# Patient Record
Sex: FEMALE | Race: WHITE | NOT HISPANIC OR LATINO | Employment: OTHER | URBAN - METROPOLITAN AREA
[De-identification: names, ages, dates, MRNs, and addresses within clinical notes are randomized per-mention and may not be internally consistent; named-entity substitution may affect disease eponyms.]

---

## 2017-03-03 DIAGNOSIS — E87.5 HYPERKALEMIA: ICD-10-CM

## 2017-03-03 DIAGNOSIS — Z12.11 ENCOUNTER FOR SCREENING FOR MALIGNANT NEOPLASM OF COLON: ICD-10-CM

## 2017-03-06 ENCOUNTER — GENERIC CONVERSION - ENCOUNTER (OUTPATIENT)
Dept: OTHER | Facility: OTHER | Age: 67
End: 2017-03-06

## 2017-03-06 ENCOUNTER — APPOINTMENT (OUTPATIENT)
Dept: LAB | Facility: CLINIC | Age: 67
End: 2017-03-06
Payer: MEDICARE

## 2017-03-06 ENCOUNTER — TRANSCRIBE ORDERS (OUTPATIENT)
Dept: LAB | Facility: CLINIC | Age: 67
End: 2017-03-06

## 2017-03-06 DIAGNOSIS — E87.5 HYPERKALEMIA: ICD-10-CM

## 2017-03-06 LAB
ANION GAP SERPL CALCULATED.3IONS-SCNC: 3 MMOL/L (ref 4–13)
BUN SERPL-MCNC: 13 MG/DL (ref 5–25)
CALCIUM SERPL-MCNC: 9.6 MG/DL (ref 8.3–10.1)
CHLORIDE SERPL-SCNC: 100 MMOL/L (ref 100–108)
CO2 SERPL-SCNC: 34 MMOL/L (ref 21–32)
CREAT SERPL-MCNC: 0.85 MG/DL (ref 0.6–1.3)
GFR SERPL CREATININE-BSD FRML MDRD: >60 ML/MIN/1.73SQ M
GLUCOSE SERPL-MCNC: 185 MG/DL (ref 65–140)
POTASSIUM SERPL-SCNC: 4.3 MMOL/L (ref 3.5–5.3)
SODIUM SERPL-SCNC: 137 MMOL/L (ref 136–145)

## 2017-03-06 PROCEDURE — 80048 BASIC METABOLIC PNL TOTAL CA: CPT

## 2017-03-06 PROCEDURE — 36415 COLL VENOUS BLD VENIPUNCTURE: CPT

## 2017-03-07 ENCOUNTER — ALLSCRIPTS OFFICE VISIT (OUTPATIENT)
Dept: OTHER | Facility: OTHER | Age: 67
End: 2017-03-07

## 2017-03-07 ENCOUNTER — GENERIC CONVERSION - ENCOUNTER (OUTPATIENT)
Dept: OTHER | Facility: OTHER | Age: 67
End: 2017-03-07

## 2017-03-20 ENCOUNTER — GENERIC CONVERSION - ENCOUNTER (OUTPATIENT)
Dept: OTHER | Facility: OTHER | Age: 67
End: 2017-03-20

## 2017-06-20 ENCOUNTER — ALLSCRIPTS OFFICE VISIT (OUTPATIENT)
Dept: OTHER | Facility: OTHER | Age: 67
End: 2017-06-20

## 2017-06-20 DIAGNOSIS — J45.909 UNCOMPLICATED ASTHMA: ICD-10-CM

## 2017-06-20 DIAGNOSIS — Z12.11 ENCOUNTER FOR SCREENING FOR MALIGNANT NEOPLASM OF COLON: ICD-10-CM

## 2017-06-20 DIAGNOSIS — Z20.5 CONTACT WITH AND (SUSPECTED) EXPOSURE TO VIRAL HEPATITIS: ICD-10-CM

## 2017-06-20 DIAGNOSIS — E11.9 TYPE 2 DIABETES MELLITUS WITHOUT COMPLICATIONS (HCC): ICD-10-CM

## 2017-06-20 LAB — HBA1C MFR BLD HPLC: 6.9 %

## 2017-09-11 ENCOUNTER — GENERIC CONVERSION - ENCOUNTER (OUTPATIENT)
Dept: OTHER | Facility: OTHER | Age: 67
End: 2017-09-11

## 2017-10-06 ENCOUNTER — APPOINTMENT (OUTPATIENT)
Dept: LAB | Facility: CLINIC | Age: 67
End: 2017-10-06
Payer: MEDICARE

## 2017-10-06 ENCOUNTER — TRANSCRIBE ORDERS (OUTPATIENT)
Dept: LAB | Facility: CLINIC | Age: 67
End: 2017-10-06

## 2017-10-06 DIAGNOSIS — E11.9 TYPE 2 DIABETES MELLITUS WITHOUT COMPLICATIONS (HCC): ICD-10-CM

## 2017-10-06 DIAGNOSIS — Z20.5 CONTACT WITH AND (SUSPECTED) EXPOSURE TO VIRAL HEPATITIS: ICD-10-CM

## 2017-10-06 LAB
ANION GAP SERPL CALCULATED.3IONS-SCNC: 6 MMOL/L (ref 4–13)
BUN SERPL-MCNC: 10 MG/DL (ref 5–25)
CALCIUM SERPL-MCNC: 9.2 MG/DL (ref 8.3–10.1)
CHLORIDE SERPL-SCNC: 104 MMOL/L (ref 100–108)
CO2 SERPL-SCNC: 30 MMOL/L (ref 21–32)
CREAT SERPL-MCNC: 0.9 MG/DL (ref 0.6–1.3)
CREAT UR-MCNC: 154 MG/DL
GFR SERPL CREATININE-BSD FRML MDRD: 65 ML/MIN/1.73SQ M
GLUCOSE P FAST SERPL-MCNC: 172 MG/DL (ref 65–99)
HCV AB SER QL: NORMAL
MICROALBUMIN UR-MCNC: 34.6 MG/L (ref 0–20)
MICROALBUMIN/CREAT 24H UR: 22 MG/G CREATININE (ref 0–30)
POTASSIUM SERPL-SCNC: 4.6 MMOL/L (ref 3.5–5.3)
SODIUM SERPL-SCNC: 140 MMOL/L (ref 136–145)

## 2017-10-06 PROCEDURE — 80048 BASIC METABOLIC PNL TOTAL CA: CPT

## 2017-10-06 PROCEDURE — 86803 HEPATITIS C AB TEST: CPT

## 2017-10-06 PROCEDURE — 36415 COLL VENOUS BLD VENIPUNCTURE: CPT

## 2017-10-06 PROCEDURE — 82570 ASSAY OF URINE CREATININE: CPT

## 2017-10-06 PROCEDURE — 82043 UR ALBUMIN QUANTITATIVE: CPT

## 2017-10-10 ENCOUNTER — ALLSCRIPTS OFFICE VISIT (OUTPATIENT)
Dept: OTHER | Facility: OTHER | Age: 67
End: 2017-10-10

## 2017-10-10 DIAGNOSIS — E78.5 HYPERLIPIDEMIA: ICD-10-CM

## 2017-10-10 DIAGNOSIS — R53.1 WEAKNESS: ICD-10-CM

## 2017-10-10 DIAGNOSIS — E11.9 TYPE 2 DIABETES MELLITUS WITHOUT COMPLICATIONS (HCC): ICD-10-CM

## 2017-10-11 NOTE — PROGRESS NOTES
Assessment  1  Allergic rhinitis (477 9) (J30 9)   2  Asthma, mild intermittent (493 90) (J45 20)   3  Dupuytren's contracture (728 6) (M72 0)   4  Controlled type 2 diabetes mellitus (250 00) (E11 9)   5  Benign essential hypertension (401 1) (I10)   6  Hyperlipidemia (272 4) (E78 5)   7  Weakness (780 79) (R53 1)    Plan  Asthma, mild intermittent    · Accu-Chek Compact Plus In Vitro Strip; test twice daily   · Accu-Chek Softclix Lancets Miscellaneous; USE BID  Controlled type 2 diabetes mellitus    · (1) HEMOGLOBIN A1C; Status:Active; Requested for:10Oct2017;   Flu vaccine need    · Fluzone High-Dose 0 5 ML Intramuscular Suspension Prefilled Syringe  Hyperlipidemia    · (1) COMPREHENSIVE METABOLIC PANEL; Status:Active; Requested for:10Oct2017;    · (1) LIPID PANEL, FASTING; Status:Active; Requested for:10Oct2017;   Weakness    · (1) CBC/PLT/DIFF; Status:Active; Requested for:10Oct2017;    · (1) TSH WITH FT4 REFLEX; Status:Active; Requested for:10Oct2017;     Discussion/Summary    Alexus Hodgson is here for follow-up visit  She has been doing well with the diabetes  She has had a little more nasal congestion and coughing in the morning due to allergies  I recommended use of Flonase and saline nasal spray  blood pressure is under excellent control  She remains on simvastatin for hyperlipidemia  We will check a fasting lipid profile prior to next visit  has been complaining of some feelings of muscle weakness recently  I have also given her a lab slip to check thyroid functions and a blood count  shot was given today and she will be due for her pneumococcal vaccine at the next visit in 3 or 4 months  Possible side effects of new medications were reviewed with the patient/guardian today  The treatment plan was reviewed with the patient/guardian  The patient/guardian understands and agrees with the treatment plan      Chief Complaint  dm ck      History of Present Illness  Here for DM follow up  She is feeling well  CP or SOB  No headaches or dizzinesshand with Duputren's contracturec/o back and hip pain after walking  she feels like she is weak opening a door  She feels like she does not have the  strength  in the AM  They recently inherited a cat      Review of Systems    Constitutional: No fever, no chills, feels well, no tiredness, no recent weight gain or weight loss  Cardiovascular: No complaints of slow heart rate, no fast heart rate, no chest pain, no palpitations, no leg claudication, no lower extremity edema  Respiratory: cough, but-no shortness of breath-and-no wheezing  Musculoskeletal: as noted in HPI  Active Problems  1  Allergic rhinitis (477 9) (J30 9)   2  Asthma, mild intermittent (493 90) (J45 20)   3  Benign essential hypertension (401 1) (I10)   4  Bronchitis, asthmatic (493 90) (J45 909)   5  Colon cancer screening (V76 51) (Z12 11)   6  Dupuytren's contracture (728 6) (M72 0)   7  Encounter for FIT (fecal immunochemical test) screening (V76 51) (Z12 11)   8  Encounter for mammogram to establish baseline mammogram (V76 12) (Z12 31)   9  Encounter for preventive health examination (V70 0) (Z00 00)   10  Exercise counseling (V65 41) (Z71 82)   11  Exposure to hepatitis C (V01 79) (Z20 5)   12  Flu vaccine need (V04 81) (Z23)   13  Flu vaccine need (V04 81) (Z23)   14  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 9) (H91 91)   15  High potassium (276 7) (E87 5)   16  Hyperlipidemia (272 4) (E78 5)   17  Hyperpigmentation of skin (709 00) (L81 9)   18  Left cataract (366 9) (H26 9)   19  Medicare annual wellness visit, subsequent (V70 0) (Z00 00)   20  Monilial rash (112 3) (B37 2)   21  Need for pneumococcal vaccine (V03 82) (Z23)   22  Postmenopausal osteoporosis (733 01) (M81 0)   23  Restless legs syndrome (333 94) (G25 81)   24  Screening for osteoporosis (V82 81) (Z13 820)   25  Uncomplicated asthma, unspecified asthma severity   26  Visit for pre-operative examination (V72 84) (Z01 818)   27   Weight gain (783 1) (R63 5)    Past Medical History    The active problems and past medical history were reviewed and updated today  Surgical History  1  History of Cholecystectomy   2  History of Hysterectomy    Family History  Mother    1  Family history of heart disease (V17 49) (Z82 49)  Father    2  Family history of rheumatic fever (V17 49) (Z82 49)  Sibling    3  Family history of diabetes mellitus (V18 0) (Z83 3)  Maternal Great Grandmother    4  Family history of diabetes mellitus (V18 0) (Z83 3)    Social History   · Former smoker (V15 82) (B50 371)   · No alcohol use   · No drug use    Current Meds   1  Accu-Chek Compact Plus Care KIT; TEST ONCE DAILY; Therapy: 34MEA7236 to (Evaluate:17Jun2016)  Requested for: 61BHN4791; Last   Rx:16Jcv2577 Ordered   2  Accu-Chek Compact Plus In Vitro Strip; test twice daily; Therapy: 51WMS0255 to (Evaluate:00Gnh9701)  Requested for: 24DOQ5343; Last   Rx:87Sju7822 Ordered   3  Accu-Chek Softclix Lancets Miscellaneous; USE BID; Therapy: 42MYV3687 to (Evaluate:11Jan2017)  Requested for: 48QGU4030; Last   Rx:51Hwo1505 Ordered   4  Advil 200 MG Oral Capsule; TAKE 2  CAPSULE Oral;   Therapy: 82AHX0859 to Recorded   5  Alendronate Sodium 70 MG Oral Tablet; TAKE 1 TABLET ONCE WEEKLY; Therapy: 39WXX2104 to (Evaluate:01Apr2018)  Requested for: 70MDL3066; Last   Rx:39Asz6448 Ordered   6  Aspirin 81 MG Oral Tablet Chewable; Take 1 tablet daily; Therapy: 77WSM5385 to (Evaluate:03Sep2017); Last Rx:07Mar2017 Ordered   7  Calcium 600+D 600-400 MG-UNIT Oral Tablet; TAKE 1 TABLET TWICE DAILY; Therapy: 67KWR0402 to (Evaluate:03Sep2017); Last Rx:07Mar2017 Ordered   8  CoQ-10 100 MG Oral Capsule Extended Release; take 1 capsule daily; Therapy: 27KXM3267 to (Evaluate:06Apr2017); Last Rx:07Mar2017 Ordered   9  Losartan Potassium 50 MG Oral Tablet; Take 1 tablet by mouth daily; Therapy: 97TMP9338 to (Evaluate:03Sep2017)  Requested for: 88JOJ5466; Last   Rx:07Mar2017 Ordered   10   MetFORMIN HCl - 1000 MG Oral Tablet; Take 1 tablet twice daily; Therapy: 44DIK5231 to ([place] Draft)  Requested for: 77ZOY2251; Last    Rx:58Qbx7388 Ordered   11  Nystatin 407082 UNIT/GM External Cream; APPLY  AND RUB  IN A THIN FILM TO    AFFECTED AREAS TWICE DAILY  (AM AND PM); Therapy: 64QMR0410 to (Evaluate:77Xzp1950)  Requested for: 20Jun2017; Last    Rx:20Jun2017 Ordered   12  Simvastatin 10 MG Oral Tablet; TAKE 1 TABLET DAILY; Therapy: 05LNE4201 to (Evaluate:11Jan2018)  Requested for: 22PLR3156; Last    Rx:16Jan2017 Ordered    The medication list was reviewed and updated today  Allergies  1  Penicillins    Vitals  Vital Signs    Recorded: 27YLX4281 58:34XP   Systolic 058   Diastolic 70   Height 5 ft 0 6 in   Weight 168 lb 2 oz   BMI Calculated 32 19   BSA Calculated 1 75     Physical Exam    Constitutional   General appearance: No acute distress, well appearing and well nourished  Ears, Nose, Mouth, and Throat   External inspection of ears and nose: Normal     Otoscopic examination: Tympanic membranes translucent with normal light reflex  Canals patent without erythema  Oropharynx: Abnormal  -(edentulous) The posterior pharynx was not erythematous-and-did not have an exudate  Pulmonary   Respiratory effort: No increased work of breathing or signs of respiratory distress  Auscultation of lungs: Clear to auscultation  Cardiovascular   Auscultation of heart: Normal rate and rhythm, normal S1 and S2, without murmurs  Examination of extremities for edema and/or varicosities: Normal     Carotid pulses: Normal     Lymphatic   Palpation of lymph nodes in neck: No lymphadenopathy  Health Management  Diabetes mellitus, type 2   *VB - Eye Exam; every 1 year; Last 99HJP5326; Next Due: 06Xua8558;  Active    Future Appointments    Date/Time Provider Specialty Site   01/16/2018 11:00 AM Everett West MD Family Medicine [facility] 19   Electronically signed by : Juan José Lawton MD; Oct 10 2017  1:03PM EST                       (Author)

## 2018-01-10 NOTE — CONSULTS
Chief Complaint  Initial visit- Preop clearance left eye cataract on 2016 by Dr Shameka Fagan      History of Present Illness  Pre-Op Visit (Brief): The patient is being seen for a preoperative visit  Surgical Risk Assessment:   Prior Anesthesia: She had prior anesthesia  Exercise Capacity: unable to walk two flights of stairs without symptoms, but able to walk four blocks without symptoms  Lifestyle Factors: denies alcohol use, denies tobacco use and denies illegal drug use  Symptoms: no easy bleeding, no easy bruising, no frequent nosebleeds, cough, no dyspnea, no edema and wheezing  HPI: Preop for Left cataract  and right side   Hx of asthma diagnosed many years ago   Has not been here for years, has not been sick  Quit smoking 20 yrs ago  C/o cough in the morning productive of clear mucus  Gets SOB with exertion, occasional wheezing  Used to use Flonase but eye doctor does not want her to use due to elevated pressures, glaucoma suspect  Uses occasional Robitussin DM and takes Advil every other night for arthritis in knees  Weight loss after her father   Dentures no longer fit  Review of Systems    Constitutional: no fever and no chills  ENT: nasal discharge  Cardiovascular: no chest pain and no palpitations  Respiratory: as noted in HPI  Surgical History    · History of Cholecystectomy   · History of Hysterectomy    Family History    · Family history of heart disease (V17 49) (Z82 49)    Social History    · Former smoker (V1 82) (Z18 637)   · No alcohol use   · No drug use    Current Meds   1  Advil 200 MG Oral Capsule; TAKE 2  CAPSULE Oral;   Therapy: 2016 to Recorded   2  Robitussin -10 MG/5ML Oral Syrup; Therapy: 77SDB1328 to Recorded    Allergies    1   Penicillins    Vitals  Signs [Data Includes: Current Encounter]    Temperature: 98 F  O2 Saturation: 94   Systolic: 322  Diastolic: 80   Systolic: 408  Diastolic: 78   Heart Rate: 88  Systolic: 182  Diastolic: 80  Height: 5 ft 0 4 in  Weight: 165 lb 8 oz  BMI Calculated: 31 89  BSA Calculated: 1 73    Physical Exam    Constitutional   General appearance: No acute distress, well appearing and well nourished  obese  Head and Face   Head and face: Normal     Palpation of the face and sinuses: No sinus tenderness  Eyes   Conjunctiva and lids: No swelling, erythema or discharge  Pupils and irises: Equal, round, reactive to light  Ears, Nose, Mouth, and Throat   External inspection of ears and nose: Normal     Otoscopic examination: Tympanic membranes translucent with normal light reflex  Canals patent without erythema  Hearing: Normal     Nasal mucosa, septum, and turbinates: Abnormal   There was clear rhinorrhea from both nares  The bilateral nasal mucosa was boggy  Lips, teeth, and gums: Abnormal   (edentulous)   Oropharynx: Normal with no erythema, edema, exudate or lesions  Neck   Neck: Supple, symmetric, trachea midline, no masses  Thyroid: Normal, no thyromegaly  Pulmonary   Respiratory effort: No increased work of breathing or signs of respiratory distress  Auscultation of lungs: Clear to auscultation  Cardiovascular   Auscultation of heart: Normal rate and rhythm, normal S1 and S2, no murmurs  Carotid pulses: 2+ bilaterally  Pedal pulses: 2+ bilaterally  Examination of extremities for edema and/or varicosities: Normal     Abdomen   Abdomen: Non-tender, no masses  (large vertical scar)   Liver and spleen: No hepatomegaly or splenomegaly  Results/Data  MINI NEBULIZER- POC 20Jan2016 10:10AM Preeti Senegal     Test Name Result Flag Reference   Serafin Silver [id number]       EKG/ECG- POC 09QLY1372 10:07AM Preeti Sen[place]     Test Name Result Flag Reference   EKG/ECG 01/20/2016       No acute ischemia  T waves:   there are nonspecific ST-T wave changes  Assessment    1  History of Hysterectomy   2  History of Cholecystectomy   3   Visit for pre-operative examination (V72 84) (Z01 818)   4  Bronchitis, asthmatic (493 90) (J45 909)   5  Allergic rhinitis (477 9) (J30 9)   6  Left cataract (366 9) (H26 9)    Plan  Bronchitis, asthmatic    · Advair Diskus 100-50 MCG/DOSE Inhalation Aerosol Powder Breath Activated;  inhale 1 puff twice daily   · Albuterol Sulfate (2 5 MG/3ML) 0 083% Inhalation Nebulization Solution   · MINI NEBULIZER- POC; Status:Complete - Retrospective By Protocol Authorization;    Done: 25TNM3777 10:10AM  Flu vaccine need    · Stop: Fluzone High-Dose Intramuscular Suspension  Visit for pre-operative examination    · EKG/ECG- POC; Status:Complete - Retrospective By Protocol Authorization;   Done:  44REY7616 10:07AM    Discussion/Summary  Surgical Clearance: She is at a LOW risk from a cardiovascular standpoint at this time without any additional cardiac testing  Reevaluation needed, if she should present with symptoms prior to surgery/procedure  Patient is here for preop clearance for left cataract to be done January 26  She has not been into the office for many years, because she states she rarely gets sick  She does have a history of chronic bronchitis and was diagnosed with asthma and allergies many years ago  She has never used an inhaler on an ongoing basis  She has used over-the-counter rescue inhalers on rare occasion  She states her respiratory status is fairly good for walking on a level surface but she gets winded with exertion such as climbing stairs or walking up an incline  She did a mid treatment here in the office to see if it would improve her pulse ox above 94% but there was no change  I have given her a Advair inhaler and a prescription for the same  We'll recommend she start using it but she may want to wait until after her cataract surgeries  She should have an annual wellness visits as well as a recheck visits and we will plan this in 1-2 months  End of Encounter Meds    1   Advair Diskus 100-50 MCG/DOSE Inhalation Aerosol Powder Breath Activated; inhale 1   puff twice daily; Therapy: 47UGU3618 to (Last Rx:20Jan2016)  Requested for: 20Jan2016 Ordered    2  Robitussin -10 MG/5ML Oral Syrup; Therapy: 86CAA3483 to Recorded    3   Advil 200 MG Oral Capsule; TAKE 2  CAPSULE Oral;   Therapy: 19EOY6674 to Recorded    Signatures   Electronically signed by : Griffin Wood MD; Jan 20 2016  1:43PM EST                       (Author)

## 2018-01-12 ENCOUNTER — APPOINTMENT (OUTPATIENT)
Dept: LAB | Facility: CLINIC | Age: 68
End: 2018-01-12
Payer: MEDICARE

## 2018-01-12 ENCOUNTER — TRANSCRIBE ORDERS (OUTPATIENT)
Dept: LAB | Facility: CLINIC | Age: 68
End: 2018-01-12

## 2018-01-12 VITALS
DIASTOLIC BLOOD PRESSURE: 82 MMHG | HEIGHT: 61 IN | BODY MASS INDEX: 30.99 KG/M2 | WEIGHT: 164.13 LBS | SYSTOLIC BLOOD PRESSURE: 130 MMHG

## 2018-01-12 DIAGNOSIS — E78.5 HYPERLIPIDEMIA: ICD-10-CM

## 2018-01-12 DIAGNOSIS — R53.1 WEAKNESS: ICD-10-CM

## 2018-01-12 DIAGNOSIS — E11.9 TYPE 2 DIABETES MELLITUS WITHOUT COMPLICATIONS (HCC): ICD-10-CM

## 2018-01-12 LAB
ALBUMIN SERPL BCP-MCNC: 3.7 G/DL (ref 3.5–5)
ALP SERPL-CCNC: 68 U/L (ref 46–116)
ALT SERPL W P-5'-P-CCNC: 18 U/L (ref 12–78)
ANION GAP SERPL CALCULATED.3IONS-SCNC: 4 MMOL/L (ref 4–13)
AST SERPL W P-5'-P-CCNC: 16 U/L (ref 5–45)
BASOPHILS # BLD AUTO: 0.05 THOUSANDS/ΜL (ref 0–0.1)
BASOPHILS NFR BLD AUTO: 1 % (ref 0–1)
BILIRUB SERPL-MCNC: 0.46 MG/DL (ref 0.2–1)
BUN SERPL-MCNC: 15 MG/DL (ref 5–25)
CALCIUM SERPL-MCNC: 9.5 MG/DL (ref 8.3–10.1)
CHLORIDE SERPL-SCNC: 101 MMOL/L (ref 100–108)
CHOLEST SERPL-MCNC: 149 MG/DL (ref 50–200)
CO2 SERPL-SCNC: 33 MMOL/L (ref 21–32)
CREAT SERPL-MCNC: 0.92 MG/DL (ref 0.6–1.3)
EOSINOPHIL # BLD AUTO: 0.45 THOUSAND/ΜL (ref 0–0.61)
EOSINOPHIL NFR BLD AUTO: 5 % (ref 0–6)
ERYTHROCYTE [DISTWIDTH] IN BLOOD BY AUTOMATED COUNT: 12.3 % (ref 11.6–15.1)
EST. AVERAGE GLUCOSE BLD GHB EST-MCNC: 143 MG/DL
GFR SERPL CREATININE-BSD FRML MDRD: 64 ML/MIN/1.73SQ M
GLUCOSE P FAST SERPL-MCNC: 117 MG/DL (ref 65–99)
HBA1C MFR BLD: 6.6 % (ref 4.2–6.3)
HCT VFR BLD AUTO: 42.2 % (ref 34.8–46.1)
HDLC SERPL-MCNC: 82 MG/DL (ref 40–60)
HGB BLD-MCNC: 13.9 G/DL (ref 11.5–15.4)
LDLC SERPL CALC-MCNC: 46 MG/DL (ref 0–100)
LYMPHOCYTES # BLD AUTO: 3.18 THOUSANDS/ΜL (ref 0.6–4.47)
LYMPHOCYTES NFR BLD AUTO: 36 % (ref 14–44)
MCH RBC QN AUTO: 29.5 PG (ref 26.8–34.3)
MCHC RBC AUTO-ENTMCNC: 32.9 G/DL (ref 31.4–37.4)
MCV RBC AUTO: 90 FL (ref 82–98)
MONOCYTES # BLD AUTO: 0.67 THOUSAND/ΜL (ref 0.17–1.22)
MONOCYTES NFR BLD AUTO: 8 % (ref 4–12)
NEUTROPHILS # BLD AUTO: 4.56 THOUSANDS/ΜL (ref 1.85–7.62)
NEUTS SEG NFR BLD AUTO: 50 % (ref 43–75)
NRBC BLD AUTO-RTO: 0 /100 WBCS
PLATELET # BLD AUTO: 289 THOUSANDS/UL (ref 149–390)
PMV BLD AUTO: 10.6 FL (ref 8.9–12.7)
POTASSIUM SERPL-SCNC: 4 MMOL/L (ref 3.5–5.3)
PROT SERPL-MCNC: 8.1 G/DL (ref 6.4–8.2)
RBC # BLD AUTO: 4.71 MILLION/UL (ref 3.81–5.12)
SODIUM SERPL-SCNC: 138 MMOL/L (ref 136–145)
TRIGL SERPL-MCNC: 104 MG/DL
TSH SERPL DL<=0.05 MIU/L-ACNC: 3.19 UIU/ML (ref 0.36–3.74)
WBC # BLD AUTO: 8.92 THOUSAND/UL (ref 4.31–10.16)

## 2018-01-12 PROCEDURE — 85025 COMPLETE CBC W/AUTO DIFF WBC: CPT

## 2018-01-12 PROCEDURE — 36415 COLL VENOUS BLD VENIPUNCTURE: CPT

## 2018-01-12 PROCEDURE — 83036 HEMOGLOBIN GLYCOSYLATED A1C: CPT

## 2018-01-12 PROCEDURE — 80053 COMPREHEN METABOLIC PANEL: CPT

## 2018-01-12 PROCEDURE — 80061 LIPID PANEL: CPT

## 2018-01-12 PROCEDURE — 84443 ASSAY THYROID STIM HORMONE: CPT

## 2018-01-12 NOTE — PROGRESS NOTES
Plan    1  DSMT/MNT Time Record; Status:Complete;   Done: 53WRQ5597 06:00PM    Discussion/Summary    PATIENT EDUCATION RECORD   Living Well with Diabetes Class 2 Education Content: Macronutrients, Carbohydrate sources, What one serving of carbohydrate equals, Effects of diet on blood glucose levels, effects of carbohydrates on blood glucose levels, Basics of meal planning: balance, portions, and meal times, Measurements, Reading food labels to determine carbohydrates       Chief Complaint  Fina Garcia attended class this evening  End of Encounter Meds    1  Advair Diskus 100-50 MCG/DOSE Inhalation Aerosol Powder Breath Activated; inhale 1   puff twice daily; Therapy: 63DPM9831 to (Evaluate:84Bpj2852); Last Rx:08Mar2016 Ordered    2  Accu-Chek Compact Plus Care KIT; TEST ONCE DAILY; Therapy: 51OUE8354 to (Evaluate:17Jun2016)  Requested for: 08SYF0607; Last   Rx:87Bug4699 Ordered   3  Accu-Chek Compact Plus In Vitro Strip; test twice daily; Therapy: 33GTW5207 to (Evaluate:15Nov2016)  Requested for: 43IZS0488; Last   Rx:11Wso8011 Ordered   4  Accu-Chek Softclix Lancets Miscellaneous; USE BID; Therapy: 43TMR3552 to (Evaluate:18Jun2016)  Requested for: 39FJU6499; Last   Rx:05Sdr2151 Ordered   5  Lisinopril 5 MG Oral Tablet; TAKE 1 TABLET DAILY; Therapy: 39YYE2830 to (Evaluate:12Nov2016)  Requested for: 97FTY2285; Last   Rx:54Lzb9978 Ordered   6  MetFORMIN HCl - 500 MG Oral Tablet; TAKE 1 TABLET EVERY 12 HOURS WITH FOOD; Therapy: 15WSM1192 to (Evaluate:12Nov2016)  Requested for: 23HGI2183; Last   Rx:87Kxx5608 Ordered    7  Robitussin -10 MG/5ML Oral Syrup; Therapy: 19UUY3641 to Recorded    8  Simvastatin 10 MG Oral Tablet; TAKE 1 TABLET DAILY; Therapy: 22RGF2993 to (Evaluate:12Nov2016)  Requested for: 82UXE6557; Last   Rx:90Rco3077 Ordered    9  Alendronate Sodium 70 MG Oral Tablet (Fosamax); TAKE 1 TABLET ONCE WEEKLY;    Therapy: 07DET4626 to (Evaluate:12Nov2016)  Requested for: 88QXC8751; Last Rx:82Mol8485 Ordered    10   Advil 200 MG Oral Capsule; TAKE 2  CAPSULE Oral;    Therapy: 18DTP1914 to Recorded    Future Appointments    Date/Time Provider Specialty Site   08/18/2016 09:40 AM Drea Campbell MD Family Medicine [address] [place] [address]   Electronically signed by : Gunner Monique RD; Jun 16 2016  8:27AM EST                       (Author)    Electronically signed by : TIFFANIE Killian ; Jun 16 2016  2:25PM EST

## 2018-01-12 NOTE — PROGRESS NOTES
Plan    1  DSMT/MNT Time Record; Status:Complete;   Done: 88FJD0257 10:23AM   2  *1 - [facility]ABETES SELF MANAGEMENT TRAINING OUTPATIENT Physician Referral    Consult  Status: Active  Requested for: 19DVL4588  requires instruction : Yes  Needs requiring Individual DSMT? : No  Self-Management Education/Trainng : Living Well with Diabetes Education      Program  Care Summary provided  : Yes    Discussion/Summary    PATIENT EDUCATION RECORD   Indication for Services: type 2 Diabetes Mellitus and obesity  She is ready to learn  She has no barriers to learning  Diabetes Disease Process:   Discussed diagnosis criteria: Method: Instruction  Response: Verbalizes Understanding   Discussed treatment goals: Method: Instruction  Response: Verbalizes Understanding   Healthy Eating:   Discussed general nutrition topics: Method: Instruction  Response: Verbalizes Understanding and Needs Review   Being Active:   Stated the benefits of exercise: Method: Instruction  Response: Verbalizes Understanding and Needs Review   Her blood glucose targets are: Pre-meal target , Post-meal target <180 and HS target <180  Monitoring:   Discussed option for monitoring SMBG: Method: Instruction  Response: Verbalizes Understanding  Discussed option for monitoring HgbA1c: Method: Instruction  Response: Verbalizes Understanding  Discussed target blood glucose ranges: Method: Instruction and Handout  Response: Verbalizes Understanding  Discussed Finger stick testing: Method: Instruction  Response: Verbalizes Understanding  Discussed proper stick techniques: Method: Instruction  Response: Verbalizes Understanding  Discussed testing times: Method: Instruction  Response: Verbalizes Understanding and Needs Review  She is currently using a Accu-Chek Compact meter  Taking Medication:   Discussed Oral Medication[de-identified] Method: Instruction  Response: [employer]  Stated name,dose, and timing of oral meds  Method: Instruction  Response: [employer]  Stated side effects/precautions with diabetes meds  Method: Instruction  Response: [facility] [employer]  Discussed medication safety  Method: Instruction  Response: [employer]  She is taking  biguanides  She was given Hypoglycemia Tear Sheet   Problem Solving: She is not on medications that cause hypoglycemia   Hypoglycemia: Stated definition and causes: Method: Instruction and Handout  Response: Verbalizes Understanding and Needs Review   Described signs and symptoms: Method: Instruction and Handout  Response: Verbalizes Understanding and Needs Review   Discussed prevention: Method: Instruction and Handout  Response: Verbalizes Instruction and Needs Review   Discussed treatment: Method: Instruction and Handout  Response: Verbalizes Instruction and Needs Review   Hyperglycemia: Stated definition and causes: Method: Instruction and Handout  Response: Verbalizes Understanding and Needs Review   Described signs and symptoms: Method: Instruction and Handout  Response: Verbalizes Instruction and Needs Review   Education Plan/Path:  She needs the Living Well Class  She will attend class starting next week  her  is also signed up for class  If they decide that they only want to have him billed, she will let educator know that she is observing class as a support person only  She was given the following educational materials: Know Your Blood Glucose Numbers, Diabetes Guidelines and Hyperglycemia/Hypoglycemia   Chief Complaint  Linn Sender reports newly diagnosed type 2 diabetes but is aware she may have had it for at least several months  Reports a h/o prediabetes 20 years ago with subsequent weight loss and near-resolution of glucose excursions  She has been taking Metformin for 2 weeks and c/o back pain requiring prescription medication for the past 10 days  Persistently elevated BG with most readings >200 may be related to pain and inflammation   Advised her to call physician next week if BG don't start to improve  History of Present Illness  Jennifer Velasquez was seen for class assessment for LWD classes for new diagnosis of type 2 diabetes  End of Encounter Meds    1  Advair Diskus 100-50 MCG/DOSE Inhalation Aerosol Powder Breath Activated; inhale 1   puff twice daily; Therapy: 96HEL6106 to (Evaluate:88Zcp8525); Last Rx:08Mar2016 Ordered    2  Accu-Chek Compact Plus Care KIT; TEST ONCE DAILY; Therapy: 17HMC0167 to (Evaluate:17Jun2016)  Requested for: 34NGH2552; Last   Rx:18May2016 Ordered   3  Accu-Chek Compact Plus In Vitro Strip; test twice daily; Therapy: 99IAE7479 to (Evaluate:15Nov2016)  Requested for: 06KJZ9530; Last   Rx:19May2016 Ordered   4  Accu-Chek Softclix Lancets Miscellaneous; USE BID; Therapy: 95JWS4400 to (Evaluate:18Jun2016)  Requested for: 55HPZ8470; Last   Rx:19May2016 Ordered   5  Lisinopril 5 MG Oral Tablet; TAKE 1 TABLET DAILY; Therapy: 09AHB7529 to (Evaluate:12Nov2016)  Requested for: 00COZ1899; Last   Rx:16May2016 Ordered   6  MetFORMIN HCl - 500 MG Oral Tablet; TAKE 1 TABLET EVERY 12 HOURS WITH FOOD; Therapy: 09PII7521 to (Evaluate:12Nov2016)  Requested for: 12SIC6081; Last   Rx:64Xvn4025 Ordered    7  Robitussin -10 MG/5ML Oral Syrup; Therapy: 05MGL5184 to Recorded    8  Simvastatin 10 MG Oral Tablet; TAKE 1 TABLET DAILY; Therapy: 27DUB7662 to (Evaluate:12Nov2016)  Requested for: 15APZ5695; Last   Rx:16Qui5361 Ordered    9  Alendronate Sodium 70 MG Oral Tablet (Fosamax); TAKE 1 TABLET ONCE WEEKLY; Therapy: 56VMD9769 to (Evaluate:12Nov2016)  Requested for: 25IIV3608; Last   Rx:61Abt6934 Ordered    10   Advil 200 MG Oral Capsule; TAKE 2  CAPSULE Oral;    Therapy: 27TSD4607 to Recorded    Future Appointments    Date/Time Provider Specialty Site   08/18/2016 09:40 AM Preeti Izquierdo MD Family Medicine [address] [place] [address]   Electronically signed by : Alexa Bay RD; Buddy  3 2016 10:38AM EST (Author)    Electronically signed by : TIFFANIE Fuentes ; Buddy  3 2016 11:47AM EST

## 2018-01-13 NOTE — PROGRESS NOTES
Plan    1  DSMT/MNT Time Record; Status:Complete;   Done: 63JIL0767 06:00PM    Discussion/Summary    PATIENT EDUCATION RECORD   Living Well with Diabetes Class 4 Education Content: Types of cholesterol, Dietary sources of cholesterol, Types of fat, Types of fiber, Reading food labels- in depth, Healthy choices when dining out        Chief Complaint  Patient attended LWD class 4 this evening  End of Encounter Meds    1  Advair Diskus 100-50 MCG/DOSE Inhalation Aerosol Powder Breath Activated; inhale 1   puff twice daily; Therapy: 16GSN8083 to (Evaluate:18Dve8275); Last Rx:08Mar2016 Ordered    2  Accu-Chek Compact Plus Care KIT; TEST ONCE DAILY; Therapy: 62DTP7189 to (Evaluate:17Jun2016)  Requested for: 41MPF9349; Last   Rx:18May2016 Ordered   3  Accu-Chek Compact Plus In Vitro Strip; test twice daily; Therapy: 04NLQ3583 to (Evaluate:15Nov2016)  Requested for: 52FCH7643; Last   Rx:19May2016 Ordered   4  Accu-Chek Softclix Lancets Miscellaneous; USE BID; Therapy: 88OPJ3306 to (Evaluate:18Jun2016)  Requested for: 81WOA2473; Last   Rx:12Wqk9324 Ordered   5  Lisinopril 5 MG Oral Tablet; TAKE 1 TABLET DAILY; Therapy: 26JGJ1601 to (Evaluate:12Nov2016)  Requested for: 54ORI9015; Last   Rx:52Vdd0566 Ordered   6  MetFORMIN HCl - 500 MG Oral Tablet; TAKE 1 TABLET EVERY 12 HOURS WITH FOOD; Therapy: 42JSG2209 to (Evaluate:12Nov2016)  Requested for: 84JZB6951; Last   Rx:94Raa4020 Ordered    7  Robitussin -10 MG/5ML Oral Syrup; Therapy: 89BEM6489 to Recorded    8  Simvastatin 10 MG Oral Tablet; TAKE 1 TABLET DAILY; Therapy: 03IAF3230 to (Evaluate:12Nov2016)  Requested for: 52IVF1412; Last   Rx:35Oho6682 Ordered    9  Alendronate Sodium 70 MG Oral Tablet (Fosamax); TAKE 1 TABLET ONCE WEEKLY; Therapy: 87CHS4772 to (Evaluate:12Nov2016)  Requested for: 75PKF3370; Last   Rx:25Xzo4958 Ordered    10   Advil 200 MG Oral Capsule; TAKE 2  CAPSULE Oral;    Therapy: 20Jan2016 to Recorded    Future Appointments    Date/Time Provider Specialty Site   08/18/2016 09:40 AM Lan Fernandez MD Family Medicine [address] [place] [address]   Electronically signed by : Joan Delgadillo RD; Jun 23 2016  9:40AM EST                       (Author)    Electronically signed by : TIFFANIE Colon ; Jun 23 2016  1:05PM EST

## 2018-01-14 VITALS
BODY MASS INDEX: 31.74 KG/M2 | WEIGHT: 168.13 LBS | SYSTOLIC BLOOD PRESSURE: 136 MMHG | DIASTOLIC BLOOD PRESSURE: 70 MMHG | HEIGHT: 61 IN

## 2018-01-14 VITALS
DIASTOLIC BLOOD PRESSURE: 70 MMHG | WEIGHT: 167.38 LBS | BODY MASS INDEX: 31.6 KG/M2 | SYSTOLIC BLOOD PRESSURE: 136 MMHG | HEIGHT: 61 IN

## 2018-01-14 NOTE — RESULT NOTES
Verified Results  (1) BASIC METABOLIC PROFILE 68IUN2253 10:07AM Papo ALLEN Order Number: [id number]     Test Name Result Flag Reference   GLUCOSE,RANDM 185 mg/dL H    If the patient is fasting, the ADA then defines impaired fasting glucose as > 100 mg/dL and diabetes as > or equal to 123 mg/dL  SODIUM 137 mmol/L  136-145   POTASSIUM 4 3 mmol/L  3 5-5 3   CHLORIDE 100 mmol/L  100-108   CARBON DIOXIDE 34 mmol/L H 21-32   ANION GAP (CALC) 3 mmol/L L 4-13   BLOOD UREA NITROGEN 13 mg/dL  5-25   CREATININE 0 85 mg/dL  0 60-1 30   Standardized to IDMS reference method   CALCIUM 9 6 mg/dL  8 3-10 1   eGFR Non-African American      >60 0 ml/min/1 73sq [facility] Disease Education Program recommendations are as follows:  GFR calculation is accurate only with a steady state creatinine  Chronic Kidney disease less than 60 ml/min/1 73 sq  meters  Kidney failure less than 15 ml/min/1 73 sq  meters

## 2018-01-15 NOTE — PROGRESS NOTES
Assessment    1  History of Hysterectomy   2  History of Cholecystectomy   3  Visit for pre-operative examination (V72 84) (Z01 818)   4  Bronchitis, asthmatic (493 90) (J45 909)   5  Allergic rhinitis (477 9) (J30 9)   6  Left cataract (366 9) (H26 9)    Plan  Bronchitis, asthmatic    · Advair Diskus 100-50 MCG/DOSE Inhalation Aerosol Powder Breath Activated;  inhale 1 puff twice daily   · Albuterol Sulfate (2 5 MG/3ML) 0 083% Inhalation Nebulization Solution   · MINI NEBULIZER- POC; Status:Complete - Retrospective By Protocol Authorization;    Done: 32IQN2406 10:10AM  Flu vaccine need    · Stop: Fluzone High-Dose Intramuscular Suspension  Visit for pre-operative examination    · EKG/ECG- POC; Status:Complete - Retrospective By Protocol Authorization;   Done:  14NTD2185 10:07AM    Discussion/Summary  Surgical Clearance: She is at a LOW risk from a cardiovascular standpoint at this time without any additional cardiac testing  Reevaluation needed, if she should present with symptoms prior to surgery/procedure  Patient is here for preop clearance for left cataract to be done January 26  She has not been into the office for many years, because she states she rarely gets sick  She does have a history of chronic bronchitis and was diagnosed with asthma and allergies many years ago  She has never used an inhaler on an ongoing basis  She has used over-the-counter rescue inhalers on rare occasion  She states her respiratory status is fairly good for walking on a level surface but she gets winded with exertion such as climbing stairs or walking up an incline  She did a mid treatment here in the office to see if it would improve her pulse ox above 94% but there was no change  I have given her a Advair inhaler and a prescription for the same  We'll recommend she start using it but she may want to wait until after her cataract surgeries    She should have an annual wellness visits as well as a recheck visits and we will plan this in 1-2 months  Chief Complaint  Initial visit- Preop clearance left eye cataract on 2016 by Dr Gege Razo      History of Present Illness  Pre-Op Visit (Brief): The patient is being seen for a preoperative visit  Surgical Risk Assessment:   Prior Anesthesia: She had prior anesthesia  Exercise Capacity: unable to walk two flights of stairs without symptoms, but able to walk four blocks without symptoms  Lifestyle Factors: denies alcohol use, denies tobacco use and denies illegal drug use  Symptoms: no easy bleeding, no easy bruising, no frequent nosebleeds, cough, no dyspnea, no edema and wheezing  HPI: Preop for Left cataract  and right side   Hx of asthma diagnosed many years ago   Has not been here for years, has not been sick  Quit smoking 20 yrs ago  C/o cough in the morning productive of clear mucus  Gets SOB with exertion, occasional wheezing  Used to use Flonase but eye doctor does not want her to use due to elevated pressures, glaucoma suspect  Uses occasional Robitussin DM and takes Advil every other night for arthritis in knees  Weight loss after her father   Dentures no longer fit  Review of Systems    Constitutional: no fever and no chills  ENT: nasal discharge  Cardiovascular: no chest pain and no palpitations  Respiratory: as noted in HPI  Surgical History    · History of Cholecystectomy   · History of Hysterectomy    Family History    · Family history of heart disease (V17 49) (Z82 49)    Social History    · Former smoker ( 80) (I98 957)   · No alcohol use   · No drug use    Current Meds   1  Advil 200 MG Oral Capsule; TAKE 2  CAPSULE Oral;   Therapy: 2016 to Recorded   2  Robitussin -10 MG/5ML Oral Syrup; Therapy: 04SWL6557 to Recorded    Allergies    1  Penicillins    Vitals   ** Printed in Appendix #1 below  Physical Exam    Constitutional   General appearance: No acute distress, well appearing and well nourished  obese  Head and Face   Head and face: Normal     Palpation of the face and sinuses: No sinus tenderness  Eyes   Conjunctiva and lids: No swelling, erythema or discharge  Pupils and irises: Equal, round, reactive to light  Ears, Nose, Mouth, and Throat   External inspection of ears and nose: Normal     Otoscopic examination: Tympanic membranes translucent with normal light reflex  Canals patent without erythema  Hearing: Normal     Nasal mucosa, septum, and turbinates: Abnormal   There was clear rhinorrhea from both nares  The bilateral nasal mucosa was boggy  Lips, teeth, and gums: Abnormal   (edentulous)   Oropharynx: Normal with no erythema, edema, exudate or lesions  Neck   Neck: Supple, symmetric, trachea midline, no masses  Thyroid: Normal, no thyromegaly  Pulmonary   Respiratory effort: No increased work of breathing or signs of respiratory distress  Auscultation of lungs: Clear to auscultation  Cardiovascular   Auscultation of heart: Normal rate and rhythm, normal S1 and S2, no murmurs  Carotid pulses: 2+ bilaterally  Pedal pulses: 2+ bilaterally  Examination of extremities for edema and/or varicosities: Normal     Abdomen   Abdomen: Non-tender, no masses  (large vertical scar)   Liver and spleen: No hepatomegaly or splenomegaly  Results/Data  MINI NEBULIZER- POC 20Jan2016 10:10AM Marie Ovalles     Test Name Result Flag Reference   Merry Souza 85BJH7044       EKG/ECG- POC 68DZS9855 10:07AM Marie Ovalles     Test Name Result Flag Reference   EKG/ECG 01/20/2016       No acute ischemia  T waves:   there are nonspecific ST-T wave changes  End of Encounter Meds    1  Advair Diskus 100-50 MCG/DOSE Inhalation Aerosol Powder Breath Activated; inhale 1   puff twice daily; Therapy: 17WEB6921 to (Last Rx:20Jan2016)  Requested for: 20Jan2016 Ordered    2  Robitussin -10 MG/5ML Oral Syrup; Therapy: 26AKS0503 to Recorded    3   Advil 200 MG Oral Capsule; TAKE 2 CAPSULE Oral;   Therapy: 45UVS9311 to Recorded    Signatures   Electronically signed by : Eugene Truong MD; 2016  1:43PM EST                       (Author)    Appendix #1     Patient: James Oden; : 1950; MRN: [id number]      Recorded: 96TMN8941 09:48AM Recorded: 82XCI0368 09:45AM Recorded: 35WZX8937 09:11AM Recorded: 63WIU7471 09:03AM   Temperature 98 F      Heart Rate    88   Systolic  439 165 107   Diastolic  80 78 80   Height    5 ft 0 4 in   Weight    165 lb 8 oz   BMI Calculated    31 89   BSA Calculated    1 73   O2 Saturation 94

## 2018-01-15 NOTE — PROGRESS NOTES
Plan    1  DSMT/MNT Time Record; Status:Complete;   Done: 02ITB1512 12:00AM    Discussion/Summary    PATIENT EDUCATION RECORD   Living Well with Diabetes Class 3 Education Content:Oral/Injectable medication, Prevention, Short-term complications, Long-term complications, Foot care, Sick day management (illness and stress), Travel       History of Present Illness  Patient attended Living Well with Diabetes class 3        Results/Data  Encounter Results   DSMT/MNT Time Record 36JGZ8446 12:00AM Bryson Martell     Test Name Result Flag Reference   Date of Service 6/13/2016     Start - Stop Time 6:00-8:00PM     Total MInutes 120 minutes     Group Or Individual Instruction DSMT-G       Future Appointments    Date/Time Provider Specialty Site   06/15/2016 06:00 PM Sue Gutierres RD Diabetes Educator [facility]   08/18/2016 09:40 AM Yi Taylor MD Family Medicine [address] [place] [address]   Electronically signed by : Margarette Buitrago [facility]; Jun 14 2016 12:10PM EST                       (Author)    Electronically signed by : TIFFANIE Meneses ; Buddy 15 2016  8:07AM EST

## 2018-01-15 NOTE — PROGRESS NOTES
Plan    1  DSMT/MNT Time Record; Status:Complete;   Done: 05YRD5390 12:00AM    Discussion/Summary    PATIENT EDUCATION RECORD   Living Well with Diabetes Class 1 Education Content: What is diabetes, Types of diabetes, How is diabetes diagnosed, Management skills, Role of exercise, Glucose monitoring, Target range goals        History of Present Illness  Patient attended Living Well with Diabetes class 1        Results/Data  Encounter Results   DSMT/MNT Time Record 75YLU9649 12:00AM Tressa Claire     Test Name Result Flag Reference   Date of Service 6/6/2016     Start - Stop Time 6:00-8:00PM     Total MInutes 120 minutes     Group Or Individual Instruction DSMT-G       Future Appointments    Date/Time Provider Specialty Site   06/08/2016 06:00 PM Taye Nguyen RD Diabetes Educator [facility] ENDOCRINOLOGY   06/15/2016 06:00 PM Taye Nguyen RD Diabetes Educator [facility] ENDOCRINOLOGY   06/13/2016 06:00 PM Tressa Claire RD, LDN Diabetes Educator [facility] ENDOCRINOLOGY   08/18/2016 09:40 AM Willow Lyle MD Family Medicine [address] [place] [address]     Signatures   Electronically signed by : Kip Barragan, [facility]; Jun 7 2016  9:04AM EST                       (Author)    Electronically signed by : TIFFANIE Pritchett ; Jun 8 2016  2:19PM EST

## 2018-01-16 ENCOUNTER — ALLSCRIPTS OFFICE VISIT (OUTPATIENT)
Dept: OTHER | Facility: OTHER | Age: 68
End: 2018-01-16

## 2018-01-16 DIAGNOSIS — M81.0 AGE-RELATED OSTEOPOROSIS WITHOUT CURRENT PATHOLOGICAL FRACTURE: ICD-10-CM

## 2018-01-16 DIAGNOSIS — R53.1 WEAKNESS: ICD-10-CM

## 2018-01-16 DIAGNOSIS — M46.1 SACROILIITIS, NOT ELSEWHERE CLASSIFIED (HCC): ICD-10-CM

## 2018-01-16 NOTE — RESULT NOTES
Verified Results  * DXA BONE DENSITY SPINE HIP AND PELVIS 15EPV7918 02:17PM Finn Kate    Order Number: [id number]     Test Name Result Flag Reference   DXA BONE DENSITY SPINE HIP AND PELVIS (Report)     CENTRAL DXA SCAN     CLINICAL HISTORY:  77year old post-menopausal  female risk factors include postmenopausal, estrogen deficiency  TECHNIQUE: Bone densitometry was performed using a Hologic Custer C bone densitometer  Regions of interest appear properly placed  There are no obvious fractures or other confounding variables which could limit the study  None     COMPARISON: Baseline     RESULTS:    LUMBAR SPINE: L1-L4:   BMD 0 822 gm/cm2   T-score below normal, -2 0  However measuring L3 and L4 the actual T score is -2 45    Z-score -0 1     LEFT TOTAL HIP:   BMD 0 716 gm/cm2   T-score below normal, -1 9   Z-score -0 5     LEFT FEMORAL NECK:   BMD 0 536 gm/cm2   T-score below normal, -2 8, osteoporosis  Z-score -1 1     In view of the femoral neck result a 25-hydroxy vitamin D level, an intact PTH, and a comprehensive metabolic panel is suggested for this patient  Treatment with a Bisphosphonate of your choice is also appropriate  ASSESSMENT:   1  Based on the WHO classification, 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 and the patient is considered at increased risk for fracture  2  A daily intake of calcium of at least 1200 mg and vitamin D, 800-1000 IU, as well as weight bearing and muscle strengthening exercise, fall prevention and avoidance of tobacco and excessive alcohol intake as basic preventive measures are recommended  3  Repeat DXA scan in 18-24 months as clinically indicated        WHO CLASSIFICATION:   Normal (a T-score of -1 0 or higher)   Low bone mineral density (a T-score of less than -1 0 but higher than -2 5)   Osteoporosis (a T-score of -2 5 or less)   Severe osteoporosis (a T-score of -2 5 or less with a fragility fracture)      Thank you for allowing us the opportunity to participate in your patient care  The expanded DEXA report will no longer be arriving in your mail   If you desire to view the full report please contact [facility] or access the PACS system       Workstation performed: [id number]

## 2018-01-17 NOTE — PROGRESS NOTES
Assessment   1  Controlled type 2 diabetes mellitus (250 00) (E11 9)   2  Hyperlipidemia (272 4) (E78 5)   3  Benign essential hypertension (401 1) (I10)   4  Chronic low back pain (724 2,338 29) (M54 5,G89 29)   5  Sacroiliitis (720 2) (M46 1)   6  Weakness (780 79) (R53 1)    Plan   Need for pneumococcal vaccine    · Pneumovax 23 25 MCG/0 5ML Injection Injectable  Postmenopausal osteoporosis    · * DXA BONE DENSITY SPINE HIP AND PELVIS; Status:Active - Retrospective By Protocol    Authorization; Requested RUT:[id number]; Sacroiliitis    · (1) SED RATE; Status:Active; Requested QQH:33TVN3932;    · 2 Henrry Moran MD, [facility]  Rheumatology Co-Management  *  Status: Active     Requested for: 68PLH6761  Care Summary provided  : Yes  Sacroiliitis, Weakness    · (1) T4, FREE; Status:Active; Requested IAO:[id number];    · (1) TSH; Status:Active; Requested WYD:17LMN6777; Discussion/Summary      Amari Mike is here for follow-up on diabetes and other problems  Her diabetes is under excellent control and I have congratulated her on this  also was stable from the asthma standpoint and blood pressure and lipids are under good control as well  of her recent lab tests were normal  continues to have some feelings of weakness in her  and proximal muscles  I am going to do a couple additional lab tests and refer her to Rheumatology for the symptoms as well as low back pain and right sacroiliac pain  her DEXA scan to be done in March  She is scheduled for eye doctor exam this spring as well  plan follow-up visit in 3-4 months  Chief Complaint   f/u dm - labs done on friday A1c was 6 6      History of Present Illness   She is feeling well except pain in hips and occasional sciatic pain in buttock  denies pain going all the way down the leg, but it is a sharp pain to bottom of buttock has been stable  She coughs up clear mucus in AM colored mucus  in hips and knees   She stopped osteo biflex due to cost  takes Advil at bedtime continues to complain of weakness in her  strength and sometimes reaching overhead her arms feel weak  Review of Systems        Constitutional: No fever, no chills, feels well, no tiredness, no recent weight gain or weight loss  ENT: no complaints of earache, no loss of hearing, no nose bleeds, no nasal discharge, no sore throat, no hoarseness  Cardiovascular: No complaints of slow heart rate, no fast heart rate, no chest pain, no palpitations, no leg claudication, no lower extremity edema  Respiratory: as noted in HPI  Gastrointestinal: No complaints of abdominal pain, no constipation, no nausea or vomiting, no diarrhea, no bloody stools  Genitourinary: No complaints of dysuria, no incontinence, no pelvic pain, no dysmenorrhea, no vaginal discharge or bleeding  Musculoskeletal: as noted in HPI  Active Problems   1  Allergic rhinitis (477 9) (J30 9)   2  Asthma, mild intermittent (493 90) (J45 20)   3  Benign essential hypertension (401 1) (I10)   4  Bronchitis, asthmatic (493 90) (J45 909)   5  Colon cancer screening (V76 51) (Z12 11)   6  Controlled type 2 diabetes mellitus (250 00) (E11 9)   7  Dupuytren's contracture (728 6) (M72 0)   8  Encounter for FIT (fecal immunochemical test) screening (V76 51) (Z12 11)   9  Encounter for mammogram to establish baseline mammogram (V76 12) (Z12 31)   10  Encounter for preventive health examination (V70 0) (Z00 00)   11  Exercise counseling (V65 41) (Z71 82)   12  Exposure to hepatitis C (V01 79) (Z20 5)   13  Flu vaccine need (V04 81) (Z23)   14  Flu vaccine need (V04 81) (Z23)   15  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 9) (H91 91)   16  High potassium (276 7) (E87 5)   17  Hyperlipidemia (272 4) (E78 5)   18  Hyperpigmentation of skin (709 00) (L81 9)   19  Left cataract (366 9) (H26 9)   20  Medicare annual wellness visit, subsequent (V70 0) (Z00 00)   21  Monilial rash (112 3) (B37 2)   22   Need for pneumococcal vaccine (V03 82) (Z23)   23  Postmenopausal osteoporosis (733 01) (M81 0)   24  Restless legs syndrome (333 94) (G25 81)   25  Screening for osteoporosis (V82 81) (Z13 820)   26  Uncomplicated asthma, unspecified asthma severity   27  Visit for pre-operative examination (V72 84) (Z01 818)   28  Weakness (780 79) (R53 1)   29  Weight gain (783 1) (R63 5)    Past Medical History      The active problems and past medical history were reviewed and updated today  Surgical History   1  History of Cholecystectomy   2  History of Hysterectomy     The surgical history was reviewed and updated today  Family History   Mother    1  Family history of heart disease (V17 49) (Z82 49)  Father    2  Family history of rheumatic fever (V17 49) (Z82 49)  Sibling    3  Family history of diabetes mellitus (V18 0) (Z83 3)  Maternal Great Grandmother    4  Family history of diabetes mellitus (V18 0) (Z83 3)     The family history was reviewed and updated today  Social History    · Former smoker (A01 78) (N48 009)   · No alcohol use   · No drug use  The social history was reviewed and updated today  Current Meds    1  Accu-Chek Compact Plus Care KIT; TEST ONCE DAILY; Therapy: 93SHA4676 to (Evaluate:38Joh9249)  Requested for: 37UXY0249; Last     Rx:95Qyp8653 Ordered   2  Accu-Chek Compact Plus In Vitro Strip; test twice daily; Therapy: 35QNV4417 to (Evaluate:16Apr2018)  Requested for: 87KIB4419; Last     Rx:73Mmf3629 Ordered   3  Accu-Chek Softclix Lancets Miscellaneous; USE BID; Therapy: 36GMC1402 to (Evaluate:08Apr2018)  Requested for: 16EJK0957; Last     Rx:66Zzi5839 Ordered   4  Advil 200 MG Oral Capsule; TAKE 2  CAPSULE Oral;     Therapy: 59JWQ3552 to Recorded   5  Alendronate Sodium 70 MG Oral Tablet; TAKE 1 TABLET ONCE WEEKLY; Therapy: 18ANK1877 to (Evaluate:01Apr2018)  Requested for: 55SDL4871; Last     Rx:72Pkg4682 Ordered   6  Aspirin 81 MG Oral Tablet Chewable; Take 1 tablet daily;      Therapy: 23JHY8185 to (Evaluate:03Sep2017); Last Rx:07Mar2017 Ordered   7  Calcium 600+D 600-400 MG-UNIT Oral Tablet; TAKE 1 TABLET TWICE DAILY; Therapy: 58PWU2764 to (Evaluate:03Sep2017); Last Rx:07Mar2017 Ordered   8  CoQ-10 100 MG Oral Capsule Extended Release; take 1 capsule daily; Therapy: 62EBN7770 to (Evaluate:06Apr2017); Last Rx:07Mar2017 Ordered   9  Losartan Potassium 50 MG Oral Tablet; Take 1 tablet by mouth daily; Therapy: 65LDN5997 to (Evaluate:03Sep2017)  Requested for: 65NDJ9479; Last     Rx:07Mar2017 Ordered   10  MetFORMIN HCl - 1000 MG Oral Tablet; Take 1 tablet twice daily; Therapy: 58IHI8013 to (Evaluate:34Jkv8162)  Requested for: 34LCU5645; Last      Rx:10Dec2017 Ordered   11  Nystatin 591026 UNIT/GM External Cream; APPLY  AND RUB  IN A THIN FILM TO      AFFECTED AREAS TWICE DAILY  (AM AND PM); Therapy: 64NFI3540 to (Evaluate:88Ipk2325)  Requested for: 20Jun2017; Last      Rx:20Jun2017 Ordered   12  Simvastatin 10 MG Oral Tablet; TAKE 1 TABLET DAILY; Therapy: 63SLU2902 to (Evaluate:11Jan2018)  Requested for: 83HAK2751; Last      Rx:16Jan2017 Ordered     The medication list was reviewed and updated today  Allergies   1  Penicillins    Vitals   Vital Signs    Recorded: 89BKV9161 11:37AM Recorded: 55AOI4071 10:57AM   Heart Rate  90   Respiration  18   Systolic 085 563   Diastolic 68 72   Height  5 ft 0 6 in   Weight  172 lb 4 oz   BMI Calculated  32 98   BSA Calculated  1 76     Physical Exam        Constitutional      General appearance: No acute distress, well appearing and well nourished  overweight  Ears, Nose, Mouth, and Throat      Oropharynx: Normal with no erythema, edema, exudate or lesions  Pulmonary      Respiratory effort: No increased work of breathing or signs of respiratory distress  Auscultation of lungs: Clear to auscultation  Cardiovascular      Auscultation of heart: Normal rate and rhythm, normal S1 and S2, without murmurs         Examination of extremities for edema and/or varicosities: Normal        Lymphatic      Palpation of lymph nodes in neck: No lymphadenopathy  Musculoskeletal      Gait and station: Normal        Inspection/palpation of joints, bones, and muscles: Abnormal  -- (tender LS spine and right SI joint)      Neurologic      Cranial nerves: Cranial nerves 2-12 intact  Reflexes: 2+ and symmetric  Deep tendon reflexes: 1+ right patella-- and-- 1+ left patella  Sensation: No sensory loss  Psychiatric      Orientation to person, place, and time: Normal        Mood and affect: Normal           Health Management   Diabetes mellitus, type 2   *VB - Eye Exam; every 1 year; Last 26VUH5828; Next Due: 68Exo3202;  Overdue    Signatures    Electronically signed by : Elva Rinaldi MD; Jan 16 2018  2:08PM EST                       (Author)

## 2018-01-23 VITALS
SYSTOLIC BLOOD PRESSURE: 146 MMHG | DIASTOLIC BLOOD PRESSURE: 68 MMHG | RESPIRATION RATE: 18 BRPM | BODY MASS INDEX: 32.52 KG/M2 | HEART RATE: 90 BPM | WEIGHT: 172.25 LBS | HEIGHT: 61 IN

## 2018-02-09 DIAGNOSIS — E78.00 PURE HYPERCHOLESTEROLEMIA: Primary | ICD-10-CM

## 2018-02-09 RX ORDER — SIMVASTATIN 10 MG
1 TABLET ORAL DAILY
COMMUNITY
Start: 2016-05-16 | End: 2018-02-09 | Stop reason: SDUPTHER

## 2018-02-09 RX ORDER — SIMVASTATIN 10 MG
10 TABLET ORAL DAILY
Qty: 90 TABLET | Refills: 1 | Status: SHIPPED | OUTPATIENT
Start: 2018-02-09 | End: 2018-06-01 | Stop reason: SDUPTHER

## 2018-03-13 ENCOUNTER — HOSPITAL ENCOUNTER (OUTPATIENT)
Dept: BONE DENSITY | Facility: MEDICAL CENTER | Age: 68
Discharge: HOME | End: 2018-03-13
Attending: FAMILY MEDICINE
Payer: MEDICARE

## 2018-03-13 DIAGNOSIS — M81.0 AGE-RELATED OSTEOPOROSIS WITHOUT CURRENT PATHOLOGICAL FRACTURE: ICD-10-CM

## 2018-03-13 PROCEDURE — 77080 DXA BONE DENSITY AXIAL: CPT

## 2018-03-19 DIAGNOSIS — M81.0 POSTMENOPAUSAL OSTEOPOROSIS: Primary | ICD-10-CM

## 2018-03-19 RX ORDER — ALENDRONATE SODIUM 70 MG/1
1 TABLET ORAL WEEKLY
COMMUNITY
Start: 2016-05-16 | End: 2018-03-19 | Stop reason: SDUPTHER

## 2018-03-19 RX ORDER — ALENDRONATE SODIUM 70 MG/1
70 TABLET ORAL WEEKLY
Qty: 30 TABLET | Refills: 5 | Status: SHIPPED | OUTPATIENT
Start: 2018-03-19 | End: 2019-05-14 | Stop reason: SDUPTHER

## 2018-04-05 DIAGNOSIS — E11.8 TYPE 2 DIABETES MELLITUS WITH COMPLICATION, UNSPECIFIED LONG TERM INSULIN USE STATUS: Primary | ICD-10-CM

## 2018-04-05 RX ORDER — LOSARTAN POTASSIUM 50 MG/1
1 TABLET ORAL DAILY
COMMUNITY
Start: 2016-12-01 | End: 2018-04-05 | Stop reason: SDUPTHER

## 2018-04-05 RX ORDER — LOSARTAN POTASSIUM 50 MG/1
50 TABLET ORAL DAILY
Qty: 90 TABLET | Refills: 1 | Status: SHIPPED | OUTPATIENT
Start: 2018-04-05 | End: 2018-08-08 | Stop reason: SDUPTHER

## 2018-04-23 RX ORDER — LANCETS
EACH MISCELLANEOUS 2 TIMES DAILY
COMMUNITY
Start: 2016-05-19 | End: 2019-06-17 | Stop reason: ALTCHOICE

## 2018-04-23 RX ORDER — ASPIRIN 81 MG/1
1 TABLET, CHEWABLE ORAL DAILY
COMMUNITY
Start: 2017-03-07 | End: 2020-01-09

## 2018-04-23 RX ORDER — NYSTATIN 100000 U/G
CREAM TOPICAL 2 TIMES DAILY
COMMUNITY
Start: 2017-03-07 | End: 2019-08-21 | Stop reason: SDUPTHER

## 2018-04-23 RX ORDER — OMEGA-3 FATTY ACIDS/FISH OIL 300-1000MG
2 CAPSULE ORAL
COMMUNITY
Start: 2016-01-20 | End: 2020-01-09

## 2018-04-25 ENCOUNTER — OFFICE VISIT (OUTPATIENT)
Dept: FAMILY MEDICINE CLINIC | Facility: CLINIC | Age: 68
End: 2018-04-25
Payer: MEDICARE

## 2018-04-25 VITALS
BODY MASS INDEX: 32.73 KG/M2 | HEIGHT: 61 IN | SYSTOLIC BLOOD PRESSURE: 138 MMHG | HEART RATE: 80 BPM | DIASTOLIC BLOOD PRESSURE: 76 MMHG | WEIGHT: 173.38 LBS

## 2018-04-25 DIAGNOSIS — H90.71 MIXED CONDUCTIVE AND SENSORINEURAL HEARING LOSS OF RIGHT EAR WITH UNRESTRICTED HEARING OF LEFT EAR: Primary | ICD-10-CM

## 2018-04-25 DIAGNOSIS — J30.9 ALLERGIC RHINITIS, UNSPECIFIED SEASONALITY, UNSPECIFIED TRIGGER: ICD-10-CM

## 2018-04-25 DIAGNOSIS — G47.33 OBSTRUCTIVE SLEEP APNEA SYNDROME: ICD-10-CM

## 2018-04-25 PROBLEM — E11.9 CONTROLLED TYPE 2 DIABETES MELLITUS (HCC): Status: ACTIVE | Noted: 2017-10-10

## 2018-04-25 PROBLEM — M54.50 CHRONIC LOW BACK PAIN: Status: ACTIVE | Noted: 2018-01-16

## 2018-04-25 PROBLEM — M46.1 SACROILIITIS (HCC): Status: ACTIVE | Noted: 2018-01-16

## 2018-04-25 PROBLEM — I10 BENIGN ESSENTIAL HYPERTENSION: Status: ACTIVE | Noted: 2017-03-07

## 2018-04-25 PROBLEM — G89.29 CHRONIC LOW BACK PAIN: Status: ACTIVE | Noted: 2018-01-16

## 2018-04-25 PROBLEM — J45.20 ASTHMA, MILD INTERMITTENT: Status: ACTIVE | Noted: 2017-06-20

## 2018-04-25 PROCEDURE — 99214 OFFICE O/P EST MOD 30 MIN: CPT | Performed by: FAMILY MEDICINE

## 2018-04-25 NOTE — ASSESSMENT & PLAN NOTE
Her  reports episodes where she stops breathing at night, and she notes daytime drowsiness  We discussed having her go for a sleep study, but she would rather try the Flonase and antihistamine 1st to see if she can help clear the nose and sinuses  If her symptoms persist, she will consider this sleep study  We will discuss it again at her next visit

## 2018-04-25 NOTE — PROGRESS NOTES
Assessment/Plan:    Allergic rhinitis  Her eye doctor told her it was safe for her to use Flonase  I urged her to start using it 2 sprays each nostril daily  In addition I recommended starting daily Claritin or Zyrtec  I also recommended increased water intake  Obstructive sleep apnea syndrome  Her  reports episodes where she stops breathing at night, and she notes daytime drowsiness  We discussed having her go for a sleep study, but she would rather try the Flonase and antihistamine 1st to see if she can help clear the nose and sinuses  If her symptoms persist, she will consider this sleep study  We will discuss it again at her next visit  Hearing loss of right ear  I believe that this is related to onset of allergies, and hopefully hearing will improve use of Flonase and antihistamine  Diagnoses and all orders for this visit:    Mixed conductive and sensorineural hearing loss of right ear with unrestricted hearing of left ear    Allergic rhinitis, unspecified seasonality, unspecified trigger    Obstructive sleep apnea syndrome    Other orders  -     Blood Glucose Monitoring Suppl (ACCU-CHEK COMPACT CARE KIT) KIT; by Does not apply route    -     Glucose Blood (ACCU-CHEK COMPACT PLUS VI); by In Vitro route 2 (two) times a day  -     ACCU-CHEK SOFTCLIX LANCETS lancets; by Does not apply route 2 (two) times a day  -     Ibuprofen (ADVIL) 200 MG CAPS; Take by mouth  -     aspirin 81 mg chewable tablet; Chew 1 tablet daily  -     Calcium Carbonate-Vitamin D (CALCIUM 600+D) 600-400 MG-UNIT per tablet; Take 1 tablet by mouth 2 (two) times a day  -     Coenzyme Q10 (COQ-10) 100 MG CAPS; Take 1 capsule by mouth daily  -     metFORMIN (GLUCOPHAGE) 1000 MG tablet; Take 1 tablet by mouth 2 (two) times a day  -     nystatin (MYCOSTATIN) cream; Apply topically Twice daily          Subjective:      Patient ID: Moses Saleh is a 77 y o  female      She complains of a feeling of right ear blockage for the past week  She has also had major increase in nasal congestion and dry coughing  She has occasional SOB and wheezing  She thinks her allergies have kicked in for the spring  Her  states she stops breathing frequently at night  The following portions of the patient's history were reviewed and updated as appropriate: allergies, current medications, past family history, past medical history, past social history, past surgical history and problem list     Review of Systems   Constitutional: Positive for fatigue  Negative for activity change, chills and fever  HENT: Positive for rhinorrhea  Negative for congestion, ear pain, sinus pressure and sore throat  Eyes: Negative for pain and visual disturbance  Respiratory: Negative for cough, chest tightness, shortness of breath and wheezing  Cardiovascular: Negative for chest pain, palpitations and leg swelling  Gastrointestinal: Negative for abdominal pain, blood in stool, constipation, diarrhea, nausea and vomiting  Endocrine: Negative for polydipsia and polyuria  Genitourinary: Negative for difficulty urinating, dysuria, frequency and urgency  Musculoskeletal: Negative for arthralgias, joint swelling and myalgias  Skin: Negative for rash  Neurological: Negative for dizziness, weakness, numbness and headaches  Hematological: Negative for adenopathy  Does not bruise/bleed easily  Psychiatric/Behavioral: Negative for dysphoric mood  The patient is not nervous/anxious  Objective:      /76   Pulse 80   Ht 5' 0 5" (1 537 m)   Wt 78 6 kg (173 lb 6 oz)   BMI 33 30 kg/m²          Physical Exam   Constitutional: She appears well-developed and well-nourished  HENT:   Head: Normocephalic and atraumatic  Mouth/Throat: Oropharynx is clear and moist    Both Tms and canals are clear  Neck: Normal range of motion  No thyromegaly present  Cardiovascular: Normal rate, regular rhythm and normal heart sounds  Pulmonary/Chest: Effort normal and breath sounds normal    Occasional cough   Lymphadenopathy:     She has no cervical adenopathy  Skin: Skin is warm and dry  Nursing note and vitals reviewed

## 2018-04-25 NOTE — ASSESSMENT & PLAN NOTE
Her eye doctor told her it was safe for her to use Flonase  I urged her to start using it 2 sprays each nostril daily  In addition I recommended starting daily Claritin or Zyrtec  I also recommended increased water intake

## 2018-04-25 NOTE — PATIENT INSTRUCTIONS
Allergic rhinitis  Her eye doctor told her it was safe for her to use Flonase  I urged her to start using it 2 sprays each nostril daily  In addition I recommended starting daily Claritin or Zyrtec  I also recommended increased water intake  Obstructive sleep apnea syndrome  Her  reports episodes where she stops breathing at night, and she notes daytime drowsiness  We discussed having her go for a sleep study, but she would rather try the Flonase and antihistamine 1st to see if she can help clear the nose and sinuses  If her symptoms persist, she will consider this sleep study  We will discuss it again at her next visit  Hearing loss of right ear  I believe that this is related to onset of allergies, and hopefully hearing will improve use of Flonase and antihistamine

## 2018-04-25 NOTE — ASSESSMENT & PLAN NOTE
I believe that this is related to onset of allergies, and hopefully hearing will improve use of Flonase and antihistamine

## 2018-05-15 ENCOUNTER — OFFICE VISIT (OUTPATIENT)
Dept: FAMILY MEDICINE CLINIC | Facility: CLINIC | Age: 68
End: 2018-05-15
Payer: MEDICARE

## 2018-05-15 VITALS
BODY MASS INDEX: 32.1 KG/M2 | SYSTOLIC BLOOD PRESSURE: 140 MMHG | WEIGHT: 170 LBS | DIASTOLIC BLOOD PRESSURE: 70 MMHG | HEART RATE: 78 BPM | HEIGHT: 61 IN | OXYGEN SATURATION: 94 %

## 2018-05-15 DIAGNOSIS — I10 BENIGN ESSENTIAL HYPERTENSION: ICD-10-CM

## 2018-05-15 DIAGNOSIS — E11.9 CONTROLLED TYPE 2 DIABETES MELLITUS WITHOUT COMPLICATION, WITHOUT LONG-TERM CURRENT USE OF INSULIN (HCC): Primary | ICD-10-CM

## 2018-05-15 DIAGNOSIS — H90.71 MIXED CONDUCTIVE AND SENSORINEURAL HEARING LOSS OF RIGHT EAR WITH UNRESTRICTED HEARING OF LEFT EAR: ICD-10-CM

## 2018-05-15 DIAGNOSIS — J30.9 ALLERGIC RHINITIS, UNSPECIFIED SEASONALITY, UNSPECIFIED TRIGGER: ICD-10-CM

## 2018-05-15 LAB — SL AMB POCT HEMOGLOBIN AIC: 6.5

## 2018-05-15 PROCEDURE — 83036 HEMOGLOBIN GLYCOSYLATED A1C: CPT | Performed by: FAMILY MEDICINE

## 2018-05-15 PROCEDURE — 99214 OFFICE O/P EST MOD 30 MIN: CPT | Performed by: FAMILY MEDICINE

## 2018-05-15 RX ORDER — LORATADINE 10 MG/1
10 TABLET ORAL DAILY
COMMUNITY
End: 2020-01-09

## 2018-05-15 NOTE — PATIENT INSTRUCTIONS
Controlled type 2 diabetes mellitus ([facility] [address] )    The diabetes has been very stable  Hemoglobin A1c was 6 5 today  We will maintain   Metformin 1000 mg b I d  Hearing loss of right ear    Her symptoms of decreased hearing persist on the right  I am going to refer her to ENT for further evaluation  Benign essential hypertension  BP   Is well controlled on losartan  50 mg daily

## 2018-05-15 NOTE — ASSESSMENT & PLAN NOTE
Her symptoms of decreased hearing persist on the right  I am going to refer her to ENT for further evaluation

## 2018-05-15 NOTE — PROGRESS NOTES
Assessment/Plan:    Controlled type 2 diabetes mellitus ([facility] [address] )    The diabetes has been very stable  Hemoglobin A1c was 6 5 today  We will maintain   Metformin 1000 mg b I d  Hearing loss of right ear    Her symptoms of decreased hearing persist on the right  I am going to refer her to ENT for further evaluation  Benign essential hypertension  BP   Is well controlled on losartan  50 mg daily  Diagnoses and all orders for this visit:    Controlled type 2 diabetes mellitus without complication, without long-term current use of insulin ([facility])  -     POCT hemoglobin A1c    Allergic rhinitis, unspecified seasonality, unspecified trigger    Mixed conductive and sensorineural hearing loss of right ear with unrestricted hearing of left ear  -     Ambulatory Referral to Otolaryngology; Future    Benign essential hypertension    Other orders  -     Eye exam  -     loratadine (CLARITIN) 10 mg tablet; Take 10 mg by mouth daily          Subjective:      Patient ID: Addis Galarza is a 77 y o  female  She is here for follow-up  Her diabetes has been stable  She continues with decreased hearing  In the right ear  It is a little bit better compared to last visit, but still muffled  She is getting a little relief with allergy symptoms from using Flonase each night  She would like to hold off on sleep study at this time  She continues to have occasional PND and coughing  No CP, no dizziness or headaches  The following portions of the patient's history were reviewed and updated as appropriate: allergies, current medications, past family history, past medical history, past social history, past surgical history and problem list     Review of Systems   Constitutional: Negative for activity change, chills, fatigue and fever  HENT: Positive for postnasal drip  Negative for congestion, ear pain, sinus pressure and sore throat  Eyes: Negative for pain and visual disturbance     Respiratory: Positive for cough  Negative for chest tightness, shortness of breath and wheezing  Cardiovascular: Negative for chest pain, palpitations and leg swelling  Gastrointestinal: Negative for abdominal pain, blood in stool, constipation, diarrhea, nausea and vomiting  Endocrine: Negative for polydipsia and polyuria  Genitourinary: Negative for difficulty urinating, dysuria, frequency and urgency  Musculoskeletal: Negative for arthralgias, joint swelling and myalgias  Skin: Negative for rash  Neurological: Negative for dizziness, weakness, numbness and headaches  Hematological: Negative for adenopathy  Does not bruise/bleed easily  Psychiatric/Behavioral: Negative for dysphoric mood  The patient is not nervous/anxious  Objective:      /70 (BP Location: Left arm, Patient Position: Sitting, Cuff Size: Standard)   Pulse 78   Ht 5' 0 5" (1 537 m)   Wt 77 1 kg (170 lb)   SpO2 94%   BMI 32 65 kg/m²          Physical Exam   Constitutional: She appears well-developed and well-nourished  HENT:   Head: Normocephalic and atraumatic  Right Ear: External ear normal    Left Ear: External ear normal    Mouth/Throat: Oropharynx is clear and moist    Neck: Normal range of motion  Neck supple  No thyromegaly present  Cardiovascular: Normal rate and regular rhythm  Pulmonary/Chest: Effort normal and breath sounds normal    Psychiatric: She has a normal mood and affect  Her behavior is normal  Thought content normal    Nursing note and vitals reviewed

## 2018-06-01 DIAGNOSIS — E78.00 PURE HYPERCHOLESTEROLEMIA: ICD-10-CM

## 2018-06-01 RX ORDER — SIMVASTATIN 10 MG
TABLET ORAL
Qty: 90 TABLET | Refills: 3 | Status: SHIPPED | OUTPATIENT
Start: 2018-06-01 | End: 2019-08-07 | Stop reason: SDUPTHER

## 2018-08-08 DIAGNOSIS — E11.8 TYPE 2 DIABETES MELLITUS WITH COMPLICATION (HCC): ICD-10-CM

## 2018-08-08 RX ORDER — LOSARTAN POTASSIUM 50 MG/1
TABLET ORAL
Qty: 90 TABLET | Refills: 1 | Status: SHIPPED | OUTPATIENT
Start: 2018-08-08 | End: 2018-09-24 | Stop reason: SDUPTHER

## 2018-08-21 ENCOUNTER — OFFICE VISIT (OUTPATIENT)
Dept: FAMILY MEDICINE CLINIC | Facility: CLINIC | Age: 68
End: 2018-08-21
Payer: MEDICARE

## 2018-08-21 VITALS
BODY MASS INDEX: 30.55 KG/M2 | OXYGEN SATURATION: 96 % | HEART RATE: 63 BPM | WEIGHT: 161.8 LBS | SYSTOLIC BLOOD PRESSURE: 136 MMHG | DIASTOLIC BLOOD PRESSURE: 70 MMHG | HEIGHT: 61 IN

## 2018-08-21 DIAGNOSIS — G89.29 CHRONIC LEFT-SIDED LOW BACK PAIN, WITH SCIATICA PRESENCE UNSPECIFIED: ICD-10-CM

## 2018-08-21 DIAGNOSIS — E11.9 CONTROLLED TYPE 2 DIABETES MELLITUS WITHOUT COMPLICATION, WITHOUT LONG-TERM CURRENT USE OF INSULIN (HCC): Primary | ICD-10-CM

## 2018-08-21 DIAGNOSIS — E78.01 FAMILIAL HYPERCHOLESTEROLEMIA: ICD-10-CM

## 2018-08-21 DIAGNOSIS — M54.5 CHRONIC LEFT-SIDED LOW BACK PAIN, WITH SCIATICA PRESENCE UNSPECIFIED: ICD-10-CM

## 2018-08-21 PROCEDURE — 99214 OFFICE O/P EST MOD 30 MIN: CPT | Performed by: FAMILY MEDICINE

## 2018-08-21 NOTE — PROGRESS NOTES
Patient's shoes and socks removed  Right Foot/Ankle   Right Foot Inspection  Skin Exam: skin normal and skin intact no dry skin, no warmth, no callus, no erythema, no maceration, no abnormal color, no pre-ulcer, no ulcer and no callus                            Sensory       Monofilament testing: intact  Vascular    The right DP pulse is 2+  Left Foot/Ankle  Left Foot Inspection  Skin Exam: skin normal and skin intactno dry skin, no warmth, no erythema, no maceration, normal color, no pre-ulcer, no ulcer and no callus                                         Sensory       Monofilament: intact  Vascular    The left DP pulse is 2+     Assign Risk Category:  No deformity present; ;

## 2018-08-21 NOTE — PROGRESS NOTES
Assessment/Plan:    Controlled type 2 diabetes mellitus ([facility] [address] )  Lab Results   Component Value Date    HGBA1C 6 5 05/15/2018     Diabetes has been under excellent control  Will recheck Hgb A1c next time    No results for input(s): POCGLU in the last 72 hours  Blood Sugar Average: Last 72 hrs:      Hyperlipidemia  Will cont Zocor recheck Jan 2019    Chronic low back pain  Back feels better since she got new mattress       Diagnoses and all orders for this visit:    Controlled type 2 diabetes mellitus without complication, without long-term current use of insulin ([facility] [address] )    Familial hypercholesterolemia    Chronic left-sided low back pain, with sciatica presence unspecified          Subjective:      Patient ID: Robbie Broussard is a 77 y o  female  She is here for follow-up  She has been feeling well although she had a hectic time this summer because they moved from the [facility] area to [facility] area  She denies any chest pain or breathing problems  She states that her breathing is improved since they moved  She  Attributes this to living in the new location  She thought there might have been mold in the old home and also there is no longer a cat in her living area  She had hearing loss right side which improved    She feels her diabetes has been under good control  She denies episodes of hyperglycemia or hypoglycemia  She checks her sugar periodically at home  She also   Denies headaches or dizziness  Foot exam revealed some decreased sensation left heel  The following portions of the patient's history were reviewed and updated as appropriate: allergies, current medications, past family history, past medical history, past social history, past surgical history and problem list     Review of Systems   Constitutional: Negative for activity change, chills, fatigue and fever  HENT: Negative for congestion, ear pain, sinus pressure and sore throat  Eyes: Negative for pain and visual disturbance  Respiratory: Negative for cough, chest tightness, shortness of breath and wheezing  Cardiovascular: Negative for chest pain, palpitations and leg swelling  Gastrointestinal: Negative for abdominal pain, blood in stool, constipation, diarrhea, nausea and vomiting  Endocrine: Negative for polydipsia and polyuria  Genitourinary: Negative for difficulty urinating, dysuria, frequency and urgency  Stress incontinence with cough or sneeze   Musculoskeletal: Negative for arthralgias, joint swelling and myalgias  Skin: Negative for rash  Neurological: Negative for dizziness, weakness, numbness and headaches  Hematological: Negative for adenopathy  Does not bruise/bleed easily  Psychiatric/Behavioral: Negative for dysphoric mood  The patient is not nervous/anxious  Objective:      /70 (BP Location: Left arm, Patient Position: Sitting, Cuff Size: Standard)   Pulse 63   Ht 5' 0 5" (1 537 m)   Wt 73 4 kg (161 lb 12 8 oz)   SpO2 96%   BMI 31 08 kg/m²          Physical Exam   Constitutional: She is oriented to person, place, and time  She appears well-developed and well-nourished  No distress  HENT:   Head: Normocephalic and atraumatic  Right Ear: External ear normal    Left Ear: External ear normal    Nose: Nose normal    Mouth/Throat: Oropharynx is clear and moist    Eyes: Conjunctivae and EOM are normal  Pupils are equal, round, and reactive to light  No scleral icterus  Neck: Normal range of motion  Neck supple  No thyromegaly present  Carotid pulses 2+ bilaterally without bruit   Cardiovascular: Normal rate, regular rhythm and normal heart sounds  No murmur heard  Pulmonary/Chest: Effort normal and breath sounds normal  She has no wheezes  She has no rales  Musculoskeletal: She exhibits no tenderness or deformity  Lymphadenopathy:     She has no cervical adenopathy  Neurological: She is alert and oriented to person, place, and time  She has normal reflexes   No cranial nerve deficit  Skin: Skin is warm and dry  No rash noted  No erythema  Psychiatric: She has a normal mood and affect  Her behavior is normal    Nursing note and vitals reviewed

## 2018-08-21 NOTE — ASSESSMENT & PLAN NOTE
Lab Results   Component Value Date    HGBA1C 6 5 05/15/2018     Diabetes has been under excellent control  Will recheck Hgb A1c next time    No results for input(s): POCGLU in the last 72 hours      Blood Sugar Average: Last 72 hrs:

## 2018-09-10 LAB
LEFT EYE DIABETIC RETINOPATHY: ABNORMAL
RIGHT EYE DIABETIC RETINOPATHY: ABNORMAL
SEVERITY (EYE EXAM): ABNORMAL

## 2018-09-24 DIAGNOSIS — I10 BENIGN ESSENTIAL HYPERTENSION: Primary | ICD-10-CM

## 2018-09-24 RX ORDER — LOSARTAN POTASSIUM 50 MG/1
50 TABLET ORAL DAILY
Qty: 90 TABLET | Refills: 3 | Status: SHIPPED | OUTPATIENT
Start: 2018-09-24 | End: 2019-06-26

## 2018-11-21 ENCOUNTER — OFFICE VISIT (OUTPATIENT)
Dept: FAMILY MEDICINE CLINIC | Facility: CLINIC | Age: 68
End: 2018-11-21
Payer: MEDICARE

## 2018-11-21 VITALS
WEIGHT: 170 LBS | RESPIRATION RATE: 16 BRPM | BODY MASS INDEX: 33.38 KG/M2 | DIASTOLIC BLOOD PRESSURE: 82 MMHG | SYSTOLIC BLOOD PRESSURE: 138 MMHG | HEART RATE: 75 BPM | HEIGHT: 60 IN | OXYGEN SATURATION: 98 %

## 2018-11-21 DIAGNOSIS — Z00.00 MEDICARE ANNUAL WELLNESS VISIT, SUBSEQUENT: Primary | ICD-10-CM

## 2018-11-21 DIAGNOSIS — I10 BENIGN ESSENTIAL HYPERTENSION: ICD-10-CM

## 2018-11-21 DIAGNOSIS — Z23 NEED FOR INFLUENZA VACCINATION: ICD-10-CM

## 2018-11-21 DIAGNOSIS — E78.01 FAMILIAL HYPERCHOLESTEROLEMIA: ICD-10-CM

## 2018-11-21 DIAGNOSIS — E11.9 CONTROLLED TYPE 2 DIABETES MELLITUS WITHOUT COMPLICATION, WITHOUT LONG-TERM CURRENT USE OF INSULIN (HCC): ICD-10-CM

## 2018-11-21 DIAGNOSIS — Z12.11 SCREENING FOR COLON CANCER: ICD-10-CM

## 2018-11-21 LAB — SL AMB POCT HEMOGLOBIN AIC: 6.4

## 2018-11-21 PROCEDURE — 90662 IIV NO PRSV INCREASED AG IM: CPT | Performed by: FAMILY MEDICINE

## 2018-11-21 PROCEDURE — 99214 OFFICE O/P EST MOD 30 MIN: CPT | Performed by: FAMILY MEDICINE

## 2018-11-21 PROCEDURE — 83036 HEMOGLOBIN GLYCOSYLATED A1C: CPT | Performed by: FAMILY MEDICINE

## 2018-11-21 PROCEDURE — G0008 ADMIN INFLUENZA VIRUS VAC: HCPCS | Performed by: FAMILY MEDICINE

## 2018-11-21 PROCEDURE — G0439 PPPS, SUBSEQ VISIT: HCPCS | Performed by: FAMILY MEDICINE

## 2018-11-21 NOTE — PROGRESS NOTES
Assessment and Plan:    Problem List Items Addressed This Visit     None        Health Maintenance Due   Topic Date Due    Medicare Annual Wellness Visit (AWV)  1950    MAMMOGRAM  1950    CRC Screening: Colonoscopy  1950    DTaP,Tdap,and Td Vaccines (1 - Tdap) 03/18/1969    Diabetic Foot Exam  06/20/2018    INFLUENZA VACCINE  07/01/2018    URINE MICROALBUMIN  10/06/2018    HEMOGLOBIN A1C  11/15/2018         HPI:  Clemente Maharaj is a 77 y o  female here for her Subsequent Wellness Visit  Patient Active Problem List   Diagnosis    Allergic rhinitis    Asthma, mild intermittent    Benign essential hypertension    Chronic low back pain    Controlled type 2 diabetes mellitus ([address] )    Dupuytren's contracture    Hearing loss of right ear    Hyperlipidemia    Postmenopausal osteoporosis    Restless legs syndrome    Sacroiliitis (HCC)    Obstructive sleep apnea syndrome     No past medical history on file    Past Surgical History:   Procedure Laterality Date    CHOLECYSTECTOMY      [id number]  LAST ASSESSED    HYSTERECTOMY      [id number]  LAST ASSESSED     Family History   Problem Relation Age of Onset    Heart disease Mother     Rheumatic fever Father     Diabetes Family         MELLITUS    Diabetes Family         MELLITUS     History   Smoking Status    Former Smoker    Quit date: 1998   Smokeless Tobacco    Never Used     History   Alcohol Use No      History   Drug Use No       Current Outpatient Prescriptions   Medication Sig Dispense Refill    ACCU-CHEK SOFTCLIX LANCETS lancets by Does not apply route 2 (two) times a day      alendronate (FOSAMAX) 70 mg tablet Take 1 tablet (70 mg total) by mouth once a week 30 tablet 5    aspirin 81 mg chewable tablet Chew 1 tablet daily      Blood Glucose Monitoring Suppl (ACCU-CHEK COMPACT CARE KIT) KIT by Does not apply route        Calcium Carbonate-Vitamin D (CALCIUM 600+D) 600-400 MG-UNIT per tablet Take 2 tablets by mouth daily        Coenzyme Q10 (COQ-10) 100 MG CAPS Take 1 capsule by mouth daily      Glucose Blood (ACCU-CHEK COMPACT PLUS VI) by In Vitro route 2 (two) times a day      Ibuprofen (ADVIL) 200 MG CAPS Take 2 capsules by mouth        loratadine (CLARITIN) 10 mg tablet Take 10 mg by mouth daily      losartan (COZAAR) 50 mg tablet Take 1 tablet (50 mg total) by mouth daily 90 tablet 3    metFORMIN (GLUCOPHAGE) 1000 MG tablet Take 1 tablet by mouth 2 (two) times a day      nystatin (MYCOSTATIN) cream Apply topically Twice daily        simvastatin (ZOCOR) 10 mg tablet TAKE 1 TABLET BY MOUTH ONCE DAILY 90 tablet 3     No current facility-administered medications for this visit  Allergies   Allergen Reactions    Penicillins      Immunization History   Administered Date(s) Administered    Influenza Split High Dose Preservative Free IM 12/01/2016, 10/10/2017    Pneumococcal Conjugate 13-Valent 12/01/2016    Pneumococcal Polysaccharide PPV23 01/16/2018       Patient Care Team:  Eduar Thornton MD as PCP - General    Medicare Screening Tests and Risk Assessments:  [facility] is here for her Subsequent Wellness visit  Health Risk Assessment:  Patient rates overall health as good  Patient feels that their physical health rating is Same  Eyesight was rated as Same  Hearing was rated as Same  Patient feels that their emotional and mental health rating is Same  Pain experienced by patient in the last 7 days has been Some  Patient's pain rating has been 2/10  Emotional/Mental Health:  Patient has been feeling nervous/anxious  PHQ-9 Depression Screening:    Frequency of the following problems over the past two weeks:      1  Little interest or pleasure in doing things: 0 - not at all      2  Feeling down, depressed, or hopeless: 0 - not at all  PHQ-2 Score: 0          Broken Bones/Falls:     Fall Risk Assessment:    In the past year, patient has experienced: No history of falling in past year          Bladder/Bowel:  Patient has not leaked urine accidently in the last six months  Patient reports no loss of bowel control  Immunizations:  Patient has had a flu vaccination within the last year  Patient has received a pneumonia shot  Patient has not received a shingles shot  (Additional Comments: Pt doesn't remember when she had last tetanus shot  )    Home Safety:  Patient does not have trouble with stairs inside or outside of their home  Patient currently reports that there are no safety hazards present in home, working smoke alarms, no working carbon monoxide detectors  Preventative Screenings:   No breast cancer screening performed, no colon cancer screen completed, cholesterol screen completed, glaucoma eye exam completed,     Nutrition:  Current diet: Frequent junk food with servings of the following:    Medications:  Patient is currently taking over-the-counter supplements  List of OTC medications includes: check medicayion list  Patient is able to manage medications  Lifestyle Choices:  Patient reports no tobacco use  Patient has smoked or used tobacco in the past   Patient has stopped her tobacco use  Tobacco use quit date: 1996  Patient reports no alcohol use  Patient drives a vehicle  Patient wears seat belt  Current level of exercise of physical activity described by patient as: walking  Activities of Daily Living:  Can get out of bed by his or her self, able to dress self, able to make own meals, able to do own shopping, able to bathe self, can do own laundry/housekeeping, can manage own money, pay bills and track expenses    Previous Hospitalizations:  No hospitalization or ED visit in past 12 months        Advanced Directives:  Patient has decided on a power of   Patient has spoken to designated power of   Patient has not completed advanced directive

## 2018-11-21 NOTE — ASSESSMENT & PLAN NOTE
Lipids are well controlled on simvastatin 10 mg daily  We will recheck fasting lipid profile in the near future

## 2018-11-21 NOTE — PROGRESS NOTES
Assessment/Plan:    Controlled type 2 diabetes mellitus ([facility] [address] )  Lab Results   Component Value Date    HGBA1C 6 5 05/15/2018     Hemoglobin A1c was 6 4 today  She is doing very well and diabetes is well controlled  We will continue current regimen  No results for input(s): POCGLU in the last 72 hours  Blood Sugar Average: Last 72 hrs:      Benign essential hypertension  BP well controlled on current regimen  Cont Losartan 50 mg daily    Hyperlipidemia  Lipids are well controlled on simvastatin 10 mg daily  We will recheck fasting lipid profile in the near future  Diagnoses and all orders for this visit:    Medicare annual wellness visit, subsequent    Controlled type 2 diabetes mellitus without complication, without long-term current use of insulin ([facility] )    Benign essential hypertension    Familial hypercholesterolemia          Subjective:      Patient ID: Leatha Mariee is a 77 y o  female  She is here for annual Wellness visit and follow-up of chronic problems  She has been doing well since last year  She notes  slight improvement in her vision  She tries to follow healthy diet in general, but she does cheat occasionally with baked goods  She can use a little more exercise, but finds this difficult in the winter  She has history of allergies and COPD  She is under good control with Claritin and she uses Flonase periodically  She has occasional cough productive of white phlegm        The following portions of the patient's history were reviewed and updated as appropriate: allergies, current medications, past family history, past medical history, past social history, past surgical history and problem list     Review of Systems   Constitutional: Negative for activity change, chills, fatigue and fever  HENT: Negative for congestion, ear pain, sinus pressure and sore throat  Eyes: Negative for pain and visual disturbance  Respiratory: Positive for cough   Negative for chest tightness, shortness of breath and wheezing  Cardiovascular: Negative for chest pain, palpitations and leg swelling  Gastrointestinal: Negative for abdominal pain, blood in stool, constipation, diarrhea, nausea and vomiting  Endocrine: Negative for polydipsia and polyuria  Genitourinary: Negative for difficulty urinating, dysuria, frequency and urgency  Musculoskeletal: Positive for arthralgias  Negative for joint swelling and myalgias  Skin: Negative for rash  Neurological: Negative for dizziness, weakness, numbness and headaches  Hematological: Negative for adenopathy  Does not bruise/bleed easily  Psychiatric/Behavioral: Negative for dysphoric mood  The patient is not nervous/anxious  Objective:      /82   Pulse 75   Resp 16   Ht 5' 0 05" (1 525 m)   Wt 77 1 kg (170 lb)   SpO2 98%   BMI 33 15 kg/m²          Physical Exam   Constitutional: She is oriented to person, place, and time  She appears well-developed and well-nourished  HENT:   Head: Normocephalic and atraumatic  Right Ear: External ear normal    Left Ear: External ear normal    Neck: Normal range of motion  Neck supple  No thyromegaly present  Carotid pulses 2+ bilaterally without bruit   Cardiovascular: Normal rate, regular rhythm and normal heart sounds  Pulmonary/Chest: Effort normal and breath sounds normal  No respiratory distress  She exhibits no tenderness  Abdominal: Soft  Bowel sounds are normal  She exhibits no distension and no mass  There is no tenderness  Musculoskeletal: Normal range of motion  Lymphadenopathy:     She has no cervical adenopathy  Neurological: She is alert and oriented to person, place, and time  Skin: Skin is warm and dry  Psychiatric: She has a normal mood and affect  Her behavior is normal    Nursing note and vitals reviewed

## 2018-11-21 NOTE — ASSESSMENT & PLAN NOTE
Lab Results   Component Value Date    HGBA1C 6 5 05/15/2018     Hemoglobin A1c was 6 4 today  She is doing very well and diabetes is well controlled  We will continue current regimen  No results for input(s): POCGLU in the last 72 hours      Blood Sugar Average: Last 72 hrs:

## 2018-11-28 DIAGNOSIS — E11.8 CONTROLLED DIABETES MELLITUS TYPE 2 WITH COMPLICATIONS, UNSPECIFIED WHETHER LONG TERM INSULIN USE (HCC): Primary | ICD-10-CM

## 2019-03-11 LAB
LEFT EYE DIABETIC RETINOPATHY: NORMAL
RIGHT EYE DIABETIC RETINOPATHY: NORMAL

## 2019-05-14 DIAGNOSIS — M81.0 POSTMENOPAUSAL OSTEOPOROSIS: ICD-10-CM

## 2019-05-14 RX ORDER — ALENDRONATE SODIUM 70 MG/1
TABLET ORAL
Qty: 12 TABLET | Refills: 14 | Status: SHIPPED | OUTPATIENT
Start: 2019-05-14 | End: 2020-01-09

## 2019-05-17 ENCOUNTER — APPOINTMENT (OUTPATIENT)
Dept: LAB | Facility: CLINIC | Age: 69
End: 2019-05-17
Payer: MEDICARE

## 2019-05-17 DIAGNOSIS — E11.9 CONTROLLED TYPE 2 DIABETES MELLITUS WITHOUT COMPLICATION, WITHOUT LONG-TERM CURRENT USE OF INSULIN (HCC): ICD-10-CM

## 2019-05-17 DIAGNOSIS — E78.01 FAMILIAL HYPERCHOLESTEROLEMIA: ICD-10-CM

## 2019-05-17 DIAGNOSIS — Z12.11 SCREENING FOR COLON CANCER: ICD-10-CM

## 2019-05-17 LAB
ALBUMIN SERPL BCP-MCNC: 3.7 G/DL (ref 3.5–5)
ALP SERPL-CCNC: 62 U/L (ref 46–116)
ALT SERPL W P-5'-P-CCNC: 21 U/L (ref 12–78)
ANION GAP SERPL CALCULATED.3IONS-SCNC: 5 MMOL/L (ref 4–13)
AST SERPL W P-5'-P-CCNC: 17 U/L (ref 5–45)
BILIRUB SERPL-MCNC: 0.5 MG/DL (ref 0.2–1)
BUN SERPL-MCNC: 16 MG/DL (ref 5–25)
CALCIUM SERPL-MCNC: 9 MG/DL (ref 8.3–10.1)
CHLORIDE SERPL-SCNC: 105 MMOL/L (ref 100–108)
CHOLEST SERPL-MCNC: 145 MG/DL (ref 50–200)
CO2 SERPL-SCNC: 31 MMOL/L (ref 21–32)
CREAT SERPL-MCNC: 1.01 MG/DL (ref 0.6–1.3)
CREAT UR-MCNC: 170 MG/DL
GFR SERPL CREATININE-BSD FRML MDRD: 56 ML/MIN/1.73SQ M
GLUCOSE P FAST SERPL-MCNC: 129 MG/DL (ref 65–99)
HDLC SERPL-MCNC: 78 MG/DL (ref 40–60)
HEMOCCULT STL QL IA: NEGATIVE
LDLC SERPL CALC-MCNC: 53 MG/DL (ref 0–100)
MICROALBUMIN UR-MCNC: 261 MG/L (ref 0–20)
MICROALBUMIN/CREAT 24H UR: 154 MG/G CREATININE (ref 0–30)
NONHDLC SERPL-MCNC: 67 MG/DL
POTASSIUM SERPL-SCNC: 4.1 MMOL/L (ref 3.5–5.3)
PROT SERPL-MCNC: 7.6 G/DL (ref 6.4–8.2)
SODIUM SERPL-SCNC: 141 MMOL/L (ref 136–145)
TRIGL SERPL-MCNC: 71 MG/DL

## 2019-05-17 PROCEDURE — 82570 ASSAY OF URINE CREATININE: CPT

## 2019-05-17 PROCEDURE — G0328 FECAL BLOOD SCRN IMMUNOASSAY: HCPCS

## 2019-05-17 PROCEDURE — 82043 UR ALBUMIN QUANTITATIVE: CPT

## 2019-05-17 PROCEDURE — 36415 COLL VENOUS BLD VENIPUNCTURE: CPT

## 2019-05-17 PROCEDURE — 80061 LIPID PANEL: CPT

## 2019-05-17 PROCEDURE — 80053 COMPREHEN METABOLIC PANEL: CPT

## 2019-05-22 ENCOUNTER — OFFICE VISIT (OUTPATIENT)
Dept: FAMILY MEDICINE CLINIC | Facility: CLINIC | Age: 69
End: 2019-05-22
Payer: MEDICARE

## 2019-05-22 VITALS
BODY MASS INDEX: 35.1 KG/M2 | HEIGHT: 60 IN | WEIGHT: 178.8 LBS | OXYGEN SATURATION: 96 % | HEART RATE: 72 BPM | TEMPERATURE: 97.9 F | DIASTOLIC BLOOD PRESSURE: 80 MMHG | SYSTOLIC BLOOD PRESSURE: 150 MMHG

## 2019-05-22 DIAGNOSIS — E11.8 CONTROLLED DIABETES MELLITUS TYPE 2 WITH COMPLICATIONS, UNSPECIFIED WHETHER LONG TERM INSULIN USE (HCC): ICD-10-CM

## 2019-05-22 DIAGNOSIS — M79.602 PAIN OF LEFT UPPER EXTREMITY: ICD-10-CM

## 2019-05-22 DIAGNOSIS — I10 BENIGN ESSENTIAL HYPERTENSION: ICD-10-CM

## 2019-05-22 DIAGNOSIS — Z12.39 BREAST CANCER SCREENING: ICD-10-CM

## 2019-05-22 DIAGNOSIS — M46.1 SACROILIITIS, NOT ELSEWHERE CLASSIFIED (HCC): ICD-10-CM

## 2019-05-22 DIAGNOSIS — J45.20 MILD INTERMITTENT ASTHMA WITHOUT COMPLICATION: ICD-10-CM

## 2019-05-22 DIAGNOSIS — E11.9 CONTROLLED TYPE 2 DIABETES MELLITUS WITHOUT COMPLICATION, WITHOUT LONG-TERM CURRENT USE OF INSULIN (HCC): Primary | ICD-10-CM

## 2019-05-22 DIAGNOSIS — Z12.11 COLON CANCER SCREENING: ICD-10-CM

## 2019-05-22 DIAGNOSIS — G47.33 OBSTRUCTIVE SLEEP APNEA SYNDROME: ICD-10-CM

## 2019-05-22 LAB — SL AMB POCT HEMOGLOBIN AIC: 6.4 (ref ?–6.5)

## 2019-05-22 PROCEDURE — 83036 HEMOGLOBIN GLYCOSYLATED A1C: CPT | Performed by: FAMILY MEDICINE

## 2019-05-22 PROCEDURE — 99214 OFFICE O/P EST MOD 30 MIN: CPT | Performed by: FAMILY MEDICINE

## 2019-05-22 RX ORDER — FLUTICASONE PROPIONATE 50 MCG
1 SPRAY, SUSPENSION (ML) NASAL DAILY PRN
COMMUNITY
End: 2019-12-10

## 2019-05-30 ENCOUNTER — APPOINTMENT (OUTPATIENT)
Dept: RADIOLOGY | Facility: CLINIC | Age: 69
End: 2019-05-30
Payer: MEDICARE

## 2019-05-30 DIAGNOSIS — M79.602 PAIN OF LEFT UPPER EXTREMITY: ICD-10-CM

## 2019-05-30 PROCEDURE — 73060 X-RAY EXAM OF HUMERUS: CPT

## 2019-05-30 PROCEDURE — 73030 X-RAY EXAM OF SHOULDER: CPT

## 2019-06-03 ENCOUNTER — TELEPHONE (OUTPATIENT)
Dept: FAMILY MEDICINE CLINIC | Facility: CLINIC | Age: 69
End: 2019-06-03

## 2019-06-03 DIAGNOSIS — M75.32 CALCIFIC TENDINITIS OF LEFT SHOULDER: Primary | ICD-10-CM

## 2019-06-03 DIAGNOSIS — M75.52 BURSITIS OF LEFT SHOULDER: ICD-10-CM

## 2019-06-17 ENCOUNTER — CONSULT (OUTPATIENT)
Dept: OBGYN CLINIC | Facility: CLINIC | Age: 69
End: 2019-06-17
Payer: MEDICARE

## 2019-06-17 VITALS
SYSTOLIC BLOOD PRESSURE: 163 MMHG | BODY MASS INDEX: 35.3 KG/M2 | HEIGHT: 60 IN | DIASTOLIC BLOOD PRESSURE: 74 MMHG | HEART RATE: 76 BPM | RESPIRATION RATE: 16 BRPM | WEIGHT: 179.8 LBS

## 2019-06-17 DIAGNOSIS — M25.512 ACUTE PAIN OF LEFT SHOULDER: Primary | ICD-10-CM

## 2019-06-17 DIAGNOSIS — M75.32 CALCIFIC TENDINITIS OF LEFT SHOULDER: ICD-10-CM

## 2019-06-17 PROCEDURE — 99203 OFFICE O/P NEW LOW 30 MIN: CPT | Performed by: EMERGENCY MEDICINE

## 2019-06-20 ENCOUNTER — EVALUATION (OUTPATIENT)
Dept: PHYSICAL THERAPY | Facility: CLINIC | Age: 69
End: 2019-06-20
Payer: MEDICARE

## 2019-06-20 VITALS — DIASTOLIC BLOOD PRESSURE: 78 MMHG | SYSTOLIC BLOOD PRESSURE: 162 MMHG

## 2019-06-20 DIAGNOSIS — M25.512 ACUTE PAIN OF LEFT SHOULDER: Primary | ICD-10-CM

## 2019-06-20 DIAGNOSIS — M75.32 CALCIFIC TENDINITIS OF LEFT SHOULDER: ICD-10-CM

## 2019-06-20 DIAGNOSIS — M25.511 ACUTE PAIN OF RIGHT SHOULDER: ICD-10-CM

## 2019-06-20 PROCEDURE — 97162 PT EVAL MOD COMPLEX 30 MIN: CPT | Performed by: PHYSICAL THERAPIST

## 2019-06-20 PROCEDURE — 97535 SELF CARE MNGMENT TRAINING: CPT | Performed by: PHYSICAL THERAPIST

## 2019-06-24 ENCOUNTER — OFFICE VISIT (OUTPATIENT)
Dept: PHYSICAL THERAPY | Facility: CLINIC | Age: 69
End: 2019-06-24
Payer: MEDICARE

## 2019-06-24 DIAGNOSIS — M25.511 ACUTE PAIN OF RIGHT SHOULDER: ICD-10-CM

## 2019-06-24 DIAGNOSIS — M25.512 ACUTE PAIN OF LEFT SHOULDER: Primary | ICD-10-CM

## 2019-06-24 DIAGNOSIS — M75.32 CALCIFIC TENDINITIS OF LEFT SHOULDER: ICD-10-CM

## 2019-06-24 PROCEDURE — 97110 THERAPEUTIC EXERCISES: CPT

## 2019-06-24 PROCEDURE — 97140 MANUAL THERAPY 1/> REGIONS: CPT

## 2019-06-26 ENCOUNTER — TELEPHONE (OUTPATIENT)
Dept: FAMILY MEDICINE CLINIC | Facility: CLINIC | Age: 69
End: 2019-06-26

## 2019-06-26 ENCOUNTER — OFFICE VISIT (OUTPATIENT)
Dept: PHYSICAL THERAPY | Facility: CLINIC | Age: 69
End: 2019-06-26
Payer: MEDICARE

## 2019-06-26 DIAGNOSIS — M25.511 ACUTE PAIN OF RIGHT SHOULDER: ICD-10-CM

## 2019-06-26 DIAGNOSIS — I10 BENIGN ESSENTIAL HYPERTENSION: Primary | ICD-10-CM

## 2019-06-26 DIAGNOSIS — M25.512 ACUTE PAIN OF LEFT SHOULDER: Primary | ICD-10-CM

## 2019-06-26 DIAGNOSIS — M75.32 CALCIFIC TENDINITIS OF LEFT SHOULDER: ICD-10-CM

## 2019-06-26 PROCEDURE — 97140 MANUAL THERAPY 1/> REGIONS: CPT

## 2019-06-26 PROCEDURE — 97110 THERAPEUTIC EXERCISES: CPT

## 2019-06-28 ENCOUNTER — OFFICE VISIT (OUTPATIENT)
Dept: PHYSICAL THERAPY | Facility: CLINIC | Age: 69
End: 2019-06-28
Payer: MEDICARE

## 2019-06-28 DIAGNOSIS — M25.511 ACUTE PAIN OF RIGHT SHOULDER: ICD-10-CM

## 2019-06-28 DIAGNOSIS — M75.32 CALCIFIC TENDINITIS OF LEFT SHOULDER: ICD-10-CM

## 2019-06-28 DIAGNOSIS — M25.512 ACUTE PAIN OF LEFT SHOULDER: Primary | ICD-10-CM

## 2019-06-28 PROCEDURE — 97140 MANUAL THERAPY 1/> REGIONS: CPT

## 2019-06-28 PROCEDURE — 97110 THERAPEUTIC EXERCISES: CPT

## 2019-06-30 RX ORDER — LOSARTAN POTASSIUM 25 MG/1
TABLET ORAL
Qty: 180 TABLET | Refills: 3 | Status: SHIPPED | OUTPATIENT
Start: 2019-06-30 | End: 2019-12-16 | Stop reason: SDUPTHER

## 2019-07-01 ENCOUNTER — OFFICE VISIT (OUTPATIENT)
Dept: PHYSICAL THERAPY | Facility: CLINIC | Age: 69
End: 2019-07-01
Payer: MEDICARE

## 2019-07-01 DIAGNOSIS — M25.511 ACUTE PAIN OF RIGHT SHOULDER: ICD-10-CM

## 2019-07-01 DIAGNOSIS — M75.32 CALCIFIC TENDINITIS OF LEFT SHOULDER: ICD-10-CM

## 2019-07-01 DIAGNOSIS — M25.512 ACUTE PAIN OF LEFT SHOULDER: Primary | ICD-10-CM

## 2019-07-01 PROCEDURE — 97110 THERAPEUTIC EXERCISES: CPT

## 2019-07-01 PROCEDURE — 97140 MANUAL THERAPY 1/> REGIONS: CPT

## 2019-07-01 NOTE — PROGRESS NOTES
Daily Note     Today's date: 2019  Patient name: John Oliver  : 1950  MRN: [id number]  Referring provider: Yesica Gaspar MD  Dx:   Encounter Diagnosis     ICD-10-CM    1  Acute pain of left shoulder M25 512    2  Calcific tendinitis of left shoulder M75 32    3  Acute pain of right shoulder M25 511                   Subjective: Pt reports noticed increased functional motion L shoulder since starting therapy  States " at least I can wash my hair now"  Continues to c/o limited IR, which she states she has had for years  Objective: See treatment diary below      Assessment: Tolerated treatment well  Progressed resistance with TB exercises without c/o or apparent difficulty  Patient would benefit from continued PT to increase L shoulder ROM and strength  Plan: Continue per plan of care          Precautions: osteopenia, DM, COPD  Re-evaluation due 19  Manual     L shoulder PROM to lynda all dir  x x x x                             15 min 15 min 15 min 15 min       Exercise Diary   7   HEP instruction (UT and levator stretch, supine cane AAROM flex) x       UBE  120 RPM 5' fwd/5' retro  120 RPM 5' fwd/5' retro  120 RPM 5' fwd/5' retro 120 rpm 5'fwd/5' retro   Pulleys - flex/scap  10" x10 Flex/Scap 10" x10 Flex/Scap 10" x10 Flex/Scap 10" x10 flex/scap   Sleeper stretch        Pec stretch in doorway  30" x3 30" x3 30" x3 30 " x3   Thoracic extension over towel  5" x20 5" x20 5" x20 5' x20   TB MTP  Red TB 5" x20 Red TB 5" x20 Red TB 5" x20 Green TB 5" x20   TB LTP  Red TB 5" x20 Red TB 5" x20 Red TB 5" x20 Green TB 5" x20   TB IR   Red TB x20 Red TB x20 Green x20   TB ER   Red TB x20 Red TB x20 Green x20   Scap punches  5" x20 5" x20 5" x20 5" x20   Standing AAROM cane scaption        IR with cane behind back    5" x10 5" x10                                                                               Modalities   7/   CP post tx NP

## 2019-07-05 ENCOUNTER — OFFICE VISIT (OUTPATIENT)
Dept: PHYSICAL THERAPY | Facility: CLINIC | Age: 69
End: 2019-07-05
Payer: MEDICARE

## 2019-07-05 DIAGNOSIS — M25.512 ACUTE PAIN OF LEFT SHOULDER: Primary | ICD-10-CM

## 2019-07-05 DIAGNOSIS — M25.511 ACUTE PAIN OF RIGHT SHOULDER: ICD-10-CM

## 2019-07-05 DIAGNOSIS — M75.32 CALCIFIC TENDINITIS OF LEFT SHOULDER: ICD-10-CM

## 2019-07-05 PROCEDURE — 97140 MANUAL THERAPY 1/> REGIONS: CPT

## 2019-07-05 PROCEDURE — 97110 THERAPEUTIC EXERCISES: CPT

## 2019-07-05 NOTE — PROGRESS NOTES
Daily Note     Today's date: 2019  Patient name: Rina Oshea  : 1950  MRN: [id number]  Referring provider: Daxa Hutton MD  Dx:   Encounter Diagnosis     ICD-10-CM    1  Acute pain of left shoulder M25 512    2  Calcific tendinitis of left shoulder M75 32    3  Acute pain of right shoulder M25 511                   Subjective: Pt c/o soreness L UT  today, states she thinks it was the way she was sleeping  Objective: See treatment diary below      Assessment: Tolerated treatment well  Patient exhibited good technique with therapeutic exercises and would benefit from continued PT      Plan: Continue per plan of care             Precautions: osteopenia, DM, COPD  Re-evaluation due 19  Manual     L shoulder PROM to lynda all dir x x x x x                            15 min 15 min 15 min 15 min 15 min       Exercise Diary     HEP instruction (UT and levator stretch, supine cane AAROM flex)         rpm  5'fwd/5'retro 120 RPM 5' fwd/5' retro  120 RPM 5' fwd/5' retro  120 RPM 5' fwd/5' retro 120 rpm 5'fwd/5' retro   Pulleys - flex/scap 10" x10  Flex/scap 10" x10 Flex/Scap 10" x10 Flex/Scap 10" x10 Flex/Scap 10" x10 flex/scap   Sleeper stretch        Pec stretch in doorway 30" x3 30" x3 30" x3 30" x3 30 " x3   Thoracic extension over towel 5" x20 5" x20 5" x20 5" x20 5' x20   TB MTP Green TB Red TB 5" x20 Red TB 5" x20 Red TB 5" x20 Green TB 5" x20   TB LTP Green TB  5"x20 Red TB 5" x20 Red TB 5" x20 Red TB 5" x20 Green TB 5" x20   TB IR Green TB  x20  Red TB x20 Red TB x20 Green x20   TB ER Green TB  x20  Red TB x20 Red TB x20 Green x20   Scap punches 5" x20 5" x20 5" x20 5" x20 5" x20   Standing AAROM cane scaption        IR with cane behind back 5"x10   5" x10 5" x10                                                                               Modalities     CP post tx np NP

## 2019-07-09 ENCOUNTER — OFFICE VISIT (OUTPATIENT)
Dept: PHYSICAL THERAPY | Facility: CLINIC | Age: 69
End: 2019-07-09
Payer: MEDICARE

## 2019-07-09 DIAGNOSIS — M25.511 ACUTE PAIN OF RIGHT SHOULDER: ICD-10-CM

## 2019-07-09 DIAGNOSIS — M25.512 ACUTE PAIN OF LEFT SHOULDER: Primary | ICD-10-CM

## 2019-07-09 DIAGNOSIS — M75.32 CALCIFIC TENDINITIS OF LEFT SHOULDER: ICD-10-CM

## 2019-07-09 PROCEDURE — 97110 THERAPEUTIC EXERCISES: CPT | Performed by: PHYSICAL THERAPIST

## 2019-07-09 PROCEDURE — 97140 MANUAL THERAPY 1/> REGIONS: CPT | Performed by: PHYSICAL THERAPIST

## 2019-07-09 NOTE — PROGRESS NOTES
Daily Note     Today's date: 2019  Patient name: Jorge Rios  : 1950  MRN: [id number]  Referring provider: Tami Barrow MD  Dx:   Encounter Diagnosis     ICD-10-CM    1  Acute pain of left shoulder M25 512    2  Calcific tendinitis of left shoulder M75 32    3  Acute pain of right shoulder M25 511                   Subjective: Patient reports her shoulder is getting a little better  Objective: See treatment diary below      Assessment: Tolerated treatment well  Continues to demonstrate limited shoulder mobility gosia GHJ IR ROM  Added sleeper stretch to address deficits  Patient demonstrated fatigue post treatment, exhibited good technique with therapeutic exercises and would benefit from continued PT      Plan: Continue per plan of care  Progress treatment as tolerated         Precautions: osteopenia, DM, COPD  Re-evaluation due 19  Manual     L shoulder PROM to lynda all dir x x x x x                            15 min 15 min 15 min 15 min 15 min       Exercise Diary     HEP instruction (UT and levator stretch, supine cane AAROM flex)         rpm  5'fwd/5'retro 120 RPM 5' fwd/5' retro  120 RPM 5' fwd/5' retro  80 RPM 5' fwd/5' retro 120 rpm 5'fwd/5' retro   Pulleys - flex/scap 10" x10  Flex/scap 10" x10 Flex/Scap 10" x10 Flex/Scap 10" x10 Flex/Scap 10" x10 flex/scap   Sleeper stretch  10"x10      Pec stretch in doorway 30" x3 30" x3 30" x3 30" x3 30 " x3   Thoracic extension over towel 5" x20 5" x20 5" x20 5" x20 5' x20   TB MTP Green TB Green TB  x20 Red TB 5" x20 Red TB 5" x20 Green TB 5" x20   TB LTP Green TB  5"x20 Green TB  x20 Red TB 5" x20 Red TB 5" x20 Green TB 5" x20   TB IR Green TB  x20 Green TB  x20 Red TB x20 Red TB x20 Green x20   TB ER Green TB  x20 Green TB  x20 Red TB x20 Red TB x20 Green x20   Scap punches 5" x20 5" x20, 2# 5" x20 5" x20 5" x20   Standing AAROM cane scaption  2", 2x10      IR with cane behind back 5"x10 5" x10 5" x10 5" x10                                                                               Modalities  7/5 7/9 6/26 6/28 7/1   CP post tx np NP

## 2019-07-11 ENCOUNTER — OFFICE VISIT (OUTPATIENT)
Dept: PHYSICAL THERAPY | Facility: CLINIC | Age: 69
End: 2019-07-11
Payer: MEDICARE

## 2019-07-11 DIAGNOSIS — M25.511 ACUTE PAIN OF RIGHT SHOULDER: ICD-10-CM

## 2019-07-11 DIAGNOSIS — M25.512 ACUTE PAIN OF LEFT SHOULDER: Primary | ICD-10-CM

## 2019-07-11 DIAGNOSIS — M75.32 CALCIFIC TENDINITIS OF LEFT SHOULDER: ICD-10-CM

## 2019-07-11 PROCEDURE — 97110 THERAPEUTIC EXERCISES: CPT

## 2019-07-11 PROCEDURE — 97140 MANUAL THERAPY 1/> REGIONS: CPT

## 2019-07-11 NOTE — PROGRESS NOTES
Daily Note     Today's date: 2019  Patient name: Caren Thomas  : 1950  MRN: [id number]  Referring provider: Boni Day MD  Dx:   Encounter Diagnosis     ICD-10-CM    1  Acute pain of left shoulder M25 512    2  Calcific tendinitis of left shoulder M75 32    3  Acute pain of right shoulder M25 511                   Subjective: "I feel pretty good today "      Objective: See treatment diary below      Assessment: Tolerated treatment well  Pt continues to slightly progress in IR of the L shoulder  Patient demonstrated fatigue post treatment, exhibited good technique with therapeutic exercises and would benefit from continued PT      Plan: Continue per plan of care        Precautions: osteopenia, DM, COPD  Re-evaluation due 19  Manual     L shoulder PROM to lynda all dir x x x x x                            15 min 15 min 15 min 15 min 15 min       Exercise Diary     HEP instruction (UT and levator stretch, supine cane AAROM flex)         rpm  5'fwd/5'retro 120 RPM 5' fwd/5' retro  100 RPM 5' fwd/5' retro  80 RPM 5' fwd/5' retro 120 rpm 5'fwd/5' retro   Pulleys - flex/scap 10" x10  Flex/scap 10" x10 Flex/Scap 10" x10 Flex/Scap 10" x10 Flex/Scap 10" x10 flex/scap   Sleeper stretch  10"x10      Pec stretch in doorway 30" x3 30" x3 30" x3 30" x3 30 " x3   Thoracic extension over towel 5" x20 5" x20 5" x20 5" x20 5' x20   TB MTP Green TB Green TB  x20 Green TB 5" x20 Red TB 5" x20 Green TB 5" x20   TB LTP Green TB  5"x20 Green TB  x20 Green TB 5" x20 Red TB 5" x20 Green TB 5" x20   TB IR Green TB  x20 Green TB  x20 Green TB x20 Red TB x20 Green x20   TB ER Green TB  x20 Green TB  x20 Green TB x20 Red TB x20 Green x20   Scap punches 5" x20 5" x20, 2# 5" x20 2# 5" x20 5" x20   Standing AAROM cane scaption  2", 2x10 2" 2x10     IR with cane behind back 5"x10 5" x10 Towel behind   10" x10 5" x10 5" x10 Modalities  7/5 7/9 6/26 6/28 7/1   CP post tx np NP

## 2019-07-12 ENCOUNTER — OFFICE VISIT (OUTPATIENT)
Dept: PHYSICAL THERAPY | Facility: CLINIC | Age: 69
End: 2019-07-12
Payer: MEDICARE

## 2019-07-12 DIAGNOSIS — M75.32 CALCIFIC TENDINITIS OF LEFT SHOULDER: ICD-10-CM

## 2019-07-12 DIAGNOSIS — M25.512 ACUTE PAIN OF LEFT SHOULDER: Primary | ICD-10-CM

## 2019-07-12 DIAGNOSIS — M25.511 ACUTE PAIN OF RIGHT SHOULDER: ICD-10-CM

## 2019-07-12 PROCEDURE — 97140 MANUAL THERAPY 1/> REGIONS: CPT

## 2019-07-12 PROCEDURE — 97110 THERAPEUTIC EXERCISES: CPT

## 2019-07-12 NOTE — PROGRESS NOTES
Daily Note     Today's date: 2019  Patient name: Joanie Real  : 1950  MRN: [id number]  Referring provider: Kye Gonzales MD  Dx:   Encounter Diagnosis     ICD-10-CM    1  Acute pain of left shoulder M25 512    2  Calcific tendinitis of left shoulder M75 32    3  Acute pain of right shoulder M25 511                   Subjective: "I feel pretty good  My right arm hurts more than my left today "      Objective: See treatment diary below      Assessment: Tolerated treatment well  Pt continues to slightly lack IR of the L shoulder during PROM  Patient exhibited good technique with therapeutic exercises and would benefit from continued PT      Plan: Continue per plan of care        Precautions: osteopenia, DM, COPD  Re-evaluation due 19  Manual     L shoulder PROM to lynda all dir x x x x x                            15 min 15 min 15 min 15 min 15 min       Exercise Diary     HEP instruction (UT and levator stretch, supine cane AAROM flex)         rpm  5'fwd/5'retro 120 RPM 5' fwd/5' retro  100 RPM 5' fwd/5' retro  100 RPM 5' fwd/5' retro 120 rpm 5'fwd/5' retro   Pulleys - flex/scap 10" x10  Flex/scap 10" x10 Flex/Scap 10" x10 Flex/Scap 10" x10 Flex/Scap 10" x10 flex/scap   Sleeper stretch  10"x10      Pec stretch in doorway 30" x3 30" x3 30" x3 30" x3 30 " x3   Thoracic extension over towel 5" x20 5" x20 5" x20 5" x20 5' x20   TB MTP Green TB Green TB  x20 Green TB 5" x20 GreenTB 5" x20 Green TB 5" x20   TB LTP Green TB  5"x20 Green TB  x20 Green TB 5" x20 Green TB 5" x20 Green TB 5" x20   TB IR Green TB  x20 Green TB  x20 Green TB x20 Green TB x20 Green x20   TB ER Green TB  x20 Green TB  x20 Green TB x20 Green TB x20 Green x20   Scap punches 5" x20 5" x20, 2# 5" x20 2# 5" x20 2# 5" x20   Standing AAROM cane scaption  2", 2x10 2" 2x10 2" 2x10    IR with cane behind back 5"x10 5" x10 Towel behind   10" x10 Towel behind   10" x10 5" x10 Modalities  7/5 7/9 6/26 7/12 7/1   CP post tx np NP

## 2019-07-15 LAB
LEFT EYE DIABETIC RETINOPATHY: NORMAL
RIGHT EYE DIABETIC RETINOPATHY: NORMAL

## 2019-07-16 ENCOUNTER — OFFICE VISIT (OUTPATIENT)
Dept: PHYSICAL THERAPY | Facility: CLINIC | Age: 69
End: 2019-07-16
Payer: MEDICARE

## 2019-07-16 DIAGNOSIS — M75.32 CALCIFIC TENDINITIS OF LEFT SHOULDER: ICD-10-CM

## 2019-07-16 DIAGNOSIS — M25.511 ACUTE PAIN OF RIGHT SHOULDER: ICD-10-CM

## 2019-07-16 DIAGNOSIS — M25.512 ACUTE PAIN OF LEFT SHOULDER: Primary | ICD-10-CM

## 2019-07-16 PROCEDURE — 97150 GROUP THERAPEUTIC PROCEDURES: CPT | Performed by: PHYSICAL THERAPIST

## 2019-07-16 PROCEDURE — 97140 MANUAL THERAPY 1/> REGIONS: CPT | Performed by: PHYSICAL THERAPIST

## 2019-07-16 NOTE — PROGRESS NOTES
Daily Note     Today's date: 2019  Patient name: Lv Reynoso  : 1950  MRN: [id number]  Referring provider: Caio Zelaya MD  Dx:   Encounter Diagnosis     ICD-10-CM    1  Acute pain of left shoulder M25 512    2  Calcific tendinitis of left shoulder M75 32    3  Acute pain of right shoulder M25 511                   Subjective: "It's feeling good  It's about 80% better  It still twinges some times "      Objective: See treatment diary below      Assessment: Tolerated treatment well  Several progressions this date  Patient is making steady progress toward goals  Patient demonstrated fatigue post treatment, exhibited good technique with therapeutic exercises and would benefit from continued PT      Plan: Continue per plan of care  Progress treatment as tolerated         Precautions: osteopenia, DM, COPD  Re-evaluation due 19  Manual     L shoulder PROM to lynda all dir x x x x x                            15 min 15 min 15 min 15 min 15 min       Exercise Diary     HEP instruction (UT and levator stretch, supine cane AAROM flex)         rpm  5'fwd/5'retro 120 RPM 5' fwd/5' retro  100 RPM 5' fwd/5' retro  100 RPM 5' fwd/5' retro 90 rpm 5'fwd/5' retro   Pulleys - flex/scap 10" x10  Flex/scap 10" x10 Flex/Scap 10" x10 Flex/Scap 10" x10 Flex/Scap 10" x10 flex/scap   Sleeper stretch  10"x10   10"x10   Pec stretch in doorway 30" x3 30" x3 30" x3 30" x3 30 " x3   Thoracic extension over towel 5" x20 5" x20 5" x20 5" x20 5' x20   TB MTP Green TB  5"x20 Green TB  x20 Green TB 5" x20 GreenTB 5" x20 Green TB 5" x30   TB LTP Green TB  5"x20 Green TB  x20 Green TB 5" x20 Green TB 5" x20 Green TB 5" x30   TB IR Green TB  x20 Green TB  x20 Green TB x20 Green TB x20 Green x25   TB ER Green TB  x20 Green TB  x20 Green TB x20 Green TB x20 Green x25   Scap punches 5" x20 5" x20, 2# 5" x20 2# 5" x20 2# 5" x20, 3#   Standing AAROM cane scaption  2", 2x10 2" 2x10 2" 2x10 AROM 2x10   IR with cane behind back 5"x10 5" x10 Towel behind   10" x10 Towel behind   10" x10 Towel  10"x10

## 2019-07-18 ENCOUNTER — OFFICE VISIT (OUTPATIENT)
Dept: PHYSICAL THERAPY | Facility: CLINIC | Age: 69
End: 2019-07-18
Payer: MEDICARE

## 2019-07-18 DIAGNOSIS — M75.32 CALCIFIC TENDINITIS OF LEFT SHOULDER: ICD-10-CM

## 2019-07-18 DIAGNOSIS — M25.512 ACUTE PAIN OF LEFT SHOULDER: Primary | ICD-10-CM

## 2019-07-18 DIAGNOSIS — M25.511 ACUTE PAIN OF RIGHT SHOULDER: ICD-10-CM

## 2019-07-18 PROCEDURE — 97150 GROUP THERAPEUTIC PROCEDURES: CPT | Performed by: PHYSICAL THERAPIST

## 2019-07-18 PROCEDURE — 97140 MANUAL THERAPY 1/> REGIONS: CPT | Performed by: PHYSICAL THERAPIST

## 2019-07-18 NOTE — PROGRESS NOTES
Daily Note     Today's date: 2019  Patient name: Charly Byrd  : 1950  MRN: [id number]  Referring provider: Tonny Wallace MD  Dx:   Encounter Diagnosis     ICD-10-CM    1  Acute pain of left shoulder M25 512    2  Calcific tendinitis of left shoulder M75 32    3  Acute pain of right shoulder M25 511                   Subjective: Patient reports her shoulder is feeling "really good"  Objective: See treatment diary below      Assessment: Tolerated treatment well  Patient demonstrated fatigue post treatment, exhibited good technique with therapeutic exercises and would benefit from continued PT      Plan: Continue per plan of care  Progress treatment as tolerated  Re-evaluation scheduled for next week  Plan to DC by end of next week        Precautions: osteopenia, DM, COPD  Re-evaluation due 19  Manual     L shoulder PROM to lynda all dir x x x x x                            15 min 15 min 15 min 15 min 15 min       Exercise Diary     HEP instruction (UT and levator stretch, supine cane AAROM flex)        UBE 90 rpm  5'fwd/5'retro 120 RPM 5' fwd/5' retro  100 RPM 5' fwd/5' retro  100 RPM 5' fwd/5' retro 90 rpm 5'fwd/5' retro   Pulleys - flex/scap 10" x10  Flex/scap 10" x10 Flex/Scap 10" x10 Flex/Scap 10" x10 Flex/Scap 10" x10 flex/scap   Sleeper stretch  10"x10   10"x10   Pec stretch in doorway 30" x3 30" x3 30" x3 30" x3 30 " x3   Thoracic extension over towel DC 5" x20 5" x20 5" x20 5' x20   TB MTP Green TB  5"x30 Green TB  x20 Green TB 5" x20 GreenTB 5" x20 Green TB 5" x30   TB LTP Green TB  5"x30 Green TB  x20 Green TB 5" x20 Green TB 5" x20 Green TB 5" x30   TB IR Green TB  x25 Green TB  x20 Green TB x20 Green TB x20 Green x25   TB ER Green TB  x25 Green TB  x20 Green TB x20 Green TB x20 Green x25   Scap punches 5" x20, 3# 5" x20, 2# 5" x20 2# 5" x20 2# 5" x20, 3#   Standing AAROM cane scaption AROM 2x10 ea no cane 2", 2x10 2" 2x10 2" 2x10 AROM 2x10 no cane   IR with cane behind back DC 5" x10 Towel behind   10" x10 Towel behind   10" x10 Towel  10"x10

## 2019-07-19 ENCOUNTER — OFFICE VISIT (OUTPATIENT)
Dept: PHYSICAL THERAPY | Facility: CLINIC | Age: 69
End: 2019-07-19
Payer: MEDICARE

## 2019-07-19 DIAGNOSIS — M25.512 ACUTE PAIN OF LEFT SHOULDER: Primary | ICD-10-CM

## 2019-07-19 DIAGNOSIS — M75.32 CALCIFIC TENDINITIS OF LEFT SHOULDER: ICD-10-CM

## 2019-07-19 PROCEDURE — 97140 MANUAL THERAPY 1/> REGIONS: CPT

## 2019-07-19 PROCEDURE — 97110 THERAPEUTIC EXERCISES: CPT

## 2019-07-19 NOTE — PROGRESS NOTES
Daily Note     Today's date: 2019  Patient name: Nona Alvarez  : 1950  MRN: [id number]  Referring provider: Adela Jiménez MD  Dx:   Encounter Diagnosis     ICD-10-CM    1  Acute pain of left shoulder M25 512    2  Calcific tendinitis of left shoulder M75 32                   Subjective: Pt reports her shoulder is feeling good  Objective: See treatment diary below      Assessment: Tolerated treatment well  Patient exhibited good technique with therapeutic exercises and would benefit from continued PT      Plan: Continue per plan of care        Precautions: osteopenia, DM, COPD  Re-evaluation due 19  Manual     L shoulder PROM to lynda all dir x x x x x                            15 min 15 min 15 min 15 min 15 min       Exercise Diary     HEP instruction (UT and levator stretch, supine cane AAROM flex)        UBE 90 rpm  5'fwd/5'retro 90 rpm   5'fwd/5'retro 100 RPM 5' fwd/5' retro  100 RPM 5' fwd/5' retro 90 rpm 5'fwd/5' retro   Pulleys - flex/scap 10" x10  Flex/scap 10" x10   Flex/scap 10" x10 Flex/Scap 10" x10 Flex/Scap 10" x10 flex/scap   Sleeper stretch     10"x10   Pec stretch in doorway 30" x3 30 x3 30" x3 30" x3 30 " x3   Thoracic extension over towel DC --- 5" x20 5" x20 5' x20   TB MTP Green TB  5"x30 Green TB   5"x30 Green TB 5" x20 GreenTB 5" x20 Green TB 5" x30   TB LTP Green TB  5"x30 Green TB  5" x30 Green TB 5" x20 Green TB 5" x20 Green TB 5" x30   TB IR Green TB  x25 Green TB  x25 Green TB x20 Green TB x20 Green x25   TB ER Green TB  x25 Green TB   x25 Green TB x20 Green TB x20 Green x25   Scap punches 5" x20, 3# 3# 5"  x30 5" x20 2# 5" x20 2# 5" x20, 3#   Standing AAROM cane scaption AROM 2x10 ea no cane AROM  2x10 ea  2" 2x10 2" 2x10 AROM 2x10 no cane   IR with cane behind back DC --- Towel behind   10" x10 Towel behind   10" x10 Towel  10"x10

## 2019-07-22 ENCOUNTER — EVALUATION (OUTPATIENT)
Dept: PHYSICAL THERAPY | Facility: CLINIC | Age: 69
End: 2019-07-22
Payer: MEDICARE

## 2019-07-22 DIAGNOSIS — M25.512 ACUTE PAIN OF LEFT SHOULDER: Primary | ICD-10-CM

## 2019-07-22 DIAGNOSIS — M25.511 ACUTE PAIN OF RIGHT SHOULDER: ICD-10-CM

## 2019-07-22 DIAGNOSIS — M75.32 CALCIFIC TENDINITIS OF LEFT SHOULDER: ICD-10-CM

## 2019-07-22 PROCEDURE — 97164 PT RE-EVAL EST PLAN CARE: CPT | Performed by: PHYSICAL THERAPIST

## 2019-07-22 PROCEDURE — 97150 GROUP THERAPEUTIC PROCEDURES: CPT | Performed by: PHYSICAL THERAPIST

## 2019-07-22 NOTE — PROGRESS NOTES
PT Re-evaluation    Today's date: 2019  Patient name: Александр Duque  : 1950  MRN: [id number]  Referring provider: Kyle Muller MD  Dx:   Encounter Diagnosis     ICD-10-CM    1  Acute pain of left shoulder M25 512    2  Calcific tendinitis of left shoulder M75 32    3  Acute pain of right shoulder M25 511                 Assessment  Александр Duque has been compliant with PT services 3 days/week  She has demonstrated decreased pain, increased strength, increased range of motion, and increased activity tolerance since starting physical therapy services  They report an overall improvement of 95% thus far  At this time, patient has achieved their maximum functional benefit from skilled physical therapy services and will be discharged after one final visit for review of HEP  Patient is in agreement with the plan of care  Thank you for the referral      Goals  1) In 4 weeks, patient will report reduced shoulder pain of 3 points or more on 10 point scale - MET  2) In 4 weeks, patient will demonstrate 10 deg or more increase in L shoulder AROM in all directions   - MET  3) In 4 weeks, patient will demonstrate 10 deg or more increase in R shoulder PROM IR  - MET  4) In 4 weeks, patient will demonstrate improved strength of shoulder with 1/2 grade or more improvement upon MMT  - MET  5) In 4 weeks, patient will demonstrate independence with HEP  - will review at [facility]  6) In 4-8 weeks, patient will demonstrate functional IR behind the back and functional ER behind the head without pain - MET  7) In 4-8 weeks, patient will demonstrate pain free overhead reaching with b/l UEs - MET    Plan  1 more visit to review HEP, then DC from PT           Subjective Evaluation    History of Present Illness  Date of onset: 2019  Mechanism of injury: Patient reports that she began to notice L shoulder pain about 2 months ago, "discovered it after sleeping on my side and waking up with pain " She reports feeling stiff after sleeping, felt some "cracking" but still had pain  Patient reports she has this pain in both shoulders but that her L shoulder pain is worse than her R  Had x-ray end of May, results with "calcific tendonitis and bursitis"  She reports the pain has eased wince having the x-ray taken  Has not had injection and does not take rx pain medication for this condition  She reports taking IBU prn for the pain  Pain is primarily in her L shoulder, can radiate into her L upper arm but lately has noticed an ache into the forearm  Denies parasthesias             Not a recurrent problem   Pain  IE        Current pain ratin   0  At best pain ratin   0  At worst pain rating: 10  1  Location: L shoulder          Patient Goals  Patient goals for therapy: decreased pain and increased motion  Patient goal: "to get my arm back, get rid of the terrible pain" - MET        Objective       Neurological Testing     Sensation     Shoulder   Left Shoulder   Intact: light touch    Right Shoulder   Intact: light touch    Active Range of Motion   IE         Left Shoulder   Flexion: 115 degrees with pain  150 deg pain free  Extension: 35 degrees with pain  40 deg pain free  Abduction: 120 degrees with pain  140 deg pain free  External rotation BTH: C2 w/pain  T3 pain free  Internal rotation BTB: sacrum with pain T10 pain free  Horizontal abduction: 80 degrees with pain 95 pain free  Horizontal adduction: 5 degrees with pain 20 deg mild pain    Right Shoulder   IE      RA   Flexion: 145 degrees     160 deg pain free  Extension: 45 degrees with pain  40 deg pain free  Abduction: 155 degrees    160 deg pain free  External rotation BTH: T4 with pain  T4 pain free  Internal rotation BTB: T12 with pain  T12 pain free  Horizontal abduction: 105 degrees   110 deg pain free  Horizontal adduction: 8 degrees with pain 20 deg pain free    Passive Range of Motion  IE         Left Shoulder   Flexion: 120 degrees with pain  WNL pain free  Abduction: 65 degrees with pain  WNL pain free  External rotation 45°: 65 degrees with pain WNL pain free  Internal rotation 45°: 30 degrees with pain WNL pain free    Right Shoulder   Flexion: 155 degrees     WNL pain free  Abduction: 155 degrees    WNL pain free  External rotation 90°: 85 degrees   WNL pain free  Internal rotation 90°: 45 degrees with pain WNL pain free    Strength/Myotome Testing     Left Shoulder   IE    RA 7/22  Planes of Motion   Flexion: 4-    4+  Abduction: 4-    4+  External rotation BTH: 4  5  Internal rotation at 0°: 4  5    Isolated Muscles   Biceps: 4+   Triceps: 4+     Right Shoulder     Planes of Motion   Flexion: 4+    4+  Abduction: 4-    4+ with mild pain  External rotation BTH: 4  5  Internal rotation at 0°: 4-  5    Isolated Muscles   Biceps: 4+   Triceps: 4+     Tests     Left Shoulder   Positive drop arm, empty can, Hawkin's, passive horizontal adduction and Speed's  Negative belly press, clunk, crank, external rotation lag sign and bicep load   RA 7/22: (-) drop arm, empty can, Hawkin's, passive horizontal adduction and Speed's  Right Shoulder   Positive drop arm and empty can  Negative belly press, external rotation lag sign, Hawkin's, passive horizontal adduction and Speed's  RA 7/22: (-) drop arm and empty can           Precautions: osteopenia, DM, COPD  Manual  7/18 7/19 7/22 7/12 7/16   L shoulder PROM to lynda all dir x x DC, ROM WNL x x                            15 min 15 min 0 mins 15 min 15 min       Exercise Diary  7/18 7/19 7/22 7/12 7/16   HEP instruction (UT and levator stretch, supine cane AAROM flex)        UBE 90 rpm  5'fwd/5'retro 90 rpm   5'fwd/5'retro 90 RPM 5' fwd/5' retro  100 RPM 5' fwd/5' retro 90 rpm 5'fwd/5' retro   Pulleys - flex/scap 10" x10  Flex/scap 10" x10   Flex/scap 10" x10 Flex/Scap 10" x10 Flex/Scap 10" x10 flex/scap   Sleeper stretch     10"x10   Pec stretch in doorway 30" x3 30 x3 30" x3 30" x3 30 " x3   Thoracic extension over towel DC --- -- 5" x20 5' x20   TB MTP Green TB  5"x30 Green TB   5"x30 Green TB 5" x25 GreenTB 5" x20 Green TB 5" x30   TB LTP Green TB  5"x30 Green TB  5" x30 Green TB 5" x25 Green TB 5" x20 Green TB 5" x30   TB IR Green TB  x25 Green TB  x25 Green TB x25 Green TB x20 Green x25   TB ER Green TB  x25 Green TB   x25 Green TB x25 Green TB x20 Green x25   Scap punches 5" x20, 3# 3# 5"  x30 Wall push ups  2x10 5" x20 2# 5" x20, 3#   Standing AAROM cane scaption AROM 2x10 ea no cane AROM  2x10 ea  1# 2x10 flexion and scaption 2" 2x10 AROM 2x10 no cane   IR with cane behind back DC --- -- Towel behind   10" x10 Towel  10"x10                                                                           Review HEP at [facility] and DC from PT

## 2019-07-24 ENCOUNTER — APPOINTMENT (OUTPATIENT)
Dept: PHYSICAL THERAPY | Facility: CLINIC | Age: 69
End: 2019-07-24
Payer: MEDICARE

## 2019-07-25 ENCOUNTER — OFFICE VISIT (OUTPATIENT)
Dept: PHYSICAL THERAPY | Facility: CLINIC | Age: 69
End: 2019-07-25
Payer: MEDICARE

## 2019-07-25 DIAGNOSIS — M25.511 ACUTE PAIN OF RIGHT SHOULDER: ICD-10-CM

## 2019-07-25 DIAGNOSIS — M25.512 ACUTE PAIN OF LEFT SHOULDER: Primary | ICD-10-CM

## 2019-07-25 DIAGNOSIS — M75.32 CALCIFIC TENDINITIS OF LEFT SHOULDER: ICD-10-CM

## 2019-07-25 PROCEDURE — 97535 SELF CARE MNGMENT TRAINING: CPT

## 2019-07-25 PROCEDURE — 97110 THERAPEUTIC EXERCISES: CPT

## 2019-07-25 PROCEDURE — 97140 MANUAL THERAPY 1/> REGIONS: CPT

## 2019-07-25 NOTE — PROGRESS NOTES
Daily Note     Today's date: 2019  Patient name: Rina Oshea  : 1950  MRN: [id number]  Referring provider: Daxa Hutton MD  Dx:   Encounter Diagnosis     ICD-10-CM    1  Acute pain of left shoulder M25 512    2  Calcific tendinitis of left shoulder M75 32    3  Acute pain of right shoulder M25 511                   Subjective: "I feel great "      Objective: See treatment diary below      Assessment: Tolerated treatment well  Updated HEP given to pt  Patient exhibited good technique with therapeutic exercises  Pt PROM WNL  Plan: Pt to be DC from skilled therapy by DPT       Precautions: osteopenia, DM, COPD  Re-evaluation due 19  Manual     L shoulder PROM to lynda all dir x x x x x                            15 min 15 min 15 min 15 min 15 min       Exercise Diary     HEP instruction (UT and levator stretch, supine cane AAROM flex)   Updated HEP (10 min)     UBE 90 rpm  5'fwd/5'retro 90 rpm   5'fwd/5'retro 90 RPM 5' fwd/5' retro  100 RPM 5' fwd/5' retro 90 rpm 5'fwd/5' retro   Pulleys - flex/scap 10" x10  Flex/scap 10" x10   Flex/scap 10" x10 Flex/Scap 10" x10 Flex/Scap 10" x10 flex/scap   Sleeper stretch     10"x10   Pec stretch in doorway 30" x3 30 x3 30" x3 30" x3 30 " x3   Thoracic extension over towel DC ---  5" x20 5' x20   TB MTP Green TB  5"x30 Green TB   5"x30 Green TB 5" x30 GreenTB 5" x20 Green TB 5" x30   TB LTP Green TB  5"x30 Green TB  5" x30 Green TB 5" x30 Green TB 5" x20 Green TB 5" x30   TB IR Green TB  x25 Green TB  x25 Green TB x25 Green TB x20 Green x25   TB ER Green TB  x25 Green TB   x25 Green TB x25 Green TB x20 Green x25   Scap punches 5" x20, 3# 3# 5"  x30 5" x20 3# 5" x20 2# 5" x20, 3#   Standing AAROM cane scaption/Flex AROM 2x10 ea no cane AROM  2x10 ea  AROM  2x10 ea 1# 2" 2x10 AROM 2x10 no cane   IR with cane behind back DC --- -- Towel behind   10" x10 Towel  10"x10

## 2019-07-26 ENCOUNTER — APPOINTMENT (OUTPATIENT)
Dept: PHYSICAL THERAPY | Facility: CLINIC | Age: 69
End: 2019-07-26
Payer: MEDICARE

## 2019-07-29 ENCOUNTER — OFFICE VISIT (OUTPATIENT)
Dept: OBGYN CLINIC | Facility: CLINIC | Age: 69
End: 2019-07-29
Payer: MEDICARE

## 2019-07-29 VITALS
RESPIRATION RATE: 16 BRPM | HEART RATE: 76 BPM | DIASTOLIC BLOOD PRESSURE: 81 MMHG | BODY MASS INDEX: 35.3 KG/M2 | HEIGHT: 60 IN | SYSTOLIC BLOOD PRESSURE: 139 MMHG | WEIGHT: 179.8 LBS

## 2019-07-29 DIAGNOSIS — M25.512 ACUTE PAIN OF LEFT SHOULDER: Primary | ICD-10-CM

## 2019-07-29 DIAGNOSIS — M75.32 CALCIFIC TENDINITIS OF LEFT SHOULDER: ICD-10-CM

## 2019-07-29 PROCEDURE — 99213 OFFICE O/P EST LOW 20 MIN: CPT | Performed by: EMERGENCY MEDICINE

## 2019-07-29 NOTE — PROGRESS NOTES
Assessment/Plan:    Diagnoses and all orders for this visit:    Acute pain of left shoulder    Calcific tendinitis of left shoulder    Patient much improved  Continue HEP  Return if symptoms worsen or fail to improve  Chief Complaint:     Chief Complaint   Patient presents with    Left Shoulder - Follow-up       Subjective:   Patient ID: Rina Oshea is a 68 y o  female  Patient returns having completed 1 month of PT and determined that she achieved their maximum functional benefit from skilled physical therapy services  She is much improved and happy with improvement, no significant night pain, no issues with ADLs  Initial note: NP presents for 2 months of left shoulder pain, no injury, worse with reaching behind and reaching above, as well as sleeping on left side  She has been taking ibuprofen which seems to be helping  Review of Systems    The following portions of the patient's chart were reviewed and updated as appropriate:    Allergy:    Allergies   Allergen Reactions    Penicillins          Past Medical History:   Diagnosis Date    Asthma     [facility]     Diabetes ([facility]     Hyperlipidemia     Osteoarthritis     Osteoporosis     Seasonal allergies        Past Surgical History:   Procedure Laterality Date    CHOLECYSTECTOMY      [id number]  LAST ASSESSED    EYE SURGERY      cataracts removed    HYSTERECTOMY      [id number]  LAST ASSESSED    TUBAL LIGATION         Social History     Socioeconomic History    Marital status: /Civil Union     Spouse name: Not on file    Number of children: Not on file    Years of education: Not on file    Highest education level: Not on file   Occupational History    Not on file   Social Needs    Financial resource strain: Not on file    Food insecurity:     Worry: Not on file     Inability: Not on file    Transportation needs:     Medical: Not on file     Non-medical: Not on file Tobacco Use    Smoking status: Former Smoker     Last attempt to quit:      Years since quittin 5    Smokeless tobacco: Never Used   Substance and Sexual Activity    Alcohol use: No    Drug use: No    Sexual activity: Not on file   Lifestyle    Physical activity:     Days per week: Not on file     Minutes per session: Not on file    Stress: Not on file   Relationships    Social connections:     Talks on phone: Not on file     Gets together: Not on file     Attends Jainism service: Not on file     Active member of club or organization: Not on file     Attends meetings of clubs or organizations: Not on file     Relationship status: Not on file    Intimate partner violence:     Fear of current or ex partner: Not on file     Emotionally abused: Not on file     Physically abused: Not on file     Forced sexual activity: Not on file   Other Topics Concern    Not on file   Social History Narrative    Not on file       Family History   Problem Relation Age of Onset    Heart disease Mother     Rheumatic fever Father     Diabetes Family         MELLITUS    Diabetes Family         MELLITUS       Medications:    Current Outpatient Medications:     alendronate (FOSAMAX) 70 mg tablet, TAKE 1 TABLET BY MOUTH ONCE A WEEK, Disp: 12 tablet, Rfl: 14    aspirin 81 mg chewable tablet, Chew 1 tablet daily, Disp: , Rfl:     Calcium Carbonate-Vitamin D (CALCIUM 600+D) 600-400 MG-UNIT per tablet, Take 2 tablets by mouth daily  , Disp: , Rfl:     Coenzyme Q10 (COQ-10) 100 MG CAPS, Take 1 capsule by mouth daily, Disp: , Rfl:     fluticasone (FLONASE) 50 mcg/act nasal spray, 1 spray into each nostril daily as needed for rhinitis, Disp: , Rfl:     Ibuprofen (ADVIL) 200 MG CAPS, Take 2 capsules by mouth  , Disp: , Rfl:     loratadine (CLARITIN) 10 mg tablet, Take 10 mg by mouth daily, Disp: , Rfl:     losartan (COZAAR) 25 mg tablet, Take 2 tabs once daily, Disp: 180 tablet, Rfl: 3    metFORMIN (GLUCOPHAGE) 1000 MG tablet, Take 1 tablet (1,000 mg total) by mouth 2 (two) times a day, Disp: 180 tablet, Rfl: 2    nystatin (MYCOSTATIN) cream, Apply topically Twice daily  , Disp: , Rfl:     simvastatin (ZOCOR) 10 mg tablet, TAKE 1 TABLET BY MOUTH ONCE DAILY, Disp: 90 tablet, Rfl: 3    Patient Active Problem List   Diagnosis    Allergic rhinitis    Asthma, mild intermittent    Benign essential hypertension    Chronic low back pain    Controlled type 2 diabetes mellitus ([address] )    Dupuytren's contracture    Hearing loss of right ear    Hyperlipidemia    Postmenopausal osteoporosis    Restless legs syndrome    Sacroiliitis (HCC)    Obstructive sleep apnea syndrome    Pain of left upper extremity    Controlled diabetes mellitus type 2 with complications (HCC)       Objective:  Right Shoulder Exam     Range of Motion   The patient has normal right shoulder ROM  Muscle Strength   External rotation: 4/5   Supraspinatus: 4/5       Left Shoulder Exam     Range of Motion   The patient has normal left shoulder ROM  Muscle Strength   External rotation: 4/5   Supraspinatus: 4/5     Tests   Drop arm: negative            Physical Exam      Neurologic Exam    Procedures    I have personally reviewed the written report of the pertinent studies

## 2019-08-01 ENCOUNTER — OFFICE VISIT (OUTPATIENT)
Dept: URGENT CARE | Facility: CLINIC | Age: 69
End: 2019-08-01
Payer: MEDICARE

## 2019-08-01 VITALS
RESPIRATION RATE: 18 BRPM | OXYGEN SATURATION: 95 % | TEMPERATURE: 98.3 F | HEART RATE: 86 BPM | DIASTOLIC BLOOD PRESSURE: 72 MMHG | WEIGHT: 179 LBS | BODY MASS INDEX: 35.14 KG/M2 | HEIGHT: 60 IN | SYSTOLIC BLOOD PRESSURE: 174 MMHG

## 2019-08-01 DIAGNOSIS — M10.9 ACUTE GOUT OF RIGHT FOOT, UNSPECIFIED CAUSE: Primary | ICD-10-CM

## 2019-08-01 PROCEDURE — 99213 OFFICE O/P EST LOW 20 MIN: CPT | Performed by: PHYSICIAN ASSISTANT

## 2019-08-01 PROCEDURE — G0463 HOSPITAL OUTPT CLINIC VISIT: HCPCS | Performed by: PHYSICIAN ASSISTANT

## 2019-08-01 RX ORDER — INDOMETHACIN 50 MG/1
50 CAPSULE ORAL 2 TIMES DAILY WITH MEALS
Qty: 10 CAPSULE | Refills: 0 | Status: SHIPPED | OUTPATIENT
Start: 2019-08-01 | End: 2019-08-21 | Stop reason: ALTCHOICE

## 2019-08-01 NOTE — PROGRESS NOTES
[facility]        NAME: Mami Maki is a 68 y o  female  : 1950    MRN: [id number]  DATE: 2019  TIME: 2:23 PM    Assessment and Plan   Acute gout of right foot, unspecified cause [M10 9]  1  Acute gout of right foot, unspecified cause  indomethacin (INDOCIN) 50 mg capsule         Patient Instructions       Follow up with PCP in 3-5 days  Proceed to  ER if symptoms worsen  Chief Complaint     Chief Complaint   Patient presents with    Foot Pain     C/O pain, redness and swelling in top of right foot at base of 3rd,4th and 5th toes with no known injury x 3 days  History of Present Illness       67 y/o F presents for eval of R dorsal foot pain onset 2 days ago suddenly without known injury  Pt states pain is localized to the base of her pinky toes but radiates to base of 3rd toe  It is sensitive even to the light touch and hurts to even put on a shoe  She denies h/o gout  No numbness or tingling, warmth, fever, N/V  Review of Systems   Review of Systems   All other systems reviewed and are negative          Current Medications       Current Outpatient Medications:     alendronate (FOSAMAX) 70 mg tablet, TAKE 1 TABLET BY MOUTH ONCE A WEEK, Disp: 12 tablet, Rfl: 14    aspirin 81 mg chewable tablet, Chew 1 tablet daily, Disp: , Rfl:     Calcium Carbonate-Vitamin D (CALCIUM 600+D) 600-400 MG-UNIT per tablet, Take 2 tablets by mouth daily  , Disp: , Rfl:     Coenzyme Q10 (COQ-10) 100 MG CAPS, Take 1 capsule by mouth daily, Disp: , Rfl:     fluticasone (FLONASE) 50 mcg/act nasal spray, 1 spray into each nostril daily as needed for rhinitis, Disp: , Rfl:     Ibuprofen (ADVIL) 200 MG CAPS, Take 2 capsules by mouth  , Disp: , Rfl:     indomethacin (INDOCIN) 50 mg capsule, Take 1 capsule (50 mg total) by mouth 2 (two) times a day with meals for 5 days, Disp: 10 capsule, Rfl: 0    loratadine (CLARITIN) 10 mg tablet, Take 10 mg by mouth daily, Disp: , Rfl:     losartan (COZAAR) 25 mg tablet, Take 2 tabs once daily, Disp: 180 tablet, Rfl: 3    metFORMIN (GLUCOPHAGE) 1000 MG tablet, Take 1 tablet (1,000 mg total) by mouth 2 (two) times a day, Disp: 180 tablet, Rfl: 2    nystatin (MYCOSTATIN) cream, Apply topically Twice daily  , Disp: , Rfl:     simvastatin (ZOCOR) 10 mg tablet, TAKE 1 TABLET BY MOUTH ONCE DAILY, Disp: 90 tablet, Rfl: 3    Current Allergies     Allergies as of 08/01/2019 - Reviewed 08/01/2019   Allergen Reaction Noted    Penicillins  01/20/2016            The following portions of the patient's history were reviewed and updated as appropriate: allergies, current medications, past family history, past medical history, past social history, past surgical history and problem list      Past Medical History:   Diagnosis Date    Asthma     [facility]     [facility]     Hyperlipidemia     Osteoarthritis     Osteoporosis     Seasonal allergies        Past Surgical History:   Procedure Laterality Date    CHOLECYSTECTOMY      [id number]  LAST ASSESSED    EYE SURGERY      cataracts removed    HYSTERECTOMY      [id number]  LAST ASSESSED    TUBAL LIGATION         Family History   Problem Relation Age of Onset    Heart disease Mother     Rheumatic fever Father     Diabetes Family         MELLITUS    Diabetes Family         MELLITUS         Medications have been verified  Objective   BP (!) 174/72   Pulse 86   Temp 98 3 °F (36 8 °C) (Tympanic)   Resp 18   Ht 5' (1 524 m)   Wt 81 2 kg (179 lb)   SpO2 95%   BMI 34 96 kg/m²        Physical Exam     Physical Exam   Constitutional: She is oriented to person, place, and time  She appears well-developed and well-nourished  No distress  HENT:   Head: Normocephalic and atraumatic  Cardiovascular: Normal rate, regular rhythm and normal heart sounds  Exam reveals no gallop and no friction rub  No murmur heard    Pulmonary/Chest: Effort normal and breath sounds normal  She has no wheezes  She has no rales  Musculoskeletal:        Feet:    Neurological: She is alert and oriented to person, place, and time  Psychiatric: She has a normal mood and affect  Vitals reviewed

## 2019-08-07 DIAGNOSIS — E78.00 PURE HYPERCHOLESTEROLEMIA: ICD-10-CM

## 2019-08-07 RX ORDER — SIMVASTATIN 10 MG
10 TABLET ORAL DAILY
Qty: 90 TABLET | Refills: 3 | Status: SHIPPED | OUTPATIENT
Start: 2019-08-07 | End: 2020-01-09

## 2019-08-21 ENCOUNTER — APPOINTMENT (OUTPATIENT)
Dept: RADIOLOGY | Facility: CLINIC | Age: 69
End: 2019-08-21
Payer: MEDICARE

## 2019-08-21 ENCOUNTER — OFFICE VISIT (OUTPATIENT)
Dept: FAMILY MEDICINE CLINIC | Facility: CLINIC | Age: 69
End: 2019-08-21
Payer: MEDICARE

## 2019-08-21 VITALS
SYSTOLIC BLOOD PRESSURE: 140 MMHG | BODY MASS INDEX: 34.57 KG/M2 | OXYGEN SATURATION: 96 % | WEIGHT: 177 LBS | TEMPERATURE: 97.9 F | DIASTOLIC BLOOD PRESSURE: 90 MMHG | HEART RATE: 66 BPM

## 2019-08-21 DIAGNOSIS — E11.9 CONTROLLED TYPE 2 DIABETES MELLITUS WITHOUT COMPLICATION, WITHOUT LONG-TERM CURRENT USE OF INSULIN (HCC): Primary | ICD-10-CM

## 2019-08-21 DIAGNOSIS — Z12.11 COLON CANCER SCREENING: ICD-10-CM

## 2019-08-21 DIAGNOSIS — M25.571 PAIN IN RIGHT ANKLE AND JOINTS OF RIGHT FOOT: ICD-10-CM

## 2019-08-21 DIAGNOSIS — B35.9 TINEA: ICD-10-CM

## 2019-08-21 DIAGNOSIS — R10.12 LEFT UPPER QUADRANT PAIN: ICD-10-CM

## 2019-08-21 DIAGNOSIS — I10 BENIGN ESSENTIAL HYPERTENSION: ICD-10-CM

## 2019-08-21 PROCEDURE — 73630 X-RAY EXAM OF FOOT: CPT

## 2019-08-21 PROCEDURE — 99214 OFFICE O/P EST MOD 30 MIN: CPT | Performed by: FAMILY MEDICINE

## 2019-08-21 RX ORDER — NYSTATIN 100000 U/G
CREAM TOPICAL AS NEEDED
Qty: 30 G | Refills: 5 | Status: SHIPPED | OUTPATIENT
Start: 2019-08-21 | End: 2020-01-09

## 2019-08-21 NOTE — PROGRESS NOTES
Assessment/Plan:    Controlled type 2 diabetes mellitus ([facility] )  Lab Results   Component Value Date    HGBA1C 6 4 05/22/2019     Diabetes has been under excellent control  We will check hemoglobin A1c at next visit  No results for input(s): POCGLU in the last 72 hours  Blood Sugar Average: Last 72 hrs:      Benign essential hypertension  Blood pressure is under excellent control on losartan 25 mg daily  Pain in right ankle and joints of right foot  She appears to have possible right foot sprain with fading ecchymosis and tenderness laterally  There is also a reddened area of skin in the anterior portion of the lateral foot which appears to be possible insect bite  Although she had a visit to urgent care and was treated for probable gout, this has not helped  Am sending her for x-ray and giving her Podiatry referral     Left upper quadrant pain  She has noticed left upper quadrant discomfort when she coughs  There is also a protrusion when she coughs  She is nontender on exam but I do see a slight protrusion with cough  Explained this could be a small hernia of the abdominal wall  Would recommend monitoring and recheck if sx increase       Diagnoses and all orders for this visit:    Controlled type 2 diabetes mellitus without complication, without long-term current use of insulin ([facility] )    Colon cancer screening  -     Occult Blood, Fecal Immunochemical; Future    Benign essential hypertension    Pain in right ankle and joints of right foot    Left upper quadrant pain    Other orders  -     nystatin (MYCOSTATIN) cream; Apply topically as needed (as needed)          Subjective:      Patient ID: Emma Crabtree is a 68 y o  female  She is here for regular follow-up, but she is having problems with ongoing right foot pain  She developed pain over the outer portion of the right foot about 3 weeks ago  She went to urgent care and was diagnosed with gout    She denies history of gout in the past   She was given anti-inflammatory for few days  It did not help very much  She continues with foot pain and then she developed a red spot on the outer portion of the foot  She does not recall an insect bite or injury there  She also has developed pain on the left side of the upper abdomen and a feeling of a lump when she coughs  This is a new symptom since she was here last time  She does have good news that the left shoulder is much better following physical therapy  The following portions of the patient's history were reviewed and updated as appropriate: allergies, current medications, past family history, past medical history, past social history, past surgical history and problem list     Review of Systems   Constitutional: Negative for activity change, chills, fatigue and fever  HENT: Negative for congestion, ear pain, sinus pressure and sore throat  Eyes: Negative for pain and visual disturbance  Respiratory: Negative for cough, chest tightness, shortness of breath and wheezing  Cardiovascular: Negative for chest pain, palpitations and leg swelling  Gastrointestinal: Positive for abdominal pain  Negative for blood in stool, constipation, diarrhea, nausea and vomiting  Endocrine: Negative for polydipsia and polyuria  Genitourinary: Negative for difficulty urinating, dysuria, frequency and urgency  Musculoskeletal: Positive for arthralgias and joint swelling  Negative for myalgias  Skin: Positive for rash  Neurological: Negative for dizziness, weakness, numbness and headaches  Hematological: Negative for adenopathy  Does not bruise/bleed easily  Psychiatric/Behavioral: Negative for dysphoric mood  The patient is not nervous/anxious            Objective:      /90 (BP Location: Left arm, Patient Position: Sitting, Cuff Size: Standard)   Pulse 66   Temp 97 9 °F (36 6 °C)   Wt 80 3 kg (177 lb)   SpO2 96%   BMI 34 57 kg/m²          Physical Exam   Constitutional: She is oriented to person, place, and time  She appears well-developed and well-nourished  obese   HENT:   Head: Normocephalic and atraumatic  Right Ear: External ear normal    Left Ear: External ear normal    Eyes: Pupils are equal, round, and reactive to light  EOM are normal    Neck: Normal range of motion  Neck supple  No thyromegaly present  Cardiovascular: Normal rate, regular rhythm and normal heart sounds  Pulmonary/Chest: Effort normal and breath sounds normal    Abdominal: Soft  Vertical scar  Slight distension left side with cough   Lymphadenopathy:     She has no cervical adenopathy  Neurological: She is alert and oriented to person, place, and time  Skin: Skin is warm and dry  Right foot with appearance of bruising of the outer ankle  The there is moderate tenderness of the lateral foot and just below the lateral malleolus  There is erythematous patch about 2 cm in diameter front portion of the lateral foot  Nursing note and vitals reviewed

## 2019-08-21 NOTE — ASSESSMENT & PLAN NOTE
She appears to have possible right foot sprain with fading ecchymosis and tenderness laterally  There is also a reddened area of skin in the anterior portion of the lateral foot which appears to be possible insect bite  Although she had a visit to urgent care and was treated for probable gout, this has not helped    Am sending her for x-ray and giving her Podiatry referral

## 2019-08-21 NOTE — ASSESSMENT & PLAN NOTE
Lab Results   Component Value Date    HGBA1C 6 4 05/22/2019     Diabetes has been under excellent control  We will check hemoglobin A1c at next visit  No results for input(s): POCGLU in the last 72 hours      Blood Sugar Average: Last 72 hrs:

## 2019-08-21 NOTE — ASSESSMENT & PLAN NOTE
She has noticed left upper quadrant discomfort when she coughs  There is also a protrusion when she coughs  She is nontender on exam but I do see a slight protrusion with cough  Explained this could be a small hernia of the abdominal wall    Would recommend monitoring and recheck if sx increase

## 2019-08-27 ENCOUNTER — TELEPHONE (OUTPATIENT)
Dept: FAMILY MEDICINE CLINIC | Facility: CLINIC | Age: 69
End: 2019-08-27

## 2019-08-27 NOTE — TELEPHONE ENCOUNTER
Maria Elena Ansari with [facility] called to let you know there were significant findings on pt's xray

## 2019-08-28 ENCOUNTER — TELEPHONE (OUTPATIENT)
Dept: FAMILY MEDICINE CLINIC | Facility: CLINIC | Age: 69
End: 2019-08-28

## 2019-08-28 DIAGNOSIS — E11.8 CONTROLLED DIABETES MELLITUS TYPE 2 WITH COMPLICATIONS, UNSPECIFIED WHETHER LONG TERM INSULIN USE (HCC): ICD-10-CM

## 2019-08-28 DIAGNOSIS — M25.871 MASS OF JOINT OF RIGHT FOOT: Primary | ICD-10-CM

## 2019-08-28 NOTE — TELEPHONE ENCOUNTER
Result Notes for XR foot 3+ vw right     Notes recorded by Natlaia Hassan on 8/28/2019 at 10:31 AM EDT  Amy Aiden called to let us know that she made an appointment to see podiatry, Dr Donald [facility] first available and her appointment is scheduled for Friday, Sept 6th   ------    Notes recorded by Vaishnavi Stoner RN on 8/27/2019 at 7:10 PM EDT  Pt notified of results   Per Dr Darling Spencer, if pt is able to see Podiatry this week we can hold off on the MRI for now, but if it is a week or so out, we should schedule MRI   Pt was waiting on results to schedule appt   She will call tomorrow to see how soon they can get her in and then will call us to let us know   ------    Notes recorded by Raciel Siddiqui MD on 8/27/2019 at 6:53 PM EDT  Please tell her x-ray did not show any broken bones, but she has a periosteal reaction at the 5th bone of the foot which could indicate an early infection   Please find out if she was able to make a Podiatry appointment  [facility] schedule her for MRI of the foot to evaluate

## 2019-08-29 ENCOUNTER — HOSPITAL ENCOUNTER (OUTPATIENT)
Dept: MRI IMAGING | Facility: HOSPITAL | Age: 69
Discharge: HOME | End: 2019-08-29
Attending: FAMILY MEDICINE
Payer: MEDICARE

## 2019-08-29 DIAGNOSIS — M25.871 MASS OF JOINT OF RIGHT FOOT: ICD-10-CM

## 2019-08-29 PROCEDURE — 73718 MRI LOWER EXTREMITY W/O DYE: CPT

## 2019-09-30 ENCOUNTER — OFFICE VISIT (OUTPATIENT)
Dept: OBGYN CLINIC | Facility: CLINIC | Age: 69
End: 2019-09-30
Payer: MEDICARE

## 2019-09-30 VITALS
HEART RATE: 74 BPM | HEIGHT: 60 IN | WEIGHT: 179.2 LBS | BODY MASS INDEX: 35.18 KG/M2 | DIASTOLIC BLOOD PRESSURE: 82 MMHG | SYSTOLIC BLOOD PRESSURE: 152 MMHG | RESPIRATION RATE: 16 BRPM

## 2019-09-30 DIAGNOSIS — M75.32 CALCIFIC TENDINITIS OF LEFT SHOULDER: ICD-10-CM

## 2019-09-30 DIAGNOSIS — M25.512 ACUTE PAIN OF LEFT SHOULDER: Primary | ICD-10-CM

## 2019-09-30 PROCEDURE — 99213 OFFICE O/P EST LOW 20 MIN: CPT | Performed by: EMERGENCY MEDICINE

## 2019-09-30 NOTE — PROGRESS NOTES
Assessment/Plan:    Diagnoses and all orders for this visit:    Acute pain of left shoulder    Calcific tendinitis of left shoulder     Reaggravation of calcific tendinitis of the left shoulder  Patient wishes to continue home exercises a declines formal physical therapy, as she has perform this in the past with benefit  We have discussed steroid injection which she would like to hold off at this time given the fact that she has diabetes however her hemoglobin knee see is 6 4 and this is something that we can consider in the future  Would hold off on MRI at this time    Return if symptoms worsen or fail to improve  Chief Complaint:     Chief Complaint   Patient presents with    Left Shoulder - Pain, Follow-up       Subjective:   Patient ID: Bryan Roca is a 68 y o  female  Patient returns with left shoulder pain for approximately 1 week denies any specific injury symptoms similar to previous pain  She has been performing home exercises and taking ibuprofen every 6 hours  Symptoms are improving  She did have night pain  Symptoms worse with reaching and lifting      Review of Systems    The following portions of the patient's chart were reviewed and updated as appropriate:      Allergie  Allergies   Allergen Reactions    Penicillins    s   Allergen Reactions    Penicillins       Diagnosis Date    Asthma     COPD (chronic obstructive pulmonary disease) (HCC)     Diabetes ([facility] [address] [facility]     Hyperlipidemia     Osteoarthritis     Osteoporosis     Seasonal allergies        Past Surgical History:   Procedure Laterality Date    CHOLECYSTECTOMY      [id number]  LAST ASSESSED    EYE SURGERY      cataracts removed    HYSTERECTOMY      [id number]  LAST ASSESSED    TUBAL LIGATION         Social History     Socioeconomic History    Marital status: /Civil Union     Spouse name: Not on file    Number of children: Not on file    Years of education: Not on file    Highest education level: Not on file Occupational History    Not on file   Social Needs    Financial resource strain: Not on file    Food insecurity:     Worry: Not on file     Inability: Not on file    Transportation needs:     Medical: Not on file     Non-medical: Not on file   Tobacco Use    Smoking status: Former Smoker     Last attempt to quit:      Years since quittin 7    Smokeless tobacco: Never Used   Substance and Sexual Activity    Alcohol use: No    Drug use: No    Sexual activity: Not on file   Lifestyle    Physical activity:     Days per week: Not on file     Minutes per session: Not on file    Stress: Not on file   Relationships    Social connections:     Talks on phone: Not on file     Gets together: Not on file     Attends Zoroastrianism service: Not on file     Active member of club or organization: Not on file     Attends meetings of clubs or organizations: Not on file     Relationship status: Not on file    Intimate partner violence:     Fear of current or ex partner: Not on file     Emotionally abused: Not on file     Physically abused: Not on file     Forced sexual activity: Not on file   Other Topics Concern    Not on file   Social History Narrative    Not on file       Family History   Problem Relation Age of Onset    Heart disease Mother     Rheumatic fever Father     Diabetes Family         MELLITUS    Diabetes Family         MELLITUS       Medications:    Current Outpatient Medications:     alendronate (FOSAMAX) 70 mg tablet, TAKE 1 TABLET BY MOUTH ONCE A WEEK, Disp: 12 tablet, Rfl: 14    aspirin 81 mg chewable tablet, Chew 1 tablet daily, Disp: , Rfl:     Calcium Carbonate-Vitamin D (CALCIUM 600+D) 600-400 MG-UNIT per tablet, Take 2 tablets by mouth daily  , Disp: , Rfl:     Coenzyme Q10 (COQ-10) 100 MG CAPS, Take 1 capsule by mouth daily, Disp: , Rfl:     fluticasone (FLONASE) 50 mcg/act nasal spray, 1 spray into each nostril daily as needed for rhinitis, Disp: , Rfl:     Ibuprofen (ADVIL) 200 MG CAPS, Take 2 capsules by mouth  , Disp: , Rfl:     loratadine (CLARITIN) 10 mg tablet, Take 10 mg by mouth daily, Disp: , Rfl:     losartan (COZAAR) 25 mg tablet, Take 2 tabs once daily, Disp: 180 tablet, Rfl: 3    metFORMIN (GLUCOPHAGE) 1000 MG tablet, Take 1 tablet (1,000 mg total) by mouth 2 (two) times a day, Disp: 180 tablet, Rfl: 3    nystatin (MYCOSTATIN) cream, Apply topically as needed (as needed), Disp: 30 g, Rfl: 5    simvastatin (ZOCOR) 10 mg tablet, Take 1 tablet (10 mg total) by mouth daily, Disp: 90 tablet, Rfl: 3    Patient Active Problem List   Diagnosis    Allergic rhinitis    Asthma, mild intermittent    Benign essential hypertension    Chronic low back pain    Controlled type 2 diabetes mellitus (HCC)    Dupuytren's contracture    Hearing loss of right ear    Hyperlipidemia    Postmenopausal osteoporosis    Restless legs syndrome    Sacroiliitis (HCC)    Obstructive sleep apnea syndrome    Pain of left upper extremity    Controlled diabetes mellitus type 2 with complications (HCC)    Pain in right ankle and joints of right foot    Left upper quadrant pain       Objective:  /82 (BP Location: Right arm, Patient Position: Sitting, Cuff Size: Large)   Pulse 74   Resp 16   Ht 5' (1 524 m)   Wt 81 3 kg (179 lb 3 2 oz)   BMI 35 00 kg/m²      Right Shoulder Exam     Range of Motion   The patient has normal right shoulder ROM  Left Shoulder Exam     Range of Motion   Active abduction: abnormal   External rotation: normal   Internal rotation 0 degrees: abnormal     Muscle Strength   External rotation: 4/5   Supraspinatus: 4/5     Tests   Drop arm: negative    Other   Erythema: absent             Physical Exam      Neurologic Exam    Procedures    I have personally reviewed the written report of the pertinent studies

## 2019-09-30 NOTE — PATIENT INSTRUCTIONS
Calcific Tendinitis   WHAT YOU NEED TO KNOW:   Calcific tendinitis is a condition that occurs when calcium collects in the tendons of the shoulder  Tendons are bands of tissue that connect muscle to bone  The calcium can make the tendons swell and cause severe pain  DISCHARGE INSTRUCTIONS:   Medicines: You may need any of the following:  · NSAIDs  may decrease swelling and pain  This medicine can be bought with or without a doctor's order  This medicine can cause stomach bleeding or kidney problems in certain people  If you take blood thinner medicine, always ask your healthcare provider if NSAIDs are safe for you  Always read the medicine label and follow the directions on it before using this medicine  · Steroids  help decrease inflammation  · Take your medicine as directed  Contact your healthcare provider if you think your medicine is not helping or if you have side effects  Tell him or her if you are allergic to any medicine  Keep a list of the medicines, vitamins, and herbs you take  Include the amounts, and when and why you take them  Bring the list or the pill bottles to follow-up visits  Carry your medicine list with you in case of an emergency  Go to physical therapy:  A physical therapist can teach you exercises to help improve movement and strength, and to decrease pain  Apply ice:  Apply ice on your shoulder for 15 to 20 minutes every hour or as directed  Use an ice pack, or put crushed ice in a plastic bag  Cover it with a towel  Ice helps prevent tissue damage and decreases swelling and pain  Apply heat:  Apply heat on your shoulder for 20 to 30 minutes every 2 hours for as many days as directed  Heat helps decrease pain and muscle spasms  Rest your arm:  Healthcare providers may have you place an item, such as a ball, between your side and elbow while you rest  This may help decrease stiffness and pain    Follow up with your healthcare provider as directed:  Bring a list of any questions you have so you remember to ask them during your visits  Contact your healthcare provider if:   · You have worse pain and stiffness in your shoulder  · You have new or more trouble moving your arm  · You have questions or concerns about your condition or care  Return to the emergency department if:   · You cannot move your arm  · You have severe pain in your arm or shoulder  © 2017 [employer] Information is for End User's use only and may not be sold, redistributed or otherwise used for commercial purposes  All illustrations and images included in CareNotes® are the copyrighted property of [employer]  or [employer]  The above information is an  only  It is not intended as medical advice for individual conditions or treatments  Talk to your doctor, nurse or pharmacist before following any medical regimen to see if it is safe and effective for you

## 2019-10-07 LAB
LEFT EYE DIABETIC RETINOPATHY: NORMAL
RIGHT EYE DIABETIC RETINOPATHY: NORMAL

## 2019-11-19 ENCOUNTER — OFFICE VISIT (OUTPATIENT)
Dept: URGENT CARE | Facility: CLINIC | Age: 69
End: 2019-11-19
Payer: MEDICARE

## 2019-11-19 ENCOUNTER — APPOINTMENT (OUTPATIENT)
Dept: RADIOLOGY | Facility: CLINIC | Age: 69
End: 2019-11-19
Payer: MEDICARE

## 2019-11-19 VITALS
TEMPERATURE: 98.8 F | DIASTOLIC BLOOD PRESSURE: 77 MMHG | BODY MASS INDEX: 35.34 KG/M2 | OXYGEN SATURATION: 95 % | HEIGHT: 60 IN | RESPIRATION RATE: 16 BRPM | WEIGHT: 180 LBS | SYSTOLIC BLOOD PRESSURE: 164 MMHG | HEART RATE: 83 BPM

## 2019-11-19 DIAGNOSIS — M25.571 ACUTE RIGHT ANKLE PAIN: ICD-10-CM

## 2019-11-19 DIAGNOSIS — M25.571 ACUTE RIGHT ANKLE PAIN: Primary | ICD-10-CM

## 2019-11-19 PROCEDURE — 99213 OFFICE O/P EST LOW 20 MIN: CPT | Performed by: PHYSICIAN ASSISTANT

## 2019-11-19 PROCEDURE — 73610 X-RAY EXAM OF ANKLE: CPT

## 2019-11-19 PROCEDURE — G0463 HOSPITAL OUTPT CLINIC VISIT: HCPCS | Performed by: PHYSICIAN ASSISTANT

## 2019-11-19 RX ORDER — SULFAMETHOXAZOLE AND TRIMETHOPRIM 800; 160 MG/1; MG/1
1 TABLET ORAL EVERY 12 HOURS SCHEDULED
Qty: 10 TABLET | Refills: 0 | Status: SHIPPED | OUTPATIENT
Start: 2019-11-19 | End: 2019-11-24

## 2019-11-19 NOTE — PATIENT INSTRUCTIONS
Xray appears negative for any fracture  Will follow up with radiologist report when available  Recommend elevating body part, icing the area every 2 hours for 20-30 minutes, take Ibuprofen every 6-8 hours to reduce inflammation  There is a mild redness over the lateral malleolus with increased warmth  Will start on antibiotic  If not improving, follow up with ortho or podiatry

## 2019-11-19 NOTE — PROGRESS NOTES
[facility]    NAME: Samantha Cannon is a 68 y o  female  : 1950    MRN: [id number]  DATE: 2019  TIME: 4:08 PM    Assessment and Plan   Acute right ankle pain [M25 [id number]]  1  Acute right ankle pain  XR ankle 3+ vw right    sulfamethoxazole-trimethoprim (BACTRIM DS) 800-160 mg per tablet       Patient Instructions   Patient Instructions   Xray appears negative for any fracture  Will follow up with radiologist report when available  Recommend elevating body part, icing the area every 2 hours for 20-30 minutes, take Ibuprofen every 6-8 hours to reduce inflammation  There is a mild redness over the lateral malleolus with increased warmth  Will start on antibiotic  If not improving, follow up with ortho or podiatry  Chief Complaint     Chief Complaint   Patient presents with    Ankle Injury     right x 1 week no injury       History of Present Illness   78-year-old female here with right ankle pain and swelling  States been getting progressively worse over the last week  States that she has had pain in the ankle before  Pain would occur when the weather was cooler  Never had swelling like this  Has some mild redness along the outside of her ankle over the lateral malleolus  Slightly increased warmth over the area  No fever chills  Review of Systems   Review of Systems   Constitutional: Negative for chills and fever  Respiratory: Negative for cough and shortness of breath  Cardiovascular: Negative for chest pain, palpitations and leg swelling  Musculoskeletal: Positive for gait problem and joint swelling (Right ankle swelling and pain)  Skin: Positive for color change (Slight redness over lateral malleolus of right ankle)  Negative for rash and wound         Current Medications     Current Outpatient Medications:     alendronate (FOSAMAX) 70 mg tablet, TAKE 1 TABLET BY MOUTH ONCE A WEEK, Disp: 12 tablet, Rfl: 14    aspirin 81 mg chewable tablet, Chew 1 tablet daily, Disp: , Rfl:     Calcium Carbonate-Vitamin D (CALCIUM 600+D) 600-400 MG-UNIT per tablet, Take 2 tablets by mouth daily  , Disp: , Rfl:     Coenzyme Q10 (COQ-10) 100 MG CAPS, Take 1 capsule by mouth daily, Disp: , Rfl:     Ibuprofen (ADVIL) 200 MG CAPS, Take 2 capsules by mouth  , Disp: , Rfl:     loratadine (CLARITIN) 10 mg tablet, Take 10 mg by mouth daily, Disp: , Rfl:     losartan (COZAAR) 25 mg tablet, Take 2 tabs once daily, Disp: 180 tablet, Rfl: 3    metFORMIN (GLUCOPHAGE) 1000 MG tablet, Take 1 tablet (1,000 mg total) by mouth 2 (two) times a day, Disp: 180 tablet, Rfl: 3    nystatin (MYCOSTATIN) cream, Apply topically as needed (as needed), Disp: 30 g, Rfl: 5    simvastatin (ZOCOR) 10 mg tablet, Take 1 tablet (10 mg total) by mouth daily, Disp: 90 tablet, Rfl: 3    fluticasone (FLONASE) 50 mcg/act nasal spray, 1 spray into each nostril daily as needed for rhinitis, Disp: , Rfl:     sulfamethoxazole-trimethoprim (BACTRIM DS) 800-160 mg per tablet, Take 1 tablet by mouth every 12 (twelve) hours for 5 days, Disp: 10 tablet, Rfl: 0    Current Allergies     Allergies as of 11/19/2019 - Reviewed 11/19/2019   Allergen Reaction Noted    Penicillins  01/20/2016          The following portions of the patient's history were reviewed and updated as appropriate: allergies, current medications, past family history, past medical history, past social history, past surgical history and problem list    Past Medical History:   Diagnosis Date    Asthma     [facility] disease) ([facility] [address] [facility]     Diabetes ([facility] [address] [facility]     Hyperlipidemia     Osteoarthritis     Osteoporosis     Seasonal allergies      Past Surgical History:   Procedure Laterality Date    CHOLECYSTECTOMY      [id number]  LAST ASSESSED    EYE SURGERY      cataracts removed    HYSTERECTOMY      [id number]  LAST ASSESSED    TUBAL LIGATION       Family History   Problem Relation Age of Onset    Heart disease Mother     Rheumatic fever Father     Diabetes Family         MELLITUS    Diabetes Family         MELLITUS     Social History     Socioeconomic History    Marital status: /Civil Union     Spouse name: Not on file    Number of children: Not on file    Years of education: Not on file    Highest education level: Not on file   Occupational History    Not on file   Social Needs    Financial resource strain: Not on file    Food insecurity:     Worry: Not on file     Inability: Not on file    Transportation needs:     Medical: Not on file     Non-medical: Not on file   Tobacco Use    Smoking status: Former Smoker     Last attempt to quit:      Years since quittin 8    Smokeless tobacco: Never Used   Substance and Sexual Activity    Alcohol use: No    Drug use: No    Sexual activity: Not on file   Lifestyle    Physical activity:     Days per week: Not on file     Minutes per session: Not on file    Stress: Not on file   Relationships    Social connections:     Talks on phone: Not on file     Gets together: Not on file     Attends Islam service: Not on file     Active member of club or organization: Not on file     Attends meetings of clubs or organizations: Not on file     Relationship status: Not on file    Intimate partner violence:     Fear of current or ex partner: Not on file     Emotionally abused: Not on file     Physically abused: Not on file     Forced sexual activity: Not on file   Other Topics Concern    Not on file   Social History Narrative    Not on file     Medications have been verified  Objective   /77   Pulse 83   Temp 98 8 °F (37 1 °C)   Resp 16   Ht 5' (1 524 m)   Wt 81 6 kg (180 lb)   SpO2 95%   BMI 35 15 kg/m²      Physical Exam   Physical Exam   Constitutional: She appears well-developed and well-nourished  No distress  Cardiovascular: Normal rate, regular rhythm, normal heart sounds and intact distal pulses  No murmur heard    Pulmonary/Chest: Effort normal and breath sounds normal  No respiratory distress  Musculoskeletal:        Right ankle: She exhibits swelling (Over lateral malleolus)  She exhibits normal range of motion, no ecchymosis, no deformity, no laceration and normal pulse  Tenderness  Lateral malleolus tenderness found  No medial malleolus, no AITFL, no CF ligament, no posterior TFL, no head of 5th metatarsal and no proximal fibula tenderness found  Achilles tendon normal    Nursing note and vitals reviewed

## 2019-11-21 ENCOUNTER — OFFICE VISIT (OUTPATIENT)
Dept: FAMILY MEDICINE CLINIC | Facility: CLINIC | Age: 69
End: 2019-11-21
Payer: MEDICARE

## 2019-11-21 VITALS
HEART RATE: 83 BPM | WEIGHT: 179.8 LBS | TEMPERATURE: 98.8 F | SYSTOLIC BLOOD PRESSURE: 150 MMHG | DIASTOLIC BLOOD PRESSURE: 80 MMHG | OXYGEN SATURATION: 96 % | BODY MASS INDEX: 33.95 KG/M2 | HEIGHT: 61 IN

## 2019-11-21 DIAGNOSIS — E11.8 CONTROLLED DIABETES MELLITUS TYPE 2 WITH COMPLICATIONS, UNSPECIFIED WHETHER LONG TERM INSULIN USE (HCC): Primary | ICD-10-CM

## 2019-11-21 DIAGNOSIS — I10 BENIGN ESSENTIAL HYPERTENSION: ICD-10-CM

## 2019-11-21 DIAGNOSIS — R19.04 LEFT LOWER QUADRANT ABDOMINAL MASS: ICD-10-CM

## 2019-11-21 DIAGNOSIS — E11.9 CONTROLLED TYPE 2 DIABETES MELLITUS WITHOUT COMPLICATION, WITHOUT LONG-TERM CURRENT USE OF INSULIN (HCC): ICD-10-CM

## 2019-11-21 DIAGNOSIS — M25.571 PAIN IN RIGHT ANKLE AND JOINTS OF RIGHT FOOT: ICD-10-CM

## 2019-11-21 DIAGNOSIS — Z23 NEEDS FLU SHOT: ICD-10-CM

## 2019-11-21 DIAGNOSIS — Z00.00 MEDICARE ANNUAL WELLNESS VISIT, SUBSEQUENT: Primary | ICD-10-CM

## 2019-11-21 LAB — SL AMB POCT HEMOGLOBIN AIC: 5.9 (ref ?–6.5)

## 2019-11-21 PROCEDURE — 83036 HEMOGLOBIN GLYCOSYLATED A1C: CPT

## 2019-11-21 PROCEDURE — 99214 OFFICE O/P EST MOD 30 MIN: CPT

## 2019-11-21 PROCEDURE — 90662 IIV NO PRSV INCREASED AG IM: CPT

## 2019-11-21 PROCEDURE — G0008 ADMIN INFLUENZA VIRUS VAC: HCPCS

## 2019-11-21 PROCEDURE — G0439 PPPS, SUBSEQ VISIT: HCPCS

## 2019-11-21 NOTE — PROGRESS NOTES
Assessment and Plan:     Problem List Items Addressed This Visit     None        BMI Counseling: Body mass index is 34 54 kg/m²  The BMI is above normal  Nutrition recommendations include decreasing portion sizes and encouraging healthy choices of fruits and vegetables  Exercise recommendations include exercising 3-5 times per week  No pharmacotherapy was ordered  Patient referred to PCP due to patient being overweight  Preventive health issues were discussed with patient, and age appropriate screening tests were ordered as noted in patient's After Visit Summary  Personalized health advice and appropriate referrals for health education or preventive services given if needed, as noted in patient's After Visit Summary       History of Present Illness:     Patient presents for Medicare Annual Wellness visit    Patient Care Team:  Eleanor Gann MD as PCP - General     Problem List:     Patient Active Problem List   Diagnosis    Allergic rhinitis    Asthma, mild intermittent    Benign essential hypertension    Chronic low back pain    Controlled type 2 diabetes mellitus ([address] )    Dupuytren's contracture    Hearing loss of right ear    Hyperlipidemia    Postmenopausal osteoporosis    Restless legs syndrome    Sacroiliitis (HCC)    Obstructive sleep apnea syndrome    Pain of left upper extremity    Controlled diabetes mellitus type 2 with complications ([address] )    Pain in right ankle and joints of right foot    Left upper quadrant pain      Past Medical and Surgical History:     Past Medical History:   Diagnosis Date    Asthma     COPD (chronic obstructive pulmonary disease) ([address] )     Diabetes ([address] )     Hyperlipidemia     Osteoarthritis     Osteoporosis     Seasonal allergies      Past Surgical History:   Procedure Laterality Date    CHOLECYSTECTOMY      [id number]  LAST ASSESSED    EYE SURGERY      cataracts removed    HYSTERECTOMY      [id number]  LAST ASSESSED    TUBAL LIGATION Family History:     Family History   Problem Relation Age of Onset    Heart disease Mother     Rheumatic fever Father     Diabetes Family         MELLITUS    Diabetes Family         MELLITUS      Social History:     Social History     Socioeconomic History    Marital status: /Civil Union     Spouse name: Not on file    Number of children: Not on file    Years of education: Not on file    Highest education level: Not on file   Occupational History    Not on file   Social Needs    Financial resource strain: Not on file    Food insecurity:     Worry: Not on file     Inability: Not on file    Transportation needs:     Medical: Not on file     Non-medical: Not on file   Tobacco Use    Smoking status: Former Smoker     Last attempt to quit:      Years since quittin 9    Smokeless tobacco: Never Used   Substance and Sexual Activity    Alcohol use: No    Drug use: No    Sexual activity: Not on file   Lifestyle    Physical activity:     Days per week: Not on file     Minutes per session: Not on file    Stress: Not on file   Relationships    Social connections:     Talks on phone: Not on file     Gets together: Not on file     Attends Baptism service: Not on file     Active member of club or organization: Not on file     Attends meetings of clubs or organizations: Not on file     Relationship status: Not on file    Intimate partner violence:     Fear of current or ex partner: Not on file     Emotionally abused: Not on file     Physically abused: Not on file     Forced sexual activity: Not on file   Other Topics Concern    Not on file   Social History Narrative    Not on file       Medications and Allergies:     Current Outpatient Medications   Medication Sig Dispense Refill    alendronate (FOSAMAX) 70 mg tablet TAKE 1 TABLET BY MOUTH ONCE A WEEK 12 tablet 14    aspirin 81 mg chewable tablet Chew 1 tablet daily      Calcium Carbonate-Vitamin D (CALCIUM 600+D) 600-400 MG-UNIT per tablet Take 2 tablets by mouth daily        Coenzyme Q10 (COQ-10) 100 MG CAPS Take 1 capsule by mouth daily      fluticasone (FLONASE) 50 mcg/act nasal spray 1 spray into each nostril daily as needed for rhinitis      Ibuprofen (ADVIL) 200 MG CAPS Take 2 capsules by mouth        loratadine (CLARITIN) 10 mg tablet Take 10 mg by mouth daily      losartan (COZAAR) 25 mg tablet Take 2 tabs once daily 180 tablet 3    metFORMIN (GLUCOPHAGE) 1000 MG tablet Take 1 tablet (1,000 mg total) by mouth 2 (two) times a day 180 tablet 3    nystatin (MYCOSTATIN) cream Apply topically as needed (as needed) 30 g 5    simvastatin (ZOCOR) 10 mg tablet Take 1 tablet (10 mg total) by mouth daily 90 tablet 3    sulfamethoxazole-trimethoprim (BACTRIM DS) 800-160 mg per tablet Take 1 tablet by mouth every 12 (twelve) hours for 5 days 10 tablet 0     No current facility-administered medications for this visit  Allergies   Allergen Reactions    Penicillins       Immunizations:     Immunization History   Administered Date(s) Administered    Influenza Split High Dose Preservative Free IM 12/01/2016, 10/10/2017    Influenza, high dose seasonal 0 5 mL 11/21/2018    Pneumococcal Conjugate 13-Valent 12/01/2016    Pneumococcal Polysaccharide PPV23 01/16/2018      Health Maintenance:         Topic Date Due    CRC Screening: Colonoscopy  05/17/2020 (Originally 1950)    MAMMOGRAM  05/22/2020 (Originally 1950)    DXA SCAN  03/13/2020    CRC Screening: FOBTx3/FIT  05/17/2020    Hepatitis C Screening  Completed         Topic Date Due    DTaP,Tdap,and Td Vaccines (1 - Tdap) 03/18/1959    Hepatitis B Vaccine (1 of 3 - Risk 3-dose series) 03/18/1967    Influenza Vaccine  07/01/2019      Medicare Health Risk Assessment:     There were no vitals taken for this visit  Arabella Samayoa is here for her Subsequent Wellness visit  Health Risk Assessment:   Patient rates overall health as good   Patient feels that their physical health rating is slightly worse  Eyesight was rated as same  Hearing was rated as same  Patient feels that their emotional and mental health rating is same  Pain experienced in the last 7 days has been a lot  Patient's pain rating has been 6/10  Patient states that she has experienced no weight loss or gain in last 6 months  Fall Risk Screening: In the past year, patient has experienced: no history of falling in past year      Urinary Incontinence Screening:   Patient has leaked urine accidently in the last six months  Home Safety:  Patient does not have trouble with stairs inside or outside of their home  Patient has working smoke alarms and has working carbon monoxide detector  Home safety hazards include: none  Nutrition:   Current diet is Regular and Frequent junk food  Medications:   Patient is currently taking over-the-counter supplements  OTC medications include: see medication list  Patient is able to manage medications  Activities of Daily Living (ADLs)/Instrumental Activities of Daily Living (IADLs):   Walk and transfer into and out of bed and chair?: Yes  Dress and groom yourself?: Yes    Bathe or shower yourself?: Yes    Feed yourself?  Yes  Do your laundry/housekeeping?: Yes  Manage your money, pay your bills and track your expenses?: Yes  Make your own meals?: Yes    Do your own shopping?: Yes    Previous Hospitalizations:   Any hospitalizations or ED visits within the last 12 months?: No      Advance Care Planning:   Living will: Yes    Advanced directive: Yes      PREVENTIVE SCREENINGS      Cardiovascular Screening:    General: Screening Not Indicated and History Lipid Disorder      Diabetes Screening:     General: Screening Not Indicated and History Diabetes      Colorectal Cancer Screening:     General: Screening Current      Cervical Cancer Screening:    General: Screening Not Indicated      Osteoporosis Screening:    General: Screening Not Indicated and History Osteoporosis      Hepatitis C Screening:    General: Screening Shari Kincaid MD

## 2019-11-21 NOTE — PATIENT INSTRUCTIONS
Medicare Preventive Visit Patient Instructions  Thank you for completing your Welcome to Medicare Visit or Medicare Annual Wellness Visit today  Your next wellness visit will be due in one year (11/21/2020)  The screening/preventive services that you may require over the next 5-10 years are detailed below  Some tests may not apply to you based off risk factors and/or age  Screening tests ordered at today's visit but not completed yet may show as past due  Also, please note that scanned in results may not display below  Preventive Screenings:  Service Recommendations Previous Testing/Comments   Colorectal Cancer Screening  * Colonoscopy    * Fecal Occult Blood Test (FOBT)/Fecal Immunochemical Test (FIT)  * Fecal DNA/Cologuard Test  * Flexible Sigmoidoscopy Age: 54-65 years old   Colonoscopy: every 10 years (may be performed more frequently if at higher risk)  OR  FOBT/FIT: every 1 year  OR  Cologuard: every 3 years  OR  Sigmoidoscopy: every 5 years  Screening may be recommended earlier than age 48 if at higher risk for colorectal cancer  Also, an individualized decision between you and your healthcare provider will decide whether screening between the ages of 74-80 would be appropriate  Colonoscopy: Not on file  FOBT/FIT: 05/17/2019  Cologuard: Not on file  Sigmoidoscopy: Not on file    Screening Current     Breast Cancer Screening Age: 36 years old  Frequency: every 1-2 years  Not required if history of left and right mastectomy Mammogram: Not on file       Cervical Cancer Screening Between the ages of 21-29, pap smear recommended once every 3 years  Between the ages of 33-67, can perform pap smear with HPV co-testing every 5 years     Recommendations may differ for women with a history of total hysterectomy, cervical cancer, or abnormal pap smears in past  Pap Smear: Not on file    Screening Not Indicated   Hepatitis C Screening Once for adults born between 1945 and 1965  More frequently in patients at high risk for Hepatitis C Hep C Antibody: 10/06/2017    Screening Current   Diabetes Screening 1-2 times per year if you're at risk for diabetes or have pre-diabetes Fasting glucose: 129 mg/dL   A1C: 6 4    Screening Not Indicated  History Diabetes   Cholesterol Screening Once every 5 years if you don't have a lipid disorder  May order more often based on risk factors  Lipid panel: 05/17/2019    Screening Not Indicated  History Lipid Disorder     Other Preventive Screenings Covered by Medicare:  1  Abdominal Aortic Aneurysm (AAA) Screening: covered once if your at risk  You're considered to be at risk if you have a family history of AAA  2  Lung Cancer Screening: covers low dose CT scan once per year if you meet all of the following conditions: (1) Age 50-69; (2) No signs or symptoms of lung cancer; (3) Current smoker or have quit smoking within the last 15 years; (4) You have a tobacco smoking history of at least 30 pack years (packs per day multiplied by number of years you smoked); (5) You get a written order from a healthcare provider  3  Glaucoma Screening: covered annually if you're considered high risk: (1) You have diabetes OR (2) Family history of glaucoma OR (3)  aged 48 and older OR (3)  American aged 72 and older  3  Osteoporosis Screening: covered every 2 years if you meet one of the following conditions: (1) You're estrogen deficient and at risk for osteoporosis based off medical history and other findings; (2) Have a vertebral abnormality; (3) On glucocorticoid therapy for more than 3 months; (4) Have primary hyperparathyroidism; (5) On osteoporosis medications and need to assess response to drug therapy  · Last bone density test (DXA Scan): 03/13/2018  5  HIV Screening: covered annually if you're between the age of 12-76  Also covered annually if you are younger than 13 and older than 72 with risk factors for HIV infection   For pregnant patients, it is covered up to 3 times per pregnancy  Immunizations:  Immunization Recommendations   Influenza Vaccine Annual influenza vaccination during flu season is recommended for all persons aged >= 6 months who do not have contraindications   Pneumococcal Vaccine (Prevnar and Pneumovax)  * Prevnar = PCV13  * Pneumovax = PPSV23   Adults 25-60 years old: 1-3 doses may be recommended based on certain risk factors  Adults 72 years old: Prevnar (PCV13) vaccine recommended followed by Pneumovax (PPSV23) vaccine  If already received PPSV23 since turning 65, then PCV13 recommended at least one year after PPSV23 dose  Hepatitis B Vaccine 3 dose series if at intermediate or high risk (ex: diabetes, end stage renal disease, liver disease)   Tetanus (Td) Vaccine - COST NOT COVERED BY MEDICARE PART B Following completion of primary series, a booster dose should be given every 10 years to maintain immunity against tetanus  Td may also be given as tetanus wound prophylaxis  Tdap Vaccine - COST NOT COVERED BY MEDICARE PART B Recommended at least once for all adults  For pregnant patients, recommended with each pregnancy  Shingles Vaccine (Shingrix) - COST NOT COVERED BY MEDICARE PART B  2 shot series recommended in those aged 48 and above     Health Maintenance Due:      Topic Date Due    CRC Screening: Colonoscopy  05/17/2020 (Originally 1950)    MAMMOGRAM  05/22/2020 (Originally 1950)    DXA SCAN  03/13/2020    CRC Screening: FOBTx3/FIT  05/17/2020    Hepatitis C Screening  Completed     Immunizations Due:      Topic Date Due    DTaP,Tdap,and Td Vaccines (1 - Tdap) 03/18/1959    Hepatitis B Vaccine (1 of 3 - Risk 3-dose series) 03/18/1967    Influenza Vaccine  07/01/2019     Advance Directives   What are advance directives? Advance directives are legal documents that state your wishes and plans for medical care  These plans are made ahead of time in case you lose your ability to make decisions for yourself   Advance directives can apply to any medical decision, such as the treatments you want, and if you want to donate organs  What are the types of advance directives? There are many types of advance directives, and each state has rules about how to use them  You may choose a combination of any of the following:  · Living will: This is a written record of the treatment you want  You can also choose which treatments you do not want, which to limit, and which to stop at a certain time  This includes surgery, medicine, IV fluid, and tube feedings  · Durable power of  for healthcare [facility]): This is a written record that states who you want to make healthcare choices for you when you are unable to make them for yourself  This person, called a proxy, is usually a family member or a friend  You may choose more than 1 proxy  · Do not resuscitate (DNR) order:  A DNR order is used in case your heart stops beating or you stop breathing  It is a request not to have certain forms of treatment, such as CPR  A DNR order may be included in other types of advance directives  · Medical directive: This covers the care that you want if you are in a coma, near death, or unable to make decisions for yourself  You can list the treatments you want for each condition  Treatment may include pain medicine, surgery, blood transfusions, dialysis, IV or tube feedings, and a ventilator (breathing machine)  · Values history: This document has questions about your views, beliefs, and how you feel and think about life  This information can help others choose the care that you would choose  Why are advance directives important? An advance directive helps you control your care  Although spoken wishes may be used, it is better to have your wishes written down  Spoken wishes can be misunderstood, or not followed  Treatments may be given even if you do not want them   An advance directive may make it easier for your family to make difficult choices about your care    Urinary Incontinence   Urinary incontinence (UI)  is when you lose control of your bladder  UI develops because your bladder cannot store or empty urine properly  The 3 most common types of UI are stress incontinence, urge incontinence, or both  Medicines:   · May be given to help strengthen your bladder control  Report any side effects of medication to your healthcare provider  Do pelvic muscle exercises often:  Your pelvic muscles help you stop urinating  Squeeze these muscles tight for 5 seconds, then relax for 5 seconds  Gradually work up to squeezing for 10 seconds  Do 3 sets of 15 repetitions a day, or as directed  This will help strengthen your pelvic muscles and improve bladder control  Train your bladder:  Go to the bathroom at set times, such as every 2 hours, even if you do not feel the urge to go  You can also try to hold your urine when you feel the urge to go  For example, hold your urine for 5 minutes when you feel the urge to go  As that becomes easier, hold your urine for 10 minutes  Self-care:   · Keep a UI record  Write down how often you leak urine and how much you leak  Make a note of what you were doing when you leaked urine  · Drink liquids as directed  You may need to limit the amount of liquid you drink to help control your urine leakage  Do not drink any liquid right before you go to bed  Limit or do not have drinks that contain caffeine or alcohol  · Prevent constipation  Eat a variety of high-fiber foods  Good examples are high-fiber cereals, beans, vegetables, and whole-grain breads  Walking is the best way to trigger your intestines to have a bowel movement  · Exercise regularly and maintain a healthy weight  Weight loss and exercise will decrease pressure on your bladder and help you control your leakage  · Use a catheter as directed  to help empty your bladder  A catheter is a tiny, plastic tube that is put into your bladder to drain your urine     · Go to behavior therapy as directed  Behavior therapy may be used to help you learn to control your urge to urinate  Weight Management   Why it is important to manage your weight:  Being overweight increases your risk of health conditions such as heart disease, high blood pressure, type 2 diabetes, and certain types of cancer  It can also increase your risk for osteoarthritis, sleep apnea, and other respiratory problems  Aim for a slow, steady weight loss  Even a small amount of weight loss can lower your risk of health problems  How to lose weight safely:  A safe and healthy way to lose weight is to eat fewer calories and get regular exercise  You can lose up about 1 pound a week by decreasing the number of calories you eat by 500 calories each day  Healthy meal plan for weight management:  A healthy meal plan includes a variety of foods, contains fewer calories, and helps you stay healthy  A healthy meal plan includes the following:  · Eat whole-grain foods more often  A healthy meal plan should contain fiber  Fiber is the part of grains, fruits, and vegetables that is not broken down by your body  Whole-grain foods are healthy and provide extra fiber in your diet  Some examples of whole-grain foods are whole-wheat breads and pastas, oatmeal, brown rice, and bulgur  · Eat a variety of vegetables every day  Include dark, leafy greens such as spinach, kale, yessy greens, and mustard greens  Eat yellow and orange vegetables such as carrots, sweet potatoes, and winter squash  · Eat a variety of fruits every day  Choose fresh or canned fruit (canned in its own juice or light syrup) instead of juice  Fruit juice has very little or no fiber  · Eat low-fat dairy foods  Drink fat-free (skim) milk or 1% milk  Eat fat-free yogurt and low-fat cottage cheese  Try low-fat cheeses such as mozzarella and other reduced-fat cheeses  · Choose meat and other protein foods that are low in fat    Choose beans or other legumes such as split peas or lentils  Choose fish, skinless poultry (chicken or turkey), or lean cuts of red meat (beef or pork)  Before you cook meat or poultry, cut off any visible fat  · Use less fat and oil  Try baking foods instead of frying them  Add less fat, such as margarine, sour cream, regular salad dressing and mayonnaise to foods  Eat fewer high-fat foods  Some examples of high-fat foods include french fries, doughnuts, ice cream, and cakes  · Eat fewer sweets  Limit foods and drinks that are high in sugar  This includes candy, cookies, regular soda, and sweetened drinks  Exercise:  Exercise at least 30 minutes per day on most days of the week  Some examples of exercise include walking, biking, dancing, and swimming  You can also fit in more physical activity by taking the stairs instead of the elevator or parking farther away from stores  Ask your healthcare provider about the best exercise plan for you  © Copyright [employer] 2018 Information is for End User's use only and may not be sold, redistributed or otherwise used for commercial purposes   All illustrations and images included in CareNotes® are the copyrighted property of A D [employer] M [employer]  or [address] [place] [address]

## 2019-11-21 NOTE — ASSESSMENT & PLAN NOTE
I recommended continuation of her full antibiotic course and she will evaluate with her podiatrist tomorrow

## 2019-11-21 NOTE — ASSESSMENT & PLAN NOTE
Lab Results   Component Value Date    HGBA1C 6 4 05/22/2019    Current hemoglobin A1c is 5 9  She still tends to cheat and I urged her to try to follow a healthy diet

## 2019-11-21 NOTE — PROGRESS NOTES
Assessment/Plan:    Left lower quadrant abdominal mass  I have ordered her CT scan of abdomen and pelvis for further evaluation  Benign essential hypertension  BP is stable on current regimen    Controlled type 2 diabetes mellitus ([address] )    Lab Results   Component Value Date    HGBA1C 6 4 05/22/2019    Current hemoglobin A1c is 5 9  She still tends to cheat and I urged her to try to follow a healthy diet  Pain in right ankle and joints of right foot  I recommended continuation of her full antibiotic course and she will evaluate with her podiatrist tomorrow  Diagnoses and all orders for this visit:    Medicare annual wellness visit, subsequent    Needs flu shot  -     influenza vaccine, [id number], high-dose, PF 0 5 mL (FLUZONE HIGH-DOSE)    Left lower quadrant abdominal mass    Benign essential hypertension    Controlled type 2 diabetes mellitus without complication, without long-term current use of insulin ([facility])    Pain in right ankle and joints of right foot          Subjective:      Patient ID: Pa Colorado is a 68 y o  female  She is here for annual Wellness visit and follow-up of chronic problems  Unfortunately the right ankle has been giving her troubles lately  I had seen her in the summer when she had right foot pain and possible cellulitis  She ended up having x-ray which showed some signs of possible osteomyelitis so she was sent for MRI which was okay  She saw Podiatry and was treated with a walking boot  Gradually the pain subsided but then about 2 weeks ago she developed pain and swelling higher in her right ankle  There is no injury that she could recall  She was seen at urgent care and started on Bactrim  X-ray was negative  So far she continues with pain and swelling after 2 days of Bactrim  She and her  also note that she sleeps a lot  She will tend to fall asleep while watching TV  She states that she does sleep well through the night    She denies depression symptoms  She also feels her cough has gotten worse and she c/o pain left upper abd  Recently she notes abd distension but denies constipation    She had hysterectomy 30 some years ago  This was done because it was causing bladder prolapse  No oopharectomy      The following portions of the patient's history were reviewed and updated as appropriate: allergies, current medications, past family history, past medical history, past social history, past surgical history and problem list     Review of Systems   Constitutional: Negative for activity change, appetite change, chills, fatigue and fever  HENT: Negative for congestion, ear pain, sinus pressure and sore throat  Eyes: Negative for pain and visual disturbance  Respiratory: Positive for cough  Negative for chest tightness, shortness of breath and wheezing  Cardiovascular: Negative for chest pain, palpitations and leg swelling  Gastrointestinal: Positive for abdominal distention and abdominal pain  Negative for blood in stool, constipation, diarrhea, nausea and vomiting  Endocrine: Negative for polydipsia and polyuria  Genitourinary: Negative for difficulty urinating, dysuria, frequency and urgency  Musculoskeletal: Positive for arthralgias  Negative for joint swelling and myalgias  Skin: Negative for rash  Neurological: Positive for headaches  Negative for dizziness, weakness and numbness  Hematological: Negative for adenopathy  Does not bruise/bleed easily  Psychiatric/Behavioral: Negative for dysphoric mood  The patient is not nervous/anxious  Objective:      /80 (BP Location: Right arm, Patient Position: Sitting, Cuff Size: Standard)   Pulse 83   Temp 98 8 °F (37 1 °C) (Tympanic)   Ht 5' 0 5" (1 537 m)   Wt 81 6 kg (179 lb 12 8 oz)   SpO2 96%   BMI 34 54 kg/m²          Physical Exam   Constitutional: She is oriented to person, place, and time  She appears well-developed and well-nourished  No distress     HENT: Head: Normocephalic and atraumatic  Right Ear: External ear normal    Left Ear: External ear normal    Nose: Nose normal    Mouth/Throat: Oropharynx is clear and moist    Eyes: Pupils are equal, round, and reactive to light  Conjunctivae and EOM are normal  No scleral icterus  Neck: Normal range of motion  Neck supple  No thyromegaly present  Cardiovascular: Normal rate, regular rhythm, normal heart sounds and intact distal pulses  No murmur heard  Pulmonary/Chest: Effort normal and breath sounds normal  She has no wheezes  She has no rales  Abdominal: Bowel sounds are normal  She exhibits no mass  There is no tenderness  Abdomen is firm with possible mass LLQ   Musculoskeletal: She exhibits edema  She exhibits no tenderness or deformity  Right ankle with mild swelling and faint erythema located just proximal to the lateral malleolus  Lymphadenopathy:     She has no cervical adenopathy  Neurological: She is alert and oriented to person, place, and time  She has normal reflexes  No cranial nerve deficit  Skin: Skin is warm and dry  No rash noted  No erythema  Psychiatric: She has a normal mood and affect  Her behavior is normal    Nursing note and vitals reviewed

## 2019-11-22 ENCOUNTER — TRANSCRIBE ORDERS (OUTPATIENT)
Dept: LAB | Facility: CLINIC | Age: 69
End: 2019-11-22

## 2019-11-22 ENCOUNTER — APPOINTMENT (OUTPATIENT)
Dept: LAB | Facility: CLINIC | Age: 69
End: 2019-11-22
Payer: MEDICARE

## 2019-11-22 DIAGNOSIS — I10 BENIGN ESSENTIAL HYPERTENSION: ICD-10-CM

## 2019-11-22 DIAGNOSIS — E11.8 CONTROLLED DIABETES MELLITUS TYPE 2 WITH COMPLICATIONS, UNSPECIFIED WHETHER LONG TERM INSULIN USE (HCC): ICD-10-CM

## 2019-11-22 LAB
ANION GAP SERPL CALCULATED.3IONS-SCNC: 9 MMOL/L (ref 4–13)
BUN SERPL-MCNC: 17 MG/DL (ref 5–25)
CALCIUM SERPL-MCNC: 9.6 MG/DL (ref 8.3–10.1)
CHLORIDE SERPL-SCNC: 104 MMOL/L (ref 100–108)
CO2 SERPL-SCNC: 28 MMOL/L (ref 21–32)
CREAT SERPL-MCNC: 1.46 MG/DL (ref 0.6–1.3)
GFR SERPL CREATININE-BSD FRML MDRD: 36 ML/MIN/1.73SQ M
GLUCOSE P FAST SERPL-MCNC: 100 MG/DL (ref 65–99)
POTASSIUM SERPL-SCNC: 4.4 MMOL/L (ref 3.5–5.3)
SODIUM SERPL-SCNC: 141 MMOL/L (ref 136–145)

## 2019-11-22 PROCEDURE — 36415 COLL VENOUS BLD VENIPUNCTURE: CPT

## 2019-11-22 PROCEDURE — 80048 BASIC METABOLIC PNL TOTAL CA: CPT

## 2019-11-27 ENCOUNTER — HOSPITAL ENCOUNTER (OUTPATIENT)
Dept: CT IMAGING | Facility: HOSPITAL | Age: 69
Discharge: HOME | End: 2019-11-27
Payer: MEDICARE

## 2019-11-27 DIAGNOSIS — R19.04 LEFT LOWER QUADRANT ABDOMINAL MASS: ICD-10-CM

## 2019-11-27 PROCEDURE — 74177 CT ABD & PELVIS W/CONTRAST: CPT

## 2019-11-27 RX ADMIN — IODIXANOL 80 ML: 320 INJECTION, SOLUTION INTRAVASCULAR at 08:35

## 2019-11-29 ENCOUNTER — TRANSCRIBE ORDERS (OUTPATIENT)
Dept: LAB | Facility: CLINIC | Age: 69
End: 2019-11-29

## 2019-11-29 ENCOUNTER — LAB REQUISITION (OUTPATIENT)
Dept: LAB | Facility: HOSPITAL | Age: 69
End: 2019-11-29
Payer: MEDICARE

## 2019-11-29 ENCOUNTER — APPOINTMENT (OUTPATIENT)
Dept: LAB | Facility: CLINIC | Age: 69
End: 2019-11-29
Payer: MEDICARE

## 2019-11-29 DIAGNOSIS — L95.9 VASCULITIS LIMITED TO THE SKIN, UNSPECIFIED: ICD-10-CM

## 2019-11-29 DIAGNOSIS — R60.0 LOCALIZED EDEMA: ICD-10-CM

## 2019-11-29 DIAGNOSIS — I77.6 VASCULITIS (HCC): ICD-10-CM

## 2019-11-29 DIAGNOSIS — R60.0 LOCALIZED EDEMA: Primary | ICD-10-CM

## 2019-11-29 DIAGNOSIS — M79.9 LESION OF SOFT TISSUE OF LOWER LEG AND ANKLE: ICD-10-CM

## 2019-11-29 LAB
CRP SERPL QL: <3 MG/L
ERYTHROCYTE [SEDIMENTATION RATE] IN BLOOD: 38 MM/HOUR (ref 0–20)

## 2019-11-29 PROCEDURE — 36415 COLL VENOUS BLD VENIPUNCTURE: CPT

## 2019-11-29 PROCEDURE — 88305 TISSUE EXAM BY PATHOLOGIST: CPT | Performed by: PATHOLOGY

## 2019-11-29 PROCEDURE — 81374 HLA I TYPING 1 ANTIGEN LR: CPT

## 2019-11-29 PROCEDURE — 86140 C-REACTIVE PROTEIN: CPT

## 2019-11-29 PROCEDURE — 86618 LYME DISEASE ANTIBODY: CPT

## 2019-11-29 PROCEDURE — 86255 FLUORESCENT ANTIBODY SCREEN: CPT

## 2019-11-29 PROCEDURE — 86430 RHEUMATOID FACTOR TEST QUAL: CPT

## 2019-11-29 PROCEDURE — 85652 RBC SED RATE AUTOMATED: CPT

## 2019-11-30 LAB — B BURGDOR IGG+IGM SER-ACNC: <0.91 ISR (ref 0–0.9)

## 2019-12-01 LAB — RHEUMATOID FACT SER QL LA: NEGATIVE

## 2019-12-02 ENCOUNTER — TELEPHONE (OUTPATIENT)
Dept: HEMATOLOGY ONCOLOGY | Facility: CLINIC | Age: 69
End: 2019-12-02

## 2019-12-02 ENCOUNTER — TELEPHONE (OUTPATIENT)
Dept: FAMILY MEDICINE CLINIC | Facility: CLINIC | Age: 69
End: 2019-12-02

## 2019-12-02 DIAGNOSIS — N83.8 OVARIAN MASS, RIGHT: Primary | ICD-10-CM

## 2019-12-02 LAB
C-ANCA TITR SER IF: NORMAL TITER
MYELOPEROXIDASE AB SER IA-ACNC: <9 U/ML (ref 0–9)
P-ANCA ATYPICAL TITR SER IF: NORMAL TITER
P-ANCA TITR SER IF: NORMAL TITER
PROTEINASE3 AB SER IA-ACNC: <3.5 U/ML (ref 0–3.5)

## 2019-12-02 NOTE — TELEPHONE ENCOUNTER
Notes recorded by Blade Anthony MD on 12/2/2019 at 3:35 PM EST  Please tell her that the CT scan revealed a large cystic mass which seems to be arising from the right ovary  Please refer her to gynecologic oncology

## 2019-12-02 NOTE — TELEPHONE ENCOUNTER
appt sched w Dr Mauricio Silva, Dec 3, 1:15 pm arrival at [facility] ,  Pt notified

## 2019-12-02 NOTE — TELEPHONE ENCOUNTER
New Patient Encounter    New Patient Intake Form   Patient Details:  Shankar Erm  1950  [id number]    Background Information:  [id number] starts by opening a telephone encounter and gathering the following information   Who is calling to schedule? If not self, relationship to patient? provider   Referring Provider Hever Baker   What is the diagnosis? Adnexal mass   When was the diagnosis? 12/2019   Is patient aware of diagnosis? Yes   Reason for visit? NP DX   Have you had any testing done? If so: when, where? Yes   Are records in [employer]? yes   Was the patient told to bring a disk? no   Scheduling Information:   Preferred [facility]:  Any     Requesting Specific Provider? no   Are there any dates/time the patient cannot be seen? no      Miscellaneous:    After completing the above information, please route to Financial Counselor and the appropriate Nurse Navigator for review

## 2019-12-03 ENCOUNTER — CONSULT (OUTPATIENT)
Dept: GYNECOLOGIC ONCOLOGY | Facility: CLINIC | Age: 69
End: 2019-12-03
Payer: MEDICARE

## 2019-12-03 ENCOUNTER — TELEPHONE (OUTPATIENT)
Dept: FAMILY MEDICINE CLINIC | Facility: CLINIC | Age: 69
End: 2019-12-03

## 2019-12-03 VITALS
HEART RATE: 72 BPM | SYSTOLIC BLOOD PRESSURE: 150 MMHG | TEMPERATURE: 98.8 F | DIASTOLIC BLOOD PRESSURE: 90 MMHG | RESPIRATION RATE: 18 BRPM | WEIGHT: 176 LBS | BODY MASS INDEX: 33.23 KG/M2 | HEIGHT: 61 IN

## 2019-12-03 DIAGNOSIS — R19.07 GENERALIZED ABDOMINAL OR PELVIC SWELLING OR MASS OR LUMP: ICD-10-CM

## 2019-12-03 DIAGNOSIS — R60.0 LOWER EXTREMITY EDEMA: Primary | ICD-10-CM

## 2019-12-03 DIAGNOSIS — N83.8 OVARIAN MASS, RIGHT: ICD-10-CM

## 2019-12-03 LAB — HLA-B27 QL NAA+PROBE: NEGATIVE

## 2019-12-03 PROCEDURE — 99205 OFFICE O/P NEW HI 60 MIN: CPT | Performed by: OBSTETRICS & GYNECOLOGY

## 2019-12-03 RX ORDER — GABAPENTIN 100 MG/1
100 CAPSULE ORAL ONCE
Status: CANCELLED | OUTPATIENT
Start: 2019-12-03 | End: 2019-12-03

## 2019-12-03 RX ORDER — ACETAMINOPHEN 325 MG/1
975 TABLET ORAL ONCE
Status: CANCELLED | OUTPATIENT
Start: 2019-12-03 | End: 2019-12-03

## 2019-12-03 RX ORDER — ENOXAPARIN SODIUM 300 MG/3ML
40 INJECTION INTRAVENOUS; SUBCUTANEOUS
Status: CANCELLED | OUTPATIENT
Start: 2019-12-04 | End: 2019-12-05

## 2019-12-03 RX ORDER — METHYLPREDNISOLONE 4 MG/1
TABLET ORAL
Refills: 0 | COMMUNITY
Start: 2019-11-29 | End: 2019-12-10

## 2019-12-03 NOTE — PROGRESS NOTES
Assessment/Plan:    Problem List Items Addressed This Visit        Other    Generalized abdominal or pelvic swelling or mass or lump     The patient has been recently diagnosed with a ovarian mass  We discussed with the patient that all new complex ovarian masses in the postmenopausal phase run approximately a 4% risk of malignancy  However as this 1 is significantly bigger than 10 cm her overall risk of malignancy is closer to the 10-20% range     These generally do not resolve  We have discussed treatment options including observation with follow-up in 2 months including possible ultrasound at that time, or immediate surgery  We have recommended the following:    Exploratory laparotomy, total abdominal hysterectomy bilateral salpingo-oophorectomy with possible pelvic and para-aortic lymph node dissection staging biopsies including omentectomy based on findings at frozen section     Have discussed risks and benefits of the procedure including bleeding requiring transfusion infection, infection, damage to local structures including bowel bladder ureter and other local organs  We discussed the risk of deep venous thrombosis  Given the patient's recent leg swelling and no prior ultrasound and Doppler we have recommended to perform this prior to surgery  All of these complications are in the 2-4% range of likelihood  The patient understands the risks and benefits of the procedure and has signed an informed consent  I personally signed the consent form with her  She does understand that further treatment including chemotherapy radiation therapy or hormones may be required based on the final postoperative pathologic diagnosis and staging  Standard preoperative testing including type and screen is CBC CPM P chest x-ray and EKG will be ordered  Any appropriate consultations for preoperative evaluation will also be ordered    Overall consultation took 60 min with greater than 50% in dedicated toward discussion time          Relevant Orders    Case request operating room: LAPAROTOMY EXPLORATORY, bilateral salpingo-oophorectomy possible staging (Completed)    Type and screen    Comprehensive metabolic panel    CBC and differential    HEMOGLOBIN A1C W/ EAG ESTIMATION        EKG 12 lead    XR chest pa & lateral      Other Visit Diagnoses     Lower extremity edema    -  Primary    Relevant Orders    VAS lower limb venous duplex study, complete bilateral    Ovarian mass, right        Relevant Orders    VAS lower limb venous duplex study, complete bilateral    Case request operating room: LAPAROTOMY EXPLORATORY, bilateral salpingo-oophorectomy possible staging (Completed)    Type and screen    Comprehensive metabolic panel    CBC and differential    HEMOGLOBIN A1C W/ EAG ESTIMATION        EKG 12 lead    XR chest pa & lateral              CHIEF COMPLAINT:  Abdominal pelvic mass          Patient ID: Ingrid Taylor is a 68 y o  female  Patient is very pleasant 77-year-old white female seen in consultation from Dr Lazarus Barton regarding evaluation and management of abdominal pelvic mass  The patient was evaluated over the summer for left ankle swelling and possible cellulitis as well as possible osteomyelitis  The patient was placed on antibiotics and an MRI of the leg was performed  No osteomyelitis was noted  The patient improved but not significantly  She has had leg swelling on and off since that time which has been evaluated  The patient recently saw her primary care physician Dr Dia Karimi and was noted to have an abdominal mass with tenderness on exam   A CT scan was ordered  This revealed the following:        ABDOMINOPELVIC CAVITY:  A large predominantly cystic mass containing internal enhancing septations arises from the right adnexa extending above the umbilicus measuring 22 x 16 x 19 cm  Primary ovarian tumor suspected    No ascites, or territorial   implants, pneumoperitoneum, or abdominal pelvic lymphadenopathy      VESSELS:  Unremarkable for patient's age      PELVIS     REPRODUCTIVE ORGANS:  Patient is status post hysterectomy  Again, a large cystic mass arising from the right adnexa is origin likely the remaining right ovary      URINARY BLADDER:  Unremarkable      ABDOMINAL WALL/INGUINAL REGIONS:  Evidence of ventral herniorrhaphy with small fat-containing umbilical hernia noted      OSSEOUS STRUCTURES:  No acute fracture or destructive osseous lesion      IMPRESSION:     1   Multiloculated cystic mass arising from the right adnexa extending above the umbilicus measuring 22 x 16 x 19 cm 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  Gynecologic oncology consultation recommended  2   No ascites or evidence for abdominal pelvic metastatic disease  3   Minimal bilateral hydronephrosis  The patient has noted over the past several months increasing early satiety  She has not finished a meal   She notes worsening urinary incontinence over the past 2 months  She now requires the use of a depends  She has no change in bowel function she has intermittent left lower quadrant pain especially while coughing or sneezing and she has developed this large mass that makes her feel as though she is pregnant  She has no family history of ovary of breast cancer  Patient now presents for evaluation and management of large abdominal pelvic mass          Review of Systems   Constitutional: Positive for appetite change and fever  HENT: Negative  Eyes: Negative  Respiratory: Negative  Cardiovascular: Negative  Gastrointestinal: Negative  Endocrine: Negative  Genitourinary: Negative  Musculoskeletal: Negative  Skin: Negative  Neurological: Negative  Hematological: Negative  Psychiatric/Behavioral: Negative          Current Outpatient Medications   Medication Sig Dispense Refill    alendronate (FOSAMAX) 70 mg tablet TAKE 1 TABLET BY MOUTH ONCE A WEEK 12 tablet 14    aspirin 81 mg chewable tablet Chew 1 tablet daily      Calcium Carbonate-Vitamin D (CALCIUM 600+D) 600-400 MG-UNIT per tablet Take 2 tablets by mouth daily        Coenzyme Q10 (COQ-10) 100 MG CAPS Take 1 capsule by mouth daily      fluticasone (FLONASE) 50 mcg/act nasal spray 1 spray into each nostril daily as needed for rhinitis      Ibuprofen (ADVIL) 200 MG CAPS Take 2 capsules by mouth        loratadine (CLARITIN) 10 mg tablet Take 10 mg by mouth daily      losartan (COZAAR) 25 mg tablet Take 2 tabs once daily 180 tablet 3    metFORMIN (GLUCOPHAGE) 1000 MG tablet Take 1 tablet (1,000 mg total) by mouth 2 (two) times a day 180 tablet 3    methylPREDNISolone 4 MG tablet therapy pack AS DIRECTED ON PACKAGE  0    nystatin (MYCOSTATIN) cream Apply topically as needed (as needed) 30 g 5    simvastatin (ZOCOR) 10 mg tablet Take 1 tablet (10 mg total) by mouth daily 90 tablet 3     No current facility-administered medications for this visit          Allergies   Allergen Reactions    Penicillins        Past Medical History:   Diagnosis Date    Asthma     [facility]     Diabetes [facility] )     Hyperlipidemia     Osteoarthritis     Osteoporosis     Seasonal allergies        Past Surgical History:   Procedure Laterality Date    CHOLECYSTECTOMY      2016  LAST ASSESSED    EYE SURGERY      cataracts removed    HYSTERECTOMY      [id number]  LAST ASSESSED    TUBAL LIGATION         OB History        5    Para   5    Term   4       1    AB   0    Living   4       SAB   0    TAB   0    Ectopic   0    Multiple   0    Live Births   4                 Family History   Problem Relation Age of Onset    Heart disease Mother     Rheumatic fever Father     Diabetes Family         MELLITUS    Diabetes Family         MELLITUS       The following portions of the patient's history were reviewed and updated as appropriate: allergies, current medications, past family history, past social history, past surgical history and problem list       Objective:    Blood pressure 150/90, pulse 72, temperature 98 8 °F (37 1 °C), resp  rate 18, height 5' 0 5" (1 537 m), weight 79 8 kg (176 lb)  Body mass index is 33 81 kg/m²  Physical Exam   Constitutional: She is oriented to person, place, and time  She appears well-developed and well-nourished  HENT:   Head: Normocephalic and atraumatic  Eyes: EOM are normal    Neck: Normal range of motion  Neck supple  No thyromegaly present  Cardiovascular: Normal rate, regular rhythm and normal heart sounds  Pulmonary/Chest: Effort normal and breath sounds normal    Abdominal: Soft  Bowel sounds are normal    Well healed  incisions  Large abdominal mass noted  There is no fluid wave her ascites noted  Genitourinary:   Genitourinary Comments: -Normal external female genitalia, normal Bartholin's and [place]. Bonaventure's glands                  -Normal midline urethral meatus  No lesions notes                  -Bladder without fullness mass or tenderness                  -Vagina without lesion or discharge No significant cystocele or rectocele noted                  -Cervix surgically absent                  -Uterus surgically absent                  -Adnexae not specifically help palpable however a large 22 cm mass in the pelvis extending to the abdomen is noted                   - Anus without fissure of lesion     Musculoskeletal: Normal range of motion  Lymphadenopathy:     She has no cervical adenopathy  Neurological: She is alert and oriented to person, place, and time  Skin: Skin is warm and dry  Psychiatric: She has a normal mood and affect   Her behavior is normal          No results found for:   Lab Results   Component Value Date    WBC 8 92 01/12/2018    HGB 13 9 01/12/2018    HCT 42 2 01/12/2018    MCV 90 01/12/2018     01/12/2018     Lab Results   Component Value Date    K 4 4 11/22/2019     11/22/2019    CO2 28 11/22/2019    BUN 17 11/22/2019    CREATININE 1 46 (H) 11/22/2019    GLUF 100 (H) 11/22/2019    CALCIUM 9 6 11/22/2019    AST 17 05/17/2019    ALT 21 05/17/2019    ALKPHOS 62 05/17/2019    EGFR 36 11/22/2019

## 2019-12-03 NOTE — ASSESSMENT & PLAN NOTE
The patient has been recently diagnosed with a ovarian mass  We discussed with the patient that all new complex ovarian masses in the postmenopausal phase run approximately a 4% risk of malignancy  However as this 1 is significantly bigger than 10 cm her overall risk of malignancy is closer to the 10-20% range     These generally do not resolve  We have discussed treatment options including observation with follow-up in 2 months including possible ultrasound at that time, or immediate surgery  We have recommended the following:    Exploratory laparotomy, total abdominal hysterectomy bilateral salpingo-oophorectomy with possible pelvic and para-aortic lymph node dissection staging biopsies including omentectomy based on findings at frozen section     Have discussed risks and benefits of the procedure including bleeding requiring transfusion infection, infection, damage to local structures including bowel bladder ureter and other local organs  We discussed the risk of deep venous thrombosis  Given the patient's recent leg swelling and no prior ultrasound and Doppler we have recommended to perform this prior to surgery  All of these complications are in the 2-4% range of likelihood  The patient understands the risks and benefits of the procedure and has signed an informed consent  I personally signed the consent form with her  She does understand that further treatment including chemotherapy radiation therapy or hormones may be required based on the final postoperative pathologic diagnosis and staging  Standard preoperative testing including type and screen is CBC CPM P chest x-ray and EKG will be ordered  Any appropriate consultations for preoperative evaluation will also be ordered  Overall consultation took 60 min with greater than 50% in dedicated toward discussion time

## 2019-12-03 NOTE — PATIENT INSTRUCTIONS
You will be contacted by the office for scheduling surgery  Instructions of the special Hibiclens shower and carbohydrate drink will be given at that time  We are recommending that we stop the aspirin 7 days prior to surgery date  Please do not take your metformin the day of surgery

## 2019-12-03 NOTE — LETTER
December 3, 2019     Seth Velazquez MD  [id number]   [address] [facility]    Patient: Cari Bautista   YOB: 1950   Date of Visit: 12/3/2019       Dear Dr Ana Dominguez: Thank you for referring Cari Bautista to me for evaluation  Below are my notes for this consultation  If you have questions, please do not hesitate to call me  I look forward to following your patient along with you  Sincerely,        Glenroy Shipley MD        CC: No Recipients  Glenroy Shipley MD  12/3/2019  1:50 PM  Sign at close encounter  Assessment/Plan:    Problem List Items Addressed This Visit        Other    Generalized abdominal or pelvic swelling or mass or lump     The patient has been recently diagnosed with a ovarian mass  We discussed with the patient that all new complex ovarian masses in the postmenopausal phase run approximately a 4% risk of malignancy  However as this 1 is significantly bigger than 10 cm her overall risk of malignancy is closer to the 10-20% range     These generally do not resolve  We have discussed treatment options including observation with follow-up in 2 months including possible ultrasound at that time, or immediate surgery  We have recommended the following:    Exploratory laparotomy, total abdominal hysterectomy bilateral salpingo-oophorectomy with possible pelvic and para-aortic lymph node dissection staging biopsies including omentectomy based on findings at frozen section     Have discussed risks and benefits of the procedure including bleeding requiring transfusion infection, infection, damage to local structures including bowel bladder ureter and other local organs  We discussed the risk of deep venous thrombosis  Given the patient's recent leg swelling and no prior ultrasound and Doppler we have recommended to perform this prior to surgery  All of these complications are in the 2-4% range of likelihood    The patient understands the risks and benefits of the procedure and has signed an informed consent  I personally signed the consent form with her  She does understand that further treatment including chemotherapy radiation therapy or hormones may be required based on the final postoperative pathologic diagnosis and staging  Standard preoperative testing including type and screen is CBC CPM P chest x-ray and EKG will be ordered  Any appropriate consultations for preoperative evaluation will also be ordered  Overall consultation took 60 min with greater than 50% in dedicated toward discussion time  Relevant Orders    Case request operating room: LAPAROTOMY EXPLORATORY, bilateral salpingo-oophorectomy possible staging (Completed)    Type and screen    Comprehensive metabolic panel    CBC and differential    HEMOGLOBIN A1C W/ EAG ESTIMATION        EKG 12 lead    XR chest pa & lateral      Other Visit Diagnoses     Lower extremity edema    -  Primary    Relevant Orders    VAS lower limb venous duplex study, complete bilateral    Ovarian mass, right        Relevant Orders    VAS lower limb venous duplex study, complete bilateral    Case request operating room: LAPAROTOMY EXPLORATORY, bilateral salpingo-oophorectomy possible staging (Completed)    Type and screen    Comprehensive metabolic panel    CBC and differential    HEMOGLOBIN A1C W/ EAG ESTIMATION        EKG 12 lead    XR chest pa & lateral              CHIEF COMPLAINT:  Abdominal pelvic mass          Patient ID: Sarah Vallecillo is a 68 y o  female  Patient is very pleasant 18-year-old white female seen in consultation from Dr Amena Huang regarding evaluation and management of abdominal pelvic mass  The patient was evaluated over the summer for left ankle swelling and possible cellulitis as well as possible osteomyelitis  The patient was placed on antibiotics and an MRI of the leg was performed  No osteomyelitis was noted  The patient improved but not significantly  She has had leg swelling on and off since that time which has been evaluated  The patient recently saw her primary care physician Dr Angelic Granados and was noted to have an abdominal mass with tenderness on exam   A CT scan was ordered  This revealed the following:        ABDOMINOPELVIC CAVITY:  A large predominantly cystic mass containing internal enhancing septations arises from the right adnexa extending above the umbilicus measuring 22 x 16 x 19 cm  Primary ovarian tumor suspected  No ascites, or territorial   implants, pneumoperitoneum, or abdominal pelvic lymphadenopathy      VESSELS:  Unremarkable for patient's age      PELVIS     REPRODUCTIVE ORGANS:  Patient is status post hysterectomy  Again, a large cystic mass arising from the right adnexa is origin likely the remaining right ovary      URINARY BLADDER:  Unremarkable      ABDOMINAL WALL/INGUINAL REGIONS:  Evidence of ventral herniorrhaphy with small fat-containing umbilical hernia noted      OSSEOUS STRUCTURES:  No acute fracture or destructive osseous lesion      IMPRESSION:     1   Multiloculated cystic mass arising from the right adnexa extending above the umbilicus measuring 22 x 16 x 19 cm 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  Gynecologic oncology consultation recommended  2   No ascites or evidence for abdominal pelvic metastatic disease  3   Minimal bilateral hydronephrosis  The patient has noted over the past several months increasing early satiety  She has not finished a meal   She notes worsening urinary incontinence over the past 2 months  She now requires the use of a depends  She has no change in bowel function she has intermittent left lower quadrant pain especially while coughing or sneezing and she has developed this large mass that makes her feel as though she is pregnant  She has no family history of ovary of breast cancer    Patient now presents for evaluation and management of large abdominal pelvic mass          Review of Systems   Constitutional: Positive for appetite change and fever  HENT: Negative  Eyes: Negative  Respiratory: Negative  Cardiovascular: Negative  Gastrointestinal: Negative  Endocrine: Negative  Genitourinary: Negative  Musculoskeletal: Negative  Skin: Negative  Neurological: Negative  Hematological: Negative  Psychiatric/Behavioral: Negative  Current Outpatient Medications   Medication Sig Dispense Refill    alendronate (FOSAMAX) 70 mg tablet TAKE 1 TABLET BY MOUTH ONCE A WEEK 12 tablet 14    aspirin 81 mg chewable tablet Chew 1 tablet daily      Calcium Carbonate-Vitamin D (CALCIUM 600+D) 600-400 MG-UNIT per tablet Take 2 tablets by mouth daily        Coenzyme Q10 (COQ-10) 100 MG CAPS Take 1 capsule by mouth daily      fluticasone (FLONASE) 50 mcg/act nasal spray 1 spray into each nostril daily as needed for rhinitis      Ibuprofen (ADVIL) 200 MG CAPS Take 2 capsules by mouth        loratadine (CLARITIN) 10 mg tablet Take 10 mg by mouth daily      losartan (COZAAR) 25 mg tablet Take 2 tabs once daily 180 tablet 3    metFORMIN (GLUCOPHAGE) 1000 MG tablet Take 1 tablet (1,000 mg total) by mouth 2 (two) times a day 180 tablet 3    methylPREDNISolone 4 MG tablet therapy pack AS DIRECTED ON PACKAGE  0    nystatin (MYCOSTATIN) cream Apply topically as needed (as needed) 30 g 5    simvastatin (ZOCOR) 10 mg tablet Take 1 tablet (10 mg total) by mouth daily 90 tablet 3     No current facility-administered medications for this visit          Allergies   Allergen Reactions    Penicillins        Past Medical History:   Diagnosis Date    Asthma     COPD [facility]chronic obstructive pulmonary disease) [facility] [address] [facility]     [facility] ([facility] )     Hyperlipidemia     Osteoarthritis     Osteoporosis     Seasonal allergies        Past Surgical History:   Procedure Laterality Date    CHOLECYSTECTOMY      [id number]  LAST ASSESSED    EYE SURGERY      cataracts removed    HYSTERECTOMY [id number]  LAST ASSESSED    TUBAL LIGATION         OB History        5    Para   5    Term   4       1    AB   0    Living   4       SAB   0    TAB   0    Ectopic   0    Multiple   0    Live Births   4                 Family History   Problem Relation Age of Onset    Heart disease Mother     Rheumatic fever Father     Diabetes Family         MELLITUS    Diabetes Family         MELLITUS       The following portions of the patient's history were reviewed and updated as appropriate: allergies, current medications, past family history, past social history, past surgical history and problem list       Objective:    Blood pressure 150/90, pulse 72, temperature 98 8 °F (37 1 °C), resp  rate 18, height 5' 0 5" (1 537 m), weight 79 8 kg (176 lb)  Body mass index is 33 81 kg/m²  Physical Exam   Constitutional: She is oriented to person, place, and time  She appears well-developed and well-nourished  HENT:   Head: Normocephalic and atraumatic  Eyes: EOM are normal    Neck: Normal range of motion  Neck supple  No thyromegaly present  Cardiovascular: Normal rate, regular rhythm and normal heart sounds  Pulmonary/Chest: Effort normal and breath sounds normal    Abdominal: Soft  Bowel sounds are normal    Well healed  incisions  Large abdominal mass noted  There is no fluid wave her ascites noted  Genitourinary:   Genitourinary Comments: -Normal external female genitalia, normal Bartholin's and [place]'s glands                  -Normal midline urethral meatus   No lesions notes                  -Bladder without fullness mass or tenderness                  -Vagina without lesion or discharge No significant cystocele or rectocele noted                  -Cervix surgically absent                  -Uterus surgically absent                  -Adnexae not specifically help palpable however a large 22 cm mass in the pelvis extending to the abdomen is noted                   - Anus without fissure of lesion Musculoskeletal: Normal range of motion  Lymphadenopathy:     She has no cervical adenopathy  Neurological: She is alert and oriented to person, place, and time  Skin: Skin is warm and dry  Psychiatric: She has a normal mood and affect   Her behavior is normal          No results found for:   Lab Results   Component Value Date    WBC 8 92 01/12/2018    HGB 13 9 01/12/2018    HCT 42 2 01/12/2018    MCV 90 01/12/2018     01/12/2018     Lab Results   Component Value Date    K 4 4 11/22/2019     11/22/2019    CO2 28 11/22/2019    BUN 17 11/22/2019    CREATININE 1 46 (H) 11/22/2019    GLUF 100 (H) 11/22/2019    CALCIUM 9 6 11/22/2019    AST 17 05/17/2019    ALT 21 05/17/2019    ALKPHOS 62 05/17/2019    EGFR 36 11/22/2019

## 2019-12-06 ENCOUNTER — ANESTHESIA EVENT (OUTPATIENT)
Dept: PERIOP | Facility: HOSPITAL | Age: 69
DRG: 742 | End: 2019-12-06
Payer: MEDICARE

## 2019-12-10 ENCOUNTER — APPOINTMENT (OUTPATIENT)
Dept: LAB | Facility: HOSPITAL | Age: 69
End: 2019-12-10
Attending: OBSTETRICS & GYNECOLOGY
Payer: MEDICARE

## 2019-12-10 ENCOUNTER — HOSPITAL ENCOUNTER (OUTPATIENT)
Dept: RADIOLOGY | Facility: HOSPITAL | Age: 69
Discharge: HOME | End: 2019-12-10
Payer: MEDICARE

## 2019-12-10 DIAGNOSIS — N83.8 OVARIAN MASS, RIGHT: ICD-10-CM

## 2019-12-10 DIAGNOSIS — R19.07 GENERALIZED ABDOMINAL OR PELVIC SWELLING OR MASS OR LUMP: ICD-10-CM

## 2019-12-10 LAB
ABO GROUP BLD: NORMAL
ALBUMIN SERPL BCP-MCNC: 4.1 G/DL (ref 3.5–5)
ALP SERPL-CCNC: 61 U/L (ref 46–116)
ALT SERPL W P-5'-P-CCNC: 22 U/L (ref 12–78)
ANION GAP SERPL CALCULATED.3IONS-SCNC: 1 MMOL/L (ref 4–13)
AST SERPL W P-5'-P-CCNC: 15 U/L (ref 5–45)
ATRIAL RATE: 66 BPM
BASOPHILS # BLD AUTO: 0.09 THOUSANDS/ΜL (ref 0–0.1)
BASOPHILS NFR BLD AUTO: 1 % (ref 0–1)
BILIRUB SERPL-MCNC: 0.32 MG/DL (ref 0.2–1)
BLD GP AB SCN SERPL QL: NEGATIVE
BUN SERPL-MCNC: 20 MG/DL (ref 5–25)
CALCIUM SERPL-MCNC: 10 MG/DL (ref 8.3–10.1)
CANCER AG125 SERPL-ACNC: 16.1 U/ML (ref 0–30)
CHLORIDE SERPL-SCNC: 106 MMOL/L (ref 100–108)
CO2 SERPL-SCNC: 33 MMOL/L (ref 21–32)
CREAT SERPL-MCNC: 1.22 MG/DL (ref 0.6–1.3)
EOSINOPHIL # BLD AUTO: 0.5 THOUSAND/ΜL (ref 0–0.61)
EOSINOPHIL NFR BLD AUTO: 5 % (ref 0–6)
ERYTHROCYTE [DISTWIDTH] IN BLOOD BY AUTOMATED COUNT: 12.3 % (ref 11.6–15.1)
EST. AVERAGE GLUCOSE BLD GHB EST-MCNC: 140 MG/DL
GFR SERPL CREATININE-BSD FRML MDRD: 45 ML/MIN/1.73SQ M
GLUCOSE P FAST SERPL-MCNC: 111 MG/DL (ref 65–99)
HBA1C MFR BLD: 6.5 % (ref 4.2–6.3)
HCT VFR BLD AUTO: 40.9 % (ref 34.8–46.1)
HGB BLD-MCNC: 13.2 G/DL (ref 11.5–15.4)
IMM GRANULOCYTES # BLD AUTO: 0.05 THOUSAND/UL (ref 0–0.2)
IMM GRANULOCYTES NFR BLD AUTO: 1 % (ref 0–2)
LYMPHOCYTES # BLD AUTO: 2.74 THOUSANDS/ΜL (ref 0.6–4.47)
LYMPHOCYTES NFR BLD AUTO: 28 % (ref 14–44)
MCH RBC QN AUTO: 29.7 PG (ref 26.8–34.3)
MCHC RBC AUTO-ENTMCNC: 32.3 G/DL (ref 31.4–37.4)
MCV RBC AUTO: 92 FL (ref 82–98)
MONOCYTES # BLD AUTO: 0.83 THOUSAND/ΜL (ref 0.17–1.22)
MONOCYTES NFR BLD AUTO: 8 % (ref 4–12)
NEUTROPHILS # BLD AUTO: 5.62 THOUSANDS/ΜL (ref 1.85–7.62)
NEUTS SEG NFR BLD AUTO: 57 % (ref 43–75)
NRBC BLD AUTO-RTO: 0 /100 WBCS
P AXIS: 60 DEGREES
PLATELET # BLD AUTO: 308 THOUSANDS/UL (ref 149–390)
PMV BLD AUTO: 10.1 FL (ref 8.9–12.7)
POTASSIUM SERPL-SCNC: 5.4 MMOL/L (ref 3.5–5.3)
PR INTERVAL: 130 MS
PROT SERPL-MCNC: 7.6 G/DL (ref 6.4–8.2)
QRS AXIS: -29 DEGREES
QRSD INTERVAL: 72 MS
QT INTERVAL: 426 MS
QTC INTERVAL: 446 MS
RBC # BLD AUTO: 4.45 MILLION/UL (ref 3.81–5.12)
RH BLD: POSITIVE
SODIUM SERPL-SCNC: 140 MMOL/L (ref 136–145)
SPECIMEN EXPIRATION DATE: NORMAL
T WAVE AXIS: 54 DEGREES
VENTRICULAR RATE: 66 BPM
WBC # BLD AUTO: 9.83 THOUSAND/UL (ref 4.31–10.16)

## 2019-12-10 PROCEDURE — 85025 COMPLETE CBC W/AUTO DIFF WBC: CPT

## 2019-12-10 PROCEDURE — 36415 COLL VENOUS BLD VENIPUNCTURE: CPT

## 2019-12-10 PROCEDURE — 93010 ELECTROCARDIOGRAM REPORT: CPT | Performed by: INTERNAL MEDICINE

## 2019-12-10 PROCEDURE — 86901 BLOOD TYPING SEROLOGIC RH(D): CPT

## 2019-12-10 PROCEDURE — 83036 HEMOGLOBIN GLYCOSYLATED A1C: CPT

## 2019-12-10 PROCEDURE — 86304 IMMUNOASSAY TUMOR CA 125: CPT

## 2019-12-10 PROCEDURE — 86850 RBC ANTIBODY SCREEN: CPT

## 2019-12-10 PROCEDURE — 71046 X-RAY EXAM CHEST 2 VIEWS: CPT

## 2019-12-10 PROCEDURE — 86900 BLOOD TYPING SEROLOGIC ABO: CPT

## 2019-12-10 PROCEDURE — 80053 COMPREHEN METABOLIC PANEL: CPT

## 2019-12-10 PROCEDURE — 93005 ELECTROCARDIOGRAM TRACING: CPT

## 2019-12-10 NOTE — PRE-PROCEDURE INSTRUCTIONS
Pre-Surgery Instructions:   Medication Instructions    alendronate (FOSAMAX) 70 mg tablet Instructed patient per Anesthesia Guidelines   aspirin 81 mg chewable tablet Instructed patient per Anesthesia Guidelines   Calcium Carbonate-Vitamin D (CALCIUM 600+D) 600-400 MG-UNIT per tablet Instructed patient per Anesthesia Guidelines   Coenzyme Q10 (COQ-10) 100 MG CAPS Instructed patient per Anesthesia Guidelines   Ibuprofen (ADVIL) 200 MG CAPS Instructed patient per Anesthesia Guidelines   loratadine (CLARITIN) 10 mg tablet Instructed patient per Anesthesia Guidelines   losartan (COZAAR) 25 mg tablet Instructed patient per Anesthesia Guidelines  Do not take 1/2, 1/3    metFORMIN (GLUCOPHAGE) 1000 MG tablet Instructed patient per Anesthesia Guidelines  Do not take 1/3    nystatin (MYCOSTATIN) cream Instructed patient per Anesthesia Guidelines   simvastatin (ZOCOR) 10 mg tablet Instructed patient per Anesthesia Guidelines  Education Index     Med Instructions Troubleshoot   ACE/ARB Med Class - FOR DEBRA     Do not take this medication the day before and the morning of the day of surgery/procedure  ASA Med Class: Aspirin     Should be discontinued at least one week prior to planned operation, unless specifically stated otherwise by surgical service  Your Surgeon may have patient stop taking aspirin up to a week before surgery if having intracranial, middle ear, posterior eye, spine surgery or prostate surgery  [Patients taking aspirin for coronary stents should be reviewed by an anesthesiologist in the optimization clinic  Please do not discontinue aspirin in patients with coronary stents unless given specific permission to do so by the cardiologist who prescribed medication ]   If your surgeon approves please continue to take this medication on your normal schedule  You may take this medication on the morning of your surgery with a sip of water      Herbal Med Class     Stop taking this herbal medications at least one week prior to surgery/procedure  Insulin Med Class     Pre-Surgery/Procedure Instructions for Adult Patients who Take Medicine for Diabetes or to Control their Blood Sugar     Day Before Surgery/Procedure  Use the directions based on the type of medicine you take for your diabetes  1  If you are having a procedure that does not require a bowel prep:  ? Pre-Mixed Insulin (Intermediate Acting: Humalog 75/25, Humulin 70/30  Novolog 70/30, Regular Insulin)  § Take ½ your regular dose the evening before your procedure  ? Rapid/Fast Acting Insulin/Long Acting Insulin (Humalog U200, NovoLog, Apidra, Lantus, Levemir, Doris Salm, [place])  § Take your FULL regular dose the day before procedure  ? Oral Diabetic Medicines including Glipizide/Glimepiride/Glucotrol (sulfonylurea)  § Take your regular dose with dinner the evening before your procedure  2  If you are having a procedure (e g  Colonoscopy) that requires a bowel prep and you are allowed to have at least a clear liquid diet:  ? Pre-Mixed Insulin (Intermediate Acting: Humalog 75/25, Humulin 70/30, Novolog 70/30, Regular Insulin)  § Take ½ your regular dose the evening before your procedure  ? Rapid/Fast Acting Insulin (Humalog U200, NovoLog, Apidra, Fiasp)  § Take ½ your regular dose the evening before your procedure  ? Long Acting Insulin (Lantus, Levemir, Doris Salm)  § Take your FULL regular dose the day before procedure  ? Oral Glipizide/Glimepiride/Glucotrol (sulfonylurea)  § Take ½ your regular dose the evening before your procedure  ? Oral Diabetic Medicines that are NOT Glipizide/Glimepiride/Glucotrol  § Take your regular dose with dinner in the evening before your procedure      Day of Surgery/Procedure  · Long Acting Insulin (Lantus, Levemir, Doris Salm)  ? If you usually take your Long-Acting Insulin in the morning, take the full dose as scheduled    · With the exception of the morning Long-Acting Insulin noted above, DO NOT take ANY diabetic medicine on the day of your procedure unless you were instructed by the doctor who manages your diabetic medicines  · Continue to check your blood sugars  · If you have an insulin pump then consult with your Endocrinologist for instructions  · If you cannot see your Endocrinologist, on the day of the procedure set your insulin pump to your basal rate only  Please bring your insulin pump supplies to the hospital      This Educational material has been approved by the Patient Education Advisory Committee  Date prepared: 1/17/2018          Expiration date: 1/17/2019        Approval Number:                     NSAID Med Class     Stop taking this medication at least 3 days prior to surgery/procedure  Statin Med Class     Continue to take this medication on your normal schedule  If this is an oral medication and you take it in the morning, then you may take this medicine with a sip of water  Pre procedure instructions reviewed verbalizes understanding  Surgical soap, incentive spirometer reviewed  All questions answered

## 2019-12-16 ENCOUNTER — OFFICE VISIT (OUTPATIENT)
Dept: CARDIOLOGY CLINIC | Facility: CLINIC | Age: 69
End: 2019-12-16
Payer: MEDICARE

## 2019-12-16 VITALS
BODY MASS INDEX: 33.55 KG/M2 | HEART RATE: 80 BPM | DIASTOLIC BLOOD PRESSURE: 88 MMHG | HEIGHT: 61 IN | WEIGHT: 177.7 LBS | SYSTOLIC BLOOD PRESSURE: 158 MMHG

## 2019-12-16 DIAGNOSIS — Z76.89 ENCOUNTER TO ESTABLISH CARE: Primary | ICD-10-CM

## 2019-12-16 DIAGNOSIS — R94.31 ABNORMAL EKG: ICD-10-CM

## 2019-12-16 DIAGNOSIS — Z01.810 PRE-OPERATIVE CARDIOVASCULAR EXAMINATION: ICD-10-CM

## 2019-12-16 DIAGNOSIS — E78.2 MIXED HYPERLIPIDEMIA: ICD-10-CM

## 2019-12-16 DIAGNOSIS — I10 BENIGN ESSENTIAL HYPERTENSION: ICD-10-CM

## 2019-12-16 PROCEDURE — 93000 ELECTROCARDIOGRAM COMPLETE: CPT | Performed by: INTERNAL MEDICINE

## 2019-12-16 PROCEDURE — 99204 OFFICE O/P NEW MOD 45 MIN: CPT | Performed by: INTERNAL MEDICINE

## 2019-12-16 RX ORDER — LOSARTAN POTASSIUM 50 MG/1
TABLET ORAL
Qty: 90 TABLET | Refills: 3 | Status: SHIPPED | OUTPATIENT
Start: 2019-12-16 | End: 2020-01-09

## 2019-12-16 NOTE — PATIENT INSTRUCTIONS

## 2019-12-16 NOTE — PROGRESS NOTES
Cardiology Consultation     Fabian Blevins  [id number]  1950  HEART & VASCULAR [facility] [address] [address]    1  Encounter to establish care  POCT ECG   2  Pre-operative cardiovascular examination     3  Mixed hyperlipidemia     4  Benign essential hypertension     5  Abnormal EKG       Chief Complaint   Patient presents with    Follow-up     Abnormal EKG and Cardiac Clearance    Shortness of Breath     "Always  I have asthma "     Dizziness     When patient gets up too quickly or with severe SOB  HPI: Patient is here for cardiac evaluation of abnormal EKG and cardiac clearance  She has baseline shortness of breath due to asthma and some occasional dizziness when she gets up too quickly  No reported chest pain, palpitations, syncope, LE edema, orthopnea, PND, or significant weight changes  Patient remains active without any increased fatigue out of the ordinary        Patient Active Problem List   Diagnosis    Allergic rhinitis    Asthma, mild intermittent    Benign essential hypertension    Chronic low back pain    Controlled type 2 diabetes mellitus ([facility] )    Dupuytren's contracture    Hearing loss of right ear    Hyperlipidemia    Postmenopausal osteoporosis    Restless legs syndrome    Sacroiliitis (HCC)    Obstructive sleep apnea syndrome    Pain of left upper extremity    Controlled diabetes mellitus type 2 with complications (HCC)    Pain in right ankle and joints of right foot    Left upper quadrant pain    Left lower quadrant abdominal mass    Generalized abdominal or pelvic swelling or mass or lump    Ovarian mass, right    Pre-operative cardiovascular examination    Abnormal EKG     Past Medical History:   Diagnosis Date    Asthma     COPD (chronic obstructive pulmonary disease) ([address]     Diabetes ([address] )     Hyperlipidemia     Osteoarthritis     Osteoporosis     Seasonal allergies      Social History     Socioeconomic History    Marital status: /Civil Union     Spouse name: Not on file    Number of children: Not on file    Years of education: Not on file    Highest education level: Not on file   Occupational History    Not on file   Social Needs    Financial resource strain: Not on file    Food insecurity:     Worry: Not on file     Inability: Not on file    Transportation needs:     Medical: Not on file     Non-medical: Not on file   Tobacco Use    Smoking status: Former Smoker     Last attempt to quit:      Years since quittin 9    Smokeless tobacco: Never Used   Substance and Sexual Activity    Alcohol use: No    Drug use: No    Sexual activity: Not Currently   Lifestyle    Physical activity:     Days per week: Not on file     Minutes per session: Not on file    Stress: Not on file   Relationships    Social connections:     Talks on phone: Not on file     Gets together: Not on file     Attends Restorationist service: Not on file     Active member of club or organization: Not on file     Attends meetings of clubs or organizations: Not on file     Relationship status: Not on file    Intimate partner violence:     Fear of current or ex partner: Not on file     Emotionally abused: Not on file     Physically abused: Not on file     Forced sexual activity: Not on file   Other Topics Concern    Not on file   Social History Narrative    Not on file      Family History   Problem Relation Age of Onset    Heart disease Mother     Rheumatic fever Father     Diabetes Family         MELLITUS    Diabetes Family         MELLITUS     Past Surgical History:   Procedure Laterality Date    CATARACT EXTRACTION Bilateral     CHOLECYSTECTOMY      [id number]  LAST ASSESSED    EYE SURGERY      cataracts removed    HYSTERECTOMY      [id number]  LAST ASSESSED    TUBAL LIGATION         Current Outpatient Medications:     alendronate (FOSAMAX) 70 mg tablet, TAKE 1 TABLET BY MOUTH ONCE A WEEK, Disp: 12 tablet, Rfl: 14    aspirin 81 mg chewable tablet, Chew 1 tablet daily, Disp: , Rfl:     Calcium Carbonate-Vitamin D (CALCIUM 600+D) 600-400 MG-UNIT per tablet, Take 2 tablets by mouth daily  , Disp: , Rfl:     Coenzyme Q10 (COQ-10) 100 MG CAPS, Take 1 capsule by mouth daily, Disp: , Rfl:     loratadine (CLARITIN) 10 mg tablet, Take 10 mg by mouth daily, Disp: , Rfl:     losartan (COZAAR) 25 mg tablet, Take 2 tabs once daily, Disp: 180 tablet, Rfl: 3    metFORMIN (GLUCOPHAGE) 1000 MG tablet, Take 1 tablet (1,000 mg total) by mouth 2 (two) times a day, Disp: 180 tablet, Rfl: 3    simvastatin (ZOCOR) 10 mg tablet, Take 1 tablet (10 mg total) by mouth daily, Disp: 90 tablet, Rfl: 3    Ibuprofen (ADVIL) 200 MG CAPS, Take 2 capsules by mouth  , Disp: , Rfl:     nystatin (MYCOSTATIN) cream, Apply topically as needed (as needed) (Patient not taking: Reported on 12/16/2019), Disp: 30 g, Rfl: 5  Allergies   Allergen Reactions    Penicillins Hives     Vitals:    12/16/19 1446   BP: 158/88   BP Location: Right arm   Patient Position: Sitting   Cuff Size: Adult   Pulse: 80   Weight: 80 6 kg (177 lb 11 2 oz)   Height: 5' 0 5" (1 537 m)       Labs:  Hospital Outpatient Visit on 12/10/2019   Component Date Value    Ventricular Rate 12/10/2019 66     Atrial Rate 12/10/2019 66     WI Interval 12/10/2019 130     QRSD Interval 12/10/2019 72     QT Interval 12/10/2019 426     QTC Interval 12/10/2019 446     P Axis 12/10/2019 60     QRS Axis 12/10/2019 -29     T Wave [place] 12/10/2019 54    Appointment on 12/10/2019   Component Date Value    ABO Grouping 12/10/2019 O     Rh Factor 12/10/2019 Positive     Antibody Screen 12/10/2019 Negative     Specimen Expiration Date 12/10/2019 [id number]     Sodium 12/10/2019 140     Potassium 12/10/2019 5 4*    Chloride 12/10/2019 106     CO2 12/10/2019 33*    ANION GAP 12/10/2019 1*    BUN 12/10/2019 20     Creatinine 12/10/2019 1 22  Glucose, Fasting 12/10/2019 111*    Calcium 12/10/2019 10 0     AST 12/10/2019 15     ALT 12/10/2019 22     Alkaline Phosphatase 12/10/2019 61     Total Protein 12/10/2019 7 6     Albumin 12/10/2019 4 1     Total Bilirubin 12/10/2019 0 32     eGFR 12/10/2019 45     WBC 12/10/2019 9 83     RBC 12/10/2019 4 45     Hemoglobin 12/10/2019 13 2     Hematocrit 12/10/2019 40 9     MCV 12/10/2019 92     MCH 12/10/2019 29 7     MCHC 12/10/2019 32 3     RDW 12/10/2019 12 3     MPV 12/10/2019 10 1     Platelets 43/88/0840 308     nRBC 12/10/2019 0     Neutrophils Relative 12/10/2019 57     Immat GRANS % 12/10/2019 1     Lymphocytes Relative 12/10/2019 28     Monocytes Relative 12/10/2019 8     Eosinophils Relative 12/10/2019 5     Basophils Relative 12/10/2019 1     Neutrophils Absolute 12/10/2019 5 62     Immature Grans Absolute 12/10/2019 0 05     Lymphocytes Absolute 12/10/2019 2 74     Monocytes Absolute 12/10/2019 0 83     Eosinophils Absolute 12/10/2019 0 50     Basophils Absolute 12/10/2019 0 09     Hemoglobin A1C 12/10/2019 6 5*    EAG 12/10/2019 140      12/10/2019 16 1    Lab Requisition on 11/29/2019   Component Date Value    Case Report 11/29/2019                      Value:Surgical Pathology Report                         Case: [id number]                                   Authorizing Provider:  Fay Lowry DPM        Collected:           11/29/2019 1330              Ordering Location:     [address] [place] [address]      Received:            11/29/2019 [facility]                                     [facility]                                                                                  Laboratory                                                                   Pathologist:           Ayesha Andre MD                                                            Specimen:    Skin, Other, RIGHT LATERAL ANKLE                                                           Final Diagnosis 11/29/2019                      Value: This result contains rich text formatting which cannot be displayed here   Additional Information 11/29/2019                      Value: This result contains rich text formatting which cannot be displayed here  Sherrell Garrido Gross Description 11/29/2019                      Value: This result contains rich text formatting which cannot be displayed here  Appointment on 11/29/2019   Component Date Value    Sed Rate 11/29/2019 38*    CRP 11/29/2019 <3 0     C-ANCA 11/29/2019 <1:20     Atypical pANCA 11/29/2019 <1:20     MPO AB 11/29/2019 <9 0     KY-3 AB 11/29/2019 <3 5     P-ANCA 11/29/2019 <1:20     HLA B27 11/29/2019 Negative     Rheumatoid Factor 11/29/2019 Negative     Lyme IgG/IgM Ab 11/29/2019 <0 91    Appointment on 11/22/2019   Component Date Value    Sodium 11/22/2019 141     Potassium 11/22/2019 4 4     Chloride 11/22/2019 104     CO2 11/22/2019 28     ANION GAP 11/22/2019 9     BUN 11/22/2019 17     Creatinine 11/22/2019 1 46*    Glucose, Fasting 11/22/2019 100*    Calcium 11/22/2019 9 6     eGFR 11/22/2019 36    Office Visit on 11/21/2019   Component Date Value    Hemoglobin A1C 11/21/2019 5 9    Orders Only on 10/07/2019   Component Date Value    Right Eye Diabetic Retin* 10/07/2019 Mild     Left Eye Diabetic Retino* 10/07/2019 Mild    Orders Only on 10/07/2019   Component Date Value    Right Eye Diabetic Retin* 10/07/2019 Mild     Left Eye Diabetic Retino* 10/07/2019 Mild    Orders Only on 10/07/2019   Component Date Value    Right Eye Diabetic Retin* 10/07/2019 Mild     Left Eye Diabetic Retino* 10/07/2019 Mild    Orders Only on 10/07/2019   Component Date Value    Right Eye Diabetic Retin* 10/07/2019 Mild     Left Eye Diabetic Retino* 10/07/2019 Mild    There may be more visits with results that are not included       Lab Results   Component Value Date    TRIG 71 05/17/2019    HDL 78 (H) 05/17/2019     Imaging: Xr Chest Pa & Lateral    Result Date: 12/12/2019  Narrative: CHEST INDICATION:   N83 8: Other noninflammatory disorders of ovary, fallopian tube and broad ligament R19 07: Generalized intra-abdominal and pelvic swelling, mass and lump  COMPARISON:  None EXAM PERFORMED/VIEWS:  XR CHEST PA & LATERAL FINDINGS: Cardiomediastinal silhouette appears unremarkable  The lungs are clear  No pneumothorax or pleural effusion  Osseous structures appear within normal limits for patient age  Impression: No acute cardiopulmonary disease  Workstation performed: [id number]     Xr Ankle 3+ Vw Right    Result Date: 11/19/2019  Narrative: RIGHT ANKLE INDICATION:   Right ankle pain and swelling for 1 5 weeks  COMPARISON:  None VIEWS:  XR ANKLE 3+ VW RIGHT FINDINGS: There is no acute fracture or dislocation  No significant degenerative changes  No lytic or blastic lesions seen  Small plantar spur and Achilles insertional enthesophyte at the posterior calcaneus  There is soft tissue swelling over the lateral malleolus  Impression: Lateral ankle swelling without fracture or malalignment  Workstation performed: [id number]     Ct Abdomen Pelvis W Contrast    Result Date: 12/2/2019  Narrative: CT ABDOMEN AND PELVIS WITH IV CONTRAST INDICATION:   R19 04: Left lower quadrant abdominal swelling, mass and lump  COMPARISON:  None  TECHNIQUE:  CT examination of the abdomen and pelvis was performed  Axial, sagittal, and coronal 2D reformatted images were created from the source data and submitted for interpretation  Radiation dose length product (DLP) for this visit:  712 1 mGy-cm   This examination, like all CT scans performed in the [facility], was performed utilizing techniques to minimize radiation dose exposure, including the use of iterative reconstruction and automated exposure control  IV Contrast:  80 mL of iodixanol (VISIPAQUE) Enteric Contrast:  Enteric contrast was administered   FINDINGS: ABDOMEN LOWER CHEST:  No clinically significant abnormality identified in the visualized lower chest  LIVER/BILIARY TREE:  Unremarkable  GALLBLADDER:  Gallbladder is surgically absent  SPLEEN:  Unremarkable  PANCREAS:  Unremarkable  ADRENAL GLANDS:  Unremarkable  KIDNEYS/URETERS:  Mild bilateral hydronephrosis likely secondary to large pelvic mass with no perinephric collections or solid mass lesions  No ureterectasis  STOMACH AND BOWEL:  Unremarkable  The nondistended bowel loops are deviated superiorly and laterally secondary to a large pelvic mass projecting above the umbilicus  APPENDIX:  A normal appendix was visualized  ABDOMINOPELVIC CAVITY:  A large predominantly cystic mass containing internal enhancing septations arises from the right adnexa extending above the umbilicus measuring 22 x 16 x 19 cm  Primary ovarian tumor suspected  No ascites, or territorial implants, pneumoperitoneum, or abdominal pelvic lymphadenopathy  VESSELS:  Unremarkable for patient's age  PELVIS REPRODUCTIVE ORGANS:  Patient is status post hysterectomy  Again, a large cystic mass arising from the right adnexa is origin likely the remaining right ovary  URINARY BLADDER:  Unremarkable  ABDOMINAL WALL/INGUINAL REGIONS:  Evidence of ventral herniorrhaphy with small fat-containing umbilical hernia noted  OSSEOUS STRUCTURES:  No acute fracture or destructive osseous lesion  Impression: 1  Multiloculated cystic mass arising from the right adnexa extending above the umbilicus measuring 22 x 16 x 19 cm 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  Gynecologic oncology consultation recommended  2   No ascites or evidence for abdominal pelvic metastatic disease  3   Minimal bilateral hydronephrosis    I personally discussed this study with Freddy Park on 12/2/2019 at 2:00 PM  Workstation performed: [id number]       Review of Systems:  Review of Systems   Constitutional: Negative for activity change, appetite change, chills, diaphoresis, fatigue and unexpected weight change  HENT: Negative for hearing loss, nosebleeds and sore throat  Eyes: Negative for photophobia and visual disturbance  Respiratory: Negative for cough, chest tightness, shortness of breath and wheezing  Cardiovascular: Negative for chest pain, palpitations and leg swelling  Gastrointestinal: Negative for abdominal pain, diarrhea, nausea and vomiting  Endocrine: Negative for polyuria  Genitourinary: Negative for dysuria, frequency and hematuria  Musculoskeletal: Negative for arthralgias, back pain, gait problem and neck pain  Skin: Negative for pallor and rash  Neurological: Negative for dizziness, syncope and headaches  Hematological: Does not bruise/bleed easily  Psychiatric/Behavioral: Negative for behavioral problems and confusion  Physical Exam:  Physical Exam   Constitutional: She is oriented to person, place, and time  She appears well-developed and well-nourished  HENT:   Head: Normocephalic and atraumatic  Nose: Nose normal    Eyes: Pupils are equal, round, and reactive to light  EOM are normal  No scleral icterus  Neck: Normal range of motion  Neck supple  No JVD present  Cardiovascular: Normal rate, regular rhythm and normal heart sounds  Exam reveals no gallop and no friction rub  No murmur heard  Pulmonary/Chest: Effort normal and breath sounds normal  No respiratory distress  She has no wheezes  She has no rales  Abdominal: Soft  Bowel sounds are normal  She exhibits no distension  There is no tenderness  Musculoskeletal: Normal range of motion  She exhibits no edema or deformity  Neurological: She is alert and oriented to person, place, and time  No cranial nerve deficit  Skin: Skin is warm and dry  No rash noted  She is not diaphoretic  Psychiatric: She has a normal mood and affect  Her behavior is normal    Vitals reviewed      Blood pressure 158/88, pulse 80, height 5' 0 5" (1 537 m), weight 80 6 kg (177 lb 11 2 oz)     EKG:  Normal sinus rhythm  Left axis deviation  Abnormal ECG    Discussion/Summary:  Abnormal ECG/pre-op cardiac eval: no significant ischemic changes or arrhythmias  Prior EKG revealed prior anterior infarct, but this is likely due to breast tissue  Would not pursue further cardiac testing based on this EKG alone  No active cardiac symptoms to suggest acute ischemia, CHF, or arrhythmia at this time  Proceed with planned intermediate risk gynecological surgery without any further cardiac work-up  HTN: elevated today, and tends to run on the high side  Would increase losartan to 50mg to help with overall control  HLD: continued on statin

## 2019-12-18 ENCOUNTER — HOSPITAL ENCOUNTER (OUTPATIENT)
Dept: NON INVASIVE DIAGNOSTICS | Facility: HOSPITAL | Age: 69
Discharge: HOME | End: 2019-12-18
Payer: MEDICARE

## 2019-12-18 DIAGNOSIS — R60.0 LOWER EXTREMITY EDEMA: ICD-10-CM

## 2019-12-18 DIAGNOSIS — N83.8 OVARIAN MASS, RIGHT: ICD-10-CM

## 2019-12-18 PROCEDURE — 93970 EXTREMITY STUDY: CPT | Performed by: SURGERY

## 2019-12-18 PROCEDURE — 93970 EXTREMITY STUDY: CPT

## 2020-01-02 NOTE — ANESTHESIA PREPROCEDURE EVALUATION
Review of Systems/Medical History  Patient summary reviewed    No history of anesthetic complications     Cardiovascular  Exercise tolerance (METS): >4,  Hyperlipidemia, Hypertension , No angina ,    Pulmonary  COPD mild- PRN medication , Asthma , Sleep apnea ,        GI/Hepatic    No GERD ,        Negative  ROS        Endo/Other  Diabetes type 2 ,      GYN       Hematology   Musculoskeletal    Arthritis     Neurology  Negative neurology ROS      Psychology           Physical Exam    Airway    Mallampati score: II         Dental   lower dentures and upper dentures,     Cardiovascular  Rhythm: regular,     Pulmonary  Breath sounds clear to auscultation,     Other Findings        Anesthesia Plan  ASA Score- 3     Anesthesia Type- general and epidural with ASA Monitors  Additional Monitors: arterial line  Airway Plan: ETT  Plan Factors-    Induction- intravenous  Postoperative Plan-     Informed Consent- Anesthetic plan and risks discussed with patient  I personally reviewed this patient with the CRNA  Discussed and agreed on the Anesthesia Plan with the CRNA  Jefferson Arrington

## 2020-01-03 ENCOUNTER — HOSPITAL ENCOUNTER (INPATIENT)
Facility: HOSPITAL | Age: 70
LOS: 3 days | Discharge: HOME | DRG: 742 | End: 2020-01-06
Attending: OBSTETRICS & GYNECOLOGY | Admitting: OBSTETRICS & GYNECOLOGY
Payer: MEDICARE

## 2020-01-03 ENCOUNTER — ANESTHESIA (OUTPATIENT)
Dept: PERIOP | Facility: HOSPITAL | Age: 70
DRG: 742 | End: 2020-01-03
Payer: MEDICARE

## 2020-01-03 DIAGNOSIS — R19.07 GENERALIZED ABDOMINAL OR PELVIC SWELLING OR MASS OR LUMP: ICD-10-CM

## 2020-01-03 DIAGNOSIS — N83.8 OVARIAN MASS, RIGHT: ICD-10-CM

## 2020-01-03 DIAGNOSIS — N28.9 RENAL INSUFFICIENCY: Primary | ICD-10-CM

## 2020-01-03 DIAGNOSIS — Z90.722 S/P BILATERAL SALPINGO-OOPHORECTOMY: ICD-10-CM

## 2020-01-03 DIAGNOSIS — L03.113 CELLULITIS OF RIGHT UPPER EXTREMITY: ICD-10-CM

## 2020-01-03 PROBLEM — E66.9 OBESITY: Status: ACTIVE | Noted: 2020-01-03

## 2020-01-03 LAB
ABO GROUP BLD: NORMAL
ANION GAP SERPL CALCULATED.3IONS-SCNC: 6 MMOL/L (ref 4–13)
BUN SERPL-MCNC: 13 MG/DL (ref 5–25)
CALCIUM SERPL-MCNC: 8.7 MG/DL (ref 8.3–10.1)
CHLORIDE SERPL-SCNC: 107 MMOL/L (ref 100–108)
CO2 SERPL-SCNC: 26 MMOL/L (ref 21–32)
CREAT SERPL-MCNC: 1.12 MG/DL (ref 0.6–1.3)
GFR SERPL CREATININE-BSD FRML MDRD: 50 ML/MIN/1.73SQ M
GLUCOSE SERPL-MCNC: 114 MG/DL (ref 65–140)
GLUCOSE SERPL-MCNC: 179 MG/DL (ref 65–140)
GLUCOSE SERPL-MCNC: 191 MG/DL (ref 65–140)
GLUCOSE SERPL-MCNC: 200 MG/DL (ref 65–140)
GLUCOSE SERPL-MCNC: 206 MG/DL (ref 65–140)
GLUCOSE SERPL-MCNC: 99 MG/DL (ref 65–140)
POTASSIUM SERPL-SCNC: 4.5 MMOL/L (ref 3.5–5.3)
RH BLD: POSITIVE
SODIUM SERPL-SCNC: 139 MMOL/L (ref 136–145)

## 2020-01-03 PROCEDURE — 86901 BLOOD TYPING SEROLOGIC RH(D): CPT | Performed by: OBSTETRICS & GYNECOLOGY

## 2020-01-03 PROCEDURE — 88112 CYTOPATH CELL ENHANCE TECH: CPT | Performed by: PATHOLOGY

## 2020-01-03 PROCEDURE — 99222 1ST HOSP IP/OBS MODERATE 55: CPT | Performed by: INTERNAL MEDICINE

## 2020-01-03 PROCEDURE — 99024 POSTOP FOLLOW-UP VISIT: CPT | Performed by: OBSTETRICS & GYNECOLOGY

## 2020-01-03 PROCEDURE — 88331 PATH CONSLTJ SURG 1 BLK 1SPC: CPT | Performed by: PATHOLOGY

## 2020-01-03 PROCEDURE — 0DNW0ZZ RELEASE PERITONEUM, OPEN APPROACH: ICD-10-PCS | Performed by: OBSTETRICS & GYNECOLOGY

## 2020-01-03 PROCEDURE — 0UT20ZZ RESECTION OF BILATERAL OVARIES, OPEN APPROACH: ICD-10-PCS | Performed by: OBSTETRICS & GYNECOLOGY

## 2020-01-03 PROCEDURE — 86900 BLOOD TYPING SEROLOGIC ABO: CPT | Performed by: OBSTETRICS & GYNECOLOGY

## 2020-01-03 PROCEDURE — 80048 BASIC METABOLIC PNL TOTAL CA: CPT | Performed by: NURSE ANESTHETIST, CERTIFIED REGISTERED

## 2020-01-03 PROCEDURE — 88307 TISSUE EXAM BY PATHOLOGIST: CPT | Performed by: PATHOLOGY

## 2020-01-03 PROCEDURE — 82948 REAGENT STRIP/BLOOD GLUCOSE: CPT

## 2020-01-03 PROCEDURE — 0UT70ZZ RESECTION OF BILATERAL FALLOPIAN TUBES, OPEN APPROACH: ICD-10-PCS | Performed by: OBSTETRICS & GYNECOLOGY

## 2020-01-03 PROCEDURE — 58720 REMOVAL OF OVARY/TUBE(S): CPT | Performed by: OBSTETRICS & GYNECOLOGY

## 2020-01-03 PROCEDURE — 88331 PATH CONSLTJ SURG 1 BLK 1SPC: CPT | Performed by: OBSTETRICS & GYNECOLOGY

## 2020-01-03 RX ORDER — CEFAZOLIN SODIUM 2 G/50ML
2000 SOLUTION INTRAVENOUS ONCE
Status: COMPLETED | OUTPATIENT
Start: 2020-01-03 | End: 2020-01-03

## 2020-01-03 RX ORDER — PRAVASTATIN SODIUM 20 MG
20 TABLET ORAL
Status: DISCONTINUED | OUTPATIENT
Start: 2020-01-04 | End: 2020-01-06 | Stop reason: HOSPADM

## 2020-01-03 RX ORDER — SODIUM CHLORIDE AND POTASSIUM CHLORIDE .9; .15 G/100ML; G/100ML
125 SOLUTION INTRAVENOUS CONTINUOUS
Status: DISCONTINUED | OUTPATIENT
Start: 2020-01-03 | End: 2020-01-05

## 2020-01-03 RX ORDER — ALBUTEROL SULFATE 90 UG/1
2 AEROSOL, METERED RESPIRATORY (INHALATION) EVERY 4 HOURS PRN
Status: DISCONTINUED | OUTPATIENT
Start: 2020-01-03 | End: 2020-01-06 | Stop reason: HOSPADM

## 2020-01-03 RX ORDER — PROPOFOL 10 MG/ML
INJECTION, EMULSION INTRAVENOUS AS NEEDED
Status: DISCONTINUED | OUTPATIENT
Start: 2020-01-03 | End: 2020-01-03 | Stop reason: SURG

## 2020-01-03 RX ORDER — SODIUM CHLORIDE, SODIUM LACTATE, POTASSIUM CHLORIDE, CALCIUM CHLORIDE 600; 310; 30; 20 MG/100ML; MG/100ML; MG/100ML; MG/100ML
125 INJECTION, SOLUTION INTRAVENOUS CONTINUOUS
Status: DISCONTINUED | OUTPATIENT
Start: 2020-01-03 | End: 2020-01-03

## 2020-01-03 RX ORDER — ALBUMIN, HUMAN INJ 5% 5 %
SOLUTION INTRAVENOUS CONTINUOUS PRN
Status: DISCONTINUED | OUTPATIENT
Start: 2020-01-03 | End: 2020-01-03 | Stop reason: SURG

## 2020-01-03 RX ORDER — BUPIVACAINE HYDROCHLORIDE 2.5 MG/ML
INJECTION, SOLUTION EPIDURAL; INFILTRATION; INTRACAUDAL AS NEEDED
Status: DISCONTINUED | OUTPATIENT
Start: 2020-01-03 | End: 2020-01-03 | Stop reason: SURG

## 2020-01-03 RX ORDER — HYDROMORPHONE HCL/PF 1 MG/ML
0.5 SYRINGE (ML) INJECTION
Status: DISCONTINUED | OUTPATIENT
Start: 2020-01-03 | End: 2020-01-03 | Stop reason: HOSPADM

## 2020-01-03 RX ORDER — ONDANSETRON 2 MG/ML
4 INJECTION INTRAMUSCULAR; INTRAVENOUS EVERY 6 HOURS PRN
Status: DISCONTINUED | OUTPATIENT
Start: 2020-01-03 | End: 2020-01-06 | Stop reason: HOSPADM

## 2020-01-03 RX ORDER — MAGNESIUM HYDROXIDE 1200 MG/15ML
LIQUID ORAL AS NEEDED
Status: DISCONTINUED | OUTPATIENT
Start: 2020-01-03 | End: 2020-01-03 | Stop reason: HOSPADM

## 2020-01-03 RX ORDER — LIDOCAINE HYDROCHLORIDE AND EPINEPHRINE 15; 5 MG/ML; UG/ML
INJECTION, SOLUTION EPIDURAL
Status: COMPLETED | OUTPATIENT
Start: 2020-01-03 | End: 2020-01-03

## 2020-01-03 RX ORDER — HYDRALAZINE HYDROCHLORIDE 20 MG/ML
5 INJECTION INTRAMUSCULAR; INTRAVENOUS EVERY 6 HOURS PRN
Status: DISCONTINUED | OUTPATIENT
Start: 2020-01-03 | End: 2020-01-06 | Stop reason: HOSPADM

## 2020-01-03 RX ORDER — GABAPENTIN 100 MG/1
100 CAPSULE ORAL ONCE
Status: COMPLETED | OUTPATIENT
Start: 2020-01-03 | End: 2020-01-03

## 2020-01-03 RX ORDER — PROMETHAZINE HYDROCHLORIDE 25 MG/ML
12.5 INJECTION, SOLUTION INTRAMUSCULAR; INTRAVENOUS ONCE AS NEEDED
Status: COMPLETED | OUTPATIENT
Start: 2020-01-03 | End: 2020-01-03

## 2020-01-03 RX ORDER — ONDANSETRON 2 MG/ML
4 INJECTION INTRAMUSCULAR; INTRAVENOUS ONCE AS NEEDED
Status: COMPLETED | OUTPATIENT
Start: 2020-01-03 | End: 2020-01-03

## 2020-01-03 RX ORDER — OXYCODONE HYDROCHLORIDE 5 MG/1
TABLET ORAL
Qty: 20 TABLET | Refills: 0 | Status: SHIPPED | OUTPATIENT
Start: 2020-01-03 | End: 2020-01-21 | Stop reason: HOSPADM

## 2020-01-03 RX ORDER — SODIUM CHLORIDE 9 MG/ML
INJECTION, SOLUTION INTRAVENOUS CONTINUOUS PRN
Status: DISCONTINUED | OUTPATIENT
Start: 2020-01-03 | End: 2020-01-03 | Stop reason: SURG

## 2020-01-03 RX ORDER — HEPARIN SODIUM 5000 [USP'U]/ML
5000 INJECTION, SOLUTION INTRAVENOUS; SUBCUTANEOUS EVERY 8 HOURS
Status: DISCONTINUED | OUTPATIENT
Start: 2020-01-03 | End: 2020-01-05

## 2020-01-03 RX ORDER — HYDROMORPHONE HCL/PF 1 MG/ML
0.5 SYRINGE (ML) INJECTION EVERY 2 HOUR PRN
Status: DISCONTINUED | OUTPATIENT
Start: 2020-01-03 | End: 2020-01-06 | Stop reason: HOSPADM

## 2020-01-03 RX ORDER — SODIUM CHLORIDE, SODIUM LACTATE, POTASSIUM CHLORIDE, CALCIUM CHLORIDE 600; 310; 30; 20 MG/100ML; MG/100ML; MG/100ML; MG/100ML
INJECTION, SOLUTION INTRAVENOUS CONTINUOUS PRN
Status: DISCONTINUED | OUTPATIENT
Start: 2020-01-03 | End: 2020-01-03 | Stop reason: SURG

## 2020-01-03 RX ORDER — FENTANYL CITRATE/PF 50 MCG/ML
25 SYRINGE (ML) INJECTION
Status: COMPLETED | OUTPATIENT
Start: 2020-01-03 | End: 2020-01-03

## 2020-01-03 RX ORDER — LIDOCAINE HYDROCHLORIDE 10 MG/ML
0.5 INJECTION, SOLUTION EPIDURAL; INFILTRATION; INTRACAUDAL; PERINEURAL ONCE AS NEEDED
Status: DISCONTINUED | OUTPATIENT
Start: 2020-01-03 | End: 2020-01-03 | Stop reason: HOSPADM

## 2020-01-03 RX ORDER — ONDANSETRON 2 MG/ML
INJECTION INTRAMUSCULAR; INTRAVENOUS AS NEEDED
Status: DISCONTINUED | OUTPATIENT
Start: 2020-01-03 | End: 2020-01-03 | Stop reason: SURG

## 2020-01-03 RX ORDER — LIDOCAINE HYDROCHLORIDE 10 MG/ML
INJECTION, SOLUTION EPIDURAL; INFILTRATION; INTRACAUDAL; PERINEURAL AS NEEDED
Status: DISCONTINUED | OUTPATIENT
Start: 2020-01-03 | End: 2020-01-03 | Stop reason: SURG

## 2020-01-03 RX ORDER — GLYCOPYRROLATE 0.2 MG/ML
INJECTION INTRAMUSCULAR; INTRAVENOUS AS NEEDED
Status: DISCONTINUED | OUTPATIENT
Start: 2020-01-03 | End: 2020-01-03 | Stop reason: SURG

## 2020-01-03 RX ORDER — DEXAMETHASONE SODIUM PHOSPHATE 10 MG/ML
INJECTION, SOLUTION INTRAMUSCULAR; INTRAVENOUS AS NEEDED
Status: DISCONTINUED | OUTPATIENT
Start: 2020-01-03 | End: 2020-01-03 | Stop reason: SURG

## 2020-01-03 RX ORDER — LABETALOL 20 MG/4 ML (5 MG/ML) INTRAVENOUS SYRINGE
5
Status: DISCONTINUED | OUTPATIENT
Start: 2020-01-03 | End: 2020-01-03 | Stop reason: HOSPADM

## 2020-01-03 RX ORDER — CALCIUM CARBONATE 200(500)MG
1000 TABLET,CHEWABLE ORAL DAILY PRN
Status: DISCONTINUED | OUTPATIENT
Start: 2020-01-03 | End: 2020-01-06 | Stop reason: HOSPADM

## 2020-01-03 RX ORDER — PROMETHAZINE HYDROCHLORIDE 25 MG/ML
12.5 INJECTION, SOLUTION INTRAMUSCULAR; INTRAVENOUS ONCE AS NEEDED
Status: DISCONTINUED | OUTPATIENT
Start: 2020-01-03 | End: 2020-01-03

## 2020-01-03 RX ORDER — ALBUTEROL SULFATE 2.5 MG/3ML
2.5 SOLUTION RESPIRATORY (INHALATION) ONCE AS NEEDED
Status: DISCONTINUED | OUTPATIENT
Start: 2020-01-03 | End: 2020-01-03 | Stop reason: HOSPADM

## 2020-01-03 RX ORDER — SIMETHICONE 80 MG
80 TABLET,CHEWABLE ORAL 4 TIMES DAILY PRN
Status: DISCONTINUED | OUTPATIENT
Start: 2020-01-03 | End: 2020-01-06 | Stop reason: HOSPADM

## 2020-01-03 RX ORDER — DOCUSATE SODIUM 100 MG/1
100 CAPSULE, LIQUID FILLED ORAL 2 TIMES DAILY
Status: DISCONTINUED | OUTPATIENT
Start: 2020-01-03 | End: 2020-01-06 | Stop reason: HOSPADM

## 2020-01-03 RX ORDER — FENTANYL CITRATE 50 UG/ML
INJECTION, SOLUTION INTRAMUSCULAR; INTRAVENOUS AS NEEDED
Status: DISCONTINUED | OUTPATIENT
Start: 2020-01-03 | End: 2020-01-03 | Stop reason: SURG

## 2020-01-03 RX ORDER — POLYETHYLENE GLYCOL 3350 17 G/17G
17 POWDER, FOR SOLUTION ORAL DAILY
Status: DISCONTINUED | OUTPATIENT
Start: 2020-01-03 | End: 2020-01-06 | Stop reason: HOSPADM

## 2020-01-03 RX ORDER — ACETAMINOPHEN 325 MG/1
975 TABLET ORAL ONCE
Status: COMPLETED | OUTPATIENT
Start: 2020-01-03 | End: 2020-01-03

## 2020-01-03 RX ORDER — ROCURONIUM BROMIDE 10 MG/ML
INJECTION, SOLUTION INTRAVENOUS AS NEEDED
Status: DISCONTINUED | OUTPATIENT
Start: 2020-01-03 | End: 2020-01-03 | Stop reason: SURG

## 2020-01-03 RX ORDER — MEPERIDINE HYDROCHLORIDE 25 MG/ML
12.5 INJECTION INTRAMUSCULAR; INTRAVENOUS; SUBCUTANEOUS AS NEEDED
Status: DISCONTINUED | OUTPATIENT
Start: 2020-01-03 | End: 2020-01-03 | Stop reason: HOSPADM

## 2020-01-03 RX ORDER — HEPARIN SODIUM 5000 [USP'U]/ML
INJECTION, SOLUTION INTRAVENOUS; SUBCUTANEOUS AS NEEDED
Status: DISCONTINUED | OUTPATIENT
Start: 2020-01-03 | End: 2020-01-03 | Stop reason: SURG

## 2020-01-03 RX ADMIN — POLYETHYLENE GLYCOL 3350 17 G: 17 POWDER, FOR SOLUTION ORAL at 13:46

## 2020-01-03 RX ADMIN — ALBUMIN (HUMAN): 12.5 SOLUTION INTRAVENOUS at 08:15

## 2020-01-03 RX ADMIN — FENTANYL CITRATE 50 MCG: 50 INJECTION, SOLUTION INTRAMUSCULAR; INTRAVENOUS at 08:31

## 2020-01-03 RX ADMIN — PHENYLEPHRINE HYDROCHLORIDE 100 MCG: 10 INJECTION INTRAVENOUS at 08:03

## 2020-01-03 RX ADMIN — ROCURONIUM BROMIDE 10 MG: 50 INJECTION, SOLUTION INTRAVENOUS at 09:39

## 2020-01-03 RX ADMIN — BUPIVACAINE HYDROCHLORIDE 5 ML: 2.5 INJECTION, SOLUTION EPIDURAL; INFILTRATION; INTRACAUDAL at 08:42

## 2020-01-03 RX ADMIN — POTASSIUM CHLORIDE AND SODIUM CHLORIDE 125 ML/HR: 900; 150 INJECTION, SOLUTION INTRAVENOUS at 14:48

## 2020-01-03 RX ADMIN — FENTANYL CITRATE 25 MCG: 50 INJECTION, SOLUTION INTRAMUSCULAR; INTRAVENOUS at 10:55

## 2020-01-03 RX ADMIN — ROPIVACAINE HYDROCHLORIDE: 5 INJECTION, SOLUTION EPIDURAL; INFILTRATION; PERINEURAL at 10:40

## 2020-01-03 RX ADMIN — LIDOCAINE HYDROCHLORIDE AND EPINEPHRINE 3 ML: 15; 5 INJECTION, SOLUTION EPIDURAL at 07:52

## 2020-01-03 RX ADMIN — PROPOFOL 30 MG: 10 INJECTION, EMULSION INTRAVENOUS at 08:04

## 2020-01-03 RX ADMIN — BUPIVACAINE HYDROCHLORIDE 4 ML: 2.5 INJECTION, SOLUTION EPIDURAL; INFILTRATION; INTRACAUDAL at 09:35

## 2020-01-03 RX ADMIN — ONDANSETRON 4 MG: 2 INJECTION INTRAMUSCULAR; INTRAVENOUS at 08:03

## 2020-01-03 RX ADMIN — SODIUM CHLORIDE: 0.9 INJECTION, SOLUTION INTRAVENOUS at 08:13

## 2020-01-03 RX ADMIN — HEPARIN SODIUM 5000 UNITS: 5000 INJECTION INTRAVENOUS; SUBCUTANEOUS at 21:32

## 2020-01-03 RX ADMIN — FENTANYL CITRATE 25 MCG: 50 INJECTION, SOLUTION INTRAMUSCULAR; INTRAVENOUS at 10:49

## 2020-01-03 RX ADMIN — INSULIN LISPRO 1 UNITS: 100 INJECTION, SOLUTION INTRAVENOUS; SUBCUTANEOUS at 17:45

## 2020-01-03 RX ADMIN — SODIUM CHLORIDE, SODIUM LACTATE, POTASSIUM CHLORIDE, AND CALCIUM CHLORIDE: .6; .31; .03; .02 INJECTION, SOLUTION INTRAVENOUS at 07:57

## 2020-01-03 RX ADMIN — LIDOCAINE HYDROCHLORIDE 50 MG: 10 INJECTION, SOLUTION EPIDURAL; INFILTRATION; INTRACAUDAL; PERINEURAL at 08:03

## 2020-01-03 RX ADMIN — GLYCOPYRROLATE 0.2 MG: 0.2 INJECTION, SOLUTION INTRAMUSCULAR; INTRAVENOUS at 08:47

## 2020-01-03 RX ADMIN — ACETAMINOPHEN 975 MG: 325 TABLET ORAL at 06:58

## 2020-01-03 RX ADMIN — CEFAZOLIN SODIUM 2000 MG: 2 SOLUTION INTRAVENOUS at 08:21

## 2020-01-03 RX ADMIN — PHENYLEPHRINE HYDROCHLORIDE 100 MCG: 10 INJECTION INTRAVENOUS at 10:15

## 2020-01-03 RX ADMIN — PROMETHAZINE HYDROCHLORIDE 12.5 MG: 25 INJECTION INTRAMUSCULAR; INTRAVENOUS at 12:51

## 2020-01-03 RX ADMIN — PHENYLEPHRINE HYDROCHLORIDE 30 MCG/MIN: 10 INJECTION INTRAVENOUS at 08:16

## 2020-01-03 RX ADMIN — POTASSIUM CHLORIDE AND SODIUM CHLORIDE 125 ML/HR: 900; 150 INJECTION, SOLUTION INTRAVENOUS at 23:29

## 2020-01-03 RX ADMIN — ONDANSETRON 4 MG: 2 INJECTION INTRAMUSCULAR; INTRAVENOUS at 09:43

## 2020-01-03 RX ADMIN — DEXAMETHASONE SODIUM PHOSPHATE 10 MG: 10 INJECTION, SOLUTION INTRAMUSCULAR; INTRAVENOUS at 08:04

## 2020-01-03 RX ADMIN — HEPARIN SODIUM 5000 UNITS: 5000 INJECTION INTRAVENOUS; SUBCUTANEOUS at 09:06

## 2020-01-03 RX ADMIN — SUGAMMADEX 161 MG: 100 INJECTION, SOLUTION INTRAVENOUS at 10:08

## 2020-01-03 RX ADMIN — GABAPENTIN 100 MG: 100 CAPSULE ORAL at 06:58

## 2020-01-03 RX ADMIN — FENTANYL CITRATE 50 MCG: 50 INJECTION, SOLUTION INTRAMUSCULAR; INTRAVENOUS at 08:03

## 2020-01-03 RX ADMIN — HYDROMORPHONE HYDROCHLORIDE 0.5 MG: 1 INJECTION, SOLUTION INTRAMUSCULAR; INTRAVENOUS; SUBCUTANEOUS at 11:08

## 2020-01-03 RX ADMIN — FENTANYL CITRATE 25 MCG: 50 INJECTION, SOLUTION INTRAMUSCULAR; INTRAVENOUS at 10:59

## 2020-01-03 RX ADMIN — FENTANYL CITRATE 25 MCG: 50 INJECTION, SOLUTION INTRAMUSCULAR; INTRAVENOUS at 10:52

## 2020-01-03 RX ADMIN — SODIUM CHLORIDE, SODIUM LACTATE, POTASSIUM CHLORIDE, AND CALCIUM CHLORIDE: .6; .31; .03; .02 INJECTION, SOLUTION INTRAVENOUS at 09:35

## 2020-01-03 RX ADMIN — BUPIVACAINE HYDROCHLORIDE 4 ML: 2.5 INJECTION, SOLUTION EPIDURAL; INFILTRATION; INTRACAUDAL at 08:35

## 2020-01-03 RX ADMIN — DOCUSATE SODIUM 100 MG: 100 CAPSULE, LIQUID FILLED ORAL at 17:45

## 2020-01-03 RX ADMIN — ROCURONIUM BROMIDE 40 MG: 50 INJECTION, SOLUTION INTRAVENOUS at 08:03

## 2020-01-03 RX ADMIN — ONDANSETRON 4 MG: 2 INJECTION INTRAMUSCULAR; INTRAVENOUS at 12:01

## 2020-01-03 RX ADMIN — HYDROMORPHONE HYDROCHLORIDE 0.5 MG: 1 INJECTION, SOLUTION INTRAMUSCULAR; INTRAVENOUS; SUBCUTANEOUS at 11:30

## 2020-01-03 RX ADMIN — PROPOFOL 120 MG: 10 INJECTION, EMULSION INTRAVENOUS at 08:03

## 2020-01-03 RX ADMIN — DOCUSATE SODIUM 100 MG: 100 CAPSULE, LIQUID FILLED ORAL at 13:47

## 2020-01-03 NOTE — PROGRESS NOTES
[address] [facility] Op note   Domi Olguin 68 y o  female MRN: [id number]  Unit/Bed#: [facility] 919-01 Encounter: [id number]      Subjective  Patient seen at bedside and reports she is doing well  She states her pain is well controlled with epidural  She denies any nausea, vomiting, chest pain, shortness of breath or calf pain  We reviewed operative findings and all questions addressed  /66   Pulse 64   Temp (!) 97 1 °F (36 2 °C)   Resp 20   Ht 5' (1 524 m)   Wt 81 1 kg (178 lb 14 1 oz)   SpO2 97%   BMI 34 94 kg/m²     No intake/output data recorded  I/O this shift:  In: 2910 [P O :360; I V :2300; IV Piggyback:250]  Out: 300 [Urine:200; Blood:100]    Lab Results   Component Value Date    WBC 9 83 12/10/2019    HGB 13 2 12/10/2019    HCT 40 9 12/10/2019    MCV 92 12/10/2019     12/10/2019       Lab Results   Component Value Date    CALCIUM 8 7 01/03/2020    K 4 5 01/03/2020    CO2 26 01/03/2020     01/03/2020    BUN 13 01/03/2020    CREATININE 1 12 01/03/2020       Lab Results   Component Value Date/Time    POCGLU 206 (H) 01/03/2020 01:53 PM    POCGLU 191 (H) 01/03/2020 11:21 AM    POCGLU 99 01/03/2020 06:38 AM       Physical Exam  General: NAD in bed, pleasant demeanor  Cardio: Regular rate and rhythm, S1 S2, no murmur, no gallop  Lungs: No respiratory distress, CTABL, good effort  Abdomen: Soft, obese abdomen, non-tender, non-distended, BS hypoactive  Incision: midline incision Closed with running absorbable sutures with overlying histoacryl, dressing C/D/I  Extremities: Non-tender, no erythema, no palpable cords, negative Homans, SCDs in place    A/P: 68 y o  female s/p exp[loratory laparotomy and bilateral salpingo-oophorectomy, Doing well post operatively       1  Post-operative care:    - Frozen section with foci of borderline mucinous pathology, f/u final pathology   - Pain: Epidural in place, APS managing pain   - Follow up AM labs   - Maintain to while epidural in place   - F/u nephrology consultation as she is at risk for DEBRA post-op   - Encourage incentive spirometry to reduce atelectasis and pneumonia post-operatively   - OOB to chair    2  T2DM   - ISS algorithm #3   - Home Metformin held   - FS fasting and before meals    3  Hypertension   - Home Losartan held    - Hydralazine 5mg IV PRN    4  Asthma/ COPD:   - Not on any meds   - Albuterol PRN ordered    5   HLD   - Parvastatin ordered in lieu of home Simvastatin to start tomorrow      FEN: Diabetic diet, NS w/ 20 meq KCl 125 ml/hr  DVT PPx: SCDs, Heparin 5000 U TID, OOB with assistance      Pily Biswas MD   Gyn PGY-3  1/3/2020 3:53 PM

## 2020-01-03 NOTE — ANESTHESIA POSTPROCEDURE EVALUATION
Post-Op Assessment Note    CV Status:  Stable  Pain Score: 0    Pain management: adequate     Mental Status:  Sleepy   Hydration Status:  Euvolemic   PONV Controlled:  Controlled   Airway Patency:  Patent   Post Op Vitals Reviewed: Yes      Staff: CRNA, Anesthesiologist     Post-op block assessment: secured with tape, site cleaned, no complications and catheter intact        BP  122/49   Temp   98   Pulse  77   Resp   18   SpO2   100%

## 2020-01-03 NOTE — INTERVAL H&P NOTE
H&P reviewed  After examining the patient I find no changes in the patients condition since the H&P had been written  Patient's workup has been unremarkable since her last visit  Her  is in the normal range  Her lower extremity Dopplers are unremarkable  Plan to move ahead with exploratory laparotomy MARY BSO and possible staging if malignancy is encountered    Blood pressures below will be reviewed by anesthesia prior to treatment and surgery    Vitals:    01/03/20 0633   BP: (!) 186/86   Pulse: 86   Resp: 16   Temp: 97 6 °F (36 4 °C)   SpO2: 96%

## 2020-01-03 NOTE — DISCHARGE SUMMARY
Discharge Summary - Gynecologic Oncology  Cathy Baumann 68 y o  female MRN: [id number]  Unit/Bed#: OR POOL Encounter: [id number]    Admission Date: 1/3/2020   Discharge Date: 1/6/2020    Admitting Attending Physician: Dr Maddy Corral Physician(s): Nephrology: Dr Rylan Navas    Admitting Diagnosis:   Generalized abdominal or pelvic swelling or mass or lump [R19 07]  Ovarian mass, right [N83 8]    Discharge Diagnosis: Same as above    Procedures Performed: Exploratory laparotomy, Bilateral salpingo- oophorectomy    Hospital Course: Ms Cathy Baumann is a 69 yo female with 22 cm cystic ovarian mass  presented on day of admission for the above mentioned procedure  She was admitted for pain control and postoperative management  Her procedure was uncomplicated  Frozen section showed possible foci of borderline mucinous pathology  She received an epidural preoperatively for pain management  The patient's hospital course was uncomplicated  On POD 1, the patient was doing well  Her pain was well controlled with the episural   Her Hb was 10 7 preoperatively and 11 0 postoperatively  Nephrology was consulted for chronic kidney disease, patient's creatinine was stable throughout hospital stay  On POD#2 her epidural ws removed and pain was well controlled on PO narcotics  Her Barrett was removed and she was able to void spontaneously  She was tolerating PO and passing flatus  She was ambulating well  The patient was discharged home on POD#3 in stable condition  She was given prescriptions for Roxicodone for pain control    She has a post-operative appointment to follow up in 2 weeks with Dr Jolly Zepeda    Lab Results:   Lab Results   Component Value Date    WBC 9 83 12/10/2019    HGB 13 2 12/10/2019    HCT 40 9 12/10/2019    MCV 92 12/10/2019     12/10/2019     Lab Results   Component Value Date    CALCIUM 8 7 01/03/2020    K 4 5 01/03/2020    CO2 26 01/03/2020     01/03/2020    BUN 13 01/03/2020 CREATININE 1 12 01/03/2020     Lab Results   Component Value Date/Time    POCGLU 99 01/03/2020 06:38 AM     No results found for: PTT  No results found for: INR, PROTIME    Pathology: F/u final pathology outpatient    Imaging: None      Condition at Discharge: good     Discharge Medications: See after visit summary for reconciled discharge medications provided to patient and family  Discharge instructions/Information to patient and family: See after visit summary for information provided to patient and family  Provisions for Follow-Up Care: See after visit summary for information related to follow-up care and any pertinent home health orders  Disposition: Home    Planned Readmission: No    Code Status: Full Code    Discharge Statement   I spent 25 minutes discharging the patient  This time was spent on the day of discharge  I had direct contact with the patient on the day of discharge  Additional documentation is required if more than 30 minutes were spent on discharge

## 2020-01-03 NOTE — H&P
Problem List Items Addressed This Visit                 Other      Generalized abdominal or pelvic swelling or mass or lump        The patient has been recently diagnosed with a ovarian mass  We discussed with the patient that all new complex ovarian masses in the postmenopausal phase run approximately a 4% risk of malignancy  However as this 1 is significantly bigger than 10 cm her overall risk of malignancy is closer to the 10-20% range     These generally do not resolve  We have discussed treatment options including observation with follow-up in 2 months including possible ultrasound at that time, or immediate surgery  We have recommended the following:     Exploratory laparotomy, total abdominal hysterectomy bilateral salpingo-oophorectomy with possible pelvic and para-aortic lymph node dissection staging biopsies including omentectomy based on findings at frozen section     Have discussed risks and benefits of the procedure including bleeding requiring transfusion infection, infection, damage to local structures including bowel bladder ureter and other local organs  We discussed the risk of deep venous thrombosis  Given the patient's recent leg swelling and no prior ultrasound and Doppler we have recommended to perform this prior to surgery  All of these complications are in the 2-4% range of likelihood  The patient understands the risks and benefits of the procedure and has signed an informed consent  I personally signed the consent form with her  She does understand that further treatment including chemotherapy radiation therapy or hormones may be required based on the final postoperative pathologic diagnosis and staging  Standard preoperative testing including type and screen is CBC CPM P chest x-ray and EKG will be ordered  Any appropriate consultations for preoperative evaluation will also be ordered    Overall consultation took 60 min with greater than 50% in dedicated toward discussion time             Relevant Orders      Case request operating room: LAPAROTOMY EXPLORATORY, bilateral salpingo-oophorectomy possible staging (Completed)      Type and screen      Comprehensive metabolic panel      CBC and differential      HEMOGLOBIN A1C W/ EAG ESTIMATION            EKG 12 lead      XR chest pa & lateral                Other Visit Diagnoses      Lower extremity edema    -  Primary     Relevant Orders     VAS lower limb venous duplex study, complete bilateral     Ovarian mass, right         Relevant Orders     VAS lower limb venous duplex study, complete bilateral     Case request operating room: LAPAROTOMY EXPLORATORY, bilateral salpingo-oophorectomy possible staging (Completed)     Type and screen     Comprehensive metabolic panel     CBC and differential     HEMOGLOBIN A1C W/ EAG ESTIMATION          EKG 12 lead     XR chest pa & lateral                   CHIEF COMPLAINT:  Abdominal pelvic mass              Patient ID: Mehdi Rodas is a 68 y o  female  Patient is very pleasant 17-year-old white female seen in consultation from Dr Jeancarlos Fonseca regarding evaluation and management of abdominal pelvic mass      The patient was evaluated over the summer for left ankle swelling and possible cellulitis as well as possible osteomyelitis  The patient was placed on antibiotics and an MRI of the leg was performed  No osteomyelitis was noted  The patient improved but not significantly  She has had leg swelling on and off since that time which has been evaluated      The patient recently saw her primary care physician Dr Lalit Sheth and was noted to have an abdominal mass with tenderness on exam   A CT scan was ordered  This revealed the following:          ABDOMINOPELVIC CAVITY:  A large predominantly cystic mass containing internal enhancing septations arises from the right adnexa extending above the umbilicus measuring 22 x 16 x 19 cm  Primary ovarian tumor suspected    No ascites, or territorial   implants, pneumoperitoneum, or abdominal pelvic lymphadenopathy      VESSELS:  Unremarkable for patient's age      PELVIS     REPRODUCTIVE ORGANS:  Patient is status post hysterectomy  Again, a large cystic mass arising from the right adnexa is origin likely the remaining right ovary      URINARY BLADDER:  Unremarkable      ABDOMINAL WALL/INGUINAL REGIONS:  Evidence of ventral herniorrhaphy with small fat-containing umbilical hernia noted      OSSEOUS STRUCTURES:  No acute fracture or destructive osseous lesion      IMPRESSION:     1   Multiloculated cystic mass arising from the right adnexa extending above the umbilicus measuring 22 x 16 x 19 cm 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  Gynecologic oncology consultation recommended  2   No ascites or evidence for abdominal pelvic metastatic disease  3   Minimal bilateral hydronephrosis      The patient has noted over the past several months increasing early satiety  She has not finished a meal   She notes worsening urinary incontinence over the past 2 months  She now requires the use of a depends  She has no change in bowel function she has intermittent left lower quadrant pain especially while coughing or sneezing and she has developed this large mass that makes her feel as though she is pregnant  She has no family history of ovary of breast cancer  Patient now presents for evaluation and management of large abdominal pelvic mass            Review of Systems   Constitutional: Positive for appetite change and fever  HENT: Negative  Eyes: Negative  Respiratory: Negative  Cardiovascular: Negative  Gastrointestinal: Negative  Endocrine: Negative  Genitourinary: Negative  Musculoskeletal: Negative  Skin: Negative  Neurological: Negative  Hematological: Negative      Psychiatric/Behavioral: Negative           Current Medications   Current Outpatient Medications   Medication Sig Dispense Refill    alendronate (FOSAMAX) 70 mg tablet TAKE 1 TABLET BY MOUTH ONCE A WEEK 12 tablet 14    aspirin 81 mg chewable tablet Chew 1 tablet daily        Calcium Carbonate-Vitamin D (CALCIUM 600+D) 600-400 MG-UNIT per tablet Take 2 tablets by mouth daily          Coenzyme Q10 (COQ-10) 100 MG CAPS Take 1 capsule by mouth daily        fluticasone (FLONASE) 50 mcg/act nasal spray 1 spray into each nostril daily as needed for rhinitis        Ibuprofen (ADVIL) 200 MG CAPS Take 2 capsules by mouth          loratadine (CLARITIN) 10 mg tablet Take 10 mg by mouth daily        losartan (COZAAR) 25 mg tablet Take 2 tabs once daily 180 tablet 3    metFORMIN (GLUCOPHAGE) 1000 MG tablet Take 1 tablet (1,000 mg total) by mouth 2 (two) times a day 180 tablet 3    methylPREDNISolone 4 MG tablet therapy pack AS DIRECTED ON PACKAGE   0    nystatin (MYCOSTATIN) cream Apply topically as needed (as needed) 30 g 5    simvastatin (ZOCOR) 10 mg tablet Take 1 tablet (10 mg total) by mouth daily 90 tablet 3      No current facility-administered medications for this visit                   Allergies   Allergen Reactions    Penicillins           Medical History        Past Medical History:   Diagnosis Date    Asthma      [facility]      Diabetes ([facility])      Hyperlipidemia      Osteoarthritis      Osteoporosis      Seasonal allergies              Surgical History         Past Surgical History:   Procedure Laterality Date    CHOLECYSTECTOMY         [id number]  LAST ASSESSED    EYE SURGERY         cataracts removed    HYSTERECTOMY         [id number]  LAST ASSESSED    TUBAL LIGATION                         OB History         5    Para   5    Term   4       1    AB   0    Living   4        SAB   0    TAB   0    Ectopic   0    Multiple   0    Live Births   4                           Family History   Problem Relation Age of Onset    Heart disease Mother      Rheumatic fever Father      Diabetes Family           MELLITUS    Diabetes Family           MELLITUS         The following portions of the patient's history were reviewed and updated as appropriate: allergies, current medications, past family history, past social history, past surgical history and problem list         Objective:     Blood pressure 150/90, pulse 72, temperature 98 8 °F (37 1 °C), resp  rate 18, height 5' 0 5" (1 537 m), weight 79 8 kg (176 lb)  Body mass index is 33 81 kg/m²      Physical Exam   Constitutional: She is oriented to person, place, and time  She appears well-developed and well-nourished  HENT:   Head: Normocephalic and atraumatic  Eyes: EOM are normal    Neck: Normal range of motion  Neck supple  No thyromegaly present  Cardiovascular: Normal rate, regular rhythm and normal heart sounds  Pulmonary/Chest: Effort normal and breath sounds normal    Abdominal: Soft  Bowel sounds are normal    Well healed  incisions  Large abdominal mass noted  There is no fluid wave her ascites noted  Genitourinary:   Genitourinary Comments: -Normal external female genitalia, normal Bartholin's and Marlton's glands                  -Normal midline urethral meatus  No lesions notes                  -Bladder without fullness mass or tenderness                  -Vagina without lesion or discharge No significant cystocele or rectocele noted                  -Cervix surgically absent                  -Uterus surgically absent                  -Adnexae not specifically help palpable however a large 22 cm mass in the pelvis extending to the abdomen is noted                   - Anus without fissure of lesion     Musculoskeletal: Normal range of motion  Lymphadenopathy:     She has no cervical adenopathy  Neurological: She is alert and oriented to person, place, and time  Skin: Skin is warm and dry  Psychiatric: She has a normal mood and affect   Her behavior is normal             No results found for:         Lab Results Component Value Date     WBC 8 92 01/12/2018     HGB 13 9 01/12/2018     HCT 42 2 01/12/2018     MCV 90 01/12/2018      01/12/2018            Lab Results   Component Value Date     K 4 4 11/22/2019      11/22/2019     CO2 28 11/22/2019     BUN 17 11/22/2019     CREATININE 1 46 (H) 11/22/2019     GLUF 100 (H) 11/22/2019     CALCIUM 9 6 11/22/2019     AST 17 05/17/2019     ALT 21 05/17/2019     ALKPHOS 62 05/17/2019     EGFR 36 11/22/2019

## 2020-01-03 NOTE — OP NOTE
OPERATIVE REPORT  PATIENT NAME: Mil Ugarte    :  1950  MRN: [id number]  Pt Location: [facility] OR ROOM 09    SURGERY DATE: 1/3/2020    Surgeon(s) and Role:     * Lorin Garcia MD - Primary     * Malena Zimmerman MD - Assisting     * Francisco Olivera MD - Victoria Meckel, MD - Assisting    Preop Diagnosis:  Generalized abdominal or pelvic swelling or mass or lump [R19 07]  Ovarian mass, right [N83 8]    Post-Op Diagnosis Codes:     * Generalized abdominal or pelvic swelling or mass or lump [R19 07]     * Ovarian mass, right [N83 8]    Procedure(s) (LRB):  LAPAROTOMY EXPLORATORY (N/A)  BILATERAL SALPINGO-OOPHORECTOMY (Bilateral)    Specimen(s):  ID Type Source Tests Collected by Time Destination   1 : left adnexa Tissue Ovary, Left TISSUE EXAM Lorin Garcia MD 1/3/2020 0900    2 : Washing Pelvic Washing NON-GYNECOLOGIC CYTOLOGY Lorin Garcia MD 1/3/2020 4171    3 : and fallopian tube Tissue Ovary, Right TISSUE EXAM Lorin Garcia MD 1/3/2020 0029        Estimated Blood Loss:   100 mL    Drains:  Urethral Catheter Non-latex 16 Fr  (Active)   Number of days: 0       Anesthesia Type:   General w/ Epidural    Operative Indications:  Generalized abdominal or pelvic swelling or mass or lump [R19 07]  Ovarian mass, right [N83 8]      Operative Findings:  Large adnexal mass from the left measuring approximately 20 x 20 cm densely adherent to the left pelvic sidewall  Normal appearing right tube and ovary  The remainder of the abdomen was unremarkable with the exception of a adhesions from the upper abdomen secondary to the patient's prior surgeries  The large mass was removed intact  Frozen section diagnosis indicated likely benign lesions but could not rule out a borderline tumor  No staging was performed      Complications:   None    Procedure and Technique:    After informed consent was obtained, the patient was taken to the operating room where general endotracheal anesthesia was then administered without incident  A full time out procedure was performed  The patient was prepped and draped in normal sterile fashion in the supine position  The patient was froglegged and a chlorhexidine vaginal prep was performed then a Barrett catheter was inserted  A midline vertical incision was created with a knife and carried through to the fascia with a Bovie electrocautery device  This was taken down to the underlying layer of fascia with cautery  The fascia was opened the midline  The fascial incision extended superiorly and anteriorly  The peritoneum was identified and entered  The peritoneal incision extended superiorly and inferiorly as well  The retro-peritoneal space on the left side was opened using cautery  The round ligament was transected  The ureter was identified coursing normally within the medial leaf of the broad ligament  The infundibulopelvic ligament on the left side was skeletonized, clamped with Stephane clamps transected and secured with a free tie of 0 Vicryl suture followed by a suture ligation  Extensive lysis of adhesions was performed on the left side taking down the remainder of the mass from the left pelvic sidewall  The mass measured approximately 20 x 20 cm  No significant bleeding was noted  The mass was sent out of the abdomen without rupturing  Hemostasis was assured  Washings were taken from the pelvis  The right pelvic sidewall was open  The right IP ligament and ureter were identified and   A hole was placed in an avascular plane between the 2  The IP ligament was doubly cross clamped with MD Stephane clamps back clamped with a Anne-Marie clamp cut with heavy scissors free tied with 0 Vicryl suture in suture ligated with 0 Vicryl suture in a fixation stitch fashion  The remainder of the mass was taken off the  Right pelvic sidewall with the Bovie electrocautery device  No bleeding was noted  The pelvis was irrigated   There is no evidence of bleeding noted  The fascia was closed using #1 looped PDS in a running Smead-Ross fashion  The skin was closed with stratafix and histocryl  A sterile dressing was applied  The patient was then awakened and transferred to the recovery room in stable condition  All instrument and instrument counts were correct X 2 for the procedure  No complications were noted  I was present for the entire procedure     I was present for the entire procedure    Patient Disposition:  PACU     SIGNATURE: Aurelio Arreguin MD  DATE: January 3, 2020  TIME: 10:28 AM

## 2020-01-03 NOTE — H&P
H&P Exam - Gynecology   Sonja Bray 68 y o  female MRN: [id number]  Unit/Bed#:  Encounter: [id number]      History of Present Illness     HPI:  Sonja Bray is a 68 y o  female who presents for surgical management due to findings of right adnexal mass 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  She had a CT scan due to abdominal tenderness on exam and a [id number] predominantly cystic mass continue internal enhancing septations was found arising from the right adnexa  Please see Dr Olman Spence full consult note from 12/3/2019  Review of Systems    Historical Information   Past Medical History:   Diagnosis Date    Asthma     [facility] [address] [facility]     [facility]     Hyperlipidemia     Osteoarthritis     Osteoporosis     Seasonal allergies      Past Surgical History:   Procedure Laterality Date    CATARACT EXTRACTION Bilateral     CHOLECYSTECTOMY      [id number]  LAST ASSESSED    EYE SURGERY      cataracts removed    HYSTERECTOMY        LAST ASSESSED    TUBAL LIGATION       OB History    Para Term  AB Living   5 5 4 1 0 4   SAB TAB Ectopic Multiple Live Births   0 0 0 0 4      # Outcome Date GA Lbr Karthik/2nd Weight Sex Delivery Anes PTL Lv   5             4 Term            3 Term            2 Term            1 Term                Family History   Problem Relation Age of Onset    Heart disease Mother     Rheumatic fever Father     Diabetes Family         MELLITUS    Diabetes Family         MELLITUS     Social History   Social History     Substance and Sexual Activity   Alcohol Use No     Social History     Substance and Sexual Activity   Drug Use No     Social History     Tobacco Use   Smoking Status Former Smoker    Last attempt to quit: Shairf Mattson Years since quittin 0   Smokeless Tobacco Never Used       Meds/Allergies   No medications prior to admission       Allergies   Allergen Reactions    Penicillins Hives       Objective There were no vitals taken for this visit  No intake or output data in the 24 hours ending 01/03/20 0608    Physical Exam    Lab Results:   No visits with results within 1 Day(s) from this visit  Latest known visit with results is:   Lab Requisition on 11/29/2019   Component Date Value    Case Report 11/29/2019                      Value:Surgical Pathology Report                         Case: [id number]                                   Authorizing Provider:  Ivana Moraes DPM        Collected:           11/29/2019 1330              Ordering Location:     [address] [place] [address]      Received:            11/29/2019 [facility]                                     [facility]                                                                                  Laboratory                                                                   Pathologist:           Yissel Nolen MD                                                            Specimen:    Skin, Other, RIGHT LATERAL ANKLE                                                           Final Diagnosis 11/29/2019                      Value: This result contains rich text formatting which cannot be displayed here   Additional Information 11/29/2019                      Value: This result contains rich text formatting which cannot be displayed here  Irene Pham Gross Description 11/29/2019                      Value: This result contains rich text formatting which cannot be displayed here  Imaging: I have personally reviewed pertinent reports  EKG, Pathology, and Other Studies: I have personally reviewed pertinent reports  Assessment/Plan     A/P: 68year old female with right adnexal mass consented for exploratory laparotomy, total abdominal hysterectomy, bilateral salpingoo-opherectomy, with possible pelvice and para-aortic lymph node dissection, staging, and biopsies including omentectomy based on findings at time of surgery on frozen section   Plan to proceed to the OR as scheduled       Code Status: No Order    Lion Anthony MD  1/3/2020  6:08 AM

## 2020-01-03 NOTE — DISCHARGE INSTRUCTIONS
Open Salpingo-oophorectomy   WHAT YOU SHOULD KNOW:   Open salpingo-oophorectomy is surgery to remove one or both fallopian tubes together with the ovaries  AFTER YOU LEAVE:   Medicines:   · NSAIDs  help decrease swelling, pain, and fever  This medicine is available without a doctor's order  NSAIDs can cause stomach bleeding or kidney problems  If you take blood thinner medicine, always ask your primary healthcare provider (PHP) if NSAIDs are safe for you  Always read the medicine label and follow directions  · Acetaminophen  decreases pain and fever  It is available without a doctor's order  Ask how much to take and how often to take it  Follow directions  Acetaminophen can cause liver damage if not taken correctly  · Prescription pain medicine  may be given  Ask your PHP how to take this medicine safely  · Take your medicine as directed  Contact your PHP if you think your medicine is not helping or if you have side effects  Tell him if you are allergic to any medicine  Keep a list of the medicines, vitamins, and herbs you take  Include the amounts, and when and why you take them  Bring the list or the pill bottles to follow-up visits  Carry your medicine list with you in case of an emergency  Follow up with your surgeon or gynecologist as directed:  Write down your questions so you remember to ask them during your visits  Wound care:  Carefully wash the wound with soap and water  Dry the area and put on new, clean bandages as directed  Change your bandages when they get wet or dirty  Counseling: This surgery may be life-changing for you and your family  Sudden changes in the levels of your hormones may occur and cause mood swings and depression  You may feel angry, sad, or frightened, or cry frequently and unexpectedly  These feelings are normal  Talk to your caregivers, family, or friends about your feelings   You may need to attend meetings with a caregiver, family members, or other people who are close to you  These meetings can help everyone better understand your condition, surgery, and care  Activity:  Ask when you can return to your usual activities, such as exercise  It is best to start exercise slowly and do more as you get stronger  Exercise makes your heart stronger, lowers blood pressure, and keeps your bones healthy  Contact your surgeon or gynecologist if:   · You have a fever  · You have chills, a cough, or feel weak and achy  · You have nausea or are vomiting  · Your skin is itchy, swollen, or has a rash  · You have questions or concerns about condition or care  Seek care immediately or call 911 if:   · You feel lightheaded, short of breath, and have chest pain  · You cough up blood  · Your arm or leg feels warm, tender, and painful  It may look swollen and red  · You feel something is bulging into your vagina, or you have vaginal bleeding  · You have lower abdominal or back pain that does not go away even after you take your medicines    · You have pus or a foul-smelling odor coming from your vagina  · You have trouble urinating or having a bowel movement  · You have sudden, severe shoulder pain  · Blood soaks through your bandage  · Your symptoms return  © 2014 [address] is for End User's use only and may not be sold, redistributed or otherwise used for commercial purposes  All illustrations and images included in CareNotes® are the copyrighted property of A D A M , Inc  or Gibran Figueroa  The above information is an  only  It is not intended as medical advice for individual conditions or treatments  Talk to your doctor, nurse or pharmacist before following any medical regimen to see if it is safe and effective for you

## 2020-01-03 NOTE — ANESTHESIA PROCEDURE NOTES
Epidural Block    Patient location during procedure: holding area  Start time: 1/3/2020 7:50 AM  Reason for block: procedure for pain, at surgeon's request and post-op pain management  Staffing  Anesthesiologist: Rupinder Lombardi MD  Performed: anesthesiologist   Preanesthetic Checklist  Completed: patient identified, site marked, surgical consent, pre-op evaluation, timeout performed, IV checked, risks and benefits discussed and monitors and equipment checked  Epidural  Patient position: sitting  Prep: ChloraPrep  Patient monitoring: cardiac monitor and frequent blood pressure checks  Approach: midline  Location: lumbar (1-5)  Injection technique: SINA air  Needle  Needle type: Tuohy   Needle gauge: 18 G  Catheter type: side hole  Catheter size: 20 G  Catheter at skin depth: 12 5 cm  Test dose: negativelidocaine 1 5% with epinephrine 1:200,000 test dose, 3 mLnegative aspiration for CSF, negative aspiration for heme and no paresthesia on injection  patient tolerated the procedure well with no immediate complications

## 2020-01-03 NOTE — CONSULTS
Consultation - Nephrology   Gianni Jean 68 y o  female MRN: [id number]  Unit/Bed#: OR POOL Encounter: [id number]    ASSESSMENT/PLAN:   1  CKD stage 3:  Baseline creatinine around 1-1 4 this year  Creatinine 1 1 today  · Likely related to diabetes, hypertension and prior NSAID use  · Hold losartan postoperative  · Avoid NSAIDs, IV dye or other nephrotoxic  · Avoid relative hypotension  · Continue IV fluids per protocol  · Continue Barrett catheter per protocol   · Check am BMP   2  Ovarian mass:  Status post exploratory laparotomy bilateral salpingo-oophorectomy  3  Hypertension:  Hold losartan for now  4  DM II: did have some microalbuminuria in the past so would restart losartan if creatinine stable in next 24-48 hours     HISTORY OF PRESENT ILLNESS:  Requesting Physician: Estuardo Villar MD  Reason for Consult: post op DEBRA prevention protocol    Gianni Jean is a 68y o  year old female with a history of an ovarian mass who presented today for surgical management and underwent exploratory laparotomy with bilateral salpingo oophorectomy  Postoperatively nephrology was consulted as part of the acute kidney injury prevention protocol  Patient is seen and examined postoperatively  She denies any history of renal dysfunction  She is feeling well currently and denies any chest pain, shortness of breath, nausea, vomiting or recent diarrhea  She does have normal postoperative pain but it is well controlled with medications  She was previously taking NSAIDs at home but recently 1 of her doctors told her to switch to Tylenol so she has been doing that instead        PAST MEDICAL HISTORY:  Past Medical History:   Diagnosis Date    Asthma     [facility] [address] [facility]     [facility]     Hyperlipidemia     Osteoarthritis     Osteoporosis     Seasonal allergies        PAST SURGICAL HISTORY:  Past Surgical History:   Procedure Laterality Date    CATARACT EXTRACTION Bilateral     CHOLECYSTECTOMY      [id number]  LAST ASSESSED    EYE SURGERY      cataracts removed    HYSTERECTOMY      [id number]  LAST ASSESSED    TUBAL LIGATION         ALLERGIES:  Allergies   Allergen Reactions    Penicillins Hives       SOCIAL HISTORY:  Social History     Substance and Sexual Activity   Alcohol Use No     Social History     Substance and Sexual Activity   Drug Use No     Social History     Tobacco Use   Smoking Status Former Smoker    Last attempt to quit: Frankie Mccarthy Years since quittin 0   Smokeless Tobacco Never Used       FAMILY HISTORY:  Family History   Problem Relation Age of Onset    Heart disease Mother     Rheumatic fever Father     Diabetes Family         MELLITUS    Diabetes Family         MELLITUS       MEDICATIONS:  Scheduled Meds:  Current Facility-Administered Medications:  albuterol 2 5 mg Nebulization Once PRN Jitendra Tobias MD   enoxaparin 40 mg Subcutaneous On Call To Magdalene Hardwick MD   heparin 5000 units in sodium chloride 0 9% 500 mL irrigation  Irrigation Once Marla Cornejo MD   hydrALAZINE 5 mg Intravenous Q6H PRN Tom Sanches MD   HYDROmorphone 0 5 mg Intravenous Q5 Min PRN Jitendra Tobias MD   HYDROmorphone 0 5 mg Intravenous Q2H PRN Shyla Britt MD   Labetalol HCl 5 mg Intravenous Q10 Min PRN Jitendra Tobias MD   lactated ringers 125 mL/hr Intravenous Continuous Jitendra Tobias MD   lidocaine (PF) 0 5 mL Infiltration Once PRN Jitendra Tobias MD   meperidine 12 5 mg Intravenous PRN Jitendra Tobias MD   ondansetron 4 mg Intravenous Once PRN Jitendra Tobias MD   promethazine 12 5 mg Intravenous Once PRN Jitendra Tobias MD   ropivacaine 0 1% and fentaNYL 2 mcg/mL  Epidural Continuous Shyla Britt MD   sodium chloride 250 mL Intravenous Once Marla Cornejo MD       PRN Meds:   albuterol    hydrALAZINE    HYDROmorphone    HYDROmorphone    Labetalol HCl    lidocaine (PF)    meperidine    ondansetron    promethazine    Continuous Infusions:  lactated ringers 125 mL/hr   ropivacaine 0 1% and fentaNYL 2 mcg/mL        REVIEW OF SYSTEMS:  A complete review of systems was done  Pertinent positives and negatives noted in the HPI but otherwise the review of systems is negative      PHYSICAL EXAM:  Current Weight: Weight - Scale: 80 3 kg (177 lb)  First Weight: Weight - Scale: 80 3 kg (177 lb)  Vitals:    01/03/20 1115   BP: 133/59   Pulse: 76   Resp: 16   Temp:    SpO2: 99%       Intake/Output Summary (Last 24 hours) at 1/3/2020 1131  Last data filed at 1/3/2020 1019  Gross per 24 hour   Intake 2550 ml   Output 150 ml   Net 2400 ml     General:  No acute distress  Skin:  No rash  Eyes:  Sclerae anicteric  ENT:  Moist mucous membranes  Neck:  Trachea midline with no JVD  Chest:  Clear to auscultation bilaterally  CVS:  Regular rate and rhythm  Abdomen:  Post op tenderness   Extremities:  No edema  Neuro:  Awake and alert  Psych:  Appropriate affect    Lab Results:   Results from last 7 days   Lab Units 01/03/20  0823   SODIUM mmol/L 139   POTASSIUM mmol/L 4 5   CHLORIDE mmol/L 107   CO2 mmol/L 26   BUN mg/dL 13   CREATININE mg/dL 1 12   CALCIUM mg/dL 8 7

## 2020-01-03 NOTE — PLAN OF CARE
Problem: Potential for Falls  Goal: Patient will remain free of falls  Description  INTERVENTIONS:  - Assess patient frequently for physical needs  -  Identify cognitive and physical deficits and behaviors that affect risk of falls    -  [place] fall precautions as indicated by assessment   - Educate patient/family on patient safety including physical limitations  - Instruct patient to call for assistance with activity based on assessment  - Modify environment to reduce risk of injury  - Consider OT/PT consult to assist with strengthening/mobility  Outcome: Progressing

## 2020-01-04 LAB
ANION GAP SERPL CALCULATED.3IONS-SCNC: 3 MMOL/L (ref 4–13)
BUN SERPL-MCNC: 12 MG/DL (ref 5–25)
CALCIUM SERPL-MCNC: 7.5 MG/DL (ref 8.3–10.1)
CHLORIDE SERPL-SCNC: 108 MMOL/L (ref 100–108)
CO2 SERPL-SCNC: 27 MMOL/L (ref 21–32)
CREAT SERPL-MCNC: 1.03 MG/DL (ref 0.6–1.3)
ERYTHROCYTE [DISTWIDTH] IN BLOOD BY AUTOMATED COUNT: 12.6 % (ref 11.6–15.1)
GFR SERPL CREATININE-BSD FRML MDRD: 55 ML/MIN/1.73SQ M
GLUCOSE SERPL-MCNC: 134 MG/DL (ref 65–140)
GLUCOSE SERPL-MCNC: 147 MG/DL (ref 65–140)
GLUCOSE SERPL-MCNC: 151 MG/DL (ref 65–140)
GLUCOSE SERPL-MCNC: 153 MG/DL (ref 65–140)
GLUCOSE SERPL-MCNC: 172 MG/DL (ref 65–140)
HCT VFR BLD AUTO: 34.3 % (ref 34.8–46.1)
HGB BLD-MCNC: 10.7 G/DL (ref 11.5–15.4)
MCH RBC QN AUTO: 29.3 PG (ref 26.8–34.3)
MCHC RBC AUTO-ENTMCNC: 31.2 G/DL (ref 31.4–37.4)
MCV RBC AUTO: 94 FL (ref 82–98)
PLATELET # BLD AUTO: 245 THOUSANDS/UL (ref 149–390)
PMV BLD AUTO: 10.7 FL (ref 8.9–12.7)
POTASSIUM SERPL-SCNC: 4.9 MMOL/L (ref 3.5–5.3)
RBC # BLD AUTO: 3.65 MILLION/UL (ref 3.81–5.12)
SODIUM SERPL-SCNC: 138 MMOL/L (ref 136–145)
WBC # BLD AUTO: 14.34 THOUSAND/UL (ref 4.31–10.16)

## 2020-01-04 PROCEDURE — 80048 BASIC METABOLIC PNL TOTAL CA: CPT | Performed by: STUDENT IN AN ORGANIZED HEALTH CARE EDUCATION/TRAINING PROGRAM

## 2020-01-04 PROCEDURE — NC001 PR NO CHARGE: Performed by: INTERNAL MEDICINE

## 2020-01-04 PROCEDURE — 99232 SBSQ HOSP IP/OBS MODERATE 35: CPT | Performed by: ANESTHESIOLOGY

## 2020-01-04 PROCEDURE — 85027 COMPLETE CBC AUTOMATED: CPT | Performed by: STUDENT IN AN ORGANIZED HEALTH CARE EDUCATION/TRAINING PROGRAM

## 2020-01-04 PROCEDURE — 99024 POSTOP FOLLOW-UP VISIT: CPT | Performed by: OBSTETRICS & GYNECOLOGY

## 2020-01-04 PROCEDURE — 82948 REAGENT STRIP/BLOOD GLUCOSE: CPT

## 2020-01-04 RX ORDER — ALENDRONATE SODIUM 70 MG/1
70 TABLET ORAL
Status: DISCONTINUED | OUTPATIENT
Start: 2020-01-05 | End: 2020-01-05 | Stop reason: RX

## 2020-01-04 RX ADMIN — POTASSIUM CHLORIDE AND SODIUM CHLORIDE 125 ML/HR: 900; 150 INJECTION, SOLUTION INTRAVENOUS at 06:26

## 2020-01-04 RX ADMIN — POTASSIUM CHLORIDE AND SODIUM CHLORIDE 125 ML/HR: 900; 150 INJECTION, SOLUTION INTRAVENOUS at 22:42

## 2020-01-04 RX ADMIN — ROPIVACAINE HYDROCHLORIDE: 5 INJECTION, SOLUTION EPIDURAL; INFILTRATION; PERINEURAL at 12:10

## 2020-01-04 RX ADMIN — POTASSIUM CHLORIDE AND SODIUM CHLORIDE 125 ML/HR: 900; 150 INJECTION, SOLUTION INTRAVENOUS at 15:02

## 2020-01-04 RX ADMIN — POLYETHYLENE GLYCOL 3350 17 G: 17 POWDER, FOR SOLUTION ORAL at 09:20

## 2020-01-04 RX ADMIN — DOCUSATE SODIUM 100 MG: 100 CAPSULE, LIQUID FILLED ORAL at 19:02

## 2020-01-04 RX ADMIN — HEPARIN SODIUM 5000 UNITS: 5000 INJECTION INTRAVENOUS; SUBCUTANEOUS at 13:55

## 2020-01-04 RX ADMIN — HEPARIN SODIUM 5000 UNITS: 5000 INJECTION INTRAVENOUS; SUBCUTANEOUS at 05:24

## 2020-01-04 RX ADMIN — PRAVASTATIN SODIUM 20 MG: 20 TABLET ORAL at 19:02

## 2020-01-04 RX ADMIN — DOCUSATE SODIUM 100 MG: 100 CAPSULE, LIQUID FILLED ORAL at 09:20

## 2020-01-04 RX ADMIN — HEPARIN SODIUM 5000 UNITS: 5000 INJECTION INTRAVENOUS; SUBCUTANEOUS at 22:43

## 2020-01-04 NOTE — PROGRESS NOTES
Gyn Oncology Progress note   Samantha Cannon 68 y o  female MRN: [id number]  Unit/Bed#: [facility] 919-01 Encounter: [id number]      Assessment / Plan:  Samantha Cannon is a  68 y o  POD#1 status post exploratory laparotomy and bilateral salpingo-oophorectomy for large right adnexal complex mass, frozen likely borderline lesion but could not rule out a borderline tumor, doing well postoperatively  1) Right adnexal mass: Status post surgery as mentioned above, frozen pathology indicated likely benign lesion but could not rule out a borderline tumor, follow up final pathology as outpatient    2) Post operative Care:  Hgb 10 7 this morning  - FEN: Diabetic regular diet, IV NS 20KCl @ 125cc/hr  - Pain: Epidural in place, controlling pain well, APS following  - Encouraged incentive spirometry to reduce atelectasis and pneumonia risk  - Encouraged ambulation as tolerated  - To in place draining adequate urine, anticipate removal tomorrow  - VTE ppx: SCDs, Heparin [id number]  Hold Heparin tomorrow morning in anticipation of discontinuing epidural tomorrow  3) Chronic kidney disease  Baseline creatinine 0 9-1 4  Creatinine 1 03 this morning, GFR 55  Continue IV fluids at this time  Consult Nephrology  Avoid nephrotoxic medications  4) Type 2 DM:   Hemoglobin A1C 6 5 (12/10/19)  Accuchecks 130s-206  Home Metformin held  Continue SSI  5) Hypertension:   Blood pressures 100s-110s/50s-60s  Continue to hold home Losartan  Hydralazine PRN  6) COPD/ Asthma:  Currently asymptomatic  Albuterol PRN  7) Hyperlipidemia:   Continue Pravastatin     8) Disposition:  Inpatient      Samantha Cannon is doing well this morning  She reports "feeling great, no pain " Pain is well controlled with epidural   Patient is currently voiding by to catheter, urine output adequate  She is not yet ambulating  Patient is not currently passing flatus and has had no bowel movement  She is tolerating PO, and denies nausea or vomiting  Patient denies fever, chills, chest pain, shortness of breath, or calf tenderness  /58   Pulse 70   Temp 98 2 °F (36 8 °C)   Resp 18   Ht 5' (1 524 m)   Wt 81 1 kg (178 lb 14 1 oz)   SpO2 94%   BMI 34 94 kg/m²     I/O last 3 completed shifts: In: 4734 1 [P O :720; I V :3764 1; IV Piggyback:250]  Out: 0361 [Urine:1715; Blood:100]  No intake/output data recorded  Lab Results   Component Value Date    WBC 14 34 (H) 01/04/2020    HGB 10 7 (L) 01/04/2020    HCT 34 3 (L) 01/04/2020    MCV 94 01/04/2020     01/04/2020       Lab Results   Component Value Date    CALCIUM 7 5 (L) 01/04/2020    K 4 9 01/04/2020    CO2 27 01/04/2020     01/04/2020    BUN 12 01/04/2020    CREATININE 1 03 01/04/2020       Lab Results   Component Value Date/Time    POCGLU 134 01/04/2020 07:44 AM    POCGLU 200 (H) 01/03/2020 08:48 PM    POCGLU 179 (H) 01/03/2020 04:42 PM    POCGLU 206 (H) 01/03/2020 01:53 PM    POCGLU 191 (H) 01/03/2020 11:21 AM       Physical Exam  Physical Exam   Constitutional: She is oriented to person, place, and time  She appears well-developed and well-nourished  Genitourinary:   Genitourinary Comments: Barrett in place draining clear yellow urine   HENT:   Head: Normocephalic and atraumatic  Eyes: Pupils are equal, round, and reactive to light  EOM are normal    Neck: Normal range of motion  Neck supple  Cardiovascular: Normal rate, regular rhythm and normal heart sounds  Pulmonary/Chest: Effort normal and breath sounds normal  No stridor  No respiratory distress  She has no wheezes  She exhibits no tenderness  Abdominal: Soft  She exhibits no distension  There is no rebound and no guarding  Mildly, appropriately tender  Incision dressing clean/dry/intact  Hypoactive bowel sounds   Musculoskeletal: Normal range of motion  She exhibits no edema or tenderness  Neurological: She is alert and oriented to person, place, and time  Skin: Skin is warm and dry     Psychiatric: She has a normal mood and affect   Her behavior is normal      Dianna Cr MD  OBGYN PGY4  1/4/2020  8:51 AM

## 2020-01-04 NOTE — CONSULTS
Progress Note - Acute Pain Service Regional Follow Up  Nick Mena 68 y o  female MRN: [id number]  Unit/Bed#: [facility] 919-01 Encounter: [id number]      SURGERY DATE: 1/3/2020  Post-Op Diagnosis Codes:     * Generalized abdominal or pelvic swelling or mass or lump [R19 07]     * Ovarian mass, right [N83 8]    Assessment:   Patient is a 69 y/o F POD#1 s/p exploratory laparotomy and bilateral salpingo-oophorectomy for large right adnexal mass  She continues to do quite well from a pain perspective with minimal pain at rest and with exertion  Plan:   - Continue epidural with current settings of ropivacaine 0 1% with fentanyl 2 mcg/mL at 8 mL//hr with 4 mL q10min DD max 3 doses/hr  - Continue dilaudid 0 5 mg IV q2 hrs PRN for breakthrough pain  - Antipruritics PRN as indicated  - Agree with bowel regimen    APS will continue to follow  Please call  / [phone] ( between [phone] and on weekends) with questions or concerns      Subjective:  Patient states her pain control continues to be excellent  She denies any discomfort at this time  Experiences some mild discomfort with coughing   Looking forward to to removal  Has experience some mild itching but only localized to right side and she does not wish for medicinal interventions    Meds/Allergies   all current active meds have been reviewed and current meds:   Current Facility-Administered Medications   Medication Dose Route Frequency    albuterol (PROVENTIL HFA,VENTOLIN HFA) inhaler 2 puff  2 puff Inhalation Q4H PRN    calcium carbonate (TUMS) chewable tablet 1,000 mg  1,000 mg Oral Daily PRN    docusate sodium (COLACE) capsule 100 mg  100 mg Oral BID    heparin (porcine) subcutaneous injection 5,000 Units  5,000 Units Subcutaneous Q8H    hydrALAZINE (APRESOLINE) injection 5 mg  5 mg Intravenous Q6H PRN    HYDROmorphone (DILAUDID) injection 0 5 mg  0 5 mg Intravenous Q2H PRN    insulin lispro (HumaLOG) 100 units/mL subcutaneous injection 1-6 Units 1-6 Units Subcutaneous 4x Daily (AC & HS)    ondansetron (ZOFRAN) injection 4 mg  4 mg Intravenous Q6H PRN    polyethylene glycol (MIRALAX) packet 17 g  17 g Oral Daily    pravastatin (PRAVACHOL) tablet 20 mg  20 mg Oral Daily With Dinner    ropivacaine 0 1% and fentaNYL 2 mcg/mL PCEA   Epidural Continuous    simethicone (MYLICON) chewable tablet 80 mg  80 mg Oral 4x Daily PRN    sodium chloride 0 9 % with KCl 20 mEq/L infusion (premix)  125 mL/hr Intravenous Continuous       Allergies   Allergen Reactions    Penicillins Hives       Objective     Temp:  [96 °F (35 6 °C)-98 9 °F (37 2 °C)] 98 2 °F (36 8 °C)  HR:  [64-79] 70  Resp:  [13-34] 18  BP: (106-154)/(49-66) 117/58    Physical Exam  Gen: NAD, Resting comfortably in bed  CV: RRR, WWP  Resp: Normal rate and excursion  Abd: Soft, nontender around incision, incision line c/d/i  Ext: Motor 5/5 in b/l LE  Epidural: site c/d/i, no erythema/edema/induration

## 2020-01-04 NOTE — PROGRESS NOTES
Writer paged GynOnc resident X 2 to advise that patient's blood sugar is 200 and she has no coverage  Patient is not being followed by endocrinology  Will page again

## 2020-01-04 NOTE — CONSULTS
Please see nephrology consult completed yesterday by Dr Bethany Gee at 12:18 p m  Margo Oral Renal function currently better than baseline with serum creatinine 1 03  His BP remains in the low 110s, would continue to hold NSAIDs, losartan and Metformin  May restart metformin upon discharge  Would not restart losartan unless systolic blood pressure persistently above 140  Continue IV fluids per surgical team   Nephrology will sign off  Call/page with questions

## 2020-01-04 NOTE — PROGRESS NOTES
Paged gyne/onc resident with out response  Obtained number from charge nurse and called blood sugar previously charted on  Spoke with Marlin Matter, resident incoming

## 2020-01-05 LAB
ANION GAP SERPL CALCULATED.3IONS-SCNC: 4 MMOL/L (ref 4–13)
BUN SERPL-MCNC: 11 MG/DL (ref 5–25)
CALCIUM SERPL-MCNC: 7.4 MG/DL (ref 8.3–10.1)
CHLORIDE SERPL-SCNC: 114 MMOL/L (ref 100–108)
CO2 SERPL-SCNC: 25 MMOL/L (ref 21–32)
CREAT SERPL-MCNC: 0.98 MG/DL (ref 0.6–1.3)
ERYTHROCYTE [DISTWIDTH] IN BLOOD BY AUTOMATED COUNT: 13 % (ref 11.6–15.1)
GFR SERPL CREATININE-BSD FRML MDRD: 58 ML/MIN/1.73SQ M
GLUCOSE SERPL-MCNC: 111 MG/DL (ref 65–140)
GLUCOSE SERPL-MCNC: 114 MG/DL (ref 65–140)
GLUCOSE SERPL-MCNC: 150 MG/DL (ref 65–140)
GLUCOSE SERPL-MCNC: 156 MG/DL (ref 65–140)
GLUCOSE SERPL-MCNC: 158 MG/DL (ref 65–140)
HCT VFR BLD AUTO: 36.1 % (ref 34.8–46.1)
HGB BLD-MCNC: 11 G/DL (ref 11.5–15.4)
MCH RBC QN AUTO: 29.5 PG (ref 26.8–34.3)
MCHC RBC AUTO-ENTMCNC: 30.5 G/DL (ref 31.4–37.4)
MCV RBC AUTO: 97 FL (ref 82–98)
PLATELET # BLD AUTO: 228 THOUSANDS/UL (ref 149–390)
PMV BLD AUTO: 11.2 FL (ref 8.9–12.7)
POTASSIUM SERPL-SCNC: 4.5 MMOL/L (ref 3.5–5.3)
RBC # BLD AUTO: 3.73 MILLION/UL (ref 3.81–5.12)
SODIUM SERPL-SCNC: 143 MMOL/L (ref 136–145)
WBC # BLD AUTO: 13.07 THOUSAND/UL (ref 4.31–10.16)

## 2020-01-05 PROCEDURE — 80048 BASIC METABOLIC PNL TOTAL CA: CPT | Performed by: STUDENT IN AN ORGANIZED HEALTH CARE EDUCATION/TRAINING PROGRAM

## 2020-01-05 PROCEDURE — 85027 COMPLETE CBC AUTOMATED: CPT | Performed by: STUDENT IN AN ORGANIZED HEALTH CARE EDUCATION/TRAINING PROGRAM

## 2020-01-05 PROCEDURE — 99024 POSTOP FOLLOW-UP VISIT: CPT | Performed by: OBSTETRICS & GYNECOLOGY

## 2020-01-05 PROCEDURE — 82948 REAGENT STRIP/BLOOD GLUCOSE: CPT

## 2020-01-05 RX ORDER — OXYCODONE HYDROCHLORIDE 5 MG/1
5 TABLET ORAL EVERY 4 HOURS PRN
Status: DISCONTINUED | OUTPATIENT
Start: 2020-01-05 | End: 2020-01-06 | Stop reason: HOSPADM

## 2020-01-05 RX ORDER — SODIUM CHLORIDE 9 MG/ML
100 INJECTION, SOLUTION INTRAVENOUS CONTINUOUS
Status: DISCONTINUED | OUTPATIENT
Start: 2020-01-05 | End: 2020-01-05

## 2020-01-05 RX ORDER — OXYCODONE HYDROCHLORIDE 10 MG/1
10 TABLET ORAL EVERY 4 HOURS PRN
Status: DISCONTINUED | OUTPATIENT
Start: 2020-01-05 | End: 2020-01-06 | Stop reason: HOSPADM

## 2020-01-05 RX ORDER — ACETAMINOPHEN 325 MG/1
650 TABLET ORAL EVERY 6 HOURS PRN
Status: DISCONTINUED | OUTPATIENT
Start: 2020-01-05 | End: 2020-01-06 | Stop reason: HOSPADM

## 2020-01-05 RX ADMIN — SIMETHICONE CHEW TAB 80 MG 80 MG: 80 TABLET ORAL at 12:01

## 2020-01-05 RX ADMIN — DOCUSATE SODIUM 100 MG: 100 CAPSULE, LIQUID FILLED ORAL at 08:17

## 2020-01-05 RX ADMIN — SIMETHICONE CHEW TAB 80 MG 80 MG: 80 TABLET ORAL at 07:39

## 2020-01-05 RX ADMIN — ENOXAPARIN SODIUM 40 MG: 40 INJECTION SUBCUTANEOUS at 14:35

## 2020-01-05 RX ADMIN — POTASSIUM CHLORIDE AND SODIUM CHLORIDE 125 ML/HR: 900; 150 INJECTION, SOLUTION INTRAVENOUS at 06:24

## 2020-01-05 RX ADMIN — DOCUSATE SODIUM 100 MG: 100 CAPSULE, LIQUID FILLED ORAL at 17:51

## 2020-01-05 RX ADMIN — OXYCODONE HYDROCHLORIDE 5 MG: 5 TABLET ORAL at 22:37

## 2020-01-05 RX ADMIN — SODIUM CHLORIDE 100 ML/HR: 0.9 INJECTION, SOLUTION INTRAVENOUS at 12:15

## 2020-01-05 RX ADMIN — SIMETHICONE CHEW TAB 80 MG 80 MG: 80 TABLET ORAL at 22:37

## 2020-01-05 RX ADMIN — OXYCODONE HYDROCHLORIDE 5 MG: 5 TABLET ORAL at 17:56

## 2020-01-05 RX ADMIN — POLYETHYLENE GLYCOL 3350 17 G: 17 POWDER, FOR SOLUTION ORAL at 08:17

## 2020-01-05 RX ADMIN — SIMETHICONE CHEW TAB 80 MG 80 MG: 80 TABLET ORAL at 17:51

## 2020-01-05 RX ADMIN — PRAVASTATIN SODIUM 20 MG: 20 TABLET ORAL at 17:50

## 2020-01-05 NOTE — PROGRESS NOTES
Progress Note - Acute Pain Service Regional Follow Up  Belen Stagers 68 y o  female MRN: [id number]  Unit/Bed#: [facility] 919-01 Encounter: [id number]      SURGERY DATE: 1/3/2020  Post-Op Diagnosis Codes:     * Generalized abdominal or pelvic swelling or mass or lump [R19 07]     * Ovarian mass, right [N83 8]    Assessment:   Patient is a 67 y/o F POD#2 s/p exploratory laparotomy and bilateral salpingo-oophorectomy for large right adnexal mass  Pain continues to be well controlled with thoracic epidural    Plan:   - Epidural removed at primary team request   Tip intact  Pain management to resume per primary team    APS will sign off  Subjective:  Patient states her incisional pain continues to be well controlled with epidural   She does have gas pain which is uncomfortable      Meds/Allergies   all current active meds have been reviewed and current meds:   Current Facility-Administered Medications   Medication Dose Route Frequency    acetaminophen (TYLENOL) tablet 650 mg  650 mg Oral Q6H PRN    albuterol (PROVENTIL HFA,VENTOLIN HFA) inhaler 2 puff  2 puff Inhalation Q4H PRN    calcium carbonate (TUMS) chewable tablet 1,000 mg  1,000 mg Oral Daily PRN    docusate sodium (COLACE) capsule 100 mg  100 mg Oral BID    hydrALAZINE (APRESOLINE) injection 5 mg  5 mg Intravenous Q6H PRN    HYDROmorphone (DILAUDID) injection 0 5 mg  0 5 mg Intravenous Q2H PRN    insulin lispro (HumaLOG) 100 units/mL subcutaneous injection 1-6 Units  1-6 Units Subcutaneous 4x Daily (AC & HS)    ondansetron (ZOFRAN) injection 4 mg  4 mg Intravenous Q6H PRN    oxyCODONE (ROXICODONE) immediate release tablet 10 mg  10 mg Oral Q4H PRN    oxyCODONE (ROXICODONE) IR tablet 5 mg  5 mg Oral Q4H PRN    polyethylene glycol (MIRALAX) packet 17 g  17 g Oral Daily    pravastatin (PRAVACHOL) tablet 20 mg  20 mg Oral Daily With Dinner    ropivacaine 0 1% and fentaNYL 2 mcg/mL PCEA   Epidural Continuous    simethicone (MYLICON) chewable tablet 80 mg  80 mg Oral 4x Daily PRN    sodium chloride 0 9 % infusion  100 mL/hr Intravenous Continuous       Allergies   Allergen Reactions    Penicillins Hives       Objective     Temp:  [98 9 °F (37 2 °C)-99 9 °F (37 7 °C)] 99 9 °F (37 7 °C)  HR:  [66-87] 84  Resp:  [18] 18  BP: (116-146)/(57-88) 146/86    Physical Exam  Gen: NAD, Resting comfortably in bed  CV: RRR, WWP  Resp: Normal rate and excursion  Abd: Soft, nontender around incision, incision line c/d/i  Ext: Motor 5/5 in b/l LE  Epidural: site c/d/i, no erythema/edema/induration

## 2020-01-05 NOTE — SOCIAL WORK
LOS 2 days, not a bundle or readmission  CM met with patient and spouse at bedside to explain role and discuss DC planning  Patient lives in a 2 floor home with 3 SULAIMAN but reports she has a 1st floor set up with both a bedroom and full bathroom  Patient lives with her , son, daughter in law, and 3 grandchildren  Patient functioned independent PTA with no use of DME  No Hx HHC or STR  No Hx MH/substance use  Patient reports they have the LW and POA documents in process  Patient PCP is Dr Osito Viera, patient does not have prescription coverage and reports she utilizes [facility] and [facility]'s $4 prescription list as able  Preferred pharmacy is [facility]  Source of income SSI  Patient drives and reports her  will drive at DC  CM will continue to follow for potential DC needs

## 2020-01-05 NOTE — PROGRESS NOTES
Gyn Oncology Progress note   George Hills 68 y o  female MRN: [id number]  Unit/Bed#: [facility] 919-01 Encounter: [id number]      Assessment / Plan:  George Hills is a  68 y o  POD#2 status post exploratory laparotomy and bilateral salpingo-oophorectomy for large right adnexal complex mass, frozen likely borderline cannot rule out  borderline tumor, doing well postoperatively  1) Right adnexal mass: Status post surgery as mentioned above  Frozen pathology indicated likely benign lesion but could not rule out a borderline tumor  Follow up final pathology as outpatient  2) Post operative Care:  Hgb 10 7-->11 0   - FEN: Diabetic regular diet, IV NS @ 100cc/hr  - Pain: Epidural in place, APS following  Plan for removal today then will transition to PO meds  - Encouraged incentive spirometry to reduce atelectasis and pneumonia risk  - Encouraged ambulation as tolerated, encouraged Simethicone  - To in place draining adequate urine, anticipate removal today  - VTE ppx: SCDs, Heparin 5082S0 - currently on hold in anticipation of epidural removal, will resume after removal     3) Chronic kidney disease  Baseline creatinine 1 0-1 4  Creatinine 0 98 this morning  Nephrology following: Hold Losartan, (unless SBP persistently >140), Metformin & NSAIDs  Continue IV fluids at this time  Avoid nephrotoxic medications  4) Type 2 DM:   Hemoglobin A1C 6 5 (12/10/19)  Accuchecks 110s-150s  Home Metformin held  Continue SSI  5) Hypertension:   Blood pressures 110s-140s/80s  Continue to hold home Losartan  Hydralazine PRN  6) COPD/ Asthma:  Currently asymptomatic  Albuterol PRN  7) Hyperlipidemia:   Continue Pravastatin     8) Disposition:  Inpatient      George Hills is overall doing well this morning with the exception of "gas pain " Pain is well controlled with epidural   Patient is currently voiding by to catheter, urine output adequate  She is ambulating    Patient is not currently passing flatus and has had no bowel movement  She is tolerating PO, and denies nausea or vomiting  Patient denies fever, chills, chest pain, shortness of breath, or calf tenderness  /86   Pulse 84   Temp 99 9 °F (37 7 °C)   Resp 18   Ht 5' (1 524 m)   Wt 81 1 kg (178 lb 14 1 oz)   SpO2 93%   BMI 34 94 kg/m²     I/O last 3 completed shifts: In: 5162 9 [P O :600; I V :4562 9]  Out: 4175 [Urine:4175]  I/O this shift:  In: 100 7 [I V :100 7]  Out: -     Lab Results   Component Value Date    WBC 13 07 (H) 01/05/2020    HGB 11 0 (L) 01/05/2020    HCT 36 1 01/05/2020    MCV 97 01/05/2020     01/05/2020       Lab Results   Component Value Date    CALCIUM 7 4 (L) 01/05/2020    K 4 5 01/05/2020    CO2 25 01/05/2020     (H) 01/05/2020    BUN 11 01/05/2020    CREATININE 0 98 01/05/2020       Lab Results   Component Value Date/Time    POCGLU 111 01/05/2020 08:17 AM    POCGLU 153 (H) 01/04/2020 09:37 PM    POCGLU 147 (H) 01/04/2020 04:44 PM    POCGLU 172 (H) 01/04/2020 11:48 AM    POCGLU 134 01/04/2020 07:44 AM       Physical Exam  Physical Exam   Constitutional: She is oriented to person, place, and time  She appears well-developed and well-nourished  Genitourinary:   Genitourinary Comments: Barrett in place draining clear yellow urine   HENT:   Head: Normocephalic and atraumatic  Eyes: Pupils are equal, round, and reactive to light  EOM are normal    Neck: Normal range of motion  Neck supple  Cardiovascular: Normal rate, regular rhythm and normal heart sounds  Pulmonary/Chest: Effort normal and breath sounds normal  No stridor  No respiratory distress  She has no wheezes  She exhibits no tenderness  Abdominal: Soft  There is no rebound and no guarding  Mild incisional tenderness, no rebound/guarding  Abdomen mildly distended, tympanic  Incision clean/dry/intact with subcuticular sutures and Exofin surgical adhesive  Hypoactive bowel sounds   Musculoskeletal: Normal range of motion   She exhibits no edema or tenderness  Neurological: She is alert and oriented to person, place, and time  Skin: Skin is warm and dry  Psychiatric: She has a normal mood and affect   Her behavior is normal      Shannon Ludwig MD  OBGYN PGY4  1/5/2020  8:31 AM

## 2020-01-05 NOTE — PLAN OF CARE
Problem: Potential for Falls  Goal: Patient will remain free of falls  Description  INTERVENTIONS:  - Assess patient frequently for physical needs  -  Identify cognitive and physical deficits and behaviors that affect risk of falls    -  [place] fall precautions as indicated by assessment   - Educate patient/family on patient safety including physical limitations  - Instruct patient to call for assistance with activity based on assessment  - Modify environment to reduce risk of injury  - Consider OT/PT consult to assist with strengthening/mobility  Outcome: Progressing

## 2020-01-06 ENCOUNTER — TRANSITIONAL CARE MANAGEMENT (OUTPATIENT)
Dept: FAMILY MEDICINE CLINIC | Facility: CLINIC | Age: 70
End: 2020-01-06

## 2020-01-06 ENCOUNTER — TELEPHONE (OUTPATIENT)
Dept: GYNECOLOGIC ONCOLOGY | Facility: CLINIC | Age: 70
End: 2020-01-06

## 2020-01-06 VITALS
WEIGHT: 178.88 LBS | BODY MASS INDEX: 35.12 KG/M2 | RESPIRATION RATE: 17 BRPM | SYSTOLIC BLOOD PRESSURE: 161 MMHG | OXYGEN SATURATION: 97 % | HEART RATE: 74 BPM | HEIGHT: 60 IN | DIASTOLIC BLOOD PRESSURE: 87 MMHG | TEMPERATURE: 98.9 F

## 2020-01-06 LAB
ANION GAP SERPL CALCULATED.3IONS-SCNC: 7 MMOL/L (ref 4–13)
BUN SERPL-MCNC: 7 MG/DL (ref 5–25)
CALCIUM SERPL-MCNC: 7.7 MG/DL (ref 8.3–10.1)
CHLORIDE SERPL-SCNC: 109 MMOL/L (ref 100–108)
CO2 SERPL-SCNC: 22 MMOL/L (ref 21–32)
CREAT SERPL-MCNC: 0.8 MG/DL (ref 0.6–1.3)
ERYTHROCYTE [DISTWIDTH] IN BLOOD BY AUTOMATED COUNT: 12.6 % (ref 11.6–15.1)
GFR SERPL CREATININE-BSD FRML MDRD: 74 ML/MIN/1.73SQ M
GLUCOSE SERPL-MCNC: 101 MG/DL (ref 65–140)
GLUCOSE SERPL-MCNC: 109 MG/DL (ref 65–140)
HCT VFR BLD AUTO: 37.8 % (ref 34.8–46.1)
HGB BLD-MCNC: 12 G/DL (ref 11.5–15.4)
MCH RBC QN AUTO: 29.7 PG (ref 26.8–34.3)
MCHC RBC AUTO-ENTMCNC: 31.7 G/DL (ref 31.4–37.4)
MCV RBC AUTO: 94 FL (ref 82–98)
PLATELET # BLD AUTO: 191 THOUSANDS/UL (ref 149–390)
PMV BLD AUTO: 11.4 FL (ref 8.9–12.7)
POTASSIUM SERPL-SCNC: 4.5 MMOL/L (ref 3.5–5.3)
RBC # BLD AUTO: 4.04 MILLION/UL (ref 3.81–5.12)
SODIUM SERPL-SCNC: 138 MMOL/L (ref 136–145)
WBC # BLD AUTO: 12.65 THOUSAND/UL (ref 4.31–10.16)

## 2020-01-06 PROCEDURE — 82948 REAGENT STRIP/BLOOD GLUCOSE: CPT

## 2020-01-06 PROCEDURE — 80048 BASIC METABOLIC PNL TOTAL CA: CPT | Performed by: STUDENT IN AN ORGANIZED HEALTH CARE EDUCATION/TRAINING PROGRAM

## 2020-01-06 PROCEDURE — 85027 COMPLETE CBC AUTOMATED: CPT | Performed by: STUDENT IN AN ORGANIZED HEALTH CARE EDUCATION/TRAINING PROGRAM

## 2020-01-06 PROCEDURE — 99024 POSTOP FOLLOW-UP VISIT: CPT | Performed by: OBSTETRICS & GYNECOLOGY

## 2020-01-06 RX ORDER — DOCUSATE SODIUM 100 MG/1
100 CAPSULE, LIQUID FILLED ORAL 2 TIMES DAILY
Qty: 10 CAPSULE | Refills: 0 | Status: SHIPPED | OUTPATIENT
Start: 2020-01-06 | End: 2020-01-21 | Stop reason: HOSPADM

## 2020-01-06 RX ORDER — SULFAMETHOXAZOLE AND TRIMETHOPRIM 800; 160 MG/1; MG/1
1 TABLET ORAL EVERY 12 HOURS SCHEDULED
Qty: 14 TABLET | Refills: 0 | Status: SHIPPED | OUTPATIENT
Start: 2020-01-06 | End: 2020-01-13

## 2020-01-06 RX ORDER — ACETAMINOPHEN 325 MG/1
650 TABLET ORAL EVERY 6 HOURS PRN
Qty: 30 TABLET | Refills: 0 | Status: SHIPPED | OUTPATIENT
Start: 2020-01-06

## 2020-01-06 NOTE — NURSING NOTE
Reviewed AVS with patient and spouse  Pt understands meds are to be picked up at her pharmacy  Volunteer will take patient down in a WC  Pt's ride in room/

## 2020-01-06 NOTE — PLAN OF CARE
Problem: Potential for Falls  Goal: Patient will remain free of falls  Description  INTERVENTIONS:  - Assess patient frequently for physical needs  -  Identify cognitive and physical deficits and behaviors that affect risk of falls    -  [place] fall precautions as indicated by assessment   - Educate patient/family on patient safety including physical limitations  - Instruct patient to call for assistance with activity based on assessment  - Modify environment to reduce risk of injury  - Consider OT/PT consult to assist with strengthening/mobility  Outcome: Progressing

## 2020-01-06 NOTE — PROGRESS NOTES
Gyn Oncology Progress note   Lucio Little 68 y o  female MRN: [id number]  Unit/Bed#: [facility] 919-01 Encounter: [id number]      A/P: 68 y o  POD# 3 s/p exploratory laparotomy and bilateral salpingo-oophorectomy for large adnexal mass  1) Adnexal mass   -F/u pathology as outpatient; initial frozen indicated benign but possible borderline tumor    2) Post operative Care:             - Hgb 10 7->11/0->12 0                        - FEN: Diabetic diet,                         - Pain: Tylenol, Roxicodone 5/10mg                        - DVT ppx: SCDs, Lovenox 40mg                        - Encouraged incentive spirometry to reduce atelectasis and pneumonia risk                        - Encouraged ambulation as tolerated  3) Cellulitis   - Left arm erythematous rash and swelling   - Augmentin 500mg q12h for 7 days  4) CKD   -Creatine 0 98->0 80   -Nephrology recs: Hold Losartan, NSAIDS, Metformin and avoid nephrotoxic drugs    5) T2DM   -Hemoglobin A1C 6 5 on 12/10   -Accuchecks yesterday 111-158   -SSI    6) HTN   - BP's overnight 155/84-87   -Hydralazine PRN    7) COPD/Asthma   -Albuterol PRN    8) HLD    -Pravastain 20mg     9) Dispo: Anticipate discharge today     Lucio Little reports a erythematous rash on her left arm that appeared yesterday  Pain is well controlled with oral narcotics after her epidural removal   Patient is currently voiding without issue  She is ambulating and sitting up in her chair  Patient is currently passing flatus and has had a small bowel movement this morning  She is tolerating PO, and denies nausea or vomitting  Patient denies fever, chills, chest pain, shortness of breath, or calf tenderness  /87   Pulse 80   Temp 98 7 °F (37 1 °C)   Resp 18   Ht 5' (1 524 m)   Wt 81 1 kg (178 lb 14 1 oz)   SpO2 96%   BMI 34 94 kg/m²     I/O last 3 completed shifts:   In: 3770 7 [P O :480; I V :3290 7]  Out: 3900 [LBRXH:8811]  I/O this shift:  In: 240 [P O :240]  Out: 850 [Urine:850]    Lab Results   Component Value Date    WBC 13 07 (H) 01/05/2020    HGB 11 0 (L) 01/05/2020    HCT 36 1 01/05/2020    MCV 97 01/05/2020     01/05/2020       Lab Results   Component Value Date    CALCIUM 7 4 (L) 01/05/2020    K 4 5 01/05/2020    CO2 25 01/05/2020     (H) 01/05/2020    BUN 11 01/05/2020    CREATININE 0 98 01/05/2020       Lab Results   Component Value Date/Time    POCGLU 156 (H) 01/05/2020 08:41 PM    POCGLU 158 (H) 01/05/2020 04:50 PM    POCGLU 150 (H) 01/05/2020 02:02 PM    POCGLU 111 01/05/2020 08:17 AM    POCGLU 153 (H) 01/04/2020 09:37 PM       Physical Exam  Gen: AAOx3, NAD, comfortable in bed  CVS: S1S2+, RRR, no murmurs  Lungs: CTA b/l normal respiratory effort and rate  Abdomen: soft, non tender, incisions C/D/I  Extremities: SCDs on and on, non tender    Edmundo Red MD  OBGYN PGY-1  1/6/2020  5:59 AM

## 2020-01-09 ENCOUNTER — OFFICE VISIT (OUTPATIENT)
Dept: FAMILY MEDICINE CLINIC | Facility: CLINIC | Age: 70
End: 2020-01-09
Payer: MEDICARE

## 2020-01-09 VITALS
BODY MASS INDEX: 32.74 KG/M2 | SYSTOLIC BLOOD PRESSURE: 180 MMHG | HEART RATE: 79 BPM | HEIGHT: 61 IN | WEIGHT: 173.4 LBS | OXYGEN SATURATION: 95 % | DIASTOLIC BLOOD PRESSURE: 78 MMHG | TEMPERATURE: 99.6 F

## 2020-01-09 DIAGNOSIS — N83.8 OVARIAN MASS, RIGHT: Primary | ICD-10-CM

## 2020-01-09 DIAGNOSIS — I80.9 PHLEBITIS: ICD-10-CM

## 2020-01-09 DIAGNOSIS — I10 BENIGN ESSENTIAL HYPERTENSION: ICD-10-CM

## 2020-01-09 PROCEDURE — 99496 TRANSJ CARE MGMT HIGH F2F 7D: CPT | Performed by: FAMILY MEDICINE

## 2020-01-09 NOTE — ASSESSMENT & PLAN NOTE
Systolic blood pressure is quite elevated today but she has not restarted her medications  I will have her start losartan 50 mg this afternoon and then continue on a daily basis  She will continue monitoring blood pressure at home and let me know if the systolic numbers remain elevated

## 2020-01-09 NOTE — ASSESSMENT & PLAN NOTE
She had exploratory laparotomy with bilateral salpingo-oophorectomy and removal of a large benign right adnexal mass  Incision appears to be healing well without any signs of infection  She is doing well postop day 6 , although having some abdominal pain/likely gas-related    She will follow up with her surgeon on January 21st

## 2020-01-09 NOTE — ASSESSMENT & PLAN NOTE
Superficial phlebitis right forearm appears to be healing well on Bactrim  She will finish her 7 day course

## 2020-01-09 NOTE — PROGRESS NOTES
Assessment/Plan:     Ovarian mass, right  She had exploratory laparotomy with bilateral salpingo-oophorectomy and removal of a large benign right adnexal mass  Incision appears to be healing well without any signs of infection  She is doing well postop day 6 , although having some abdominal pain/likely gas-related  She will follow up with her surgeon on January 21st     Benign essential hypertension  Systolic blood pressure is quite elevated today but she has not restarted her medications  I will have her start losartan 50 mg this afternoon and then continue on a daily basis  She will continue monitoring blood pressure at home and let me know if the systolic numbers remain elevated  Controlled diabetes mellitus type 2 with complications [facility])    Lab Results   Component Value Date    HGBA1C 6 5 (H) 12/10/2019     She is back on her metformin  I have urged her to continue with small frequent meals and we will plan follow-up visit in a month  Phlebitis  Superficial phlebitis right forearm appears to be healing well on Bactrim  She will finish her 7 day course  Diagnoses and all orders for this visit:    Ovarian mass, right    Benign essential hypertension    Phlebitis         Subjective:     Patient ID: Belen Dale is a 68 y o  female  She is here with her  6 postop day status post exploratory laparotomy and she bilateral salpingo-oophorectomy with removal of a large benign adnexal mass on the right side  She was discharged from the hospital on Monday January 6, 3 days ago  She did get a superficial phlebitis of the right arm from the IV  She was treated with a course of Bactrim  There was a decrease in kidney function which recovered  She had Nephrology consultation while in the hospital   Although she is taking her metformin she has not started any of the other medications  She is having back pain which radiates around to the abdomen  This just started today    No fever or chills    She has been doing small walks daily  She has decreased appetite  She has had some nausea but no vomiting  She is having bowel movements which are soft because she has been taking stool softeners  Review of Systems   Constitutional: Negative for activity change, chills, fatigue and fever  HENT: Negative for congestion, ear pain, sinus pressure and sore throat  Eyes: Negative for pain and visual disturbance  Respiratory: Positive for cough  Negative for chest tightness, shortness of breath and wheezing  Cardiovascular: Negative for chest pain, palpitations and leg swelling  Gastrointestinal: Positive for abdominal pain and nausea  Negative for blood in stool, constipation, diarrhea and vomiting  Endocrine: Negative for polydipsia and polyuria  Genitourinary: Negative for difficulty urinating, dysuria, frequency and urgency  Musculoskeletal: Negative for arthralgias, joint swelling and myalgias  Skin: Negative for rash  Neurological: Negative for dizziness, weakness, numbness and headaches  Hematological: Negative for adenopathy  Does not bruise/bleed easily  Psychiatric/Behavioral: Negative for dysphoric mood  The patient is not nervous/anxious  Objective:     Physical Exam   Constitutional: She is oriented to person, place, and time  She appears well-developed and well-nourished  HENT:   Head: Normocephalic and atraumatic  Mouth/Throat: Oropharynx is clear and moist    Neck: Normal range of motion  Neck supple  Cardiovascular: Normal rate, regular rhythm, normal heart sounds and intact distal pulses  Pulmonary/Chest: Effort normal and breath sounds normal  No respiratory distress  Abdominal: Soft  Bowel sounds are normal    Large vertical incision from xiphoid process to pubis  Appears to be healing well without erythema or drainage  Neurological: She is alert and oriented to person, place, and time  Skin: Skin is warm and dry     Ecchymosis of the left hand over the dorsum  Small subcutaneous swelling right forearm which is nontender  Psychiatric: She has a normal mood and affect  Her behavior is normal    Nursing note and vitals reviewed  Vitals:    01/09/20 1401   BP: (!) 180/78   BP Location: Left arm   Patient Position: Sitting   Cuff Size: Standard   Pulse: 79   Temp: 99 6 °F (37 6 °C)   TempSrc: Tympanic   SpO2: 95%   Weight: 78 7 kg (173 lb 6 4 oz)   Height: 5' 0 5" (1 537 m)       Transitional Care Management Review:  Marie Coelho is a 68 y o  female here for TCM follow up  During the TCM phone call patient stated:    TCM Call (since 12/9/2019)     Date and time call was made  1/6/2020  5:01 PM    Hospital care reviewed  Records reviewed    Patient was hospitial[facility] at  [facility]    Date of Admission  01/03/20    Date of discharge  01/06/20    Diagnosis  renal insufficiency    Disposition  Home    Were the patients medications reviewed and updated  No    Current Symptoms  None      TCM Call (since 12/9/2019)     Post hospital issues  None    Should patient be enrolled in anticoag monitoring? No    Scheduled for follow up?   Yes    Did you obtain your prescribed medications  Yes    Do you need help managing your prescriptions or medications  No    Is transportation to your appointment needed  No    I have advised the patient to call PCP with any new or worsening symptoms  lhunter    Living Arrangements  Spouse or Significiant other    Are you recieving any outpatient services  No    Are you recieving home care services  No    Are you using any community resources  No    Current waiver services  No    Have you fallen in the last 12 months  No    Comments  scheduled with dr coronel 1/9          Taylor Perea MD

## 2020-01-09 NOTE — ASSESSMENT & PLAN NOTE
Lab Results   Component Value Date    HGBA1C 6 5 (H) 12/10/2019     She is back on her metformin  I have urged her to continue with small frequent meals and we will plan follow-up visit in a month

## 2020-01-21 ENCOUNTER — OFFICE VISIT (OUTPATIENT)
Dept: GYNECOLOGIC ONCOLOGY | Facility: CLINIC | Age: 70
End: 2020-01-21

## 2020-01-21 VITALS
TEMPERATURE: 98.5 F | HEIGHT: 60 IN | BODY MASS INDEX: 31.8 KG/M2 | WEIGHT: 162 LBS | RESPIRATION RATE: 18 BRPM | HEART RATE: 79 BPM | DIASTOLIC BLOOD PRESSURE: 82 MMHG | SYSTOLIC BLOOD PRESSURE: 150 MMHG

## 2020-01-21 DIAGNOSIS — N83.8 OVARIAN MASS, RIGHT: Primary | ICD-10-CM

## 2020-01-21 PROCEDURE — 99024 POSTOP FOLLOW-UP VISIT: CPT | Performed by: OBSTETRICS & GYNECOLOGY

## 2020-01-21 NOTE — PROGRESS NOTES
Assessment/Plan:    Problem List Items Addressed This Visit        Other    Ovarian mass, right - Primary     Patient is stable postoperatively  She has tolerated surgery well  She has return to a preop level of functioning  Final pathology report is currently pending but somewhere in the neighborhood of benign versus borderline tumor  It is likely that no further treatment is required  The patient will follow up by phone  She will call me in 2 weeks if she has not heard from me  If the it is benign no further treatment is required  If it is borderline will follow her with  send CEAs on an every six-month basis  CHIEF COMPLAINT:  Postoperative evaluation      Patient ID: Yamini Kruse is a 68 y o  female  Patient is very pleasant 69-year-old female who underwent exploratory laparotomy bilateral salpingo oophorectomy for a 20 cm adnexal mass  She did well postoperatively and was discharged home in stable condition  Since that time she continues to do well  Final pathology report is not yet available  A preliminary report is consistent with a frozen section and that indicates 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  Today, the patient is doing well  She denies significant abdominal pain, pelvic pain, nausea, vomiting, constipation, diarrhea, fevers, chills, or vaginal bleeding  Review of Systems   Constitutional: Negative  HENT: Negative  Eyes: Negative  Respiratory: Negative  Cardiovascular: Negative  Gastrointestinal: Negative  Endocrine: Negative  Genitourinary: Negative  Musculoskeletal: Negative  Skin: Negative  Neurological: Negative  Hematological: Negative  Psychiatric/Behavioral: Negative          Current Outpatient Medications   Medication Sig Dispense Refill    acetaminophen (TYLENOL) 325 mg tablet Take 2 tablets (650 mg total) by mouth every 6 (six) hours as needed for mild pain 30 tablet 0    metFORMIN (GLUCOPHAGE) 1000 MG tablet Take 1 tablet (1,000 mg total) by mouth 2 (two) times a day 180 tablet 3     No current facility-administered medications for this visit  Allergies   Allergen Reactions    Penicillins Hives       Past Medical History:   Diagnosis Date    Asthma     [facility]chronic obstructive pulmonary disease) [facility] [address]     [facility]     Hyperlipidemia     Osteoarthritis     Osteoporosis     Seasonal allergies        Past Surgical History:   Procedure Laterality Date    BILATERAL SALPINGOOPHORECTOMY Bilateral 1/3/2020    Procedure: BILATERAL SALPINGO-OOPHORECTOMY;  Surgeon: Noel Gottlieb MD;  [facility]: [facility];  Service: Gynecology Oncology    CATARACT EXTRACTION Bilateral     CHOLECYSTECTOMY      [id number]  LAST ASSESSED    EYE SURGERY      cataracts removed    HYSTERECTOMY      [id number]  LAST ASSESSED    NH EXPLORATORY OF ABDOMEN N/A 1/3/2020    Procedure: LAPAROTOMY EXPLORATORY;  Surgeon: Noel Gottlieb MD;  Location: [facility];  Service: Gynecology Oncology    TUBAL LIGATION         OB History        5    Para   5    Term   4       1    AB   0    Living   4       SAB   0    TAB   0    Ectopic   0    Multiple   0    Live Births   4                 Family History   Problem Relation Age of Onset    Heart disease Mother     Rheumatic fever Father     Diabetes Family         MELLITUS    Diabetes Family         MELLITUS       The following portions of the patient's history were reviewed and updated as appropriate: allergies, current medications, past family history, past social history, past surgical history and problem list       Objective:    Blood pressure 150/82, pulse 79, temperature 98 5 °F (36 9 °C), resp  rate 18, height 5' 0 05" (1 525 m), weight 73 5 kg (162 lb)  Body mass index is 31 59 kg/m²  Physical Exam   Constitutional: She is oriented to person, place, and time  She appears well-developed and well-nourished     HENT:   Head: Normocephalic and atraumatic  Eyes: EOM are normal    Neck: Normal range of motion  Neck supple  No thyromegaly present  Cardiovascular: Normal rate, regular rhythm and normal heart sounds  Pulmonary/Chest: Effort normal and breath sounds normal    Abdominal: Soft  Bowel sounds are normal    Well healed laparoscopic incisions  Genitourinary:   Genitourinary Comments: -Normal external female genitalia, normal Barth[place]'s and [place]'s glands                  -Normal midline urethral meatus  No lesions notes                  -Bladder without fullness mass or tenderness                  -Vagina without lesion or discharge No significant cystocele or rectocele noted                  -Cervix surgically absent                  -Uterus surgically absent                  -Adnexae surgically absent                  - Anus without fissure of lesion     Musculoskeletal: Normal range of motion  Lymphadenopathy:     She has no cervical adenopathy  Neurological: She is alert and oriented to person, place, and time  Skin: Skin is warm and dry  Psychiatric: She has a normal mood and affect   Her behavior is normal          Lab Results   Component Value Date     16 1 12/10/2019     Lab Results   Component Value Date    WBC 12 65 (H) 01/06/2020    HGB 12 0 01/06/2020    HCT 37 8 01/06/2020    MCV 94 01/06/2020     01/06/2020     Lab Results   Component Value Date    K 4 5 01/06/2020     (H) 01/06/2020    CO2 22 01/06/2020    BUN 7 01/06/2020    CREATININE 0 80 01/06/2020    GLUF 111 (H) 12/10/2019    CALCIUM 7 7 (L) 01/06/2020    AST 15 12/10/2019    ALT 22 12/10/2019    ALKPHOS 61 12/10/2019    EGFR 74 01/06/2020

## 2020-01-21 NOTE — ASSESSMENT & PLAN NOTE
Patient is stable postoperatively  She has tolerated surgery well  She has return to a preop level of functioning  Final pathology report is currently pending but somewhere in the neighborhood of benign versus borderline tumor  It is likely that no further treatment is required  The patient will follow up by phone  She will call me in 2 weeks if she has not heard from me  If the it is benign no further treatment is required  If it is borderline will follow her with  send CEAs on an every six-month basis

## 2020-01-21 NOTE — LETTER
January 21, 2020     Inez Lang MD  [address]     Patient: Domi Olguin   YOB: 1950   Date of Visit: 1/21/2020       Dear Dr Toyin Rivers: Thank you for referring Domi Olguin to me for evaluation  Below are my notes for this consultation  If you have questions, please do not hesitate to call me  I look forward to following your patient along with you  Sincerely,        Keyur Bobo MD        CC: MD Keyur Greco MD  1/21/2020 10:37 AM  Incomplete  Assessment/Plan:    Problem List Items Addressed This Visit        Other    Ovarian mass, right - Primary     Patient is stable postoperatively  She has tolerated surgery well  She has return to a preop level of functioning  Final pathology report is currently pending but somewhere in the neighborhood of benign versus borderline tumor  It is likely that no further treatment is required  The patient will follow up by phone  She will call me in 2 weeks if she has not heard from me  If the it is benign no further treatment is required  If it is borderline will follow her with  send CEAs on an every six-month basis  CHIEF COMPLAINT:  Postoperative evaluation      Patient ID: Domi Olguin is a 68 y o  female  Patient is very pleasant 28-year-old female who underwent exploratory laparotomy bilateral salpingo oophorectomy for a 20 cm adnexal mass  She did well postoperatively and was discharged home in stable condition  Since that time she continues to do well  Final pathology report is not yet available  A preliminary report is consistent with a frozen section and that indicates 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  Today, the patient is doing well  She denies significant abdominal pain, pelvic pain, nausea, vomiting, constipation, diarrhea, fevers, chills, or vaginal bleeding        Review of Systems   Constitutional: Negative  HENT: Negative  Eyes: Negative  Respiratory: Negative  Cardiovascular: Negative  Gastrointestinal: Negative  Endocrine: Negative  Genitourinary: Negative  Musculoskeletal: Negative  Skin: Negative  Neurological: Negative  Hematological: Negative  Psychiatric/Behavioral: Negative  Current Outpatient Medications   Medication Sig Dispense Refill    acetaminophen (TYLENOL) 325 mg tablet Take 2 tablets (650 mg total) by mouth every 6 (six) hours as needed for mild pain 30 tablet 0    metFORMIN (GLUCOPHAGE) 1000 MG tablet Take 1 tablet (1,000 mg total) by mouth 2 (two) times a day 180 tablet 3     No current facility-administered medications for this visit          Allergies   Allergen Reactions    Penicillins Hives       Past Medical History:   Diagnosis Date    Asthma     COPD [facility]chronic obstructive pulmonary disease) ([facility]     Diabetes ([facility] )     Hyperlipidemia     Osteoarthritis     Osteoporosis     Seasonal allergies        Past Surgical History:   Procedure Laterality Date    BILATERAL SALPINGOOPHORECTOMY Bilateral 1/3/2020    Procedure: BILATERAL SALPINGO-OOPHORECTOMY;  Surgeon: Shruti Suazo MD;  [facility];  Service: Gynecology Oncology    CATARACT EXTRACTION Bilateral     CHOLECYSTECTOMY      [id number]  LAST ASSESSED    EYE SURGERY      cataracts removed    HYSTERECTOMY      [id number]  LAST ASSESSED    NE EXPLORATORY OF ABDOMEN N/A 1/3/2020    Procedure: LAPAROTOMY EXPLORATORY;  Surgeon: Shruti Suazo MD;  [facility];  Service: Gynecology Oncology    TUBAL LIGATION         OB History        5    Para   5    Term   4       1    AB   0    Living   4       SAB   0    TAB   0    Ectopic   0    Multiple   0    Live Births   4                 Family History   Problem Relation Age of Onset    Heart disease Mother     Rheumatic fever Father     Diabetes Family         MELLITUS    Diabetes Family MELLITUS       The following portions of the patient's history were reviewed and updated as appropriate: allergies, current medications, past family history, past social history, past surgical history and problem list       Objective:    Blood pressure 150/82, pulse 79, temperature 98 5 °F (36 9 °C), resp  rate 18, height 5' 0 05" (1 525 m), weight 73 5 kg (162 lb)  Body mass index is 31 59 kg/m²  Physical Exam   Constitutional: She is oriented to person, place, and time  She appears well-developed and well-nourished  HENT:   Head: Normocephalic and atraumatic  Eyes: EOM are normal    Neck: Normal range of motion  Neck supple  No thyromegaly present  Cardiovascular: Normal rate, regular rhythm and normal heart sounds  Pulmonary/Chest: Effort normal and breath sounds normal    Abdominal: Soft  Bowel sounds are normal    Well healed laparoscopic incisions  Genitourinary:   Genitourinary Comments: -Normal external female genitalia, normal Bartholin's and Satanta's glands                  -Normal midline urethral meatus  No lesions notes                  -Bladder without fullness mass or tenderness                  -Vagina without lesion or discharge No significant cystocele or rectocele noted                  -Cervix surgically absent                  -Uterus surgically absent                  -Adnexae surgically absent                  - Anus without fissure of lesion     Musculoskeletal: Normal range of motion  Lymphadenopathy:     She has no cervical adenopathy  Neurological: She is alert and oriented to person, place, and time  Skin: Skin is warm and dry  Psychiatric: She has a normal mood and affect   Her behavior is normal          Lab Results   Component Value Date     16 1 12/10/2019     Lab Results   Component Value Date    WBC 12 65 (H) 01/06/2020    HGB 12 0 01/06/2020    HCT 37 8 01/06/2020    MCV 94 01/06/2020     01/06/2020     Lab Results   Component Value Date    K 4 5 01/06/2020     (H) 01/06/2020    CO2 22 01/06/2020    BUN 7 01/06/2020    CREATININE 0 80 01/06/2020    GLUF 111 (H) 12/10/2019    CALCIUM 7 7 (L) 01/06/2020    AST 15 12/10/2019    ALT 22 12/10/2019    ALKPHOS 61 12/10/2019    EGFR 74 01/06/2020

## 2020-02-19 ENCOUNTER — OFFICE VISIT (OUTPATIENT)
Dept: FAMILY MEDICINE CLINIC | Facility: CLINIC | Age: 70
End: 2020-02-19
Payer: MEDICARE

## 2020-02-19 VITALS
HEART RATE: 64 BPM | WEIGHT: 165.5 LBS | DIASTOLIC BLOOD PRESSURE: 80 MMHG | HEIGHT: 61 IN | OXYGEN SATURATION: 98 % | SYSTOLIC BLOOD PRESSURE: 142 MMHG | BODY MASS INDEX: 31.25 KG/M2

## 2020-02-19 DIAGNOSIS — I77.6 VASCULITIS (HCC): ICD-10-CM

## 2020-02-19 DIAGNOSIS — J45.20 MILD INTERMITTENT ASTHMA WITHOUT COMPLICATION: ICD-10-CM

## 2020-02-19 DIAGNOSIS — I10 BENIGN ESSENTIAL HYPERTENSION: ICD-10-CM

## 2020-02-19 DIAGNOSIS — M81.0 POSTMENOPAUSAL OSTEOPOROSIS: ICD-10-CM

## 2020-02-19 DIAGNOSIS — M46.1 SACROILIITIS, NOT ELSEWHERE CLASSIFIED (HCC): ICD-10-CM

## 2020-02-19 DIAGNOSIS — E78.2 MIXED HYPERLIPIDEMIA: ICD-10-CM

## 2020-02-19 DIAGNOSIS — Z12.11 SCREENING FOR COLON CANCER: ICD-10-CM

## 2020-02-19 DIAGNOSIS — E11.9 CONTROLLED TYPE 2 DIABETES MELLITUS WITHOUT COMPLICATION, WITHOUT LONG-TERM CURRENT USE OF INSULIN (HCC): Primary | ICD-10-CM

## 2020-02-19 DIAGNOSIS — Z23 NEED FOR TDAP VACCINATION: ICD-10-CM

## 2020-02-19 DIAGNOSIS — N83.8 OVARIAN MASS, RIGHT: ICD-10-CM

## 2020-02-19 PROBLEM — R19.07 GENERALIZED ABDOMINAL OR PELVIC SWELLING OR MASS OR LUMP: Status: RESOLVED | Noted: 2019-12-03 | Resolved: 2020-02-19

## 2020-02-19 PROBLEM — Z01.810 PRE-OPERATIVE CARDIOVASCULAR EXAMINATION: Status: RESOLVED | Noted: 2019-12-16 | Resolved: 2020-02-19

## 2020-02-19 PROBLEM — I80.9 PHLEBITIS: Status: RESOLVED | Noted: 2020-01-09 | Resolved: 2020-02-19

## 2020-02-19 PROCEDURE — 3077F SYST BP >= 140 MM HG: CPT | Performed by: FAMILY MEDICINE

## 2020-02-19 PROCEDURE — 1036F TOBACCO NON-USER: CPT | Performed by: FAMILY MEDICINE

## 2020-02-19 PROCEDURE — 3079F DIAST BP 80-89 MM HG: CPT | Performed by: FAMILY MEDICINE

## 2020-02-19 PROCEDURE — 4040F PNEUMOC VAC/ADMIN/RCVD: CPT | Performed by: FAMILY MEDICINE

## 2020-02-19 PROCEDURE — 99214 OFFICE O/P EST MOD 30 MIN: CPT | Performed by: FAMILY MEDICINE

## 2020-02-19 PROCEDURE — 1160F RVW MEDS BY RX/DR IN RCRD: CPT | Performed by: FAMILY MEDICINE

## 2020-02-19 PROCEDURE — 3008F BODY MASS INDEX DOCD: CPT | Performed by: FAMILY MEDICINE

## 2020-02-19 PROCEDURE — 4010F ACE/ARB THERAPY RXD/TAKEN: CPT | Performed by: FAMILY MEDICINE

## 2020-02-19 PROCEDURE — 1111F DSCHRG MED/CURRENT MED MERGE: CPT | Performed by: FAMILY MEDICINE

## 2020-02-19 RX ORDER — LOSARTAN POTASSIUM 50 MG/1
50 TABLET ORAL DAILY
COMMUNITY
End: 2021-01-08 | Stop reason: SDUPTHER

## 2020-02-19 RX ORDER — ALENDRONATE SODIUM 70 MG/1
70 TABLET ORAL
COMMUNITY
End: 2020-07-16

## 2020-02-19 RX ORDER — SIMVASTATIN 10 MG
10 TABLET ORAL
COMMUNITY
End: 2020-08-05 | Stop reason: SDUPTHER

## 2020-02-19 RX ORDER — ASPIRIN 81 MG/1
81 TABLET ORAL DAILY
COMMUNITY

## 2020-02-19 RX ORDER — LORATADINE 10 MG/1
CAPSULE, LIQUID FILLED ORAL DAILY
COMMUNITY

## 2020-02-19 NOTE — ASSESSMENT & PLAN NOTE
Benign ovarian mass removed early January  All results were benign and she has been discharged from needing to return to the surgeon for follow-up

## 2020-02-19 NOTE — PROGRESS NOTES
Assessment/Plan:    Controlled type 2 diabetes mellitus ([facility] )    Lab Results   Component Value Date    HGBA1C 6 5 (H) 12/10/2019       Diabetes has been under excellent control  She will continue her metformin 1000 mg b i d     Will check hemoglobin A1c along with other labs in May  Benign essential hypertension    Blood pressure stable on losartan 50 mg daily  Ovarian mass, right    Benign ovarian mass removed early January  All results were benign and she has been discharged from [facility] to return to the surgeon for follow-up  Asthma, mild intermittent    She hardly ever needs inhaler now  She states cough has improved dramatically since her surgery  Diagnoses and all orders for this visit:    Controlled type 2 diabetes mellitus without complication, without long-term current use of insulin ([facility])    Benign essential hypertension    Ovarian mass, right    Mild intermittent asthma without complication    Other orders  -     losartan (COZAAR) 50 mg tablet; Take 50 mg by mouth daily  -     simvastatin (ZOCOR) 10 mg tablet; Take 10 mg by mouth daily at bedtime  -     alendronate (FOSAMAX) 70 mg tablet; Take 70 mg by mouth every 7 days  -     Calcium Carbonate-Vit D-Min (CALCIUM 1200 PO); Take by mouth daily  -     VITAMIN D PO; Take 2,000 mg by mouth daily  -     Coenzyme Q10 (COQ-10) 10 MG CAPS; Take by mouth daily  -     aspirin (ECOTRIN LOW STRENGTH) 81 mg EC tablet; Take 81 mg by mouth daily  -     Loratadine 10 MG CAPS; Take by mouth daily          Subjective:      Patient ID: Christina Knight is a 68 y o  female  She is here with her  for routine follow-up  She has been feeling well  She had laparotomty just 6 weeks ago to remove a large ovarian mass which was benign  She denies chest pain, shortness of breath or palpitations  No headaches or dizziness  She feels like she is getting her energy back after surgery        The following portions of the patient's history were reviewed and updated as appropriate: allergies, current medications, past family history, past medical history, past social history, past surgical history and problem list     Review of Systems   Constitutional: Negative for activity change, chills, fatigue and fever  HENT: Negative for congestion, ear pain, sinus pressure and sore throat  Eyes: Negative for pain and visual disturbance  Respiratory: Negative for cough, chest tightness, shortness of breath and wheezing  Cardiovascular: Negative for chest pain, palpitations and leg swelling  Gastrointestinal: Negative for abdominal pain, blood in stool, constipation, diarrhea, nausea and vomiting  Endocrine: Negative for polydipsia and polyuria  Genitourinary: Negative for difficulty urinating, dysuria, frequency and urgency  Musculoskeletal: Negative for arthralgias, joint swelling and myalgias  Skin: Negative for rash  Neurological: Negative for dizziness, weakness, numbness and headaches  Hematological: Negative for adenopathy  Does not bruise/bleed easily  Psychiatric/Behavioral: Negative for dysphoric mood  The patient is not nervous/anxious  Objective:      /80 (BP Location: Left arm, Patient Position: Sitting, Cuff Size: Standard)   Pulse 64   Ht 5' 0 5" (1 537 m)   Wt 75 1 kg (165 lb 8 oz)   SpO2 98%   BMI 31 79 kg/m²          Physical Exam   Constitutional: She is oriented to person, place, and time  She appears well-developed and well-nourished  HENT:   Head: Normocephalic and atraumatic  Mouth/Throat: Oropharynx is clear and moist    Eyes: Pupils are equal, round, and reactive to light  EOM are normal    Neck: Normal range of motion  Neck supple  No thyromegaly present  Cardiovascular: Normal rate, regular rhythm and normal heart sounds  Pulmonary/Chest: Effort normal and breath sounds normal    Abdominal: Soft  Bowel sounds are normal    Large vertical scar well healed   Musculoskeletal: Normal range of motion  Lymphadenopathy:     She has no cervical adenopathy  Neurological: She is alert and oriented to person, place, and time  Skin: Skin is warm and dry  Psychiatric: She has a normal mood and affect  Her behavior is normal    Nursing note and vitals reviewed

## 2020-02-19 NOTE — ASSESSMENT & PLAN NOTE
Lab Results   Component Value Date    HGBA1C 6 5 (H) 12/10/2019       Diabetes has been under excellent control  She will continue her metformin 1000 mg b i d     Will check hemoglobin A1c along with other labs in May

## 2020-03-16 ENCOUNTER — HOSPITAL ENCOUNTER (OUTPATIENT)
Dept: BONE DENSITY | Facility: MEDICAL CENTER | Age: 70
Discharge: HOME | End: 2020-03-16
Attending: FAMILY MEDICINE
Payer: MEDICARE

## 2020-03-16 DIAGNOSIS — M81.0 POSTMENOPAUSAL OSTEOPOROSIS: ICD-10-CM

## 2020-03-16 PROCEDURE — 77080 DXA BONE DENSITY AXIAL: CPT

## 2020-03-19 DIAGNOSIS — E55.9 VITAMIN D DEFICIENCY: Primary | ICD-10-CM

## 2020-03-19 DIAGNOSIS — E83.51 LOW CALCIUM LEVELS: ICD-10-CM

## 2020-05-26 ENCOUNTER — APPOINTMENT (OUTPATIENT)
Dept: LAB | Facility: CLINIC | Age: 70
End: 2020-05-26
Payer: MEDICARE

## 2020-05-26 DIAGNOSIS — E78.2 MIXED HYPERLIPIDEMIA: ICD-10-CM

## 2020-05-26 DIAGNOSIS — E83.51 LOW CALCIUM LEVELS: ICD-10-CM

## 2020-05-26 DIAGNOSIS — E11.9 CONTROLLED TYPE 2 DIABETES MELLITUS WITHOUT COMPLICATION, WITHOUT LONG-TERM CURRENT USE OF INSULIN (HCC): ICD-10-CM

## 2020-05-26 DIAGNOSIS — E55.9 VITAMIN D DEFICIENCY: ICD-10-CM

## 2020-05-26 LAB
25(OH)D3 SERPL-MCNC: 58.2 NG/ML (ref 30–100)
ALBUMIN SERPL BCP-MCNC: 3.7 G/DL (ref 3.5–5)
ALP SERPL-CCNC: 62 U/L (ref 46–116)
ALT SERPL W P-5'-P-CCNC: 25 U/L (ref 12–78)
ANION GAP SERPL CALCULATED.3IONS-SCNC: 6 MMOL/L (ref 4–13)
AST SERPL W P-5'-P-CCNC: 19 U/L (ref 5–45)
BILIRUB SERPL-MCNC: 0.36 MG/DL (ref 0.2–1)
BUN SERPL-MCNC: 16 MG/DL (ref 5–25)
CALCIUM SERPL-MCNC: 9.4 MG/DL (ref 8.3–10.1)
CHLORIDE SERPL-SCNC: 103 MMOL/L (ref 100–108)
CHOLEST SERPL-MCNC: 167 MG/DL (ref 50–200)
CO2 SERPL-SCNC: 30 MMOL/L (ref 21–32)
CREAT SERPL-MCNC: 1.18 MG/DL (ref 0.6–1.3)
CREAT UR-MCNC: 74.6 MG/DL
EST. AVERAGE GLUCOSE BLD GHB EST-MCNC: 143 MG/DL
GFR SERPL CREATININE-BSD FRML MDRD: 46 ML/MIN/1.73SQ M
GLUCOSE P FAST SERPL-MCNC: 127 MG/DL (ref 65–99)
HBA1C MFR BLD: 6.6 %
HDLC SERPL-MCNC: 85 MG/DL
LDLC SERPL CALC-MCNC: 70 MG/DL (ref 0–100)
MICROALBUMIN UR-MCNC: 86.4 MG/L (ref 0–20)
MICROALBUMIN/CREAT 24H UR: 116 MG/G CREATININE (ref 0–30)
NONHDLC SERPL-MCNC: 82 MG/DL
POTASSIUM SERPL-SCNC: 4.1 MMOL/L (ref 3.5–5.3)
PROT SERPL-MCNC: 7.6 G/DL (ref 6.4–8.2)
PTH-INTACT SERPL-MCNC: 30.1 PG/ML (ref 18.4–80.1)
SODIUM SERPL-SCNC: 139 MMOL/L (ref 136–145)
TRIGL SERPL-MCNC: 60 MG/DL

## 2020-05-26 PROCEDURE — 83970 ASSAY OF PARATHORMONE: CPT

## 2020-05-26 PROCEDURE — 82306 VITAMIN D 25 HYDROXY: CPT

## 2020-05-26 PROCEDURE — 80061 LIPID PANEL: CPT

## 2020-05-26 PROCEDURE — 82570 ASSAY OF URINE CREATININE: CPT | Performed by: FAMILY MEDICINE

## 2020-05-26 PROCEDURE — 83036 HEMOGLOBIN GLYCOSYLATED A1C: CPT

## 2020-05-26 PROCEDURE — 80053 COMPREHEN METABOLIC PANEL: CPT

## 2020-05-26 PROCEDURE — 82043 UR ALBUMIN QUANTITATIVE: CPT | Performed by: FAMILY MEDICINE

## 2020-05-26 PROCEDURE — 36415 COLL VENOUS BLD VENIPUNCTURE: CPT

## 2020-06-02 ENCOUNTER — OFFICE VISIT (OUTPATIENT)
Dept: FAMILY MEDICINE CLINIC | Facility: CLINIC | Age: 70
End: 2020-06-02
Payer: MEDICARE

## 2020-06-02 VITALS
RESPIRATION RATE: 18 BRPM | DIASTOLIC BLOOD PRESSURE: 82 MMHG | HEART RATE: 72 BPM | TEMPERATURE: 98.2 F | OXYGEN SATURATION: 97 % | WEIGHT: 174 LBS | SYSTOLIC BLOOD PRESSURE: 138 MMHG | BODY MASS INDEX: 33.42 KG/M2

## 2020-06-02 DIAGNOSIS — E78.2 MIXED HYPERLIPIDEMIA: ICD-10-CM

## 2020-06-02 DIAGNOSIS — E66.09 CLASS 1 OBESITY DUE TO EXCESS CALORIES WITH SERIOUS COMORBIDITY AND BODY MASS INDEX (BMI) OF 33.0 TO 33.9 IN ADULT: ICD-10-CM

## 2020-06-02 DIAGNOSIS — E11.9 CONTROLLED TYPE 2 DIABETES MELLITUS WITHOUT COMPLICATION, WITHOUT LONG-TERM CURRENT USE OF INSULIN (HCC): Primary | ICD-10-CM

## 2020-06-02 DIAGNOSIS — I10 BENIGN ESSENTIAL HYPERTENSION: ICD-10-CM

## 2020-06-02 DIAGNOSIS — N83.8 OVARIAN MASS, RIGHT: ICD-10-CM

## 2020-06-02 DIAGNOSIS — J45.20 MILD INTERMITTENT ASTHMA WITHOUT COMPLICATION: ICD-10-CM

## 2020-06-02 DIAGNOSIS — Z12.39 SCREENING FOR BREAST CANCER: ICD-10-CM

## 2020-06-02 PROBLEM — R10.12 LEFT UPPER QUADRANT PAIN: Status: RESOLVED | Noted: 2019-08-21 | Resolved: 2020-06-02

## 2020-06-02 PROCEDURE — 3044F HG A1C LEVEL LT 7.0%: CPT | Performed by: FAMILY MEDICINE

## 2020-06-02 PROCEDURE — 1036F TOBACCO NON-USER: CPT | Performed by: FAMILY MEDICINE

## 2020-06-02 PROCEDURE — 99214 OFFICE O/P EST MOD 30 MIN: CPT | Performed by: FAMILY MEDICINE

## 2020-06-02 PROCEDURE — 3079F DIAST BP 80-89 MM HG: CPT | Performed by: FAMILY MEDICINE

## 2020-06-02 PROCEDURE — 1160F RVW MEDS BY RX/DR IN RCRD: CPT | Performed by: FAMILY MEDICINE

## 2020-06-02 PROCEDURE — 3075F SYST BP GE 130 - 139MM HG: CPT | Performed by: FAMILY MEDICINE

## 2020-06-02 PROCEDURE — 4040F PNEUMOC VAC/ADMIN/RCVD: CPT | Performed by: FAMILY MEDICINE

## 2020-06-02 RX ORDER — ALBUTEROL SULFATE 90 UG/1
2 AEROSOL, METERED RESPIRATORY (INHALATION) EVERY 6 HOURS PRN
Qty: 1 INHALER | Refills: 0 | Status: SHIPPED | OUTPATIENT
Start: 2020-06-02 | End: 2021-11-23 | Stop reason: SDUPTHER

## 2020-07-02 LAB
LEFT EYE DIABETIC RETINOPATHY: NORMAL
RIGHT EYE DIABETIC RETINOPATHY: NORMAL

## 2020-07-16 DIAGNOSIS — M81.0 POSTMENOPAUSAL OSTEOPOROSIS: Primary | ICD-10-CM

## 2020-07-16 RX ORDER — ALENDRONATE SODIUM 70 MG/1
70 TABLET ORAL
Qty: 12 TABLET | Refills: 0 | Status: SHIPPED | OUTPATIENT
Start: 2020-07-16 | End: 2020-10-08 | Stop reason: SDUPTHER

## 2020-08-05 DIAGNOSIS — E78.2 MIXED HYPERLIPIDEMIA: Primary | ICD-10-CM

## 2020-08-05 RX ORDER — SIMVASTATIN 10 MG
10 TABLET ORAL
Qty: 90 TABLET | Refills: 3 | Status: SHIPPED | OUTPATIENT
Start: 2020-08-05 | End: 2021-07-28 | Stop reason: SDUPTHER

## 2020-10-08 DIAGNOSIS — M81.0 POSTMENOPAUSAL OSTEOPOROSIS: ICD-10-CM

## 2020-10-09 RX ORDER — ALENDRONATE SODIUM 70 MG/1
70 TABLET ORAL
Qty: 12 TABLET | Refills: 0 | Status: SHIPPED | OUTPATIENT
Start: 2020-10-09 | End: 2020-12-30 | Stop reason: SDUPTHER

## 2020-11-17 ENCOUNTER — OFFICE VISIT (OUTPATIENT)
Dept: FAMILY MEDICINE CLINIC | Facility: CLINIC | Age: 70
End: 2020-11-17
Payer: MEDICARE

## 2020-11-17 VITALS
WEIGHT: 175 LBS | BODY MASS INDEX: 33.04 KG/M2 | RESPIRATION RATE: 18 BRPM | HEART RATE: 80 BPM | SYSTOLIC BLOOD PRESSURE: 162 MMHG | DIASTOLIC BLOOD PRESSURE: 70 MMHG | HEIGHT: 61 IN | OXYGEN SATURATION: 98 % | TEMPERATURE: 98 F

## 2020-11-17 DIAGNOSIS — E66.9 OBESITY, CLASS I, BMI 30-34.9: ICD-10-CM

## 2020-11-17 DIAGNOSIS — Z23 FLU VACCINE NEED: ICD-10-CM

## 2020-11-17 DIAGNOSIS — E11.8 CONTROLLED DIABETES MELLITUS TYPE 2 WITH COMPLICATIONS, UNSPECIFIED WHETHER LONG TERM INSULIN USE (HCC): Primary | ICD-10-CM

## 2020-11-17 DIAGNOSIS — J45.20 MILD INTERMITTENT ASTHMA WITHOUT COMPLICATION: ICD-10-CM

## 2020-11-17 PROBLEM — N83.8 OVARIAN MASS, RIGHT: Status: RESOLVED | Noted: 2019-12-03 | Resolved: 2020-11-17

## 2020-11-17 PROBLEM — E66.811 OBESITY, CLASS I, BMI 30-34.9: Status: ACTIVE | Noted: 2020-11-17

## 2020-11-17 LAB — SL AMB POCT HEMOGLOBIN AIC: 6.6 (ref ?–6.5)

## 2020-11-17 PROCEDURE — 83036 HEMOGLOBIN GLYCOSYLATED A1C: CPT | Performed by: FAMILY MEDICINE

## 2020-11-17 PROCEDURE — 99214 OFFICE O/P EST MOD 30 MIN: CPT | Performed by: FAMILY MEDICINE

## 2020-11-17 PROCEDURE — G0008 ADMIN INFLUENZA VIRUS VAC: HCPCS

## 2020-11-17 PROCEDURE — 90662 IIV NO PRSV INCREASED AG IM: CPT

## 2020-12-10 LAB
LEFT EYE DIABETIC RETINOPATHY: NORMAL
RIGHT EYE DIABETIC RETINOPATHY: NORMAL

## 2020-12-30 DIAGNOSIS — M81.0 POSTMENOPAUSAL OSTEOPOROSIS: ICD-10-CM

## 2020-12-30 RX ORDER — ALENDRONATE SODIUM 70 MG/1
70 TABLET ORAL
Qty: 12 TABLET | Refills: 0 | Status: SHIPPED | OUTPATIENT
Start: 2020-12-30 | End: 2021-03-24 | Stop reason: SDUPTHER

## 2021-01-08 DIAGNOSIS — I10 BENIGN ESSENTIAL HYPERTENSION: Primary | ICD-10-CM

## 2021-01-08 RX ORDER — LOSARTAN POTASSIUM 50 MG/1
50 TABLET ORAL DAILY
Qty: 90 TABLET | Refills: 1 | Status: SHIPPED | OUTPATIENT
Start: 2021-01-08 | End: 2021-07-20 | Stop reason: SDUPTHER

## 2021-03-24 DIAGNOSIS — M81.0 POSTMENOPAUSAL OSTEOPOROSIS: ICD-10-CM

## 2021-03-24 RX ORDER — ALENDRONATE SODIUM 70 MG/1
70 TABLET ORAL
Qty: 12 TABLET | Refills: 0 | Status: SHIPPED | OUTPATIENT
Start: 2021-03-24 | End: 2021-06-17 | Stop reason: SDUPTHER

## 2021-04-06 ENCOUNTER — IMMUNIZATIONS (OUTPATIENT)
Dept: FAMILY MEDICINE CLINIC | Facility: HOSPITAL | Age: 71
End: 2021-04-06

## 2021-04-06 DIAGNOSIS — Z23 ENCOUNTER FOR IMMUNIZATION: Primary | ICD-10-CM

## 2021-04-06 PROCEDURE — 91301 SARS-COV-2 / COVID-19 MRNA VACCINE (MODERNA) 100 MCG: CPT

## 2021-04-06 PROCEDURE — 0011A SARS-COV-2 / COVID-19 MRNA VACCINE (MODERNA) 100 MCG: CPT

## 2021-05-04 ENCOUNTER — IMMUNIZATIONS (OUTPATIENT)
Dept: FAMILY MEDICINE CLINIC | Facility: HOSPITAL | Age: 71
End: 2021-05-04

## 2021-05-04 DIAGNOSIS — Z23 ENCOUNTER FOR IMMUNIZATION: Primary | ICD-10-CM

## 2021-05-04 PROCEDURE — 91301 SARS-COV-2 / COVID-19 MRNA VACCINE (MODERNA) 100 MCG: CPT

## 2021-05-04 PROCEDURE — 0012A SARS-COV-2 / COVID-19 MRNA VACCINE (MODERNA) 100 MCG: CPT

## 2021-05-17 PROBLEM — E11.22 TYPE 2 DIABETES MELLITUS WITH STAGE 3 CHRONIC KIDNEY DISEASE (HCC): Status: ACTIVE | Noted: 2019-05-22

## 2021-05-17 PROBLEM — N18.30 CKD (CHRONIC KIDNEY DISEASE), STAGE III (HCC): Status: ACTIVE | Noted: 2021-05-17

## 2021-05-17 PROBLEM — N18.30 TYPE 2 DIABETES MELLITUS WITH STAGE 3 CHRONIC KIDNEY DISEASE (HCC): Status: ACTIVE | Noted: 2019-05-22

## 2021-05-18 ENCOUNTER — OFFICE VISIT (OUTPATIENT)
Dept: FAMILY MEDICINE CLINIC | Facility: CLINIC | Age: 71
End: 2021-05-18
Payer: MEDICARE

## 2021-05-18 VITALS
HEIGHT: 61 IN | BODY MASS INDEX: 32.85 KG/M2 | DIASTOLIC BLOOD PRESSURE: 82 MMHG | SYSTOLIC BLOOD PRESSURE: 158 MMHG | HEART RATE: 84 BPM | RESPIRATION RATE: 18 BRPM | WEIGHT: 174 LBS | OXYGEN SATURATION: 97 %

## 2021-05-18 DIAGNOSIS — I10 BENIGN ESSENTIAL HYPERTENSION: ICD-10-CM

## 2021-05-18 DIAGNOSIS — Z12.11 COLON CANCER SCREENING: ICD-10-CM

## 2021-05-18 DIAGNOSIS — J44.9 CHRONIC OBSTRUCTIVE PULMONARY DISEASE, UNSPECIFIED COPD TYPE (HCC): ICD-10-CM

## 2021-05-18 DIAGNOSIS — E11.9 CONTROLLED TYPE 2 DIABETES MELLITUS WITHOUT COMPLICATION, WITHOUT LONG-TERM CURRENT USE OF INSULIN (HCC): ICD-10-CM

## 2021-05-18 DIAGNOSIS — N18.30 STAGE 3 CHRONIC KIDNEY DISEASE, UNSPECIFIED WHETHER STAGE 3A OR 3B CKD (HCC): ICD-10-CM

## 2021-05-18 DIAGNOSIS — E11.22 TYPE 2 DIABETES MELLITUS WITH STAGE 3 CHRONIC KIDNEY DISEASE, UNSPECIFIED WHETHER LONG TERM INSULIN USE, UNSPECIFIED WHETHER STAGE 3A OR 3B CKD (HCC): ICD-10-CM

## 2021-05-18 DIAGNOSIS — H90.71 MIXED CONDUCTIVE AND SENSORINEURAL HEARING LOSS OF RIGHT EAR WITH UNRESTRICTED HEARING OF LEFT EAR: ICD-10-CM

## 2021-05-18 DIAGNOSIS — M81.0 POSTMENOPAUSAL OSTEOPOROSIS: ICD-10-CM

## 2021-05-18 DIAGNOSIS — E78.2 MIXED HYPERLIPIDEMIA: ICD-10-CM

## 2021-05-18 DIAGNOSIS — N18.30 TYPE 2 DIABETES MELLITUS WITH STAGE 3 CHRONIC KIDNEY DISEASE, UNSPECIFIED WHETHER LONG TERM INSULIN USE, UNSPECIFIED WHETHER STAGE 3A OR 3B CKD (HCC): ICD-10-CM

## 2021-05-18 DIAGNOSIS — R53.83 FATIGUE, UNSPECIFIED TYPE: ICD-10-CM

## 2021-05-18 DIAGNOSIS — Z00.00 MEDICARE ANNUAL WELLNESS VISIT, SUBSEQUENT: Primary | ICD-10-CM

## 2021-05-18 DIAGNOSIS — E66.9 OBESITY, CLASS I, BMI 30-34.9: ICD-10-CM

## 2021-05-18 LAB — SL AMB POCT HEMOGLOBIN AIC: 6.5 (ref ?–6.5)

## 2021-05-18 PROCEDURE — 83036 HEMOGLOBIN GLYCOSYLATED A1C: CPT | Performed by: FAMILY MEDICINE

## 2021-05-18 PROCEDURE — 99214 OFFICE O/P EST MOD 30 MIN: CPT | Performed by: FAMILY MEDICINE

## 2021-05-18 PROCEDURE — G0439 PPPS, SUBSEQ VISIT: HCPCS | Performed by: FAMILY MEDICINE

## 2021-05-18 RX ORDER — AMLODIPINE BESYLATE 5 MG/1
5 TABLET ORAL DAILY
Qty: 30 TABLET | Refills: 5 | Status: SHIPPED | OUTPATIENT
Start: 2021-05-18 | End: 2021-11-10 | Stop reason: SDUPTHER

## 2021-05-18 NOTE — PROGRESS NOTES
Assessment and Plan:     Problem List Items Addressed This Visit        Endocrine    Type 2 diabetes mellitus with stage 3 chronic kidney disease ([address] )    Relevant Orders    POCT hemoglobin A1c       Other    Obesity, Class I, BMI 30-34 9      Other Visit Diagnoses     Medicare annual wellness visit, subsequent    -  Primary           Preventive health issues were discussed with patient, and age appropriate screening tests were ordered as noted in patient's After Visit Summary  Personalized health advice and appropriate referrals for health education or preventive services given if needed, as noted in patient's After Visit Summary       History of Present Illness:     Patient presents for Medicare Annual Wellness visit    Patient Care Team:  Ramakrishna Little MD as PCP - General Jailyn Bowman (Pediatrics)  [facility])     Problem List:     Patient Active Problem List   Diagnosis    Allergic rhinitis    Asthma, mild intermittent    Benign essential hypertension    Chronic low back pain    Controlled type 2 diabetes mellitus ([address] )    Dupuytren's contracture    Hearing loss of right ear    Hyperlipidemia    Postmenopausal osteoporosis    Restless legs syndrome    Sacroiliitis (HCC)    Obstructive sleep apnea syndrome    Pain of left upper extremity    Type 2 diabetes mellitus with stage 3 chronic kidney disease (HCC)    Pain in right ankle and joints of right foot    Left lower quadrant abdominal mass    Abnormal EKG    Obesity    Obesity, Class I, BMI 30-34 9    CKD (chronic kidney disease), stage III (HCC)      Past Medical and Surgical History:     Past Medical History:   Diagnosis Date    Asthma     COPD (chronic obstructive pulmonary disease) ([address] )     Diabetes ([address] )     Hyperlipidemia     Osteoarthritis     Osteoporosis     Seasonal allergies      Past Surgical History:   Procedure Laterality Date    BILATERAL SALPINGOOPHORECTOMY Bilateral 1/3/2020 Procedure: BILATERAL SALPINGO-OOPHORECTOMY;  Surgeon: Justo Esposito MD;  [facility];  Service: Gynecology Oncology    CATARACT EXTRACTION Bilateral     CHOLECYSTECTOMY      [id number]  LAST ASSESSED    EYE SURGERY      cataracts removed    HYSTERECTOMY      [id number]  LAST ASSESSED    IL EXPLORATORY OF ABDOMEN N/A 1/3/2020    Procedure: LAPAROTOMY EXPLORATORY;  Surgeon: Justo Esposito MD;  [facility];  Service: Gynecology Oncology    TUBAL LIGATION        Family History:     Family History   Problem Relation Age of Onset    Heart disease Mother     Rheumatic fever Father     Diabetes Family         MELLITUS    Diabetes Family         MELLITUS      Social History:     E-Cigarette/Vaping    E-Cigarette Use Never User      E-Cigarette/Vaping Substances    Nicotine No     THC No     CBD No     Flavoring No     Other No     Unknown No      Social History     Socioeconomic History    Marital status: /Civil Union     Spouse name: Not on file    Number of children: Not on file    Years of education: Not on file    Highest education level: Not on file   Occupational History    Not on file   Social Needs    Financial resource strain: Not on file    Food insecurity     Worry: Not on file     Inability: Not on file   [employer] needs     Medical: Not on file     Non-medical: Not on file   Tobacco Use    Smoking status: Former Smoker     Quit date:      Years since quittin 3    Smokeless tobacco: Never Used   Substance and Sexual Activity    Alcohol use: Not Currently     Frequency: Never     Binge frequency: Never    Drug use: No    Sexual activity: Not Currently   Lifestyle    Physical activity     Days per week: Not on file     Minutes per session: Not on file    Stress: Not on file   Relationships    Social connections     Talks on phone: Not on file     Gets together: Not on file     Attends Baptism service: Not on file     Active member of club or organization: Not on file     Attends meetings of clubs or organizations: Not on file     Relationship status: Not on file    Intimate partner violence     Fear of current or ex partner: Not on file     Emotionally abused: Not on file     Physically abused: Not on file     Forced sexual activity: Not on file   Other Topics Concern    Not on file   Social History Narrative    Not on file      Medications and Allergies:     Current Outpatient Medications   Medication Sig Dispense Refill    acetaminophen (TYLENOL) 325 mg tablet Take 2 tablets (650 mg total) by mouth every 6 (six) hours as needed for mild pain 30 tablet 0    albuterol (PROVENTIL HFA,VENTOLIN HFA) 90 mcg/act inhaler Inhale 2 puffs every 6 (six) hours as needed for wheezing or shortness of breath 1 Inhaler 0    alendronate (FOSAMAX) 70 mg tablet Take 1 tablet (70 mg total) by mouth every 7 days Give with at least 8 ounces of plain water first thing in the morning  Patient should be instructed to stay upright (not to lie down) for at least 30 minutes and until after first food of the day (to reduce esophageal irritation)  12 tablet 0    aspirin (ECOTRIN LOW STRENGTH) 81 mg EC tablet Take 81 mg by mouth daily      Calcium Carbonate-Vit D-Min (CALCIUM 1200 PO) Take by mouth daily      Coenzyme Q10 (COQ-10) 10 MG CAPS Take by mouth daily      Loratadine 10 MG CAPS Take by mouth daily      losartan (COZAAR) 50 mg tablet Take 1 tablet (50 mg total) by mouth daily 90 tablet 1    metFORMIN (GLUCOPHAGE) 1000 MG tablet Take 1 tablet (1,000 mg total) by mouth 2 (two) times a day 180 tablet 3    Multiple Vitamin (MULTI-VITAMIN DAILY PO) Take by mouth daily      simvastatin (ZOCOR) 10 mg tablet Take 1 tablet (10 mg total) by mouth daily at bedtime 90 tablet 3    VITAMIN D PO Take 2,000 mg by mouth daily       No current facility-administered medications for this visit        Allergies   Allergen Reactions    Penicillins Hives Immunizations:     Immunization History   Administered Date(s) Administered    Influenza Split High Dose Preservative Free IM 12/01/2016, 10/10/2017    Influenza, high dose seasonal 0 7 mL 11/21/2018, 11/21/2019, 11/17/2020    Pneumococcal Conjugate 13-Valent 12/01/2016    Pneumococcal Polysaccharide PPV23 01/16/2018    SARS-CoV-2 / COVID-19 mRNA IM (Tomi Robles) 04/06/2021, 05/04/2021      Health Maintenance:         Topic Date Due    MAMMOGRAM  Never done    Colorectal Cancer Screening  05/17/2020    DXA SCAN  03/16/2022    Hepatitis C Screening  Completed         Topic Date Due    DTaP,Tdap,and Td Vaccines (1 - Tdap) Never done      Medicare Health Risk Assessment:     Resp 18   Ht 5' 0 5" (1 537 m)   Wt 78 9 kg (174 lb)   BMI 33 42 kg/m²      Ray Mancera is here for her Subsequent Wellness visit  Health Risk Assessment:   Patient rates overall health as good  Patient feels that their physical health rating is same  Patient is satisfied with their life  Eyesight was rated as same  Hearing was rated as slightly worse  Patient feels that their emotional and mental health rating is same  Patients states they are never, rarely angry  Patient states they are never, rarely unusually tired/fatigued  Pain experienced in the last 7 days has been none  Patient states that she has experienced no weight loss or gain in last 6 months  Depression Screening:   PHQ-2 Score: 0      Fall Risk Screening: In the past year, patient has experienced: no history of falling in past year      Urinary Incontinence Screening:   Patient has not leaked urine accidently in the last six months  Home Safety:  Patient does not have trouble with stairs inside or outside of their home  Patient has working smoke alarms and has working carbon monoxide detector  Home safety hazards include: none  Nutrition:   Current diet is Regular and Frequent junk food       Medications:   Patient is currently taking over-the-counter supplements  OTC medications include: see medication list  Patient is able to manage medications  Activities of Daily Living (ADLs)/Instrumental Activities of Daily Living (IADLs):   Walk and transfer into and out of bed and chair?: Yes  Dress and groom yourself?: Yes    Bathe or shower yourself?: Yes    Feed yourself? Yes  Do your laundry/housekeeping?: Yes  Manage your money, pay your bills and track your expenses?: Yes  Make your own meals?: Yes    Do your own shopping?: Yes    Previous Hospitalizations:   Any hospitalizations or ED visits within the last 12 months?: No      Advance Care Planning:   Living will: Yes    Durable POA for healthcare: Yes    Advanced directive: Yes      PREVENTIVE SCREENINGS      Cardiovascular Screening:    General: Screening Not Indicated and History Lipid Disorder      Diabetes Screening:     General: Screening Not Indicated and History Diabetes      Cervical Cancer Screening:    General: Screening Not Indicated      Osteoporosis Screening:    General: Screening Not Indicated and History Osteoporosis      Lung Cancer Screening:     General: Screening Not Indicated      Hepatitis C Screening:    General: Screening Current    Screening, Brief Intervention, and Referral to Treatment (SBIRT)    Screening  Typical number of drinks in a day: 0  Typical number of drinks in a week: 0  Interpretation: Low risk drinking behavior        Jolene Tovar MD

## 2021-05-18 NOTE — ASSESSMENT & PLAN NOTE
Lab Results   Component Value Date    HGBA1C 6 5 05/18/2021       She remains stable on metformin 1000 mg b i d

## 2021-05-18 NOTE — PROGRESS NOTES
Assessment/Plan:    Type 2 diabetes mellitus with stage 3 chronic kidney disease (HCC)    Lab Results   Component Value Date    HGBA1C 6 5 05/18/2021       She remains stable on metformin 1000 mg b i d  Benign essential hypertension    Systolic blood pressure is elevated  Will continue losartan 50 mg daily and add amlodipine 5 mg daily  Recheck BP in 3 mos    Hearing loss of right ear  Gave ENT referral    Postmenopausal osteoporosis  Continue fosamax  She will be due for DEXA scan in 2022  CKD (chronic kidney disease), stage III [facility])  Lab Results   Component Value Date    EGFR 46 05/26/2020    EGFR 74 01/06/2020    EGFR 58 01/05/2020    CREATININE 1 18 05/26/2020    CREATININE 0 80 01/06/2020    CREATININE 0 98 01/05/2020      ordered new lab work  Diagnoses and all orders for this visit:    Medicare annual wellness visit, subsequent    Obesity, Class I, BMI 30-34 9    Type 2 diabetes mellitus with stage 3 chronic kidney disease, unspecified whether long term insulin use, unspecified whether stage 3a or 3b CKD (HCC)  -     POCT hemoglobin A1c    Colon cancer screening  -     Cologuard; Future    Controlled type 2 diabetes mellitus without complication, without long-term current use of insulin (HCC)    Benign essential hypertension    Mixed conductive and sensorineural hearing loss of right ear with unrestricted hearing of left ear    Postmenopausal osteoporosis    Stage 3 chronic kidney disease, unspecified whether stage 3a or 3b CKD ([facility] [address] )          Subjective:      Patient ID: Russel Denver is a 66 y o  female  She is here with her  for AWV and f/u  She has been feeling well in general   She notes AM cough and increased allergy sx recently  She uses Claritin and sometimes Flonase  Also uses inhaler prn  She has noticed decreased hearing especially in the left ear    She had her 2 Covid shots without problem      She notes fatigue over the winter which she thinks is partly due to the pandemic along with cold weather and isolation  She feels symptoms are improving a little bit now  The following portions of the patient's history were reviewed and updated as appropriate: allergies, current medications, past family history, past medical history, past social history, past surgical history and problem list     Review of Systems   Constitutional: Negative for activity change, chills, fatigue and fever  HENT: Positive for congestion  Negative for ear pain, sinus pressure and sore throat  Eyes: Negative for pain and visual disturbance  Respiratory: Positive for cough  Negative for chest tightness, shortness of breath and wheezing  Cardiovascular: Negative for chest pain, palpitations and leg swelling  Gastrointestinal: Negative for abdominal pain, blood in stool, constipation, diarrhea, nausea and vomiting  Endocrine: Negative for polydipsia and polyuria  Genitourinary: Negative for difficulty urinating, dysuria, frequency and urgency  Musculoskeletal: Negative for arthralgias, joint swelling and myalgias  Skin: Negative for rash  Allergic/Immunologic: Positive for environmental allergies  Neurological: Negative for dizziness, weakness, numbness and headaches  Hematological: Negative for adenopathy  Does not bruise/bleed easily  Psychiatric/Behavioral: Negative for dysphoric mood  The patient is not nervous/anxious  Objective:      /82   Pulse 84   Resp 18   Ht 5' 0 5" (1 537 m)   Wt 78 9 kg (174 lb)   SpO2 97%   BMI 33 42 kg/m²          Physical Exam  Vitals signs and nursing note reviewed  Constitutional:       Appearance: She is obese  HENT:      Head: Normocephalic and atraumatic        Right Ear: Tympanic membrane, ear canal and external ear normal       Left Ear: Tympanic membrane, ear canal and external ear normal       Mouth/Throat:      Mouth: Mucous membranes are moist    Neck:      Musculoskeletal: Normal range of motion and neck supple  Vascular: No carotid bruit  Cardiovascular:      Rate and Rhythm: Normal rate and regular rhythm  Pulses: Normal pulses  Heart sounds: Normal heart sounds  Pulmonary:      Effort: Pulmonary effort is normal    Abdominal:      Palpations: There is no mass  Tenderness: There is no abdominal tenderness  Musculoskeletal: Normal range of motion  Right lower leg: No edema  Left lower leg: No edema  Lymphadenopathy:      Cervical: No cervical adenopathy  Skin:     General: Skin is warm and dry  Neurological:      General: No focal deficit present  Mental Status: She is alert and oriented to person, place, and time     Psychiatric:         Mood and Affect: Mood normal          Behavior: Behavior normal

## 2021-05-18 NOTE — ASSESSMENT & PLAN NOTE
Lab Results   Component Value Date    EGFR 46 05/26/2020    EGFR 74 01/06/2020    EGFR 58 01/05/2020    CREATININE 1 18 05/26/2020    CREATININE 0 80 01/06/2020    CREATININE 0 98 01/05/2020      ordered new lab work

## 2021-05-18 NOTE — ASSESSMENT & PLAN NOTE
Systolic blood pressure is elevated  Will continue losartan 50 mg daily and add amlodipine 5 mg daily    Recheck BP in 3 mos

## 2021-05-19 ENCOUNTER — APPOINTMENT (OUTPATIENT)
Dept: LAB | Facility: CLINIC | Age: 71
End: 2021-05-19
Payer: MEDICARE

## 2021-05-19 DIAGNOSIS — E78.2 MIXED HYPERLIPIDEMIA: ICD-10-CM

## 2021-05-19 DIAGNOSIS — R53.83 FATIGUE, UNSPECIFIED TYPE: ICD-10-CM

## 2021-05-19 LAB
ALBUMIN SERPL BCP-MCNC: 3.5 G/DL (ref 3.5–5)
ALP SERPL-CCNC: 65 U/L (ref 46–116)
ALT SERPL W P-5'-P-CCNC: 19 U/L (ref 12–78)
ANION GAP SERPL CALCULATED.3IONS-SCNC: 6 MMOL/L (ref 4–13)
AST SERPL W P-5'-P-CCNC: 12 U/L (ref 5–45)
BASOPHILS # BLD AUTO: 0.09 THOUSANDS/ΜL (ref 0–0.1)
BASOPHILS NFR BLD AUTO: 1 % (ref 0–1)
BILIRUB SERPL-MCNC: 0.29 MG/DL (ref 0.2–1)
BUN SERPL-MCNC: 16 MG/DL (ref 5–25)
CALCIUM SERPL-MCNC: 9.4 MG/DL (ref 8.3–10.1)
CHLORIDE SERPL-SCNC: 105 MMOL/L (ref 100–108)
CHOLEST SERPL-MCNC: 139 MG/DL (ref 50–200)
CO2 SERPL-SCNC: 31 MMOL/L (ref 21–32)
CREAT SERPL-MCNC: 1.22 MG/DL (ref 0.6–1.3)
EOSINOPHIL # BLD AUTO: 0.5 THOUSAND/ΜL (ref 0–0.61)
EOSINOPHIL NFR BLD AUTO: 5 % (ref 0–6)
ERYTHROCYTE [DISTWIDTH] IN BLOOD BY AUTOMATED COUNT: 12.1 % (ref 11.6–15.1)
GFR SERPL CREATININE-BSD FRML MDRD: 44 ML/MIN/1.73SQ M
GLUCOSE P FAST SERPL-MCNC: 140 MG/DL (ref 65–99)
HCT VFR BLD AUTO: 40.9 % (ref 34.8–46.1)
HDLC SERPL-MCNC: 73 MG/DL
HGB BLD-MCNC: 12.8 G/DL (ref 11.5–15.4)
IMM GRANULOCYTES # BLD AUTO: 0.05 THOUSAND/UL (ref 0–0.2)
IMM GRANULOCYTES NFR BLD AUTO: 1 % (ref 0–2)
LDLC SERPL CALC-MCNC: 50 MG/DL (ref 0–100)
LYMPHOCYTES # BLD AUTO: 2.97 THOUSANDS/ΜL (ref 0.6–4.47)
LYMPHOCYTES NFR BLD AUTO: 30 % (ref 14–44)
MCH RBC QN AUTO: 29.1 PG (ref 26.8–34.3)
MCHC RBC AUTO-ENTMCNC: 31.3 G/DL (ref 31.4–37.4)
MCV RBC AUTO: 93 FL (ref 82–98)
MONOCYTES # BLD AUTO: 0.78 THOUSAND/ΜL (ref 0.17–1.22)
MONOCYTES NFR BLD AUTO: 8 % (ref 4–12)
NEUTROPHILS # BLD AUTO: 5.52 THOUSANDS/ΜL (ref 1.85–7.62)
NEUTS SEG NFR BLD AUTO: 55 % (ref 43–75)
NONHDLC SERPL-MCNC: 66 MG/DL
NRBC BLD AUTO-RTO: 0 /100 WBCS
PLATELET # BLD AUTO: 312 THOUSANDS/UL (ref 149–390)
PMV BLD AUTO: 10.6 FL (ref 8.9–12.7)
POTASSIUM SERPL-SCNC: 4.5 MMOL/L (ref 3.5–5.3)
PROT SERPL-MCNC: 7.6 G/DL (ref 6.4–8.2)
RBC # BLD AUTO: 4.4 MILLION/UL (ref 3.81–5.12)
SODIUM SERPL-SCNC: 142 MMOL/L (ref 136–145)
TRIGL SERPL-MCNC: 78 MG/DL
TSH SERPL DL<=0.05 MIU/L-ACNC: 2.79 UIU/ML (ref 0.36–3.74)
WBC # BLD AUTO: 9.91 THOUSAND/UL (ref 4.31–10.16)

## 2021-05-19 PROCEDURE — 85025 COMPLETE CBC W/AUTO DIFF WBC: CPT

## 2021-05-19 PROCEDURE — 84443 ASSAY THYROID STIM HORMONE: CPT

## 2021-05-19 PROCEDURE — 36415 COLL VENOUS BLD VENIPUNCTURE: CPT

## 2021-05-19 PROCEDURE — 80061 LIPID PANEL: CPT

## 2021-05-19 PROCEDURE — 80053 COMPREHEN METABOLIC PANEL: CPT

## 2021-06-10 LAB
LEFT EYE DIABETIC RETINOPATHY: NORMAL
RIGHT EYE DIABETIC RETINOPATHY: NORMAL

## 2021-06-17 DIAGNOSIS — M81.0 POSTMENOPAUSAL OSTEOPOROSIS: ICD-10-CM

## 2021-06-17 RX ORDER — ALENDRONATE SODIUM 70 MG/1
70 TABLET ORAL
Qty: 12 TABLET | Refills: 0 | Status: SHIPPED | OUTPATIENT
Start: 2021-06-17 | End: 2021-09-08 | Stop reason: SDUPTHER

## 2021-06-18 ENCOUNTER — OFFICE VISIT (OUTPATIENT)
Dept: URGENT CARE | Facility: CLINIC | Age: 71
End: 2021-06-18
Payer: MEDICARE

## 2021-06-18 ENCOUNTER — APPOINTMENT (OUTPATIENT)
Dept: RADIOLOGY | Facility: CLINIC | Age: 71
End: 2021-06-18
Payer: MEDICARE

## 2021-06-18 VITALS
TEMPERATURE: 97.4 F | DIASTOLIC BLOOD PRESSURE: 82 MMHG | HEART RATE: 81 BPM | RESPIRATION RATE: 16 BRPM | OXYGEN SATURATION: 96 % | SYSTOLIC BLOOD PRESSURE: 166 MMHG

## 2021-06-18 DIAGNOSIS — M79.644 PAIN OF RIGHT MIDDLE FINGER: Primary | ICD-10-CM

## 2021-06-18 DIAGNOSIS — M79.644 PAIN OF RIGHT MIDDLE FINGER: ICD-10-CM

## 2021-06-18 PROCEDURE — 73130 X-RAY EXAM OF HAND: CPT

## 2021-06-18 PROCEDURE — 99213 OFFICE O/P EST LOW 20 MIN: CPT | Performed by: NURSE PRACTITIONER

## 2021-06-18 PROCEDURE — G0463 HOSPITAL OUTPT CLINIC VISIT: HCPCS | Performed by: NURSE PRACTITIONER

## 2021-06-18 NOTE — PATIENT INSTRUCTIONS
Your xray was preliminarily read by your provider  A radiologist will read the xray and you will be notified  Take tylenol or motrin as needed  You are to see your PCP   Go to the ED if symptoms worsen  You are to see Dr Walter Iglesias the hand ortho physician   Call the office for an appointment  Apply heat, apply cream or CBD cream and message the hand    Arthralgia   WHAT YOU NEED TO KNOW:   Arthralgia is pain in one or more joints, with no inflammation  It may be short-term and get better within 6 to 8 weeks  Arthralgia can be an early sign of arthritis  Arthralgia may be caused by a medical condition, such as a hormone disorder or a tumor  It may also be caused by an infection or injury  DISCHARGE INSTRUCTIONS:   Medicines: The following medicines may  be ordered for you:  · Acetaminophen  decreases pain  Ask how much to take and how often to take it  Follow directions  Acetaminophen can cause liver damage if not taken correctly  · NSAIDs  decrease pain and prevent swelling  Ask your healthcare provider which medicine is right for you  Ask how much to take and when to take it  Take as directed  NSAIDs can cause stomach bleeding and kidney problems if not taken correctly  · Pain relief cream  decreases pain  Use this cream as directed  · Take your medicine as directed  Contact your healthcare provider if you think your medicine is not helping or if you have side effects  Tell him of her if you are allergic to any medicine  Keep a list of the medicines, vitamins, and herbs you take  Include the amounts, and when and why you take them  Bring the list or the pill bottles to follow-up visits  Carry your medicine list with you in case of an emergency  Follow up with your healthcare provider or specialist as directed:  Write down your questions so you remember to ask them during your visits  Self-care:   · Apply heat  to help decrease pain  Use a heating pad or heat wrap   Apply heat for 20 to 30 minutes every 2 hours for as many days as directed  · Rest  as much as possible  Avoid activities that cause joint pain  · Apply ice  to help decrease swelling and pain  Ice may also help prevent tissue damage  Use an ice pack, or put crushed ice in a plastic bag  Cover it with a towel and place it on your painful joint for 15 to 20 minutes every hour or as directed  · Support  the joint with a brace or elastic wrap as directed  · Elevate  your joint above the level of your heart as often as you can to help decrease swelling and pain  Prop your painful joint on pillows or blankets to keep it elevated comfortably  · Lose weight  if you are overweight  Extra weight can put pressure on your joints and cause more pain  Ask your healthcare provider how much you should weigh  Ask him to help you create a weight loss plan  · Exercise  regularly to help improve joint movement and to decrease pain  Ask about the best exercise plan for you  Low-impact exercises can help take the pressure off your joints  Examples are walking, swimming, and water aerobics  Physical therapy:  A physical therapist teaches you exercises to help improve movement and strength, and to decrease pain  Ask your healthcare provider if physical therapy is right for you  Contact your healthcare provider or specialist if:   · You have a fever  · You continue to have joint pain that cannot be relieved with heat, ice, or medicine  · You have pain and inflammation around your joint  · You have questions or concerns about your condition or care  Return to the emergency department if:   · You have sudden, severe pain when you move your joint  · You have a fever and shaking chills  · You cannot move your joint  · You lose feeling on the side of your body where you have the painful joint      © Copyright [address] Information is for End User's use only and may not be sold, redistributed or otherwise used for commercial purposes  All illustrations and images included in CareNotes® are the copyrighted property of A D A M [employer]  or Govind Pineda  The above information is an  only  It is not intended as medical advice for individual conditions or treatments  Talk to your doctor, nurse or pharmacist before following any medical regimen to see if it is safe and effective for you

## 2021-06-18 NOTE — PROGRESS NOTES
[address]        NAME: Marija Rangel is a 66 y o  female  : 1950    MRN: [id number]  DATE: 2021  TIME: 2:09 PM    Assessment and Plan   Pain of right middle finger [M79 [id number]]  1  Pain of right middle finger  XR hand 3+ vw right         Patient Instructions       Follow up with PCP in 3-5 days  Proceed to  ER if symptoms worsen  Your xray was preliminarily read by your provider  A radiologist will read the xray and you will be notified  Take tylenol or motrin as needed  You are to see your PCP   Go to the ED if symptoms worsen  You are to see Dr Loly Hernandez the hand ortho physician   Call the office for an appointment  Apply heat, apply cream or CBD cream and message the hand            Chief Complaint     Chief Complaint   Patient presents with    Finger Pain     Pt c/o right middle finger pain no injury since   History of Present Illness       This is a 66year old female who states has chronic right middle finger pain that radiates into the palm of her hand  She states that she at times notices that her middle finger is difficult to flex and she states she generally will heat it up and then get it to bend  She states that she woke up  and noted that the finger was more difficult to bend than normal  She states she also has little nodules on the palm of her hand  She states she was told at one time not to worry about it  She denies any injury  She states at times it feels asleep with numbness and tingling         Review of Systems   Review of Systems   Constitutional: Negative  HENT: Negative  Eyes: Negative  Respiratory: Negative  Cardiovascular: Negative  Gastrointestinal: Negative  Endocrine: Negative  Genitourinary: Negative  Musculoskeletal:        Right middle finger and palm pain    Skin: Negative  Allergic/Immunologic: Negative  Neurological: Negative  Hematological: Negative  Psychiatric/Behavioral: Negative  Current Medications       Current Outpatient Medications:     acetaminophen (TYLENOL) 325 mg tablet, Take 2 tablets (650 mg total) by mouth every 6 (six) hours as needed for mild pain, Disp: 30 tablet, Rfl: 0    albuterol (PROVENTIL HFA,VENTOLIN HFA) 90 mcg/act inhaler, Inhale 2 puffs every 6 (six) hours as needed for wheezing or shortness of breath, Disp: 1 Inhaler, Rfl: 0    alendronate (FOSAMAX) 70 mg tablet, Take 1 tablet (70 mg total) by mouth every 7 days Give with at least 8 ounces of plain water first thing in the morning  Patient should be instructed to stay upright (not to lie down) for at least 30 minutes and until after first food of the day (to reduce esophageal irritation)  , Disp: 12 tablet, Rfl: 0    amLODIPine (NORVASC) 5 mg tablet, Take 1 tablet (5 mg total) by mouth daily, Disp: 30 tablet, Rfl: 5    aspirin (ECOTRIN LOW STRENGTH) 81 mg EC tablet, Take 81 mg by mouth daily, Disp: , Rfl:     Calcium Carbonate-Vit D-Min (CALCIUM 1200 PO), Take by mouth daily, Disp: , Rfl:     Coenzyme Q10 (COQ-10) 10 MG CAPS, Take by mouth daily, Disp: , Rfl:     Loratadine 10 MG CAPS, Take by mouth daily, Disp: , Rfl:     losartan (COZAAR) 50 mg tablet, Take 1 tablet (50 mg total) by mouth daily, Disp: 90 tablet, Rfl: 1    metFORMIN (GLUCOPHAGE) 1000 MG tablet, Take 1 tablet (1,000 mg total) by mouth 2 (two) times a day, Disp: 180 tablet, Rfl: 3    mometasone (ELOCON) 0 1 % cream, Apply topically daily, Disp: 45 g, Rfl: 1    Multiple Vitamin (MULTI-VITAMIN DAILY PO), Take by mouth daily, Disp: , Rfl:     simvastatin (ZOCOR) 10 mg tablet, Take 1 tablet (10 mg total) by mouth daily at bedtime, Disp: 90 tablet, Rfl: 3    VITAMIN D PO, Take 2,000 mg by mouth daily, Disp: , Rfl:     Current Allergies     Allergies as of 06/18/2021 - Reviewed 06/18/2021   Allergen Reaction Noted    Penicillins Hives 01/20/2016            The following portions of the patient's history were reviewed and updated as appropriate: allergies, current medications, past family history, past medical history, past social history, past surgical history and problem list      Past Medical History:   Diagnosis Date    Asthma     COPD [facility]chronic obstructive pulmonary disease) ([facility] [address] )     [facility]     Hyperlipidemia     Osteoarthritis     Osteoporosis     Seasonal allergies        Past Surgical History:   Procedure Laterality Date    BILATERAL SALPINGOOPHORECTOMY Bilateral 1/3/2020    Procedure: BILATERAL SALPINGO-OOPHORECTOMY;  Surgeon: Jennyfer Richardson MD;  [facility];  Service: Gynecology Oncology    CATARACT EXTRACTION Bilateral     CHOLECYSTECTOMY      [id number]  LAST ASSESSED    EYE SURGERY      cataracts removed    HYSTERECTOMY      [id number]  LAST ASSESSED    [place] EXPLORATORY OF ABDOMEN N/A 1/3/2020    Procedure: LAPAROTOMY EXPLORATORY;  Surgeon: Jennyfer Richardson MD;  [facility];  Service: Gynecology Oncology    TUBAL LIGATION         Family History   Problem Relation Age of Onset    Heart disease Mother     Rheumatic fever Father     Diabetes Family         MELLITUS    Diabetes Family         MELLITUS         Medications have been verified  Objective   /82   Pulse 81   Temp (!) 97 4 °F (36 3 °C)   Resp 16   SpO2 96%   No LMP recorded  Patient has had a hysterectomy  Physical Exam     Physical Exam  Vitals and nursing note reviewed  Constitutional:       General: She is not in acute distress  Appearance: Normal appearance  She is obese  She is not ill-appearing, toxic-appearing or diaphoretic  HENT:      Head: Normocephalic and atraumatic  Eyes:      Extraocular Movements: Extraocular movements intact  Cardiovascular:      Rate and Rhythm: Normal rate  Pulmonary:      Effort: Pulmonary effort is normal    Musculoskeletal:         General: Tenderness present  No signs of injury  Cervical back: Normal range of motion        Comments: LROM right middle finger  Unable to fully close finger to palm  Finger is swollen, TTP and ecchymotic    Pt has raised TTP nodules noted at base of middle finger in palm and little finger base in palm  Skin:     General: Skin is warm and dry  Capillary Refill: Capillary refill takes less than 2 seconds  Neurological:      General: No focal deficit present  Mental Status: She is alert and oriented to person, place, and time  Sensory: No sensory deficit  Psychiatric:         Mood and Affect: Mood normal          Behavior: Behavior normal          Thought Content: Thought content normal          Judgment: Judgment normal              Preliminary reading of xray  No acute process seen  Waiting on rad read    DDX: arthritis vs dupytrans nodules  Doubt flexor tenosynovitis as there is no erythema or signs of

## 2021-06-22 ENCOUNTER — OFFICE VISIT (OUTPATIENT)
Dept: OBGYN CLINIC | Facility: CLINIC | Age: 71
End: 2021-06-22
Payer: MEDICARE

## 2021-06-22 VITALS
DIASTOLIC BLOOD PRESSURE: 80 MMHG | BODY MASS INDEX: 32.85 KG/M2 | WEIGHT: 174 LBS | HEART RATE: 76 BPM | HEIGHT: 61 IN | SYSTOLIC BLOOD PRESSURE: 161 MMHG

## 2021-06-22 DIAGNOSIS — M19.041 DEGENERATIVE ARTHRITIS OF MIDDLE FINGER OF RIGHT HAND: Primary | ICD-10-CM

## 2021-06-22 PROCEDURE — 99214 OFFICE O/P EST MOD 30 MIN: CPT | Performed by: PHYSICIAN ASSISTANT

## 2021-06-22 RX ORDER — MELOXICAM 15 MG/1
15 TABLET ORAL DAILY
Qty: 30 TABLET | Refills: 0 | Status: SHIPPED | OUTPATIENT
Start: 2021-06-22 | End: 2022-01-10 | Stop reason: ALTCHOICE

## 2021-06-22 NOTE — PROGRESS NOTES
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  -     Ambulatory referral to Physical Therapy; Future  -     meloxicam (MOBIC) 15 mg tablet; Take 1 tablet (15 mg total) by mouth daily          X-rays of the patient's right hand shows some arthritic changes with no fractures or dislocations  Possible treatment options were discussed with the patient  She was given a prescription for meloxicam and will be referred to physical therapy for strengthening, stretching and range of motion of her finger  She will follow up with our office in 6 weeks  The patient is acceptable to this plan  Return in about 6 weeks (around 8/3/2021)  _____________________________________________________  CHIEF COMPLAINT:  Chief Complaint   Patient presents with    Right Middle Finger - Pain         SUBJECTIVE:  Zen Ceballos is a 66 y o  female who presents   To our office complaining of right middle finger pain  The patient states that her pain has been worsening recently and is starting to affect the range of motion of her finger  She states that warm water improved her range of motion  She denies any falls or trauma  She denies any numbness or tingling  She denies any fever or chills  She denies any triggering or locking       The following portions of the patient's history were reviewed and updated as appropriate: allergies, current medications, past family history, past medical history, past social history, past surgical history and problem list     PAST MEDICAL HISTORY:  Past Medical History:   Diagnosis Date    Asthma     [facility]     Diabetes ([facility]     Hyperlipidemia     Osteoarthritis     Osteoporosis     Seasonal allergies        PAST SURGICAL HISTORY:  Past Surgical History:   Procedure Laterality Date    BILATERAL SALPINGOOPHORECTOMY Bilateral 1/3/2020    Procedure: BILATERAL SALPINGO-OOPHORECTOMY;  Surgeon: Javad Lyman MD;  [facility];  Service: Gynecology Oncology    CATARACT EXTRACTION Bilateral     CHOLECYSTECTOMY      [id number]  LAST ASSESSED    EYE SURGERY      cataracts removed    HYSTERECTOMY      [id number]  LAST ASSESSED    [place] EXPLORATORY OF ABDOMEN N/A 1/3/2020    Procedure: LAPAROTOMY EXPLORATORY;  Surgeon: Javad Lyman MD;  [facility];  Service: Gynecology Oncology    TUBAL LIGATION         FAMILY HISTORY:  Family History   Problem Relation Age of Onset    Heart disease Mother     Rheumatic fever Father     Diabetes Family         MELLITUS    Diabetes Family         MELLITUS       SOCIAL HISTORY:  Social History     Tobacco Use    Smoking status: Former Smoker     Quit date:      Years since quittin 4    Smokeless tobacco: Never Used   Vaping Use    Vaping Use: Never used   Substance Use Topics    Alcohol use: Not Currently    Drug use: No       MEDICATIONS:    Current Outpatient Medications:     acetaminophen (TYLENOL) 325 mg tablet, Take 2 tablets (650 mg total) by mouth every 6 (six) hours as needed for mild pain, Disp: 30 tablet, Rfl: 0    albuterol (PROVENTIL HFA,VENTOLIN HFA) 90 mcg/act inhaler, Inhale 2 puffs every 6 (six) hours as needed for wheezing or shortness of breath, Disp: 1 Inhaler, Rfl: 0    alendronate (FOSAMAX) 70 mg tablet, Take 1 tablet (70 mg total) by mouth every 7 days Give with at least 8 ounces of plain water first thing in the morning  Patient should be instructed to stay upright (not to lie down) for at least 30 minutes and until after first food of the day (to reduce esophageal irritation)  , Disp: 12 tablet, Rfl: 0    amLODIPine (NORVASC) 5 mg tablet, Take 1 tablet (5 mg total) by mouth daily, Disp: 30 tablet, Rfl: 5    aspirin (ECOTRIN LOW STRENGTH) 81 mg EC tablet, Take 81 mg by mouth daily, Disp: , Rfl:     Calcium Carbonate-Vit D-Min (CALCIUM 1200 PO), Take by mouth daily, Disp: , Rfl:     Coenzyme Q10 (COQ-10) 10 MG CAPS, Take by mouth daily, Disp: , Rfl:     Loratadine 10 MG CAPS, Take by mouth daily, Disp: , Rfl:     losartan (COZAAR) 50 mg tablet, Take 1 tablet (50 mg total) by mouth daily, Disp: 90 tablet, Rfl: 1    metFORMIN (GLUCOPHAGE) 1000 MG tablet, Take 1 tablet (1,000 mg total) by mouth 2 (two) times a day, Disp: 180 tablet, Rfl: 3    mometasone (ELOCON) 0 1 % cream, Apply topically daily, Disp: 45 g, Rfl: 1    Multiple Vitamin (MULTI-VITAMIN DAILY PO), Take by mouth daily, Disp: , Rfl:     simvastatin (ZOCOR) 10 mg tablet, Take 1 tablet (10 mg total) by mouth daily at bedtime, Disp: 90 tablet, Rfl: 3    VITAMIN D PO, Take 2,000 mg by mouth daily, Disp: , Rfl:     meloxicam (MOBIC) 15 mg tablet, Take 1 tablet (15 mg total) by mouth daily, Disp: 30 tablet, Rfl: 0    ALLERGIES:  Allergies   Allergen Reactions    Penicillins Hives       ROS:  Review of Systems     Constitutional: Negative for fatigue, fever or loss of appetite  HENT: Negative  Respiratory: Negative for shortness of breath, dyspnea  Cardiovascular: Negative for chest pain/tightness  Gastrointestinal: Negative for abdominal pain, N/V  Endocrine: Negative for cold/heat intolerance, unexplained weight loss/gain  Genitourinary: Negative for flank pain, dysuria, hematuria  Musculoskeletal: Positive for arthralgia   Skin: Negative for rash  Neurological: Negative for numbness or tingling  Psychiatric/Behavioral: Negative for agitation  _____________________________________________________  PHYSICAL EXAMINATION:    Blood pressure 161/80, pulse 76, height 5' 0 5" (1 537 m), weight 78 9 kg (174 lb)  Constitutional: Oriented to person, place, and time  Appears well-developed and well-nourished  No distress  HENT:   Head: Normocephalic  Eyes: Conjunctivae are normal  Right eye exhibits no discharge  Left eye exhibits no discharge  No scleral icterus  Cardiovascular: Normal rate  Pulmonary/Chest: Effort normal    Neurological: Alert and oriented to person, place, and time  Skin: Skin is warm and dry  No rash noted  Not diaphoretic  No erythema  No pallor  Psychiatric: Normal mood and affect  Behavior is normal  Judgment and thought content normal       MUSCULOSKELETAL EXAMINATION:   Physical Exam  Ortho Exam      Right upper extremity is neurovascularly intact  Fingers are pink and mobile  Compartments are soft  There is no ligament dysfunction  Tenderness to palpation of the middle phalanx of the middle finger   No tendon dysfunction  Brisk cap refill   Sensation intact  ROM of the middle finger is decreased secondary to pain  Objective:  BP Readings from Last 1 Encounters:   06/22/21 161/80      Wt Readings from Last 1 Encounters:   06/22/21 78 9 kg (174 lb)        BMI:   Estimated body mass index is 33 42 kg/m² as calculated from the following:    Height as of this encounter: 5' 0 5" (1 537 m)  Weight as of this encounter: 78 9 kg (174 lb)  Radiographs:  _____________________________________________________  STUDIES REVIEWED:  I have personally reviewed pertinent films and reports in PACS  X-rays of the patient's right hand shows some arthritic changes with no fractures or dislocations          Malina Cintron PA-C

## 2021-06-23 NOTE — PROGRESS NOTES
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  M19 041                   Assessment  Assessment details: The patient is a 67 y/o female who presents to PT with degenerative arthritis of middle finger of R hand  She has complaints of intermittent pain with most pain being along her R middle finger between her DIP and PIP joint  She also demonstrates deficits with decreased ROM and strength, decreased flexibility and pain and difficulty with completing her ADLs and tasks at home  She is unable to make a full fist on her R hand  She remains I with all her ADLs though she has pain and difficulty with completing them  She notes that because she is unable to make a full fist she has trouble with holding items and carrying items where she has to bend her finger (such as lifting a gallon of milk or carrying a shopping bag)  At times she will drop items  Secondary to pain and above deficits she is limited with her overall mobility and function  The patient would benefit from continued PT to address deficits and improve function  Tx to include ROM, stretching, strengthening, modalities, HEP, pt education, postural ed, lifting/body mechanics, neuro re-ed, balance/proprioception Te, MT and equipment  Impairments: abnormal or restricted ROM, activity intolerance, impaired physical strength, lacks appropriate home exercise program and pain with function  Other impairment: decreased flexibility  Understanding of Dx/Px/POC: good   Prognosis: good    Goals  STGs:  1  Initiate and complete HEP with verbal cues  2   Decrease R middle finger pain by > 25% in 4 weeks  3   Improve R middle finger ROM by 5-10 degrees in 4 weeks  LTGs:  1  Patient to be I with HEP in 12 weeks    2   Improve R middle finger ROM to [facility] t/o in 12 weeks to improve function  3   Improve R  strength by > 5# in 12 weeks to improve function  4   Decrease R middle finger pain to < or = to 2-3/10 with activity in 12 weeks to improve function  5   Recreational performance in related activities is improved to PLOF in 12 weeks  6   ADL performance is improved to PLOF in 12 weeks  7   Patient will be able to make a full fist R hand in 12 weeks to improve function and hold items with increased ease  Plan  Plan details: Modalities and therapy interventions prn  Patient would benefit from: skilled physical therapy  Planned modality interventions: cryotherapy and thermotherapy: hydrocollator packs  Planned therapy interventions: manual therapy, neuromuscular re-education, patient education, postural training, self care, strengthening, stretching, therapeutic activities, therapeutic exercise, flexibility and home exercise program  Frequency: 2x week  Duration in weeks: 12  Plan of Care beginning date: 6/28/2021  Plan of Care expiration date: 9/28/2021  Treatment plan discussed with: patient        Subjective Evaluation    History of Present Illness  Date of onset: 6/13/2021  Mechanism of injury: The patient states that she developed R middle finger pain and swelling on Sunday 6/13/21  By the end of the week the pain had increased and she had tenderness along her finger and towards the palm of her hand  She had gone to the Urgent Care in [facility] and she had x-rays taken  Per patient, (-) for break  She was referred to the orthopedic doctor  She had seen Dr Adrianna Pascual on 6/22/21  No further imaging was completed today  She was started on an anti-inflammatory and she is taking one a day for 30 days  Since starting to take the medicine, some swelling has gone down along with pain  She is still unable to make a full fist     She will be going back to see the doctor on 8/3/21 for her next appointment      Quality of life: good    Pain  At best pain ratin  At worst pain ratin  Location: R Hand - middle finger PIP and DIP joint  Quality: tight, dull ache and sharp (Stiffness)  Relieving factors: heat and medications    Social Support  Lives with: spouse and adult children    Employment status: not working  Hand dominance: right      Diagnostic Tests  X-ray: normal  Patient Goals  Patient goals for therapy: increased motion, decreased pain and increased strength  Patient goal: "To help my finger bend better, to be able to make a fist "         Objective     Tenderness     Additional Tenderness Details  TTP along PIP joint of R middle finger  Neurological Testing     Sensation     Wrist/Hand   Left   Intact: light touch    Right   Intact: light touch    Active Range of Motion     Left Wrist   Wrist flexion: 65 degrees   Wrist extension: 45 degrees     Right Wrist   Wrist flexion: 65 degrees   Wrist extension: 45 degrees     Left Digits    Flexion   Middle     MCP: 70    PIP: 80    DIP: 75  Extension   Middle     MCP: 0    PIP: 0    DIP: 0    Right Digits   Flexion   Middle     MCP: 55    PIP: 60    DIP: 45  Extension   Middle     MCP: 0    PIP: 0    DIP: 0    Additional Active Range of Motion Details  Patient able to make between 1/2 and 3/4 fist on her R hand, mostly limited at middle finger        Strength/Myotome Testing     Left Wrist/Hand   Wrist extension: WFL  Wrist flexion: WFL     (2nd hand position)     Trial 1: 20    Trial 2: 20    Trial 3: 20    Thumb Strength  Key/Lateral Pinch     Trial 1: 12    Trial 2: 12    Trial 3: 12    Average: 12  Tip/Two-Point Pinch     Trial 1: 9    Trial 2: 8    Trial 3: 9    Average: 8 67  Palmar/Three-Point Pinch     Trial 1: 11    Trial 2: 11    Trial 3: 11    Average: 11     Right Wrist/Hand   Wrist extension: WFL  Wrist flexion: WFL     (2nd hand position)     Trial 1: 25    Trial 2: 25    Trial 3: 25    Thumb Strength   Key/Lateral Pinch     Trial 1: 13    Trial 2: 14    Trial 3: 14 Average: 13 67  Tip/Two-Point Pinch     Trial 1: 9    Trial 2: 8    Trial 3: 10    Average: 9  Palmar/Three-Point Pinch     Trial 1: 8    Trial 2: 7    Trial 3: 9    Average: 8                Precautions: None       Manuals 6/28       R middle finger                                Neuro Re-Ed                                                                 Ther Ex        UBE 10 mins   100 rpm       Digiflex Comp and Ind Yellow 20x each       Web -  and Pro/Sup Yellow 20x each       Finger Ext with Rubber Band        Putty - /Roll/Pinch        Therabar  Red 15x       TBand at wrist 4 ways                Ther Activity        Place chips into slot on lid                Gait Training                        Modalities        HP prn

## 2021-06-28 ENCOUNTER — EVALUATION (OUTPATIENT)
Dept: PHYSICAL THERAPY | Facility: CLINIC | Age: 71
End: 2021-06-28
Payer: MEDICARE

## 2021-06-28 DIAGNOSIS — M19.041 DEGENERATIVE ARTHRITIS OF MIDDLE FINGER OF RIGHT HAND: Primary | ICD-10-CM

## 2021-06-28 PROCEDURE — 97110 THERAPEUTIC EXERCISES: CPT | Performed by: PHYSICAL THERAPIST

## 2021-06-28 PROCEDURE — 97161 PT EVAL LOW COMPLEX 20 MIN: CPT | Performed by: PHYSICAL THERAPIST

## 2021-07-01 ENCOUNTER — OFFICE VISIT (OUTPATIENT)
Dept: PHYSICAL THERAPY | Facility: CLINIC | Age: 71
End: 2021-07-01
Payer: MEDICARE

## 2021-07-01 DIAGNOSIS — M19.041 DEGENERATIVE ARTHRITIS OF MIDDLE FINGER OF RIGHT HAND: Primary | ICD-10-CM

## 2021-07-01 PROCEDURE — 97110 THERAPEUTIC EXERCISES: CPT

## 2021-07-01 NOTE — PROGRESS NOTES
Daily Note     Today's date: 2021  Patient name: John Oliver  : 1950  MRN: [id number]  Referring provider: Yesica Gaspar MD  Dx:   Encounter Diagnosis     ICD-10-CM    1  Degenerative arthritis of middle finger of right hand  M19 041                   Subjective: Pt reports she is now able to make a fist now R hand  Objective: See treatment diary below      Assessment: Tolerated treatment well  Discomfort primarily 3 digit DIP joint with resisted  with Web  Patient exhibited good technique with therapeutic exercises and would benefit from continued PT      Plan: Continue per plan of care           Precautions: None       Manuals       R middle finger                                Neuro Re-Ed                                                                 Ther Ex        UBE 10 mins   100 rpm 100 rpm  5'fwd/5'retro      Digiflex Comp and Ind Yellow 20x each Yellow x20 ea      Web -  and Pro/Sup Yellow 20x each Yellow   x20         Finger Ext with Rubber Band  Web yellow  x20       Putty - /Roll/Pinch  2x10 ea      Therabar  Red 15x Red 2x10      TBand at wrist 4 ways  Red 2x10 ea              Ther Activity        Place chips into slot on lid  x20               Gait Training                        Modalities        HP prn

## 2021-07-07 ENCOUNTER — OFFICE VISIT (OUTPATIENT)
Dept: PHYSICAL THERAPY | Facility: CLINIC | Age: 71
End: 2021-07-07
Payer: MEDICARE

## 2021-07-07 DIAGNOSIS — M19.041 DEGENERATIVE ARTHRITIS OF MIDDLE FINGER OF RIGHT HAND: Primary | ICD-10-CM

## 2021-07-07 PROCEDURE — 97110 THERAPEUTIC EXERCISES: CPT

## 2021-07-07 NOTE — PROGRESS NOTES
Daily Note     Today's date: 2021  Patient name: Jessica Wilcox  : 1950  MRN: [id number]  Referring provider: Ronaldo Salinas MD  Dx:   Encounter Diagnosis     ICD-10-CM    1  Degenerative arthritis of middle finger of right hand  M19 041                   Subjective: Pt reports her finger is sore this morning  States "it wasn't too bad"  after last treatment  Objective: See treatment diary below      Assessment: Tolerated treatment well  Tenderness over DIP joint, 3rd digit R hand  Pain with flexion of DIP  Issued handouts and reviewed with patient for HEP  Instructed patient to stop if symptoms increase or new symptoms arise  Patient reported decreased pain increased motion noted 3rd digit R hPatient exhibited good technique with therapeutic exercises and would benefit from continued PT      Plan: Continue per plan of care  Precautions: None       Manuals      R middle finger                                Neuro Re-Ed                                                                 Ther Ex        UBE 10 mins   100 rpm 100 rpm  5'fwd/5'retro 100 rpm  5'fwd/5'retro     Digiflex Comp and Ind Yellow 20x each Yellow x20 ea Yellow x20 ea     Web -  and Pro/Sup Yellow 20x each Yellow   x20    Yellow   x20     Finger Ext with Rubber Band  Web yellow  x20  Web yellow  x20     Putty - /Roll/Pinch  2x10 ea 2x10 ea     Therabar  Red 15x Red 2x10 Red 2x10     TBand at wrist 4 ways  Red 2x10 ea Red 2x10              Ther Activity        Place chips into slot on lid  x20               Gait Training                        Modalities        HP prn                      Access Code: [email or web address]  URL: [email or web address]  Date: 2021  Prepared by:  Mona Pantoja    Exercises  Wrist Extension with Resistance - 1 x daily - 3 x weekly - 2 sets - 10 reps  Wrist Flexion with Resistance - 1 x daily - 3 x weekly - 2 sets - 10 reps  Seated Wrist Radial Deviation with Anchored Resistance - 1 x daily - 3 x weekly - 2 sets - 10 reps  Wrist Ulnar Deviation with Resistance - 1 x daily - 3 x weekly - 2 sets - 10 reps  Seated Finger Flexion with Resistance - 1 x daily - 3 x weekly - 2 sets - 10 reps  Quick Finger Spreading with Rubber Band - 1 x daily - 3 x weekly - 2 sets - 10 reps

## 2021-07-09 ENCOUNTER — OFFICE VISIT (OUTPATIENT)
Dept: PHYSICAL THERAPY | Facility: CLINIC | Age: 71
End: 2021-07-09
Payer: MEDICARE

## 2021-07-09 DIAGNOSIS — M19.041 DEGENERATIVE ARTHRITIS OF MIDDLE FINGER OF RIGHT HAND: Primary | ICD-10-CM

## 2021-07-09 PROCEDURE — 97110 THERAPEUTIC EXERCISES: CPT

## 2021-07-09 NOTE — PROGRESS NOTES
Daily Note     Today's date: 2021  Patient name: Carlene Oconnell  : 1950  MRN: [id number]  Referring provider: Jaylin Baron MD  Dx:   Encounter Diagnosis     ICD-10-CM    1  Degenerative arthritis of middle finger of right hand  M19 041                   Subjective: Pt reports her middle finger is sore today  Objective: See treatment diary below      Assessment: Tolerated treatment well  Tenderness continues DIP joint 3rd digit  Encouraged patient to continue with HEP as tolerated  Patient demonstrated fatigue post treatment, exhibited good technique with therapeutic exercises and would benefit from continued PT      Plan: Continue per plan of care  Precautions: None       Manuals     R middle finger                                Neuro Re-Ed                                                                 Ther Ex        UBE 10 mins   100 rpm 100 rpm  5'fwd/5'retro 100 rpm  5'fwd/5'retro 100 rpm  5'fwd/5'retro    Digiflex Comp and Ind Yellow 20x each Yellow x20 ea Yellow x20 ea Red x20    Web -  and Pro/Sup Yellow 20x each Yellow   x20    Yellow   x20 Yellow   x20    Finger Ext with Rubber Band  Web yellow  x20  Web yellow  x20 Wed yellow  x20    Putty - /Roll/Pinch  2x10 ea 2x10 ea 2x10 ea    Therabar  Red 15x Red 2x10 Red 2x10 Red 2x10    TBand at wrist 4 ways  Red 2x10 ea Red 2x10  Green  2x10            Ther Activity        Place chips into slot on lid  x20               Gait Training                        Modalities         prn                      Access Code: [email or web address]  URL: [email or web address]  Date: 2021  Prepared by:  Aman Pantoja    Exercises  Wrist Extension with Resistance - 1 x daily - 3 x weekly - 2 sets - 10 reps  Wrist Flexion with Resistance - 1 x daily - 3 x weekly - 2 sets - 10 reps  Seated Wrist Radial Deviation with Anchored Resistance - 1 x daily - 3 x weekly - 2 sets - 10 reps  Wrist Ulnar Deviation with Resistance - 1 x daily - 3 x weekly - 2 sets - 10 reps  Seated Finger Flexion with Resistance - 1 x daily - 3 x weekly - 2 sets - 10 reps  Quick Finger Spreading with Rubber Band - 1 x daily - 3 x weekly - 2 sets - 10 reps

## 2021-07-13 ENCOUNTER — OFFICE VISIT (OUTPATIENT)
Dept: PHYSICAL THERAPY | Facility: CLINIC | Age: 71
End: 2021-07-13
Payer: MEDICARE

## 2021-07-13 DIAGNOSIS — M19.041 DEGENERATIVE ARTHRITIS OF MIDDLE FINGER OF RIGHT HAND: Primary | ICD-10-CM

## 2021-07-13 PROCEDURE — 97110 THERAPEUTIC EXERCISES: CPT

## 2021-07-13 NOTE — PROGRESS NOTES
Daily Note     Today's date: 2021  Patient name: Jenna Gay  : 1950  MRN: [id number]  Referring provider: Logan Marcus MD  Dx:   Encounter Diagnosis     ICD-10-CM    1  Degenerative arthritis of middle finger of right hand  M19 041                   Subjective: Pt reports that her finger is hurting today especially due to the weather today  Objective: See treatment diary below      Assessment: Tolerated treatment well  Patient does well with there ex  Pt is able to make a fist now  Pt has weakness in hand  Pt would benefit from continued therapy for improving functional mobility  Plan: Continue per plan of care  Precautions: None       Manuals    R middle finger                                Neuro Re-Ed                                                                 Ther Ex        UBE 10 mins   100 rpm 100 rpm  5'fwd/5'retro 100 rpm  5'fwd/5'retro 100 rpm  5'fwd/5'retro 100 rpm  5'fwd/5'retro   Digiflex Comp and Ind Yellow 20x each Yellow x20 ea Yellow x20 ea Red x20 Red x 20 ea   Web -  and Pro/Sup Yellow 20x each Yellow   x20    Yellow   x20 Yellow   x20 Yellow  x 20   Finger Ext with Rubber Band  Web yellow  x20  Web yellow  x20 Wed yellow  x20 Web yellow   x 20    Putty - /Roll/Pinch  2x10 ea 2x10 ea 2x10 ea 2 x 10 ea   Therabar  Red 15x Red 2x10 Red 2x10 Red 2x10 Red 2 x 10   TBand at wrist 4 ways  Red 2x10 ea Red 2x10  Green  2x10 Green 2 x 10           Ther Activity        Place chips into slot on lid  x20               Gait Training                        Modalities        HP prn                      Access Code: [email or web address]  URL: [email or web address]  Date: 2021  Prepared by:  Thompson Pantoja    Exercises  Wrist Extension with Resistance - 1 x daily - 3 x weekly - 2 sets - 10 reps  Wrist Flexion with Resistance - 1 x daily - 3 x weekly - 2 sets - 10 reps  Seated Wrist Radial Deviation with Anchored Resistance - 1 x daily - 3 x weekly - 2 sets - 10 reps  Wrist Ulnar Deviation with Resistance - 1 x daily - 3 x weekly - 2 sets - 10 reps  Seated Finger Flexion with Resistance - 1 x daily - 3 x weekly - 2 sets - 10 reps  Quick Finger Spreading with Rubber Band - 1 x daily - 3 x weekly - 2 sets - 10 reps

## 2021-07-15 ENCOUNTER — OFFICE VISIT (OUTPATIENT)
Dept: PHYSICAL THERAPY | Facility: CLINIC | Age: 71
End: 2021-07-15
Payer: MEDICARE

## 2021-07-15 DIAGNOSIS — M19.041 DEGENERATIVE ARTHRITIS OF MIDDLE FINGER OF RIGHT HAND: Primary | ICD-10-CM

## 2021-07-15 PROCEDURE — 97110 THERAPEUTIC EXERCISES: CPT | Performed by: PHYSICAL THERAPIST

## 2021-07-15 NOTE — PROGRESS NOTES
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  M19 041                   Subjective: My finger feels ok       Objective: See treatment diary below      Assessment: Tolerated treatment well  Patient exhibited good technique with therapeutic exercises and would benefit from continued PT      Plan: Continue per plan of care  Precautions: None       Manuals 7/15  7/1 7/7 7/9 7/13   R middle finger 5 min RR PROM to R middle finger                                Neuro Re-Ed                                                                 Ther Ex        UBE 10 mins   120 rpm 100 rpm  5'fwd/5'retro 100 rpm  5'fwd/5'retro 100 rpm  5'fwd/5'retro 100 rpm  5'fwd/5'retro   Digiflex Comp and Ind Red  20x each Yellow x20 ea Yellow x20 ea Red x20 Red x 20 ea   Web -  and Pro/Sup Red  20x each Yellow   x20    Yellow   x20 Yellow   x20 Yellow  x 20   Finger Ext with Rubber Band Web Red x20 Web yellow  x20  Web yellow  x20 Wed yellow  x20 Web yellow   x 20    Putty - /Roll/Pinch 2x10  2x10 ea 2x10 ea 2x10 ea 2 x 10 ea   Therabar  Red 2x10 Red 2x10 Red 2x10 Red 2x10 Red 2 x 10   TBand at wrist 4 ways Green 2x10 Red 2x10 ea Red 2x10  Green  2x10 Green 2 x 10           Ther Activity        Place chips into slot on lid  x20               Gait Training                        Modalities        HP prn                      Access Code: [email or web address]  URL: [email or web address]  Date: 2021  Prepared by:  Abundio Pantoja    Exercises  Wrist Extension with Resistance - 1 x daily - 3 x weekly - 2 sets - 10 reps  Wrist Flexion with Resistance - 1 x daily - 3 x weekly - 2 sets - 10 reps  Seated Wrist Radial Deviation with Anchored Resistance - 1 x daily - 3 x weekly - 2 sets - 10 reps  Wrist Ulnar Deviation with Resistance - 1 x daily - 3 x weekly - 2 sets - 10 reps  Seated Finger Flexion with Resistance - 1 x daily - 3 x weekly - 2 sets - 10 reps  Quick Finger Spreading with Rubber Band - 1 x daily - 3 x weekly - 2 sets - 10 reps

## 2021-07-20 ENCOUNTER — OFFICE VISIT (OUTPATIENT)
Dept: PHYSICAL THERAPY | Facility: CLINIC | Age: 71
End: 2021-07-20
Payer: MEDICARE

## 2021-07-20 DIAGNOSIS — I10 BENIGN ESSENTIAL HYPERTENSION: ICD-10-CM

## 2021-07-20 DIAGNOSIS — M19.041 DEGENERATIVE ARTHRITIS OF MIDDLE FINGER OF RIGHT HAND: Primary | ICD-10-CM

## 2021-07-20 PROCEDURE — 97110 THERAPEUTIC EXERCISES: CPT

## 2021-07-20 RX ORDER — LOSARTAN POTASSIUM 50 MG/1
50 TABLET ORAL DAILY
Qty: 30 TABLET | Refills: 0 | Status: SHIPPED | OUTPATIENT
Start: 2021-07-20 | End: 2021-08-25 | Stop reason: SDUPTHER

## 2021-07-20 NOTE — PROGRESS NOTES
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  M19 041                   Subjective: Pt reports feeling good today and reports no pain  Objective: See treatment diary below      Assessment: Tolerated treatment well  Patient with decreased pain today  Pt is able to increase reps/resistance today with good tolerance  Pt is progressing well  Pt does have weakness in hand  Pt would benefit from continued therapy for strengthening for improving functional mobility  Plan: Continue per plan of care  Precautions: None       Manuals 7/15  7/20 7/7 7/9 7/13   R middle finger 5 min RR PROM to R middle finger                                Neuro Re-Ed                                                                 Ther Ex        UBE 10 mins   120 rpm 100 rpm  5'fwd/5'retro 100 rpm  5'fwd/5'retro 100 rpm  5'fwd/5'retro 100 rpm  5'fwd/5'retro   Digiflex Comp and Ind Red  20x each Red x20 ea Yellow x20 ea Red x20 Red x 20 ea   Web -  and Pro/Sup Red  20x each Red   x20    Yellow   x20 Yellow   x20 Yellow  x 20   Finger Ext with Rubber Band Web [employer] x20 Web Red  x20  Web yellow  x20 Wed yellow  x20 Web yellow   x 20    Putty - /Roll/Pinch 2x10  2x10 ea  Red 2x10 ea 2x10 ea 2 x 10 ea   Therabar  Red 2x10 Green 2x10 Red 2x10 Red 2x10 Red 2 x 10   TBand at wrist 4 ways Green 2x10  Green 2x10 ea Red 2x10  Green  2x10 Green 2 x 10           Ther Activity        Place chips into slot on lid                Gait Training                        Modalities        HP prn                      Access Code: [email or web address]  URL: [email or web address]  Date: 2021  Prepared by:  Rosalia Pantoja    Exercises  Wrist Extension with Resistance - 1 x daily - 3 x weekly - 2 sets - 10 reps  Wrist Flexion with Resistance - 1 x daily - 3 x weekly - 2 sets - 10 reps  Seated Wrist Radial Deviation with Anchored Resistance - 1 x daily - 3 x weekly - 2 sets - 10 reps  Wrist Ulnar Deviation with Resistance - 1 x daily - 3 x weekly - 2 sets - 10 reps  Seated Finger Flexion with Resistance - 1 x daily - 3 x weekly - 2 sets - 10 reps  Quick Finger Spreading with Rubber Band - 1 x daily - 3 x weekly - 2 sets - 10 reps

## 2021-07-22 ENCOUNTER — OFFICE VISIT (OUTPATIENT)
Dept: PHYSICAL THERAPY | Facility: CLINIC | Age: 71
End: 2021-07-22
Payer: MEDICARE

## 2021-07-22 DIAGNOSIS — M19.041 DEGENERATIVE ARTHRITIS OF MIDDLE FINGER OF RIGHT HAND: Primary | ICD-10-CM

## 2021-07-22 PROCEDURE — 97110 THERAPEUTIC EXERCISES: CPT

## 2021-07-22 NOTE — PROGRESS NOTES
Daily Note     Today's date: 2021  Patient name: Bryan Roca  : 1950  MRN: [id number]  Referring provider: Josie Lopez MD  Dx:   Encounter Diagnosis     ICD-10-CM    1  Degenerative arthritis of middle finger of right hand  M19 041        Start Time: 830  Stop Time: 915  Total time in clinic (min): 45 minutes    Subjective: Pt states her finger is doing well  Objective: See treatment diary below      Assessment: Tolerated treatment well  Increased TE repetitions this visit, good challenge for patient  Patient exhibited good technique with therapeutic exercises and would benefit from continued PT      Plan: Continue per plan of care  Precautions: None       Manuals 7/15  7/20 7/22 7/9 7/13   R middle finger 5 min RR PROM to R middle finger                                Neuro Re-Ed                                                                 Ther Ex        UBE 10 mins   120 rpm 100 rpm  5'fwd/5'retro 100 rpm  5'fwd/5'retro 100 rpm  5'fwd/5'retro 100 rpm  5'fwd/5'retro   Digiflex Comp and Ind Red  20x each Red x20 ea Red x25 ea Red x20 Red x 20 ea   Web -  and Pro/Sup Red  20x each Red   x20    Red   x25 Yellow   x20 Yellow  x 20   Finger Ext with Rubber Band Web [employer] x20 Web [employer]  x20  Web Red  x25 Wed yellow  x20 Web yellow   x 20    Putty - /Roll/Pinch 2x10  2x10 ea  Red 2x10 ea  Red 2x10 ea 2 x 10 ea   Therabar  Red 2x10 Green 2x10 Green 25x Red 2x10 Red 2 x 10   TBand at wrist 4 ways Green 2x10  Green 2x10 ea Green 25x ea Green  2x10 Green 2 x 10           Ther Activity        Place chips into slot on lid                Gait Training                        Modalities         prn                      Access Code: [email or web address]  URL: [email or web address]  Date: 2021  Prepared by:  Tara Pantoja    Exercises  Wrist Extension with Resistance - 1 x daily - 3 x weekly - 2 sets - 10 reps  Wrist Flexion with Resistance - 1 x daily - 3 x weekly - 2 sets - 10 reps  Seated Wrist Radial Deviation with Anchored Resistance - 1 x daily - 3 x weekly - 2 sets - 10 reps  Wrist Ulnar Deviation with Resistance - 1 x daily - 3 x weekly - 2 sets - 10 reps  Seated Finger Flexion with Resistance - 1 x daily - 3 x weekly - 2 sets - 10 reps  Quick Finger Spreading with Rubber Band - 1 x daily - 3 x weekly - 2 sets - 10 reps

## 2021-07-26 ENCOUNTER — EVALUATION (OUTPATIENT)
Dept: PHYSICAL THERAPY | Facility: CLINIC | Age: 71
End: 2021-07-26
Payer: MEDICARE

## 2021-07-26 DIAGNOSIS — M19.041 DEGENERATIVE ARTHRITIS OF MIDDLE FINGER OF RIGHT HAND: Primary | ICD-10-CM

## 2021-07-26 PROCEDURE — 97110 THERAPEUTIC EXERCISES: CPT | Performed by: PHYSICAL THERAPIST

## 2021-07-26 NOTE — PROGRESS NOTES
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  M19 041             Assessment  Assessment details: The patient is a 67 y/o female who presents to PT with degenerative arthritis of middle finger of R hand  She has complaints of intermittent pain with most pain being along her R middle finger between her DIP and PIP joint  She also demonstrates deficits with decreased ROM and strength, decreased flexibility and pain and difficulty with completing her ADLs and tasks at home  She is unable to make a full fist on her R hand  She remains I with all her ADLs though she has pain and difficulty with completing them  She notes that because she is unable to make a full fist she has trouble with holding items and carrying items where she has to bend her finger (such as lifting a gallon of milk or carrying a shopping bag)  At times she will drop items  Secondary to pain and above deficits she is limited with her overall mobility and function  The patient would benefit from continued PT to address deficits and improve function  Tx to include ROM, stretching, strengthening, modalities, HEP, pt education, postural ed, lifting/body mechanics, neuro re-ed, balance/proprioception Te, MT and equipment  RA      Jenna Gay has demonstrated decreased pain, increased strength, increased range of motion, and increased activity tolerance since starting physical therapy services  She reports They report an overall improvement of 100% thus far  She continues to present with pain, decreased strength, decreased range of motion, and decreased activity tolerance and would benefit from additional skilled physical therapy interventions to address impairments and maximize function   Anticipate D/C from PT following appointment on 7/29        Impairments: abnormal or restricted ROM, activity intolerance, impaired physical strength, lacks appropriate home exercise program and pain with function  Other impairment: decreased flexibility  Understanding of Dx/Px/POC: good   Prognosis: good    Goals  STGs:  1  Initiate and complete HEP with verbal cues  Met   2  Decrease R middle finger pain by > 25% in 4 weeks  Met   3  Improve R middle finger ROM by 5-10 degrees in 4 weeks  Met   LTGs:  1  Patient to be I with HEP in 12 weeks  Met   2  Improve R middle finger ROM to [facility] t/o in 12 weeks to improve function  Met   3  Improve R  strength by > 5# in 12 weeks to improve function  Met   4  Decrease R middle finger pain to < or = to 2-3/10 with activity in 12 weeks to improve function  Met     5  Recreational performance in related activities is improved to PLOF in 12 weeks  Met   6  ADL performance is improved to PLOF in 12 weeks  Met   7  Patient will be able to make a full fist R hand in 12 weeks to improve function and hold items with increased ease  Met      Plan  Plan details: Modalities and therapy interventions prn  Patient would benefit from: skilled physical therapy  Planned modality interventions: cryotherapy and thermotherapy: hydrocollator packs  Planned therapy interventions: manual therapy, neuromuscular re-education, patient education, postural training, self care, strengthening, stretching, therapeutic activities, therapeutic exercise, flexibility and home exercise program  Frequency: 2x week  Duration in weeks: 12  Plan of Care beginning date: 6/28/2021  Plan of Care expiration date: 9/28/2021  Treatment plan discussed with: patient        Subjective Evaluation    History of Present Illness  Date of onset: 6/13/2021  Mechanism of injury: The patient states that she developed R middle finger pain and swelling on Sunday 6/13/21    By the end of the week the pain had increased and she had tenderness along her finger and towards the palm of her hand  She had gone to the Urgent Care in [facility] and she had x-rays taken  Per patient, (-) for break  She was referred to the orthopedic doctor  She had seen Dr Janak Edmondson on 21  No further imaging was completed today  She was started on an anti-inflammatory and she is taking one a day for 30 days  Since starting to take the medicine, some swelling has gone down along with pain  She is still unable to make a full fist     She will be going back to see the doctor on 8/3/21 for her next appointment  RA      The patient reports 100 percent improvement since beginning skilled PT  She reports she is now able to make a complete fist with her right hand  Denies any limitations with gripping  Lifting objects, ADL's or IADL's  She has a follow up with MD on 8/3  Quality of life: good    Pain       RA  No Pain reported   At best pain ratin  At worst pain ratin  Location: R Hand - middle finger PIP and DIP joint  Quality: tight, dull ache and sharp (Stiffness)  Relieving factors: heat and medications    Social Support  Lives with: spouse and adult children    Employment status: not working  Hand dominance: right      Diagnostic Tests  X-ray: normal  Patient Goals  Patient goals for therapy: increased motion, decreased pain and increased strength  Patient goal: "To help my finger bend better, to be able to make a fist "         Objective     Tenderness     Additional Tenderness Details  TTP along PIP joint of R middle finger        Neurological Testing     Sensation     Wrist/Hand   Left   Intact: light touch    Right   Intact: light touch    Active Range of Motion     Left Wrist   Wrist flexion: 65 degrees   Wrist extension: 45 degrees     Right Wrist     RA  WFL  Wrist flexion: 65 degrees   Wrist extension: 45 degrees     Left Digits    Flexion     Middle     MCP: 70    PIP: 80    DIP: 75  Extension   Middle     MCP: 0    PIP: 0    DIP: 0    Right Digits   RA 7/26 WFL  Flexion   Middle     MCP: 55    PIP: 60    DIP: 45  Extension   Middle     MCP: 0    PIP: 0    DIP: 0    Additional Active Range of Motion Details  Patient able to make between 1/2 and 3/4 fist on her R hand, mostly limited at middle finger      RA 7/26  Patient able to make composite fist         Strength/Myotome Testing     Left Wrist/Hand   [facility] [place]  Wrist flexion: WFL     (2nd hand position)     Trial 1: 20    Trial 2: 20    Trial 3: 20    Thumb Strength  Key/Lateral Pinch     Trial 1: 12    Trial 2: 12    Trial 3: 12    Average: 12  Tip/Two-Point Pinch     Trial 1: 9    Trial 2: 8    Trial 3: 9    Average: 8 67  Palmar/Three-Point Pinch     Trial 1: 11    Trial 2: 11    Trial 3: 11    Average: 11     Right Wrist/Hand       Wrist extension: WFL  Wrist flexion: WFL     (2nd hand position)    7/26     Trial 1: 25    35    Trial 2: 25    35    Trial 3: 25    30    Thumb Strength   Key/Lateral Pinch     Trial 1: 13    14       Trial 2: 14    14    Trial 3: 14    14    Average: 13 67  Tip/Two-Point Pinch     Trial 1: 9    10    Trial 2: 8    10    Trial 3: 10     9    Average: 9  Palmar/Three-Point Pinch     Trial 1: 8    10    Trial 2: 7    10    Trial 3: 9    9    Average: 8                 Precautions: None       Manuals 7/15  7/20 7/22 7/26 7/13   R middle finger 5 min RR PROM to R middle finger                                Neuro Re-Ed                                                                 Ther Ex        UBE 10 mins   120 rpm 100 rpm  5'fwd/5'retro 100 rpm  5'fwd/5'retro 100 rpm  5'fwd/5'retro 100 rpm  5'fwd/5'retro   Digiflex Comp and Ind Red  20x each Red x20 ea Red x25 ea Red x20 Red x 20 ea   Web -  and Pro/Sup Red  20x each Red   x20    Red   x25 Red    x20 Yellow  x 20   Finger Ext with Rubber Band [employer] x20 [employer]  x20  Web [employer]  x25 [employer]   x20 Web yellow   x 20    Putty - /Roll/Pinch 2x10  2x10 ea  Red 2x10 ea  Red 2x10 ea  Red  2 x 10 ea   Therabar  Red 2x10 Green 2x10 Green 25x Green 2x10 Red 2 x 10   TBand at wrist 4 ways Green 2x10  Green 2x10 ea Green 25x ea Green  2x10 Green 2 x 10           Ther Activity        Place chips into slot on lid                Gait Training                        Modalities        HP prn                      Access Code: [email or web address]  URL: [email or web address]  Date: 07/07/2021  Prepared by:  Karlo Pantoja    Exercises  Wrist Extension with Resistance - 1 x daily - 3 x weekly - 2 sets - 10 reps  Wrist Flexion with Resistance - 1 x daily - 3 x weekly - 2 sets - 10 reps  Seated Wrist Radial Deviation with Anchored Resistance - 1 x daily - 3 x weekly - 2 sets - 10 reps  Wrist Ulnar Deviation with Resistance - 1 x daily - 3 x weekly - 2 sets - 10 reps  Seated Finger Flexion with Resistance - 1 x daily - 3 x weekly - 2 sets - 10 reps  Quick Finger Spreading with Rubber Band - 1 x daily - 3 x weekly - 2 sets - 10 reps

## 2021-07-28 DIAGNOSIS — E78.2 MIXED HYPERLIPIDEMIA: ICD-10-CM

## 2021-07-28 RX ORDER — SIMVASTATIN 10 MG
10 TABLET ORAL
Qty: 90 TABLET | Refills: 3 | Status: SHIPPED | OUTPATIENT
Start: 2021-07-28 | End: 2021-11-30 | Stop reason: ALTCHOICE

## 2021-07-29 ENCOUNTER — OFFICE VISIT (OUTPATIENT)
Dept: PHYSICAL THERAPY | Facility: CLINIC | Age: 71
End: 2021-07-29
Payer: MEDICARE

## 2021-07-29 DIAGNOSIS — M19.041 DEGENERATIVE ARTHRITIS OF MIDDLE FINGER OF RIGHT HAND: Primary | ICD-10-CM

## 2021-07-29 PROCEDURE — 97110 THERAPEUTIC EXERCISES: CPT

## 2021-07-29 NOTE — PROGRESS NOTES
Daily Note     Today's date: 2021  Patient name: John Oliver  : 1950  MRN: [id number]  Referring provider: Yesica Gaspar MD  Dx:   Encounter Diagnosis     ICD-10-CM    1  Degenerative arthritis of middle finger of right hand  M19 041                   Subjective: Pt reports her middle finger is swollen today due to rainy weather  Objective: See treatment diary below      Assessment: Tolerated treatment well  No c/o with exercises today  Minimal edema noted DIP 3rd digit R hand  Patient exhibited good technique with therapeutic exercises  To continue with HEP  Plan: Pt to be D/c'd from PT by DPT today as per plan  Precautions: None       Manuals 7/15  7/20 7/22 7/26 7/29   R middle finger 5 min RR PROM to R middle finger                                Neuro Re-Ed                                                                 Ther Ex        UBE 10 mins   120 rpm 100 rpm  5'fwd/5'retro 100 rpm  5'fwd/5'retro 100 rpm  5'fwd/5'retro 100 rpm  5'fwd/5'retro   Digiflex Comp and Ind Red  20x each Red x20 ea Red x25 ea Red x20 Red x 20 ea   Web -  and Pro/Sup Red  20x each Red   x20    Red   x25 Red    x20 Red  x 20   Finger Ext with Rubber Band [employer] x20 [employer]  x20  Web [employer]  x25 [employer]   x20 Web Red  x 20    Putty - /Roll/Pinch 2x10  2x10 ea  Red 2x10 ea  Red 2x10 ea  Red  2 x 10 ea   Therabar  Red 2x10 Green 2x10 Green 25x Green 2x10 Green 2 x 10   TBand at wrist 4 ways Green 2x10  Green 2x10 ea Green 25x ea Green  2x10 Green 2 x 10           Ther Activity        Place chips into slot on lid                Gait Training                        Modalities        HP prn                      Access Code: [email or web address]  URL: [email or web address]  Date: 2021  Prepared by:  Faith Pantoja    Exercises  Wrist Extension with Resistance - 1 x daily - 3 x weekly - 2 sets - 10 reps  Wrist Flexion with Resistance - 1 x daily - 3 x weekly - 2 sets - 10 reps  Seated Wrist Radial Deviation with Anchored Resistance - 1 x daily - 3 x weekly - 2 sets - 10 reps  Wrist Ulnar Deviation with Resistance - 1 x daily - 3 x weekly - 2 sets - 10 reps  Seated Finger Flexion with Resistance - 1 x daily - 3 x weekly - 2 sets - 10 reps  Quick Finger Spreading with Rubber Band - 1 x daily - 3 x weekly - 2 sets - 10 reps

## 2021-08-03 ENCOUNTER — OFFICE VISIT (OUTPATIENT)
Dept: OBGYN CLINIC | Facility: CLINIC | Age: 71
End: 2021-08-03
Payer: MEDICARE

## 2021-08-03 VITALS
WEIGHT: 174 LBS | SYSTOLIC BLOOD PRESSURE: 149 MMHG | BODY MASS INDEX: 32.85 KG/M2 | HEART RATE: 80 BPM | DIASTOLIC BLOOD PRESSURE: 79 MMHG | HEIGHT: 61 IN

## 2021-08-03 DIAGNOSIS — M19.041 DEGENERATIVE ARTHRITIS OF MIDDLE FINGER OF RIGHT HAND: Primary | ICD-10-CM

## 2021-08-03 PROCEDURE — 1124F ACP DISCUSS-NO DSCNMKR DOCD: CPT | Performed by: ORTHOPAEDIC SURGERY

## 2021-08-03 PROCEDURE — 99213 OFFICE O/P EST LOW 20 MIN: CPT | Performed by: ORTHOPAEDIC SURGERY

## 2021-08-03 NOTE — PROGRESS NOTES
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        The patient is doing very well since participating in physical therapy  She will continue a home exercise program for strengthening, stretching and range of motion  She can follow up with our office as needed  The patient is acceptable to this plan  Return if symptoms worsen or fail to improve  the patient is doing better since she participated in physical therapy  Physical examination shows full strength and full motion along her right hand without any evidence of any fibrosis or decreased range of motion  Sensation intact  The importance of a home exercise program was discussed with the patient and her  in great detail  Return back on an as-needed basis  _____________________________________________________  CHIEF COMPLAINT:  Chief Complaint   Patient presents with    Right Hand - Follow-up         SUBJECTIVE:  Radha Meza is a 66 y o  female who presents  To our office for follow-up of her right hand  The patient has history of osteoarthritis of the middle finger of her right hand  Last visit, she was given a referral to physical therapy and also prescription for meloxicam   She states she has completed physical therapy and is performing a home exercise program   Her range of motion has greatly improved  She denies any significant pain  She denies any numbness or tingling  She denies any fever or chills       The following portions of the patient's history were reviewed and updated as appropriate: allergies, current medications, past family history, past medical history, past social history, past surgical history and problem list     PAST MEDICAL HISTORY:  Past Medical History:   Diagnosis Date    Asthma     [facility]     [facility] ([facility]     Hyperlipidemia     Osteoarthritis     Osteoporosis     Seasonal allergies        PAST SURGICAL HISTORY:  Past Surgical History:   Procedure Laterality Date    BILATERAL SALPINGOOPHORECTOMY Bilateral 1/3/2020    Procedure: BILATERAL SALPINGO-OOPHORECTOMY;  Surgeon: Junior Alvarado MD;  [facility]: [facility];  Service: Gynecology Oncology    CATARACT EXTRACTION Bilateral     CHOLECYSTECTOMY      [id number]  LAST ASSESSED    EYE SURGERY      cataracts removed    HYSTERECTOMY      [id number]  LAST ASSESSED    MS EXPLORATORY OF ABDOMEN N/A 1/3/2020    Procedure: LAPAROTOMY EXPLORATORY;  Surgeon: Junior Alvarado MD;  Location: [facility];  Service: Gynecology Oncology    TUBAL LIGATION         FAMILY HISTORY:  Family History   Problem Relation Age of Onset    Heart disease Mother     Rheumatic fever Father     Diabetes Family         MELLITUS    Diabetes Family         MELLITUS       SOCIAL HISTORY:  Social History     Tobacco Use    Smoking status: Former Smoker     Quit date:      Years since quittin 6    Smokeless tobacco: Never Used   Vaping Use    Vaping Use: Never used   Substance Use Topics    Alcohol use: Not Currently    Drug use: No       MEDICATIONS:    Current Outpatient Medications:     acetaminophen (TYLENOL) 325 mg tablet, Take 2 tablets (650 mg total) by mouth every 6 (six) hours as needed for mild pain, Disp: 30 tablet, Rfl: 0    albuterol (PROVENTIL HFA,VENTOLIN HFA) 90 mcg/act inhaler, Inhale 2 puffs every 6 (six) hours as needed for wheezing or shortness of breath, Disp: 1 Inhaler, Rfl: 0    alendronate (FOSAMAX) 70 mg tablet, Take 1 tablet (70 mg total) by mouth every 7 days Give with at least 8 ounces of plain water first thing in the morning  Patient should be instructed to stay upright (not to lie down) for at least 30 minutes and until after first food of the day (to reduce esophageal irritation)  , Disp: 12 tablet, Rfl: 0    amLODIPine (NORVASC) 5 mg tablet, Take 1 tablet (5 mg total) by mouth daily, Disp: 30 tablet, Rfl: 5   aspirin (ECOTRIN LOW STRENGTH) 81 mg EC tablet, Take 81 mg by mouth daily, Disp: , Rfl:     Calcium Carbonate-Vit D-Min (CALCIUM 1200 PO), Take by mouth daily, Disp: , Rfl:     Coenzyme Q10 (COQ-10) 10 MG CAPS, Take by mouth daily, Disp: , Rfl:     Loratadine 10 MG CAPS, Take by mouth daily, Disp: , Rfl:     losartan (COZAAR) 50 mg tablet, Take 1 tablet (50 mg total) by mouth daily, Disp: 30 tablet, Rfl: 0    meloxicam (MOBIC) 15 mg tablet, Take 1 tablet (15 mg total) by mouth daily, Disp: 30 tablet, Rfl: 0    metFORMIN (GLUCOPHAGE) 1000 MG tablet, Take 1 tablet (1,000 mg total) by mouth 2 (two) times a day, Disp: 180 tablet, Rfl: 3    mometasone (ELOCON) 0 1 % cream, Apply topically daily, Disp: 45 g, Rfl: 1    Multiple Vitamin (MULTI-VITAMIN DAILY PO), Take by mouth daily, Disp: , Rfl:     simvastatin (ZOCOR) 10 mg tablet, Take 1 tablet (10 mg total) by mouth daily at bedtime, Disp: 90 tablet, Rfl: 3    VITAMIN D PO, Take 2,000 mg by mouth daily, Disp: , Rfl:     ALLERGIES:  Allergies   Allergen Reactions    Penicillins Hives       ROS:  Review of Systems     Constitutional: Negative for fatigue, fever or loss of appetite  HENT: Negative  Respiratory: Negative for shortness of breath, dyspnea  Cardiovascular: Negative for chest pain/tightness  Gastrointestinal: Negative for abdominal pain, N/V  Endocrine: Negative for cold/heat intolerance, unexplained weight loss/gain  Genitourinary: Negative for flank pain, dysuria, hematuria  Musculoskeletal:   Negative for arthralgia   Skin: Negative for rash  Neurological: Negative for numbness or tingling  Psychiatric/Behavioral: Negative for agitation  _____________________________________________________  PHYSICAL EXAMINATION:    Blood pressure 149/79, pulse 80, height 5' 0 5" (1 537 m), weight 78 9 kg (174 lb)  Constitutional: Oriented to person, place, and time  Appears well-developed and well-nourished  No distress     HENT:   Head: Normocephalic  Eyes: Conjunctivae are normal  Right eye exhibits no discharge  Left eye exhibits no discharge  No scleral icterus  Cardiovascular: Normal rate  Pulmonary/Chest: Effort normal    Neurological: Alert and oriented to person, place, and time  Skin: Skin is warm and dry  No rash noted  Not diaphoretic  No erythema  No pallor  Psychiatric: Normal mood and affect  Behavior is normal  Judgment and thought content normal       MUSCULOSKELETAL EXAMINATION:   Physical Exam  Ortho Exam     right upper extremity is neurovascularly intact  Fingers are pink and mobile   Compartments are soft   No tenderness to palpation  Range of motion of the hand and fingers are improved   No warmth, erythema or ecchymosis   Brisk cap refill   Sensation intact  Objective:  BP Readings from Last 1 Encounters:   08/03/21 149/79      Wt Readings from Last 1 Encounters:   08/03/21 78 9 kg (174 lb)        BMI:   Estimated body mass index is 33 42 kg/m² as calculated from the following:    Height as of this encounter: 5' 0 5" (1 537 m)  Weight as of this encounter: 78 9 kg (174 lb)            Olga Caballero PA-C

## 2021-08-25 DIAGNOSIS — I10 BENIGN ESSENTIAL HYPERTENSION: ICD-10-CM

## 2021-08-25 RX ORDER — LOSARTAN POTASSIUM 50 MG/1
50 TABLET ORAL DAILY
Qty: 30 TABLET | Refills: 0 | Status: SHIPPED | OUTPATIENT
Start: 2021-08-25 | End: 2021-09-28 | Stop reason: SDUPTHER

## 2021-09-08 DIAGNOSIS — M81.0 POSTMENOPAUSAL OSTEOPOROSIS: ICD-10-CM

## 2021-09-08 RX ORDER — ALENDRONATE SODIUM 70 MG/1
70 TABLET ORAL
Qty: 12 TABLET | Refills: 0 | Status: SHIPPED | OUTPATIENT
Start: 2021-09-08 | End: 2021-11-23 | Stop reason: SDUPTHER

## 2021-09-28 DIAGNOSIS — I10 BENIGN ESSENTIAL HYPERTENSION: ICD-10-CM

## 2021-09-28 RX ORDER — LOSARTAN POTASSIUM 50 MG/1
50 TABLET ORAL DAILY
Qty: 30 TABLET | Refills: 0 | Status: SHIPPED | OUTPATIENT
Start: 2021-09-28 | End: 2021-10-27 | Stop reason: SDUPTHER

## 2021-10-27 DIAGNOSIS — I10 BENIGN ESSENTIAL HYPERTENSION: ICD-10-CM

## 2021-10-27 RX ORDER — LOSARTAN POTASSIUM 50 MG/1
50 TABLET ORAL DAILY
Qty: 30 TABLET | Refills: 0 | Status: SHIPPED | OUTPATIENT
Start: 2021-10-27 | End: 2021-11-30 | Stop reason: SDUPTHER

## 2021-11-10 DIAGNOSIS — I10 BENIGN ESSENTIAL HYPERTENSION: ICD-10-CM

## 2021-11-10 RX ORDER — AMLODIPINE BESYLATE 5 MG/1
5 TABLET ORAL DAILY
Qty: 30 TABLET | Refills: 5 | Status: SHIPPED | OUTPATIENT
Start: 2021-11-10 | End: 2022-05-25 | Stop reason: ALTCHOICE

## 2021-11-23 ENCOUNTER — OFFICE VISIT (OUTPATIENT)
Dept: FAMILY MEDICINE CLINIC | Facility: CLINIC | Age: 71
End: 2021-11-23
Payer: MEDICARE

## 2021-11-23 VITALS
SYSTOLIC BLOOD PRESSURE: 122 MMHG | BODY MASS INDEX: 32.97 KG/M2 | HEIGHT: 61 IN | RESPIRATION RATE: 18 BRPM | TEMPERATURE: 99 F | WEIGHT: 174.6 LBS | OXYGEN SATURATION: 97 % | DIASTOLIC BLOOD PRESSURE: 66 MMHG | HEART RATE: 77 BPM

## 2021-11-23 DIAGNOSIS — G47.33 OBSTRUCTIVE SLEEP APNEA SYNDROME: ICD-10-CM

## 2021-11-23 DIAGNOSIS — N18.30 TYPE 2 DIABETES MELLITUS WITH STAGE 3 CHRONIC KIDNEY DISEASE, UNSPECIFIED WHETHER LONG TERM INSULIN USE, UNSPECIFIED WHETHER STAGE 3A OR 3B CKD (HCC): Primary | ICD-10-CM

## 2021-11-23 DIAGNOSIS — I10 BENIGN ESSENTIAL HYPERTENSION: ICD-10-CM

## 2021-11-23 DIAGNOSIS — N18.30 STAGE 3 CHRONIC KIDNEY DISEASE, UNSPECIFIED WHETHER STAGE 3A OR 3B CKD (HCC): ICD-10-CM

## 2021-11-23 DIAGNOSIS — M81.0 POSTMENOPAUSAL OSTEOPOROSIS: ICD-10-CM

## 2021-11-23 DIAGNOSIS — H90.71 MIXED CONDUCTIVE AND SENSORINEURAL HEARING LOSS OF RIGHT EAR WITH UNRESTRICTED HEARING OF LEFT EAR: ICD-10-CM

## 2021-11-23 DIAGNOSIS — Z86.2 HISTORY OF ANEMIA: ICD-10-CM

## 2021-11-23 DIAGNOSIS — M81.0 AGE-RELATED OSTEOPOROSIS WITHOUT CURRENT PATHOLOGICAL FRACTURE: ICD-10-CM

## 2021-11-23 DIAGNOSIS — J45.20 MILD INTERMITTENT ASTHMA WITHOUT COMPLICATION: ICD-10-CM

## 2021-11-23 DIAGNOSIS — E11.22 TYPE 2 DIABETES MELLITUS WITH STAGE 3 CHRONIC KIDNEY DISEASE, UNSPECIFIED WHETHER LONG TERM INSULIN USE, UNSPECIFIED WHETHER STAGE 3A OR 3B CKD (HCC): Primary | ICD-10-CM

## 2021-11-23 DIAGNOSIS — E78.2 MIXED HYPERLIPIDEMIA: ICD-10-CM

## 2021-11-23 DIAGNOSIS — E66.9 OBESITY, CLASS I, BMI 30-34.9: ICD-10-CM

## 2021-11-23 DIAGNOSIS — Z23 NEED FOR TETANUS BOOSTER: ICD-10-CM

## 2021-11-23 DIAGNOSIS — Z23 NEED FOR INFLUENZA VACCINATION: ICD-10-CM

## 2021-11-23 LAB — SL AMB POCT HEMOGLOBIN AIC: 6.7 (ref ?–6.5)

## 2021-11-23 PROCEDURE — 90662 IIV NO PRSV INCREASED AG IM: CPT | Performed by: FAMILY MEDICINE

## 2021-11-23 PROCEDURE — 83036 HEMOGLOBIN GLYCOSYLATED A1C: CPT | Performed by: FAMILY MEDICINE

## 2021-11-23 PROCEDURE — 99214 OFFICE O/P EST MOD 30 MIN: CPT | Performed by: FAMILY MEDICINE

## 2021-11-23 PROCEDURE — G0008 ADMIN INFLUENZA VIRUS VAC: HCPCS | Performed by: FAMILY MEDICINE

## 2021-11-23 RX ORDER — ALBUTEROL SULFATE 90 UG/1
2 AEROSOL, METERED RESPIRATORY (INHALATION) EVERY 6 HOURS PRN
Qty: 6.7 G | Refills: 0 | Status: SHIPPED | OUTPATIENT
Start: 2021-11-23

## 2021-11-23 RX ORDER — ALENDRONATE SODIUM 70 MG/1
70 TABLET ORAL
Qty: 12 TABLET | Refills: 1 | Status: SHIPPED | OUTPATIENT
Start: 2021-11-23 | End: 2022-05-18 | Stop reason: SDUPTHER

## 2021-11-30 ENCOUNTER — CONSULT (OUTPATIENT)
Dept: NEPHROLOGY | Facility: CLINIC | Age: 71
End: 2021-11-30
Payer: MEDICARE

## 2021-11-30 VITALS
OXYGEN SATURATION: 95 % | SYSTOLIC BLOOD PRESSURE: 162 MMHG | HEIGHT: 61 IN | BODY MASS INDEX: 32.66 KG/M2 | DIASTOLIC BLOOD PRESSURE: 84 MMHG | WEIGHT: 173 LBS | HEART RATE: 80 BPM

## 2021-11-30 DIAGNOSIS — N18.30 STAGE 3 CHRONIC KIDNEY DISEASE, UNSPECIFIED WHETHER STAGE 3A OR 3B CKD (HCC): ICD-10-CM

## 2021-11-30 DIAGNOSIS — E66.9 OBESITY, CLASS I, BMI 30-34.9: ICD-10-CM

## 2021-11-30 DIAGNOSIS — I10 BENIGN ESSENTIAL HYPERTENSION: ICD-10-CM

## 2021-11-30 DIAGNOSIS — E11.22 TYPE 2 DIABETES MELLITUS WITH STAGE 3 CHRONIC KIDNEY DISEASE, UNSPECIFIED WHETHER LONG TERM INSULIN USE, UNSPECIFIED WHETHER STAGE 3A OR 3B CKD (HCC): Primary | ICD-10-CM

## 2021-11-30 DIAGNOSIS — E11.9 CONTROLLED TYPE 2 DIABETES MELLITUS WITHOUT COMPLICATION, WITHOUT LONG-TERM CURRENT USE OF INSULIN (HCC): ICD-10-CM

## 2021-11-30 DIAGNOSIS — G47.33 OBSTRUCTIVE SLEEP APNEA SYNDROME: ICD-10-CM

## 2021-11-30 DIAGNOSIS — E78.2 MIXED HYPERLIPIDEMIA: ICD-10-CM

## 2021-11-30 DIAGNOSIS — N18.30 TYPE 2 DIABETES MELLITUS WITH STAGE 3 CHRONIC KIDNEY DISEASE, UNSPECIFIED WHETHER LONG TERM INSULIN USE, UNSPECIFIED WHETHER STAGE 3A OR 3B CKD (HCC): Primary | ICD-10-CM

## 2021-11-30 PROCEDURE — 99214 OFFICE O/P EST MOD 30 MIN: CPT | Performed by: INTERNAL MEDICINE

## 2021-11-30 RX ORDER — ROSUVASTATIN CALCIUM 20 MG/1
20 TABLET, COATED ORAL DAILY
Qty: 90 TABLET | Refills: 3 | Status: SHIPPED | OUTPATIENT
Start: 2021-11-30

## 2021-11-30 RX ORDER — LOSARTAN POTASSIUM 50 MG/1
50 TABLET ORAL DAILY
Qty: 90 TABLET | Refills: 3 | Status: SHIPPED | OUTPATIENT
Start: 2021-11-30 | End: 2021-11-30

## 2021-11-30 RX ORDER — LOSARTAN POTASSIUM 100 MG/1
100 TABLET ORAL DAILY
Qty: 90 TABLET | Refills: 3 | Status: SHIPPED | OUTPATIENT
Start: 2021-11-30

## 2021-12-01 ENCOUNTER — HOSPITAL ENCOUNTER (OUTPATIENT)
Dept: ULTRASOUND IMAGING | Facility: HOSPITAL | Age: 71
Discharge: HOME | End: 2021-12-01
Attending: INTERNAL MEDICINE
Payer: MEDICARE

## 2021-12-01 DIAGNOSIS — N18.30 TYPE 2 DIABETES MELLITUS WITH STAGE 3 CHRONIC KIDNEY DISEASE, UNSPECIFIED WHETHER LONG TERM INSULIN USE, UNSPECIFIED WHETHER STAGE 3A OR 3B CKD (HCC): ICD-10-CM

## 2021-12-01 DIAGNOSIS — N18.30 STAGE 3 CHRONIC KIDNEY DISEASE, UNSPECIFIED WHETHER STAGE 3A OR 3B CKD (HCC): ICD-10-CM

## 2021-12-01 DIAGNOSIS — E11.22 TYPE 2 DIABETES MELLITUS WITH STAGE 3 CHRONIC KIDNEY DISEASE, UNSPECIFIED WHETHER LONG TERM INSULIN USE, UNSPECIFIED WHETHER STAGE 3A OR 3B CKD (HCC): ICD-10-CM

## 2021-12-01 PROCEDURE — 76770 US EXAM ABDO BACK WALL COMP: CPT

## 2021-12-22 DIAGNOSIS — E11.8 CONTROLLED DIABETES MELLITUS TYPE 2 WITH COMPLICATIONS, UNSPECIFIED WHETHER LONG TERM INSULIN USE (HCC): ICD-10-CM

## 2022-01-04 ENCOUNTER — IMMUNIZATIONS (OUTPATIENT)
Dept: FAMILY MEDICINE CLINIC | Facility: HOSPITAL | Age: 72
End: 2022-01-04

## 2022-01-04 DIAGNOSIS — Z23 ENCOUNTER FOR IMMUNIZATION: Primary | ICD-10-CM

## 2022-01-04 PROCEDURE — 0064A COVID-19 MODERNA VACC 0.25 ML BOOSTER: CPT

## 2022-01-04 PROCEDURE — 91306 COVID-19 MODERNA VACC 0.25 ML BOOSTER: CPT

## 2022-01-10 ENCOUNTER — OFFICE VISIT (OUTPATIENT)
Dept: NEPHROLOGY | Facility: CLINIC | Age: 72
End: 2022-01-10
Payer: MEDICARE

## 2022-01-10 ENCOUNTER — APPOINTMENT (OUTPATIENT)
Dept: LAB | Facility: CLINIC | Age: 72
End: 2022-01-10
Payer: MEDICARE

## 2022-01-10 VITALS
HEART RATE: 75 BPM | SYSTOLIC BLOOD PRESSURE: 138 MMHG | OXYGEN SATURATION: 95 % | DIASTOLIC BLOOD PRESSURE: 74 MMHG | HEIGHT: 61 IN | BODY MASS INDEX: 32.85 KG/M2 | WEIGHT: 174 LBS

## 2022-01-10 DIAGNOSIS — N18.30 TYPE 2 DIABETES MELLITUS WITH STAGE 3 CHRONIC KIDNEY DISEASE, UNSPECIFIED WHETHER LONG TERM INSULIN USE, UNSPECIFIED WHETHER STAGE 3A OR 3B CKD (HCC): ICD-10-CM

## 2022-01-10 DIAGNOSIS — E66.9 OBESITY, CLASS I, BMI 30-34.9: ICD-10-CM

## 2022-01-10 DIAGNOSIS — E78.2 MIXED HYPERLIPIDEMIA: ICD-10-CM

## 2022-01-10 DIAGNOSIS — E11.22 TYPE 2 DIABETES MELLITUS WITH STAGE 3 CHRONIC KIDNEY DISEASE, UNSPECIFIED WHETHER LONG TERM INSULIN USE, UNSPECIFIED WHETHER STAGE 3A OR 3B CKD (HCC): ICD-10-CM

## 2022-01-10 DIAGNOSIS — I10 BENIGN ESSENTIAL HYPERTENSION: ICD-10-CM

## 2022-01-10 DIAGNOSIS — N18.30 STAGE 3 CHRONIC KIDNEY DISEASE, UNSPECIFIED WHETHER STAGE 3A OR 3B CKD (HCC): ICD-10-CM

## 2022-01-10 DIAGNOSIS — G47.33 OBSTRUCTIVE SLEEP APNEA SYNDROME: ICD-10-CM

## 2022-01-10 DIAGNOSIS — N18.31 STAGE 3A CHRONIC KIDNEY DISEASE (HCC): Primary | ICD-10-CM

## 2022-01-10 LAB
ALBUMIN SERPL BCP-MCNC: 3.7 G/DL (ref 3.5–5)
ALP SERPL-CCNC: 65 U/L (ref 46–116)
ALT SERPL W P-5'-P-CCNC: 17 U/L (ref 12–78)
ANION GAP SERPL CALCULATED.3IONS-SCNC: 4 MMOL/L (ref 4–13)
AST SERPL W P-5'-P-CCNC: 14 U/L (ref 5–45)
BASOPHILS # BLD AUTO: 0.06 THOUSANDS/ΜL (ref 0–0.1)
BASOPHILS NFR BLD AUTO: 1 % (ref 0–1)
BILIRUB SERPL-MCNC: 0.26 MG/DL (ref 0.2–1)
BUN SERPL-MCNC: 21 MG/DL (ref 5–25)
CALCIUM SERPL-MCNC: 9.6 MG/DL (ref 8.3–10.1)
CHLORIDE SERPL-SCNC: 104 MMOL/L (ref 100–108)
CO2 SERPL-SCNC: 30 MMOL/L (ref 21–32)
CREAT SERPL-MCNC: 1.31 MG/DL (ref 0.6–1.3)
CREAT UR-MCNC: 91.7 MG/DL
CREAT UR-MCNC: 91.7 MG/DL
EOSINOPHIL # BLD AUTO: 0.37 THOUSAND/ΜL (ref 0–0.61)
EOSINOPHIL NFR BLD AUTO: 4 % (ref 0–6)
ERYTHROCYTE [DISTWIDTH] IN BLOOD BY AUTOMATED COUNT: 12.3 % (ref 11.6–15.1)
GFR SERPL CREATININE-BSD FRML MDRD: 40 ML/MIN/1.73SQ M
GLUCOSE P FAST SERPL-MCNC: 130 MG/DL (ref 65–99)
HCT VFR BLD AUTO: 38.6 % (ref 34.8–46.1)
HGB BLD-MCNC: 12.2 G/DL (ref 11.5–15.4)
IMM GRANULOCYTES # BLD AUTO: 0.03 THOUSAND/UL (ref 0–0.2)
IMM GRANULOCYTES NFR BLD AUTO: 0 % (ref 0–2)
LDLC SERPL DIRECT ASSAY-MCNC: 33 MG/DL (ref 0–100)
LYMPHOCYTES # BLD AUTO: 2.81 THOUSANDS/ΜL (ref 0.6–4.47)
LYMPHOCYTES NFR BLD AUTO: 31 % (ref 14–44)
MCH RBC QN AUTO: 29.1 PG (ref 26.8–34.3)
MCHC RBC AUTO-ENTMCNC: 31.6 G/DL (ref 31.4–37.4)
MCV RBC AUTO: 92 FL (ref 82–98)
MICROALBUMIN UR-MCNC: 55.5 MG/L (ref 0–20)
MICROALBUMIN/CREAT 24H UR: 61 MG/G CREATININE (ref 0–30)
MONOCYTES # BLD AUTO: 0.67 THOUSAND/ΜL (ref 0.17–1.22)
MONOCYTES NFR BLD AUTO: 7 % (ref 4–12)
NEUTROPHILS # BLD AUTO: 5.07 THOUSANDS/ΜL (ref 1.85–7.62)
NEUTS SEG NFR BLD AUTO: 57 % (ref 43–75)
NRBC BLD AUTO-RTO: 0 /100 WBCS
PLATELET # BLD AUTO: 251 THOUSANDS/UL (ref 149–390)
PMV BLD AUTO: 10.6 FL (ref 8.9–12.7)
POTASSIUM SERPL-SCNC: 4.3 MMOL/L (ref 3.5–5.3)
PROT SERPL-MCNC: 7.4 G/DL (ref 6.4–8.2)
PROT UR-MCNC: 13 MG/DL
PROT/CREAT UR: 0.14 MG/G{CREAT} (ref 0–0.1)
RBC # BLD AUTO: 4.19 MILLION/UL (ref 3.81–5.12)
SODIUM SERPL-SCNC: 138 MMOL/L (ref 136–145)
URATE SERPL-MCNC: 5.2 MG/DL (ref 2–6.8)
WBC # BLD AUTO: 9.01 THOUSAND/UL (ref 4.31–10.16)

## 2022-01-10 PROCEDURE — 36415 COLL VENOUS BLD VENIPUNCTURE: CPT

## 2022-01-10 PROCEDURE — 85025 COMPLETE CBC W/AUTO DIFF WBC: CPT

## 2022-01-10 PROCEDURE — 84550 ASSAY OF BLOOD/URIC ACID: CPT

## 2022-01-10 PROCEDURE — 99214 OFFICE O/P EST MOD 30 MIN: CPT | Performed by: PHYSICIAN ASSISTANT

## 2022-01-10 PROCEDURE — 82570 ASSAY OF URINE CREATININE: CPT

## 2022-01-10 PROCEDURE — 84165 PROTEIN E-PHORESIS SERUM: CPT

## 2022-01-10 PROCEDURE — 84156 ASSAY OF PROTEIN URINE: CPT

## 2022-01-10 PROCEDURE — 84165 PROTEIN E-PHORESIS SERUM: CPT | Performed by: PATHOLOGY

## 2022-01-10 PROCEDURE — 83721 ASSAY OF BLOOD LIPOPROTEIN: CPT

## 2022-01-10 PROCEDURE — 82043 UR ALBUMIN QUANTITATIVE: CPT

## 2022-01-10 PROCEDURE — 80053 COMPREHEN METABOLIC PANEL: CPT

## 2022-01-10 NOTE — PROGRESS NOTES
Assessment & Plan:    1  Stage 3a chronic kidney disease [facility])  Assessment & Plan:  Lab Results   Component Value Date    EGFR 44 05/19/2021    EGFR 46 05/26/2020    EGFR 74 01/06/2020    CREATININE 1 22 05/19/2021    CREATININE 1 18 05/26/2020    CREATININE 0 80 01/06/2020   Lab results pending, monitor with dose increase of losartan  Orders:  -     CBC and differential; Future; Expected date: 06/03/2022  -     Comprehensive metabolic panel; Future; Expected date: 06/03/2022  -     Magnesium; Future; Expected date: 06/03/2022  -     Microalbumin / creatinine urine ratio; Future; Expected date: 06/03/2022  -     Phosphorus; Future; Expected date: 06/03/2022  -     Protein / creatinine ratio, urine; Future; Expected date: 06/03/2022  -     PTH, intact; Future; Expected date: 06/03/2022  -     Urinalysis with microscopic; Future; Expected date: 06/03/2022  -     Vitamin D 25 hydroxy; Future; Expected date: 06/03/2022    2  Obstructive sleep apnea syndrome  Assessment & Plan:  Sleep study when available       3  Benign essential hypertension  Assessment & Plan:  Blood pressure is significantly improved to 138/74 with a heart rate of 75 from 162/84 at prior visit  She reports adherence with losartan 100 mg daily  She is not certain about the amlodipine 5 mg daily  She does have a monitor, but has not been checking recently  I have asked her to report BP values when we call with labs results  4  Mixed hyperlipidemia  Assessment & Plan:  Continue Crestor  Goal LDL < 70 mg/dL      5  Obesity, Class I, BMI 30-34 9  Assessment & Plan:  Benefit form weight loss through low sodium, whole foods, portion controlled diet  Will call with results of labs when available  Blood pressure is approaching goal   I have asked her to report home blood pressures 1 week call with results of labs that were performed today  Consider addition of SGL T2 inhibitor at that time    Routine follow-up with Dr Scott Christensen in 5 months with labs prior  The benefits, risks and alternatives to the treatment plan were discussed at this visit  Patient was advised of common adverse effects of any medical therapies prescribed  All questions were answered and discussed with the patient and any accompanying family members or caretakers  Subjective:      Patient ID: Dorota Hwang is a 66 y o  female seen in the [facility] office  Patient was seen in consultation by Dr Bernadette Jean-Baptiste on 11/30/2021 for evaluation of elevated creatinine  At that time, simvastatin was discontinued in favor of rosuvastatin 20 mg due to interaction of simvastatin with amlodipine  Goal blood pressure is less than 130/80  Losartan was increased to 100 mg daily  SGLT 2 inhibitor was considered  HPI     Today, patient presents for follow-up of stage IIIA chronic kidney disease  She denies acute complaints  A member of her household (son) got Man recently but she and her  did not  She is boosted  Blood pressure is significantly improved to 138/74 with a heart rate of 75 from 162/84 at prior visit  She reports adherence with losartan 100 mg daily  She is not certain about the amlodipine 5 mg daily  She does have a monitor, but has not been checking recently  Labs were performed this morning, but results are not available at this time  History is obtained from patient  She is accompanied at today's visit by her   The following portions of the patient's history were reviewed and updated as appropriate: allergies, current medications, past family history, past medical history, past social history, past surgical history, and problem list     Review of Systems   Constitutional: Negative for activity change, appetite change, chills, fatigue, fever and unexpected weight change  Respiratory: Negative for apnea, cough and shortness of breath  Cardiovascular: Negative for chest pain, palpitations and leg swelling     Gastrointestinal: Negative for abdominal pain, blood in stool, constipation, diarrhea, nausea and vomiting  Genitourinary: Negative for decreased urine volume, difficulty urinating, dysuria, flank pain, frequency, hematuria and urgency  Musculoskeletal: Positive for back pain  Negative for arthralgias, joint swelling and myalgias  Skin: Negative for color change and rash  Neurological: Negative for dizziness, seizures, syncope, weakness, light-headedness, numbness and headaches  Hematological: Negative for adenopathy  Does not bruise/bleed easily  Psychiatric/Behavioral: Negative for agitation, behavioral problems, confusion, decreased concentration, dysphoric mood and hallucinations  The patient is not nervous/anxious and is not hyperactive  All other systems reviewed and are negative  Objective:      /74   Pulse 75   Ht 5' 0 5" (1 537 m)   Wt 78 9 kg (174 lb)   SpO2 95%   BMI 33 42 kg/m²          Physical Exam  Vitals and nursing note reviewed  Exam conducted with a chaperone present  Constitutional:       General: She is not in acute distress  Appearance: Normal appearance  She is normal weight  She is not ill-appearing or toxic-appearing  HENT:      Head: Normocephalic and atraumatic  Nose: Nose normal  No congestion or rhinorrhea  Mouth/Throat:      Mouth: Mucous membranes are moist       Pharynx: Oropharynx is clear  Eyes:      General: No scleral icterus  Right eye: No discharge  Left eye: No discharge  Extraocular Movements: Extraocular movements intact  Conjunctiva/sclera: Conjunctivae normal       Pupils: Pupils are equal, round, and reactive to light  Cardiovascular:      Rate and Rhythm: Normal rate and regular rhythm  Pulses: Normal pulses  Heart sounds: Normal heart sounds  No murmur heard  Pulmonary:      Effort: Pulmonary effort is normal  No respiratory distress  Breath sounds: Normal breath sounds  No wheezing     Abdominal: General: Abdomen is flat  Bowel sounds are normal  There is no distension  Palpations: Abdomen is soft  There is no mass  Tenderness: There is no abdominal tenderness  Musculoskeletal:         General: No swelling or deformity  Normal range of motion  Cervical back: Normal range of motion and neck supple  No rigidity or tenderness  Skin:     General: Skin is warm and dry  Coloration: Skin is not jaundiced or pale  Findings: No bruising  Neurological:      General: No focal deficit present  Mental Status: She is alert and oriented to person, place, and time  Mental status is at baseline  Psychiatric:         Mood and Affect: Mood normal          Behavior: Behavior normal          Thought Content:  Thought content normal          Judgment: Judgment normal              Lab Results   Component Value Date    SODIUM 142 05/19/2021    K 4 5 05/19/2021     05/19/2021    CO2 31 05/19/2021    AGAP 6 05/19/2021    BUN 16 05/19/2021    CREATININE 1 22 05/19/2021    GLUC 101 01/06/2020    GLUF 140 (H) 05/19/2021    CALCIUM 9 4 05/19/2021    AST 12 05/19/2021    ALT 19 05/19/2021    ALKPHOS 65 05/19/2021    TP 7 6 05/19/2021    TBILI 0 29 05/19/2021    EGFR 44 05/19/2021      Lab Results   Component Value Date    CREATININE 1 22 05/19/2021    CREATININE 1 18 05/26/2020    CREATININE 0 80 01/06/2020    CREATININE 0 98 01/05/2020    CREATININE 1 03 01/04/2020    CREATININE 1 12 01/03/2020    CREATININE 1 22 12/10/2019    CREATININE 1 46 (H) 11/22/2019    CREATININE 1 01 05/17/2019    CREATININE 0 92 01/12/2018    CREATININE 0 90 10/06/2017    CREATININE 0 85 03/06/2017      No results found for: COLORU, CLARITYU, SPECGRAV, PHUR, LEUKOCYTESUR, NITRITE, PROTEIN UA, GLUCOSEU, KETONESU, UROBILINOGEN, BILIRUBINUR, BLOODU, RBCUA, WBCUA, EPIS, BACTERIA, MUCOUSTHREAD   No results found for: LABPROT  No results found for: Xu Chance  Lab Results   Component Value Date    WBC 9 91 05/19/2021    HGB 12 8 05/19/2021    HCT 40 9 05/19/2021    MCV 93 05/19/2021     05/19/2021      Lab Results   Component Value Date    HGB 12 8 05/19/2021    HGB 12 0 01/06/2020    HGB 11 0 (L) 01/05/2020    HGB 10 7 (L) 01/04/2020    HGB 13 2 12/10/2019      No results found for: IRON, TIBC, FERRITIN   No results found for: PTHCALCIUM, [id number], PHOSPHORUS   Lab Results   Component Value Date    CHOLESTEROL 139 05/19/2021    HDL 73 05/19/2021    LDLCALC 50 05/19/2021    TRIG 78 05/19/2021      No results found for: URICACID   Lab Results   Component Value Date    HGBA1C 6 7 (A) 11/23/2021      No results found for: TSHANTIBODY, N2ARSHI, FREET4   No results found for: YULIET, DSDNAAB, RFIGM   No results found for: PROT, UPEP, IMMUNOFIX, KAPPALAMBDA, KAPPALIGHT     Portions of the record may have been created with voice recognition software  Occasional wrong word or "sound a like" substitutions may have occurred due to the inherent limitations of voice recognition software  Read the chart carefully and recognize, using context, where substitutions have occurred  If you have any questions, please contact the dictating provider

## 2022-01-10 NOTE — ASSESSMENT & PLAN NOTE
Lab Results   Component Value Date    EGFR 44 05/19/2021    EGFR 46 05/26/2020    EGFR 74 01/06/2020    CREATININE 1 22 05/19/2021    CREATININE 1 18 05/26/2020    CREATININE 0 80 01/06/2020   Lab results pending, monitor with dose increase of losartan

## 2022-01-10 NOTE — PATIENT INSTRUCTIONS
Call office if you don't hear about labs from the end of the week  For the prevention of kidney injury: If you are sick and not eating well or drinking well OR vomiting or having diarrhea, temporarily hold your ACE-inhibitor / ARB / diuretic (LOSARTAN) while sick and call office for further instructions  Otherwise, please take medications as prescribed  Please avoid NSAIDs (nonsteroidal anti-inflammatory drugs), such as Advil (ibuprofen), Aleve (naproxen), Naprosyn, BCs, Goody's powder  Tylenol (acetaminophen) is a safer option for the kidneys  Do not exceed the maximum dose of acetaminophen  Note that this may be in combination with other drugs NSAID medications  Please avoid herbal supplements, such as turmeric  Please consult your nephrologist before taking any over-the-counter medications  Low sodium diet, do not exceed 2 g (or 2,000 mg) of sodium daily  Avoid processed, canned, frozen foods unless "no salt added " Opt for fresh meats, produce  Utilize compression socks during the day, remove before bed    Watch fluid intake (this includes water, tea, coffee soda, soups etc )

## 2022-01-10 NOTE — ASSESSMENT & PLAN NOTE
Blood pressure is significantly improved to 138/74 with a heart rate of 75 from 162/84 at prior visit  She reports adherence with losartan 100 mg daily  She is not certain about the amlodipine 5 mg daily  She does have a monitor, but has not been checking recently  I have asked her to report BP values when we call with labs results

## 2022-01-11 LAB
ALBUMIN SERPL ELPH-MCNC: 4.55 G/DL (ref 3.5–5)
ALBUMIN SERPL ELPH-MCNC: 59.9 % (ref 52–65)
ALPHA1 GLOB SERPL ELPH-MCNC: 0.29 G/DL (ref 0.1–0.4)
ALPHA1 GLOB SERPL ELPH-MCNC: 3.8 % (ref 2.5–5)
ALPHA2 GLOB SERPL ELPH-MCNC: 0.97 G/DL (ref 0.4–1.2)
ALPHA2 GLOB SERPL ELPH-MCNC: 12.8 % (ref 7–13)
BETA GLOB ABNORMAL SERPL ELPH-MCNC: 0.4 G/DL (ref 0.4–0.8)
BETA1 GLOB SERPL ELPH-MCNC: 5.3 % (ref 5–13)
BETA2 GLOB SERPL ELPH-MCNC: 6.1 % (ref 2–8)
BETA2+GAMMA GLOB SERPL ELPH-MCNC: 0.46 G/DL (ref 0.2–0.5)
GAMMA GLOB ABNORMAL SERPL ELPH-MCNC: 0.92 G/DL (ref 0.5–1.6)
GAMMA GLOB SERPL ELPH-MCNC: 12.1 % (ref 12–22)
IGG/ALB SER: 1.49 {RATIO} (ref 1.1–1.8)
PROT PATTERN SERPL ELPH-IMP: NORMAL
PROT SERPL-MCNC: 7.6 G/DL (ref 6.4–8.2)

## 2022-05-18 DIAGNOSIS — M81.0 POSTMENOPAUSAL OSTEOPOROSIS: ICD-10-CM

## 2022-05-19 RX ORDER — ALENDRONATE SODIUM 70 MG/1
70 TABLET ORAL
Qty: 12 TABLET | Refills: 1 | Status: SHIPPED | OUTPATIENT
Start: 2022-05-19

## 2022-05-20 ENCOUNTER — RA CDI HCC (OUTPATIENT)
Dept: OTHER | Facility: HOSPITAL | Age: 72
End: 2022-05-20

## 2022-05-20 ENCOUNTER — APPOINTMENT (OUTPATIENT)
Dept: LAB | Facility: CLINIC | Age: 72
End: 2022-05-20
Payer: MEDICARE

## 2022-05-20 DIAGNOSIS — Z86.2 HISTORY OF ANEMIA: ICD-10-CM

## 2022-05-20 DIAGNOSIS — E78.2 MIXED HYPERLIPIDEMIA: ICD-10-CM

## 2022-05-20 DIAGNOSIS — N18.30 TYPE 2 DIABETES MELLITUS WITH STAGE 3 CHRONIC KIDNEY DISEASE, UNSPECIFIED WHETHER LONG TERM INSULIN USE, UNSPECIFIED WHETHER STAGE 3A OR 3B CKD (HCC): ICD-10-CM

## 2022-05-20 DIAGNOSIS — E11.22 TYPE 2 DIABETES MELLITUS WITH STAGE 3 CHRONIC KIDNEY DISEASE, UNSPECIFIED WHETHER LONG TERM INSULIN USE, UNSPECIFIED WHETHER STAGE 3A OR 3B CKD (HCC): ICD-10-CM

## 2022-05-20 LAB
ALBUMIN SERPL BCP-MCNC: 3.4 G/DL (ref 3.5–5)
ALP SERPL-CCNC: 55 U/L (ref 46–116)
ALT SERPL W P-5'-P-CCNC: 18 U/L (ref 12–78)
ANION GAP SERPL CALCULATED.3IONS-SCNC: 3 MMOL/L (ref 4–13)
AST SERPL W P-5'-P-CCNC: 14 U/L (ref 5–45)
BASOPHILS # BLD AUTO: 0.09 THOUSANDS/ΜL (ref 0–0.1)
BASOPHILS NFR BLD AUTO: 1 % (ref 0–1)
BILIRUB SERPL-MCNC: 0.28 MG/DL (ref 0.2–1)
BUN SERPL-MCNC: 20 MG/DL (ref 5–25)
CALCIUM ALBUM COR SERPL-MCNC: 10.3 MG/DL (ref 8.3–10.1)
CALCIUM SERPL-MCNC: 9.8 MG/DL (ref 8.3–10.1)
CHLORIDE SERPL-SCNC: 104 MMOL/L (ref 100–108)
CHOLEST SERPL-MCNC: 116 MG/DL
CO2 SERPL-SCNC: 33 MMOL/L (ref 21–32)
CREAT SERPL-MCNC: 1.28 MG/DL (ref 0.6–1.3)
EOSINOPHIL # BLD AUTO: 0.56 THOUSAND/ΜL (ref 0–0.61)
EOSINOPHIL NFR BLD AUTO: 6 % (ref 0–6)
ERYTHROCYTE [DISTWIDTH] IN BLOOD BY AUTOMATED COUNT: 11.9 % (ref 11.6–15.1)
GFR SERPL CREATININE-BSD FRML MDRD: 41 ML/MIN/1.73SQ M
GLUCOSE P FAST SERPL-MCNC: 131 MG/DL (ref 65–99)
HCT VFR BLD AUTO: 38.3 % (ref 34.8–46.1)
HDLC SERPL-MCNC: 69 MG/DL
HGB BLD-MCNC: 12.1 G/DL (ref 11.5–15.4)
IMM GRANULOCYTES # BLD AUTO: 0.04 THOUSAND/UL (ref 0–0.2)
IMM GRANULOCYTES NFR BLD AUTO: 0 % (ref 0–2)
LDLC SERPL CALC-MCNC: 29 MG/DL (ref 0–100)
LYMPHOCYTES # BLD AUTO: 2.62 THOUSANDS/ΜL (ref 0.6–4.47)
LYMPHOCYTES NFR BLD AUTO: 27 % (ref 14–44)
MCH RBC QN AUTO: 29.7 PG (ref 26.8–34.3)
MCHC RBC AUTO-ENTMCNC: 31.6 G/DL (ref 31.4–37.4)
MCV RBC AUTO: 94 FL (ref 82–98)
MONOCYTES # BLD AUTO: 0.78 THOUSAND/ΜL (ref 0.17–1.22)
MONOCYTES NFR BLD AUTO: 8 % (ref 4–12)
NEUTROPHILS # BLD AUTO: 5.54 THOUSANDS/ΜL (ref 1.85–7.62)
NEUTS SEG NFR BLD AUTO: 58 % (ref 43–75)
NONHDLC SERPL-MCNC: 47 MG/DL
NRBC BLD AUTO-RTO: 0 /100 WBCS
PLATELET # BLD AUTO: 270 THOUSANDS/UL (ref 149–390)
PMV BLD AUTO: 10.3 FL (ref 8.9–12.7)
POTASSIUM SERPL-SCNC: 4.5 MMOL/L (ref 3.5–5.3)
PROT SERPL-MCNC: 7.2 G/DL (ref 6.4–8.2)
RBC # BLD AUTO: 4.08 MILLION/UL (ref 3.81–5.12)
SODIUM SERPL-SCNC: 140 MMOL/L (ref 136–145)
TRIGL SERPL-MCNC: 88 MG/DL
WBC # BLD AUTO: 9.63 THOUSAND/UL (ref 4.31–10.16)

## 2022-05-20 PROCEDURE — 85025 COMPLETE CBC W/AUTO DIFF WBC: CPT

## 2022-05-20 PROCEDURE — 80053 COMPREHEN METABOLIC PANEL: CPT

## 2022-05-20 PROCEDURE — 83036 HEMOGLOBIN GLYCOSYLATED A1C: CPT

## 2022-05-20 PROCEDURE — 36415 COLL VENOUS BLD VENIPUNCTURE: CPT

## 2022-05-20 PROCEDURE — 80061 LIPID PANEL: CPT

## 2022-05-20 NOTE — PROGRESS NOTES
[id number]  [facility] 75  coding opportunities          Chart Reviewed number of suggestions sent to Provider: 1     Patients Insurance     Medicare Insurance: [employer]

## 2022-05-21 LAB
EST. AVERAGE GLUCOSE BLD GHB EST-MCNC: 157 MG/DL
HBA1C MFR BLD: 7.1 %

## 2022-05-25 ENCOUNTER — OFFICE VISIT (OUTPATIENT)
Dept: FAMILY MEDICINE CLINIC | Facility: CLINIC | Age: 72
End: 2022-05-25
Payer: MEDICARE

## 2022-05-25 VITALS
OXYGEN SATURATION: 96 % | DIASTOLIC BLOOD PRESSURE: 80 MMHG | HEIGHT: 60 IN | HEART RATE: 75 BPM | SYSTOLIC BLOOD PRESSURE: 138 MMHG | BODY MASS INDEX: 34.79 KG/M2 | WEIGHT: 177.2 LBS | RESPIRATION RATE: 12 BRPM

## 2022-05-25 DIAGNOSIS — E11.22 TYPE 2 DIABETES MELLITUS WITH STAGE 3 CHRONIC KIDNEY DISEASE, UNSPECIFIED WHETHER LONG TERM INSULIN USE, UNSPECIFIED WHETHER STAGE 3A OR 3B CKD (HCC): ICD-10-CM

## 2022-05-25 DIAGNOSIS — M72.0 DUPUYTREN'S CONTRACTURE: ICD-10-CM

## 2022-05-25 DIAGNOSIS — J44.9 CHRONIC OBSTRUCTIVE PULMONARY DISEASE, UNSPECIFIED COPD TYPE (HCC): ICD-10-CM

## 2022-05-25 DIAGNOSIS — Z00.00 MEDICARE ANNUAL WELLNESS VISIT, SUBSEQUENT: Primary | ICD-10-CM

## 2022-05-25 DIAGNOSIS — M81.0 AGE-RELATED OSTEOPOROSIS WITHOUT CURRENT PATHOLOGICAL FRACTURE: ICD-10-CM

## 2022-05-25 DIAGNOSIS — N18.31 STAGE 3A CHRONIC KIDNEY DISEASE (HCC): ICD-10-CM

## 2022-05-25 DIAGNOSIS — N18.30 TYPE 2 DIABETES MELLITUS WITH STAGE 3 CHRONIC KIDNEY DISEASE, UNSPECIFIED WHETHER LONG TERM INSULIN USE, UNSPECIFIED WHETHER STAGE 3A OR 3B CKD (HCC): ICD-10-CM

## 2022-05-25 DIAGNOSIS — B35.4 TINEA CORPORIS: ICD-10-CM

## 2022-05-25 DIAGNOSIS — I10 BENIGN ESSENTIAL HYPERTENSION: ICD-10-CM

## 2022-05-25 PROCEDURE — 99214 OFFICE O/P EST MOD 30 MIN: CPT | Performed by: FAMILY MEDICINE

## 2022-05-25 PROCEDURE — G0439 PPPS, SUBSEQ VISIT: HCPCS | Performed by: FAMILY MEDICINE

## 2022-05-25 RX ORDER — NYSTATIN 100000 [USP'U]/G
POWDER TOPICAL 2 TIMES DAILY
Qty: 30 G | Refills: 0 | Status: SHIPPED | OUTPATIENT
Start: 2022-05-25

## 2022-05-25 NOTE — PATIENT INSTRUCTIONS
Medicare Preventive Visit Patient Instructions  Thank you for completing your Welcome to Medicare Visit or Medicare Annual Wellness Visit today  Your next wellness visit will be due in one year (5/26/2023)  The screening/preventive services that you may require over the next 5-10 years are detailed below  Some tests may not apply to you based off risk factors and/or age  Screening tests ordered at today's visit but not completed yet may show as past due  Also, please note that scanned in results may not display below  Preventive Screenings:  Service Recommendations Previous Testing/Comments   Colorectal Cancer Screening  * Colonoscopy    * Fecal Occult Blood Test (FOBT)/Fecal Immunochemical Test (FIT)  * Fecal DNA/Cologuard Test  * Flexible Sigmoidoscopy Age: 54-65 years old   Colonoscopy: every 10 years (may be performed more frequently if at higher risk)  OR  FOBT/FIT: every 1 year  OR  Cologuard: every 3 years  OR  Sigmoidoscopy: every 5 years  Screening may be recommended earlier than age 48 if at higher risk for colorectal cancer  Also, an individualized decision between you and your healthcare provider will decide whether screening between the ages of 74-80 would be appropriate  Colonoscopy: Not on file  FOBT/FIT: Not on file  Cologuard: Not on file  Sigmoidoscopy: Not on file          Breast Cancer Screening Age: 36 years old  Frequency: every 1-2 years  Not required if history of left and right mastectomy Mammogram: Not on file        Cervical Cancer Screening Between the ages of 21-29, pap smear recommended once every 3 years  Between the ages of 33-67, can perform pap smear with HPV co-testing every 5 years     Recommendations may differ for women with a history of total hysterectomy, cervical cancer, or abnormal pap smears in past  Pap Smear: Not on file    Screening Not Indicated   Hepatitis C Screening Once for adults born between [facility]  More frequently in patients at high risk for Hepatitis C Hep C Antibody: 10/06/2017    Screening Current   Diabetes Screening 1-2 times per year if you're at risk for diabetes or have pre-diabetes Fasting glucose: 131 mg/dL   A1C: 7 1 %    Screening Not Indicated  History Diabetes   Cholesterol Screening Once every 5 years if you don't have a lipid disorder  May order more often based on risk factors  Lipid panel: 05/20/2022    Screening Not Indicated  History Lipid Disorder     Other Preventive Screenings Covered by Medicare:  1  Abdominal Aortic Aneurysm (AAA) Screening: covered once if your at risk  You're considered to be at risk if you have a family history of AAA  2  Lung Cancer Screening: covers low dose CT scan once per year if you meet all of the following conditions: (1) Age 50-69; (2) No signs or symptoms of lung cancer; (3) Current smoker or have quit smoking within the last 15 years; (4) You have a tobacco smoking history of at least 30 pack years (packs per day multiplied by number of years you smoked); (5) You get a written order from a healthcare provider  3  Glaucoma Screening: covered annually if you're considered high risk: (1) You have diabetes OR (2) Family history of glaucoma OR (3)  aged 48 and older OR (3)  American aged 72 and older  3  Osteoporosis Screening: covered every 2 years if you meet one of the following conditions: (1) You're estrogen deficient and at risk for osteoporosis based off medical history and other findings; (2) Have a vertebral abnormality; (3) On glucocorticoid therapy for more than 3 months; (4) Have primary hyperparathyroidism; (5) On osteoporosis medications and need to assess response to drug therapy  · Last bone density test (DXA Scan): 03/17/2020   5  HIV Screening: covered annually if you're between the age of 15-65  Also covered annually if you are younger than 13 and older than 72 with risk factors for HIV infection   For pregnant patients, it is covered up to 3 times per pregnancy  Immunizations:  Immunization Recommendations   Influenza Vaccine Annual influenza vaccination during flu season is recommended for all persons aged >= 6 months who do not have contraindications   Pneumococcal Vaccine (Prevnar and Pneumovax)  * Prevnar = PCV13  * Pneumovax = PPSV23   Adults 25-60 years old: 1-3 doses may be recommended based on certain risk factors  Adults 72 years old: Prevnar (PCV13) vaccine recommended followed by Pneumovax (PPSV23) vaccine  If already received PPSV23 since turning 65, then PCV13 recommended at least one year after PPSV23 dose  Hepatitis B Vaccine 3 dose series if at intermediate or high risk (ex: diabetes, end stage renal disease, liver disease)   Tetanus (Td) Vaccine - COST NOT COVERED BY MEDICARE PART B Following completion of primary series, a booster dose should be given every 10 years to maintain immunity against tetanus  Td may also be given as tetanus wound prophylaxis  Tdap Vaccine - COST NOT COVERED BY MEDICARE PART B Recommended at least once for all adults  For pregnant patients, recommended with each pregnancy  Shingles Vaccine (Shingrix) - COST NOT COVERED BY MEDICARE PART B  2 shot series recommended in those aged 48 and above     Health Maintenance Due:      Topic Date Due    Breast Cancer Screening: Mammogram  Never done    Colorectal Cancer Screening  05/17/2020    DXA SCAN  03/16/2022    Hepatitis C Screening  Completed     Immunizations Due:      Topic Date Due    DTaP,Tdap,and Td Vaccines (1 - Tdap) Never done    COVID-19 Vaccine (4 - Booster for [employer] series) 05/04/2022     Advance Directives   What are advance directives? Advance directives are legal documents that state your wishes and plans for medical care  These plans are made ahead of time in case you lose your ability to make decisions for yourself  Advance directives can apply to any medical decision, such as the treatments you want, and if you want to donate organs  What are the types of advance directives? There are many types of advance directives, and each state has rules about how to use them  You may choose a combination of any of the following:  · Living will: This is a written record of the treatment you want  You can also choose which treatments you do not want, which to limit, and which to stop at a certain time  This includes surgery, medicine, IV fluid, and tube feedings  · Durable power of  for healthcare [facility]): This is a written record that states who you want to make healthcare choices for you when you are unable to make them for yourself  This person, called a proxy, is usually a family member or a friend  You may choose more than 1 proxy  · Do not resuscitate (DNR) order:  A DNR order is used in case your heart stops beating or you stop breathing  It is a request not to have certain forms of treatment, such as CPR  A DNR order may be included in other types of advance directives  · Medical directive: This covers the care that you want if you are in a coma, near death, or unable to make decisions for yourself  You can list the treatments you want for each condition  Treatment may include pain medicine, surgery, blood transfusions, dialysis, IV or tube feedings, and a ventilator (breathing machine)  · Values history: This document has questions about your views, beliefs, and how you feel and think about life  This information can help others choose the care that you would choose  Why are advance directives important? An advance directive helps you control your care  Although spoken wishes may be used, it is better to have your wishes written down  Spoken wishes can be misunderstood, or not followed  Treatments may be given even if you do not want them  An advance directive may make it easier for your family to make difficult choices about your care     Urinary Incontinence   Urinary incontinence (UI)  is when you lose control of your bladder  UI develops because your bladder cannot store or empty urine properly  The 3 most common types of UI are stress incontinence, urge incontinence, or both  Medicines:   · May be given to help strengthen your bladder control  Report any side effects of medication to your healthcare provider  Do pelvic muscle exercises often:  Your pelvic muscles help you stop urinating  Squeeze these muscles tight for 5 seconds, then relax for 5 seconds  Gradually work up to squeezing for 10 seconds  Do 3 sets of 15 repetitions a day, or as directed  This will help strengthen your pelvic muscles and improve bladder control  Train your bladder:  Go to the bathroom at set times, such as every 2 hours, even if you do not feel the urge to go  You can also try to hold your urine when you feel the urge to go  For example, hold your urine for 5 minutes when you feel the urge to go  As that becomes easier, hold your urine for 10 minutes  Self-care:   · Keep a UI record  Write down how often you leak urine and how much you leak  Make a note of what you were doing when you leaked urine  · Drink liquids as directed  You may need to limit the amount of liquid you drink to help control your urine leakage  Do not drink any liquid right before you go to bed  Limit or do not have drinks that contain caffeine or alcohol  · Prevent constipation  Eat a variety of high-fiber foods  Good examples are high-fiber cereals, beans, vegetables, and whole-grain breads  Walking is the best way to trigger your intestines to have a bowel movement  · Exercise regularly and maintain a healthy weight  Weight loss and exercise will decrease pressure on your bladder and help you control your leakage  · Use a catheter as directed  to help empty your bladder  A catheter is a tiny, plastic tube that is put into your bladder to drain your urine  · Go to behavior therapy as directed    Behavior therapy may be used to help you learn to control your urge to urinate  Weight Management   Why it is important to manage your weight:  Being overweight increases your risk of health conditions such as heart disease, high blood pressure, type 2 diabetes, and certain types of cancer  It can also increase your risk for osteoarthritis, sleep apnea, and other respiratory problems  Aim for a slow, steady weight loss  Even a small amount of weight loss can lower your risk of health problems  How to lose weight safely:  A safe and healthy way to lose weight is to eat fewer calories and get regular exercise  You can lose up about 1 pound a week by decreasing the number of calories you eat by 500 calories each day  Healthy meal plan for weight management:  A healthy meal plan includes a variety of foods, contains fewer calories, and helps you stay healthy  A healthy meal plan includes the following:  · Eat whole-grain foods more often  A healthy meal plan should contain fiber  Fiber is the part of grains, fruits, and vegetables that is not broken down by your body  Whole-grain foods are healthy and provide extra fiber in your diet  Some examples of whole-grain foods are whole-wheat breads and pastas, oatmeal, brown rice, and bulgur  · Eat a variety of vegetables every day  Include dark, leafy greens such as spinach, kale, yessy greens, and mustard greens  Eat yellow and orange vegetables such as carrots, sweet potatoes, and winter squash  · Eat a variety of fruits every day  Choose fresh or canned fruit (canned in its own juice or light syrup) instead of juice  Fruit juice has very little or no fiber  · Eat low-fat dairy foods  Drink fat-free (skim) milk or 1% milk  Eat fat-free yogurt and low-fat cottage cheese  Try low-fat cheeses such as mozzarella and other reduced-fat cheeses  · Choose meat and other protein foods that are low in fat  Choose beans or other legumes such as split peas or lentils   Choose fish, skinless poultry (chicken or turkey), or lean cuts of red meat (beef or pork)  Before you cook meat or poultry, cut off any visible fat  · Use less fat and oil  Try baking foods instead of frying them  Add less fat, such as margarine, sour cream, regular salad dressing and mayonnaise to foods  Eat fewer high-fat foods  Some examples of high-fat foods include french fries, doughnuts, ice cream, and cakes  · Eat fewer sweets  Limit foods and drinks that are high in sugar  This includes candy, cookies, regular soda, and sweetened drinks  Exercise:  Exercise at least 30 minutes per day on most days of the week  Some examples of exercise include walking, biking, dancing, and swimming  You can also fit in more physical activity by taking the stairs instead of the elevator or parking farther away from stores  Ask your healthcare provider about the best exercise plan for you  © Copyright [employer] 2018 Information is for End User's use only and may not be sold, redistributed or otherwise used for commercial purposes   All illustrations and images included in CareNotes® are the copyrighted property of [employer]  or [address] [place] [address]

## 2022-05-25 NOTE — ASSESSMENT & PLAN NOTE
Lab Results   Component Value Date    EGFR 41 05/20/2022    EGFR 40 01/10/2022    EGFR 44 05/19/2021    CREATININE 1 28 05/20/2022    CREATININE 1 31 (H) 01/10/2022    CREATININE 1 22 05/19/2021   she will cont f/u with nephrology

## 2022-05-25 NOTE — ASSESSMENT & PLAN NOTE
Blood pressure seems to be controlled on losartan 100 mg daily  At 1 time she was taking amlodipine 5 mg in addition but she thought it was canceled so she stopped taking it

## 2022-05-25 NOTE — PROGRESS NOTES
Assessment/Plan:    Type 2 diabetes mellitus with stage 3 chronic kidney disease ([facility] [address] )  Unfortunately she was eating too many sweets lately and hemoglobin A1c has increased to 7 1  Urged her to improve the diet and otherwise she will continue with metformin 1000 mg b i d  Lab Results   Component Value Date    HGBA1C 7 1 (H) 05/20/2022       Benign essential hypertension  Blood pressure seems to be controlled on losartan 100 mg daily  At 1 time she was taking amlodipine 5 mg in addition but she thought it was canceled so she stopped taking it  CKD (chronic kidney disease), stage III [facility])  Lab Results   Component Value Date    EGFR 41 05/20/2022    EGFR 40 01/10/2022    EGFR 44 05/19/2021    CREATININE 1 28 05/20/2022    CREATININE 1 31 (H) 01/10/2022    CREATININE 1 22 05/19/2021   she will cont f/u with nephrology    Age-related osteoporosis without current pathological fracture  She is stable on Fosamax and I reminded her to schedule her DEXA scan  Chronic obstructive pulmonary disease (HCC)  She has chronic cough which has been increasing lately  Gave her referral to pulmonology and script for chest x-ray  Diagnoses and all orders for this visit:    Medicare annual wellness visit, subsequent    Type 2 diabetes mellitus with stage 3 chronic kidney disease, unspecified whether long term insulin use, unspecified whether stage 3a or 3b CKD ([facility] )    Benign essential hypertension    Stage 3a chronic kidney disease ([facility] [address] )    Age-related osteoporosis without current pathological fracture    Dupuytren's contracture  -     Ambulatory Referral to Hand Surgery; Future    Chronic obstructive pulmonary disease, unspecified COPD type ([facility] )  -     XR chest pa & lateral; Future  -     Ambulatory Referral to Pulmonology; Future    Tinea corporis  -     nystatin (MYCOSTATIN) powder; Apply topically 2 (two) times a day          Subjective:      Patient ID: George Hills is a 74 y o  female      She is here with her  for annual Wellness visit and follow-up  She has been feeling well in general   She does complain of pain in the right hand where she has some swelling at the head of the metacarpals on the palmar surface  These areas become painful when she is working in the kitchen  For example she was peeling potatoes and developed a lot of pain afterwards  She denies chest pain, shortness of breath or palpitations  She does have chronic cough symptoms from asthma    Her sleep apnea study got postponed  The following portions of the patient's history were reviewed and updated as appropriate: allergies, current medications, past family history, past medical history, past social history, past surgical history and problem list     Review of Systems   Constitutional: Negative for activity change, chills, fatigue and fever  HENT: Positive for hearing loss  Negative for congestion, ear pain, sinus pressure and sore throat  Eyes: Negative for pain and visual disturbance  Respiratory: Positive for cough  Negative for choking, chest tightness, shortness of breath and wheezing  Cardiovascular: Negative for chest pain, palpitations and leg swelling  Gastrointestinal: Negative for abdominal pain, blood in stool, constipation, diarrhea, nausea and vomiting  Endocrine: Negative for polydipsia and polyuria  Genitourinary: Negative for difficulty urinating, dysuria, frequency and urgency  Musculoskeletal: Negative for arthralgias, joint swelling and myalgias  Skin: Negative for rash  Neurological: Negative for dizziness, weakness, numbness and headaches  Hematological: Negative for adenopathy  Does not bruise/bleed easily  Psychiatric/Behavioral: Negative for dysphoric mood  The patient is not nervous/anxious            Objective:      /80 (BP Location: Left arm, Patient Position: Sitting, Cuff Size: Standard)   Pulse 75   Resp 12   Ht 5' (1 524 m)   Wt 80 4 kg (177 lb 3 2 oz)   SpO2 96% BMI 34 61 kg/m²          Physical Exam  Vitals and nursing note reviewed  Constitutional:       Appearance: Normal appearance  HENT:      Head: Normocephalic and atraumatic  Right Ear: Tympanic membrane, ear canal and external ear normal       Left Ear: Tympanic membrane, ear canal and external ear normal       Mouth/Throat:      Mouth: Mucous membranes are moist    Cardiovascular:      Rate and Rhythm: Normal rate and regular rhythm  Pulses: Normal pulses  Heart sounds: Normal heart sounds  Pulmonary:      Effort: Pulmonary effort is normal       Breath sounds: Normal breath sounds  Abdominal:      General: Bowel sounds are normal  There is no distension  Palpations: There is no mass  Tenderness: There is no abdominal tenderness  Musculoskeletal:      Right lower leg: No edema  Left lower leg: No edema  Comments: Right hand with contracture   Lymphadenopathy:      Cervical: No cervical adenopathy  Skin:     General: Skin is warm and dry  Neurological:      General: No focal deficit present  Mental Status: She is alert and oriented to person, place, and time     Psychiatric:         Mood and Affect: Mood normal          Behavior: Behavior normal

## 2022-05-25 NOTE — PROGRESS NOTES
Assessment and Plan:     Problem List Items Addressed This Visit    None       BMI Counseling: Body mass index is 34 61 kg/m²  The BMI is above normal  Nutrition recommendations include decreasing portion sizes and encouraging healthy choices of fruits and vegetables  Exercise recommendations include moderate physical activity 150 minutes/week  No pharmacotherapy was ordered  Rationale for BMI follow-up plan is due to patient being overweight or obese  Depression Screening and Follow-up Plan: Patient was screened for depression during today's encounter  They screened negative with a PHQ-2 score of 0  Preventive health issues were discussed with patient, and age appropriate screening tests were ordered as noted in patient's After Visit Summary  Personalized health advice and appropriate referrals for health education or preventive services given if needed, as noted in patient's After Visit Summary       History of Present Illness:     Patient presents for Medicare Annual Wellness visit    Patient Care Team:  Shea Apple MD as PCP - General Zarina Funes (Pediatrics)  [facility])     Problem List:     Patient Active Problem List   Diagnosis    Allergic rhinitis    Asthma, mild intermittent    Benign essential hypertension    Chronic low back pain    Controlled type 2 diabetes mellitus ([address] )    Dupuytren's contracture    Hearing loss of right ear    Hyperlipidemia    Age-related osteoporosis without current pathological fracture    Restless legs syndrome    Sacroiliitis ([address] )    Obstructive sleep apnea syndrome    Pain of left upper extremity    Type 2 diabetes mellitus with stage 3 chronic kidney disease (HCC)    Pain in right ankle and joints of right foot    Left lower quadrant abdominal mass    Abnormal EKG    Obesity    Obesity, Class I, BMI 30-34 9    CKD (chronic kidney disease), stage III (HCC)    Chronic obstructive pulmonary disease (HCC)      Past Medical and Surgical History:     Past Medical History:   Diagnosis Date    Asthma     COPD (chronic obstructive pulmonary disease) ([facility] [facility] )     [facility]     Hyperlipidemia     Osteoarthritis     Osteoporosis     Seasonal allergies      Past Surgical History:   Procedure Laterality Date    BILATERAL SALPINGOOPHORECTOMY Bilateral 1/3/2020    Procedure: BILATERAL SALPINGO-OOPHORECTOMY;  Surgeon: Franck Pagan MD;  [facility];  Service: Gynecology Oncology    CATARACT EXTRACTION Bilateral     CHOLECYSTECTOMY      [id number]  LAST ASSESSED    EYE SURGERY      cataracts removed    HYSTERECTOMY      [id number]  LAST ASSESSED    OR EXPLORATORY OF ABDOMEN N/A 1/3/2020    Procedure: LAPAROTOMY EXPLORATORY;  Surgeon: Franck Pagan MD;  [facility];  Service: Gynecology Oncology    TUBAL LIGATION        Family History:     Family History   Problem Relation Age of Onset    Heart disease Mother     Rheumatic fever Father     Diabetes Family         MELLITUS    Diabetes Family         MELLITUS      Social History:     Social History     Socioeconomic History    Marital status: /Civil Union     Spouse name: Not on file    Number of children: Not on file    Years of education: Not on file    Highest education level: Not on file   Occupational History    Not on file   Tobacco Use    Smoking status: Former Smoker     Quit date:      Years since quittin 4    Smokeless tobacco: Never Used   Vaping Use    Vaping Use: Never used   Substance and Sexual Activity    Alcohol use: Not Currently    Drug use: No    Sexual activity: Not Currently   Other Topics Concern    Not on file   Social History Narrative    Not on file     Social Determinants of Health     Financial Resource Strain: Not on file   Food Insecurity: Not on file   Transportation Needs: Not on file   Physical Activity: Not on file   Stress: Not on file   Social Connections: Not on file   Intimate Partner Violence: Not on file   Housing Stability: Not on file      Medications and Allergies:     Current Outpatient Medications   Medication Sig Dispense Refill    albuterol (PROVENTIL HFA,VENTOLIN HFA) 90 mcg/act inhaler Inhale 2 puffs every 6 (six) hours as needed for wheezing or shortness of breath 6 7 g 0    alendronate (FOSAMAX) 70 mg tablet Take 1 tablet (70 mg total) by mouth every 7 days Give with at least 8 ounces of plain water first thing in the morning  Patient should be instructed to stay upright (not to lie down) for at least 30 minutes and until after first food of the day (to reduce esophageal irritation)  12 tablet 1    amLODIPine (NORVASC) 5 mg tablet Take 1 tablet (5 mg total) by mouth daily 30 tablet 5    aspirin (ECOTRIN LOW STRENGTH) 81 mg EC tablet Take 81 mg by mouth daily      Calcium Carbonate-Vit D-Min (CALCIUM 1200 PO) Take by mouth daily      Coenzyme Q10 (COQ-10) 10 MG CAPS Take by mouth daily      Loratadine 10 MG CAPS Take by mouth daily      losartan (COZAAR) 100 MG tablet Take 1 tablet (100 mg total) by mouth daily 90 tablet 3    metFORMIN (GLUCOPHAGE) 1000 MG tablet Take 1 tablet (1,000 mg total) by mouth 2 (two) times a day 180 tablet 3    Multiple Vitamin (MULTI-VITAMIN DAILY PO) Take by mouth daily      rosuvastatin (CRESTOR) 20 MG tablet Take 1 tablet (20 mg total) by mouth daily At bedtime 90 tablet 3    VITAMIN D PO Take 2,000 mg by mouth daily      acetaminophen (TYLENOL) 325 mg tablet Take 2 tablets (650 mg total) by mouth every 6 (six) hours as needed for mild pain 30 tablet 0     No current facility-administered medications for this visit       Allergies   Allergen Reactions    Penicillins Hives      Immunizations:     Immunization History   Administered Date(s) Administered    COVID-19 MODERNA VACC 0 25 ML IM BOOSTER 01/04/2022    COVID-19 MODERNA VACC 0 5 ML IM 04/06/2021, 05/04/2021    Influenza Split High Dose Preservative Free IM 12/01/2016, 10/10/2017    Influenza, high dose seasonal 0 7 mL 11/21/2018, 11/21/2019, 11/17/2020, 11/23/2021    Pneumococcal Conjugate 13-Valent 12/01/2016    Pneumococcal Polysaccharide PPV23 01/16/2018      Health Maintenance:         Topic Date Due    Breast Cancer Screening: Mammogram  Never done    Colorectal Cancer Screening  05/17/2020    DXA SCAN  03/16/2022    Hepatitis C Screening  Completed         Topic Date Due    DTaP,Tdap,and Td Vaccines (1 - Tdap) Never done    COVID-19 Vaccine (4 - Booster for Moderna series) 05/04/2022      Medicare Health Risk Assessment:     /80 (BP Location: Left arm, Patient Position: Sitting, Cuff Size: Standard)   Pulse 75   Resp 12   Ht 5' (1 524 m)   Wt 80 4 kg (177 lb 3 2 oz)   SpO2 96%   BMI 34 61 kg/m²      Enid Real is here for her Subsequent Wellness visit  Health Risk Assessment:   Patient rates overall health as good  Patient feels that their physical health rating is same  Patient is satisfied with their life  Eyesight was rated as same  Hearing was rated as slightly worse  Patient feels that their emotional and mental health rating is same  Patients states they are sometimes angry  Patient states they are often unusually tired/fatigued  Pain experienced in the last 7 days has been some  Patient states that she has experienced no weight loss or gain in last 6 months  Depression Screening:   PHQ-2 Score: 0      Fall Risk Screening: In the past year, patient has experienced: no history of falling in past year      Urinary Incontinence Screening:   Patient has not leaked urine accidently in the last six months  Home Safety:  Patient does not have trouble with stairs inside or outside of their home  Patient has working smoke alarms and has working carbon monoxide detector  Home safety hazards include: none  Nutrition:   Current diet is Diabetic  Medications:   Patient is currently taking over-the-counter supplements   OTC medications include: see medication list  Patient is able to manage medications  Activities of Daily Living (ADLs)/Instrumental Activities of Daily Living (IADLs):   Walk and transfer into and out of bed and chair?: Yes  Dress and groom yourself?: Yes    Bathe or shower yourself?: Yes    Feed yourself? Yes  Do your laundry/housekeeping?: Yes  Manage your money, pay your bills and track your expenses?: Yes  Make your own meals?: Yes    Do your own shopping?: Yes    Previous Hospitalizations:   Any hospitalizations or ED visits within the last 12 months?: No      Advance Care Planning:   Living will: No    Durable POA for healthcare: No    Advanced directive: No    Five wishes given: Yes      PREVENTIVE SCREENINGS      Cardiovascular Screening:    General: Screening Not Indicated and History Lipid Disorder      Diabetes Screening:     General: Screening Not Indicated and History Diabetes      Cervical Cancer Screening:    General: Screening Not Indicated      Osteoporosis Screening:    General: Screening Not Indicated and History Osteoporosis      Lung Cancer Screening:     General: Screening Not Indicated      Hepatitis C Screening:    General: Screening Current    Screening, Brief Intervention, and Referral to Treatment (SBIRT)    Screening  Typical number of drinks in a day: 0  Typical number of drinks in a week: 0  Interpretation: Low risk drinking behavior      Single Item Drug Screening:  How often have you used an illegal drug (including marijuana) or a prescription medication for non-medical reasons in the past year? never    Single Item Drug Screen Score: 0  Interpretation: Negative screen for possible drug use disorder      Jamie Nance MD

## 2022-05-25 NOTE — ASSESSMENT & PLAN NOTE
She has chronic cough which has been increasing lately  Gave her referral to pulmonology and script for chest x-ray

## 2022-05-25 NOTE — ASSESSMENT & PLAN NOTE
Unfortunately she was eating too many sweets lately and hemoglobin A1c has increased to 7 1  Urged her to improve the diet and otherwise she will continue with metformin 1000 mg b i d    Lab Results   Component Value Date    HGBA1C 7 1 (H) 05/20/2022

## 2022-05-31 ENCOUNTER — TELEPHONE (OUTPATIENT)
Dept: OBGYN CLINIC | Facility: HOSPITAL | Age: 72
End: 2022-05-31

## 2022-06-02 ENCOUNTER — APPOINTMENT (OUTPATIENT)
Dept: LAB | Facility: CLINIC | Age: 72
End: 2022-06-02
Payer: MEDICARE

## 2022-06-02 DIAGNOSIS — N18.31 STAGE 3A CHRONIC KIDNEY DISEASE (HCC): ICD-10-CM

## 2022-06-02 LAB
25(OH)D3 SERPL-MCNC: 60.1 NG/ML (ref 30–100)
ALBUMIN SERPL BCP-MCNC: 3.5 G/DL (ref 3.5–5)
ALP SERPL-CCNC: 62 U/L (ref 46–116)
ALT SERPL W P-5'-P-CCNC: 20 U/L (ref 12–78)
ANION GAP SERPL CALCULATED.3IONS-SCNC: 4 MMOL/L (ref 4–13)
AST SERPL W P-5'-P-CCNC: 19 U/L (ref 5–45)
BACTERIA UR QL AUTO: ABNORMAL /HPF
BASOPHILS # BLD AUTO: 0.07 THOUSANDS/ΜL (ref 0–0.1)
BASOPHILS NFR BLD AUTO: 1 % (ref 0–1)
BILIRUB SERPL-MCNC: 0.34 MG/DL (ref 0.2–1)
BILIRUB UR QL STRIP: NEGATIVE
BUN SERPL-MCNC: 15 MG/DL (ref 5–25)
CALCIUM SERPL-MCNC: 9.8 MG/DL (ref 8.3–10.1)
CHLORIDE SERPL-SCNC: 104 MMOL/L (ref 100–108)
CLARITY UR: CLEAR
CO2 SERPL-SCNC: 33 MMOL/L (ref 21–32)
COLOR UR: ABNORMAL
CREAT SERPL-MCNC: 1.38 MG/DL (ref 0.6–1.3)
CREAT UR-MCNC: 78.1 MG/DL
CREAT UR-MCNC: 78.1 MG/DL
EOSINOPHIL # BLD AUTO: 0.44 THOUSAND/ΜL (ref 0–0.61)
EOSINOPHIL NFR BLD AUTO: 6 % (ref 0–6)
ERYTHROCYTE [DISTWIDTH] IN BLOOD BY AUTOMATED COUNT: 11.9 % (ref 11.6–15.1)
GFR SERPL CREATININE-BSD FRML MDRD: 37 ML/MIN/1.73SQ M
GLUCOSE P FAST SERPL-MCNC: 131 MG/DL (ref 65–99)
GLUCOSE UR STRIP-MCNC: NEGATIVE MG/DL
HCT VFR BLD AUTO: 36.3 % (ref 34.8–46.1)
HGB BLD-MCNC: 12 G/DL (ref 11.5–15.4)
HGB UR QL STRIP.AUTO: NEGATIVE
IMM GRANULOCYTES # BLD AUTO: 0.03 THOUSAND/UL (ref 0–0.2)
IMM GRANULOCYTES NFR BLD AUTO: 0 % (ref 0–2)
KETONES UR STRIP-MCNC: NEGATIVE MG/DL
LEUKOCYTE ESTERASE UR QL STRIP: NEGATIVE
LYMPHOCYTES # BLD AUTO: 2.45 THOUSANDS/ΜL (ref 0.6–4.47)
LYMPHOCYTES NFR BLD AUTO: 31 % (ref 14–44)
MAGNESIUM SERPL-MCNC: 1.9 MG/DL (ref 1.6–2.6)
MCH RBC QN AUTO: 29.1 PG (ref 26.8–34.3)
MCHC RBC AUTO-ENTMCNC: 33.1 G/DL (ref 31.4–37.4)
MCV RBC AUTO: 88 FL (ref 82–98)
MICROALBUMIN UR-MCNC: 73.9 MG/L (ref 0–20)
MICROALBUMIN/CREAT 24H UR: 95 MG/G CREATININE (ref 0–30)
MONOCYTES # BLD AUTO: 0.58 THOUSAND/ΜL (ref 0.17–1.22)
MONOCYTES NFR BLD AUTO: 7 % (ref 4–12)
NEUTROPHILS # BLD AUTO: 4.42 THOUSANDS/ΜL (ref 1.85–7.62)
NEUTS SEG NFR BLD AUTO: 55 % (ref 43–75)
NITRITE UR QL STRIP: NEGATIVE
NON-SQ EPI CELLS URNS QL MICRO: ABNORMAL /HPF
NRBC BLD AUTO-RTO: 0 /100 WBCS
PH UR STRIP.AUTO: 5 [PH]
PHOSPHATE SERPL-MCNC: 4.9 MG/DL (ref 2.3–4.1)
PLATELET # BLD AUTO: 309 THOUSANDS/UL (ref 149–390)
PMV BLD AUTO: 10.5 FL (ref 8.9–12.7)
POTASSIUM SERPL-SCNC: 4.3 MMOL/L (ref 3.5–5.3)
PROT SERPL-MCNC: 7.7 G/DL (ref 6.4–8.2)
PROT UR STRIP-MCNC: ABNORMAL MG/DL
PROT UR-MCNC: 18 MG/DL
PROT/CREAT UR: 0.23 MG/G{CREAT} (ref 0–0.1)
PTH-INTACT SERPL-MCNC: 59.3 PG/ML (ref 18.4–80.1)
RBC # BLD AUTO: 4.13 MILLION/UL (ref 3.81–5.12)
RBC #/AREA URNS AUTO: ABNORMAL /HPF
SODIUM SERPL-SCNC: 141 MMOL/L (ref 136–145)
SP GR UR STRIP.AUTO: 1.01 (ref 1–1.03)
UROBILINOGEN UR STRIP-ACNC: <2 MG/DL
WBC # BLD AUTO: 7.99 THOUSAND/UL (ref 4.31–10.16)
WBC #/AREA URNS AUTO: ABNORMAL /HPF

## 2022-06-02 PROCEDURE — 85025 COMPLETE CBC W/AUTO DIFF WBC: CPT

## 2022-06-02 PROCEDURE — 84156 ASSAY OF PROTEIN URINE: CPT

## 2022-06-02 PROCEDURE — 80053 COMPREHEN METABOLIC PANEL: CPT

## 2022-06-02 PROCEDURE — 82043 UR ALBUMIN QUANTITATIVE: CPT

## 2022-06-02 PROCEDURE — 83970 ASSAY OF PARATHORMONE: CPT

## 2022-06-02 PROCEDURE — 83735 ASSAY OF MAGNESIUM: CPT

## 2022-06-02 PROCEDURE — 82570 ASSAY OF URINE CREATININE: CPT

## 2022-06-02 PROCEDURE — 81001 URINALYSIS AUTO W/SCOPE: CPT

## 2022-06-02 PROCEDURE — 36415 COLL VENOUS BLD VENIPUNCTURE: CPT

## 2022-06-02 PROCEDURE — 84100 ASSAY OF PHOSPHORUS: CPT

## 2022-06-02 PROCEDURE — 82306 VITAMIN D 25 HYDROXY: CPT

## 2022-06-06 ENCOUNTER — OFFICE VISIT (OUTPATIENT)
Dept: NEPHROLOGY | Facility: CLINIC | Age: 72
End: 2022-06-06
Payer: MEDICARE

## 2022-06-06 VITALS
BODY MASS INDEX: 34.28 KG/M2 | DIASTOLIC BLOOD PRESSURE: 68 MMHG | OXYGEN SATURATION: 96 % | SYSTOLIC BLOOD PRESSURE: 130 MMHG | WEIGHT: 174.6 LBS | HEART RATE: 80 BPM | HEIGHT: 60 IN

## 2022-06-06 DIAGNOSIS — N18.31 TYPE 2 DIABETES MELLITUS WITH STAGE 3A CHRONIC KIDNEY DISEASE, UNSPECIFIED WHETHER LONG TERM INSULIN USE (HCC): ICD-10-CM

## 2022-06-06 DIAGNOSIS — N18.30 BENIGN HYPERTENSION WITH CKD (CHRONIC KIDNEY DISEASE) STAGE III (HCC): ICD-10-CM

## 2022-06-06 DIAGNOSIS — E11.22 TYPE 2 DIABETES MELLITUS WITH STAGE 3A CHRONIC KIDNEY DISEASE, UNSPECIFIED WHETHER LONG TERM INSULIN USE (HCC): ICD-10-CM

## 2022-06-06 DIAGNOSIS — I12.9 BENIGN HYPERTENSION WITH CKD (CHRONIC KIDNEY DISEASE) STAGE III (HCC): ICD-10-CM

## 2022-06-06 DIAGNOSIS — N18.31 STAGE 3A CHRONIC KIDNEY DISEASE (HCC): Primary | ICD-10-CM

## 2022-06-06 PROCEDURE — 99213 OFFICE O/P EST LOW 20 MIN: CPT | Performed by: NURSE PRACTITIONER

## 2022-06-06 NOTE — PROGRESS NOTES
Nephrology   Office Follow-Up  Irvin Phillips 74 y o  female MRN: [id number]    Encounter: [id number]        [address] [place] [address]    Irvin Phillips was seen in the [place] office today  All diagnoses and orders for visit:     1  Stage 3a chronic kidney disease (HCC)  · Baseline creatinine around 1 1-1 3 mg/dL  Most recent creatinine 1 38 mg/dL  Continue losartan 100 mg daily  She will focus on avoidance of nephrotoxins, blood sugar control and weight loss  -     Comprehensive metabolic panel; Future; Expected date: 12/01/2022   -     Microalbumin / creatinine urine ratio; Future; Expected date: 12/01/2022   -     Urinalysis with microscopic; Future; Expected date: 12/01/2022   -     Protein / creatinine ratio, urine; Future; Expected date: 12/01/2022  2  Type 2 diabetes mellitus with stage 3a chronic kidney disease, unspecified whether long term insulin use (HCC)  · Admits to high carb diet recently  Will work on appropriate diet adjustments and weight loss  Continue losartan 100 mg daily  Did speak with patient today about SGLT2i however would like to defer- cost may be an issue   3  Benign hypertension with CKD (chronic kidney disease) stage III (HCC)  · Goal < 130/80 mmHg  Blood pressure appropriate and no changes made    HPI: Irvin Phillips is a 74 y o  female who is here for scheduled follow-up regarding CKD 3a, hypertension, T2DM with microalbuminuria  [place] Dub is stable from a nephrology perspective  She will continue losartan 100 mg daily and focus on diabetic control, weight loss  Did discuss mechanism of SGLT2 inhibitor today but she would like to defer and try lifestyle changes first  Return to office in 6 months with Dr Danae Goss  ROS:   Review of Systems   Constitutional: Negative for chills and fever  HENT: Negative for ear pain and sore throat  Eyes: Negative for pain and visual disturbance  Respiratory: Negative for cough and shortness of breath      Cardiovascular: Negative for chest pain and palpitations  Gastrointestinal: Negative for abdominal pain and vomiting  Genitourinary: Negative for dysuria and hematuria  Musculoskeletal: Negative for arthralgias and back pain  Skin: Negative for color change and rash  Allergic/Immunologic: Positive for environmental allergies  Neurological: Negative for seizures and syncope  All other systems reviewed and are negative  Allergies: Penicillins    Medications:   Current Outpatient Medications:     acetaminophen (TYLENOL) 325 mg tablet, Take 2 tablets (650 mg total) by mouth every 6 (six) hours as needed for mild pain, Disp: 30 tablet, Rfl: 0    albuterol (PROVENTIL HFA,VENTOLIN HFA) 90 mcg/act inhaler, Inhale 2 puffs every 6 (six) hours as needed for wheezing or shortness of breath, Disp: 6 7 g, Rfl: 0    alendronate (FOSAMAX) 70 mg tablet, Take 1 tablet (70 mg total) by mouth every 7 days Give with at least 8 ounces of plain water first thing in the morning  Patient should be instructed to stay upright (not to lie down) for at least 30 minutes and until after first food of the day (to reduce esophageal irritation)  , Disp: 12 tablet, Rfl: 1    aspirin (ECOTRIN LOW STRENGTH) 81 mg EC tablet, Take 81 mg by mouth daily, Disp: , Rfl:     Calcium Carbonate-Vit D-Min (CALCIUM 1200 PO), Take by mouth daily, Disp: , Rfl:     Coenzyme Q10 (COQ-10) 10 MG CAPS, Take by mouth daily, Disp: , Rfl:     Loratadine 10 MG CAPS, Take by mouth daily, Disp: , Rfl:     losartan (COZAAR) 100 MG tablet, Take 1 tablet (100 mg total) by mouth daily, Disp: 90 tablet, Rfl: 3    metFORMIN (GLUCOPHAGE) 1000 MG tablet, Take 1 tablet (1,000 mg total) by mouth 2 (two) times a day, Disp: 180 tablet, Rfl: 3    Multiple Vitamin (MULTI-VITAMIN DAILY PO), Take by mouth daily, Disp: , Rfl:     nystatin (MYCOSTATIN) powder, Apply topically 2 (two) times a day, Disp: 30 g, Rfl: 0    rosuvastatin (CRESTOR) 20 MG tablet, Take 1 tablet (20 mg total) by mouth daily At bedtime, Disp: 90 tablet, Rfl: 3    VITAMIN D PO, Take 2,000 mg by mouth daily, Disp: , Rfl:     Past Medical History:   Diagnosis Date    Asthma     [facility]ch[facility] obstructive [facility] disease) ([facility] )     Diabetes ([facility] )     Hyperlipidemia     Osteoarthritis     Osteoporosis     Seasonal allergies      Past Surgical History:   Procedure Laterality Date    BILATERAL SALPINGOOPHORECTOMY Bilateral 1/3/2020    Procedure: BILATERAL SALPINGO-OOPHORECTOMY;  Surgeon: Tg Llamas MD;  [facility];  Service: Gynecology Oncology    CATARACT EXTRACTION Bilateral     CHOLECYSTECTOMY      [id number]  LAST ASSESSED    EYE SURGERY      cataracts removed    HYSTERECTOMY      [id number]  LAST ASSESSED    LA EXPLORATORY OF ABDOMEN N/A 1/3/2020    Procedure: LAPAROTOMY EXPLORATORY;  Surgeon: Tg Llamas MD;  [facility];  Service: Gynecology Oncology    TUBAL LIGATION       Family History   Problem Relation Age of Onset    Heart disease Mother     Rheumatic fever Father     Diabetes Family         MELLITUS    Diabetes Family         MELLITUS      reports that she quit smoking about 26 years ago  She has never used smokeless tobacco  She reports previous alcohol use  She reports that she does not use drugs  Physical Exam:   Vitals:    06/06/22 1122   BP: 130/68   Pulse: 80   SpO2: 96%   Weight: 79 2 kg (174 lb 9 6 oz)   Height: 5' (1 524 m)     Body mass index is 34 1 kg/m²      General: conscious, cooperative, in no acute distress, appears stated age  Eyes: conjunctivae pale, anicteric sclerae  ENT: lips and mucous membranes moist  Neck: supple, no JVD, no masses  Chest:  essentially clear breath sounds bilaterally, no crackles, ronchus or wheezings  CVS: S1 & S2, normal rate, regular rhythm  Abdomen: soft, non-tender, non-distended, normoactive bowel sounds, rounded, obese  Extremities: no edema of both legs  Skin: no rash   Neuro: awake, alert, oriented       Diagnostic Data:  Lab: I have personally reviewed pertinent lab results  ,   CBC:  Results from last 7 days   Lab Units 06/02/22  1059   WBC Thousand/uL 7 99   HEMOGLOBIN g/dL 12 0   HEMATOCRIT % 36 3   PLATELETS Thousands/uL 309      CMP: No results found for: SODIUM, K, CL, CO2, ANIONGAP, BUN, CREATININE, GLUCOSE, CALCIUM, AST, ALT, ALKPHOS, PROT, BILITOT, EGFR,   PT/INR: No results found for: PT, INR,   Magnesium: No components found for: MAG,  Phosphorous: No results found for: PHOS    Patient Instructions   Focus on a low carb, low sugar diet to help your diabetes  Blood work in December       Portions of the record may have been created with voice recognition software  Occasional wrong word or "sound a like" substitutions may have occurred due to the inherent limitations of voice recognition software  Read the chart carefully and recognize, using context, where substitutions have occurred  If you have any questions, please contact the dictating provider

## 2022-07-07 LAB
LEFT EYE DIABETIC RETINOPATHY: NORMAL
RIGHT EYE DIABETIC RETINOPATHY: NORMAL

## 2022-08-01 ENCOUNTER — OFFICE VISIT (OUTPATIENT)
Dept: FAMILY MEDICINE CLINIC | Facility: CLINIC | Age: 72
End: 2022-08-01
Payer: MEDICARE

## 2022-08-01 ENCOUNTER — HOSPITAL ENCOUNTER (OUTPATIENT)
Dept: BONE DENSITY | Facility: MEDICAL CENTER | Age: 72
Discharge: HOME | End: 2022-08-01
Payer: MEDICARE

## 2022-08-01 ENCOUNTER — APPOINTMENT (OUTPATIENT)
Dept: RADIOLOGY | Facility: MEDICAL CENTER | Age: 72
End: 2022-08-01
Payer: MEDICARE

## 2022-08-01 VITALS
WEIGHT: 174 LBS | OXYGEN SATURATION: 96 % | BODY MASS INDEX: 34.16 KG/M2 | RESPIRATION RATE: 12 BRPM | HEIGHT: 60 IN | SYSTOLIC BLOOD PRESSURE: 140 MMHG | DIASTOLIC BLOOD PRESSURE: 60 MMHG | HEART RATE: 72 BPM

## 2022-08-01 DIAGNOSIS — M25.552 LEFT HIP PAIN: ICD-10-CM

## 2022-08-01 DIAGNOSIS — E11.22 TYPE 2 DIABETES MELLITUS WITH STAGE 3A CHRONIC KIDNEY DISEASE, UNSPECIFIED WHETHER LONG TERM INSULIN USE (HCC): ICD-10-CM

## 2022-08-01 DIAGNOSIS — M81.0 POSTMENOPAUSAL OSTEOPOROSIS: ICD-10-CM

## 2022-08-01 DIAGNOSIS — I10 BENIGN ESSENTIAL HYPERTENSION: Primary | ICD-10-CM

## 2022-08-01 DIAGNOSIS — N18.31 TYPE 2 DIABETES MELLITUS WITH STAGE 3A CHRONIC KIDNEY DISEASE, UNSPECIFIED WHETHER LONG TERM INSULIN USE (HCC): ICD-10-CM

## 2022-08-01 PROCEDURE — 73502 X-RAY EXAM HIP UNI 2-3 VIEWS: CPT

## 2022-08-01 PROCEDURE — 77080 DXA BONE DENSITY AXIAL: CPT

## 2022-08-01 PROCEDURE — 99214 OFFICE O/P EST MOD 30 MIN: CPT | Performed by: INTERNAL MEDICINE

## 2022-08-01 NOTE — ASSESSMENT & PLAN NOTE
Recent hemoglobin A1c few months ago for of 7 1  She is currently on metformin 1000 mg b i d  will recheck in few months  Continue the same therapy as for now  Stick with low-carbohydrate diet    Lab Results   Component Value Date    HGBA1C 7 1 (H) 05/20/2022

## 2022-08-01 NOTE — PROGRESS NOTES
Diabetic Foot Exam    Patient's shoes and socks removed  Right Foot/Ankle   Right Foot Inspection  Skin Exam: skin normal and skin intact  No dry skin, no warmth, no callus, no erythema, no maceration, no abnormal color, no pre-ulcer, no ulcer and no callus  Toe Exam: ROM and strength within normal limits  Sensory   Monofilament testing: diminished    Vascular  Capillary refills: < 3 seconds  The right DP pulse is 1+  The right PT pulse is 1+  Left Foot/Ankle  Left Foot Inspection  Skin Exam: skin normal and skin intact  No dry skin, no warmth, no erythema, no maceration, normal color, no pre-ulcer, no ulcer and no callus  Toe Exam: ROM and strength within normal limits  Sensory   Monofilament testing: diminished    Vascular  Capillary refills: < 3 seconds  The left DP pulse is 1+  The left PT pulse is 1+       Assign Risk Category  No deformity present  No loss of protective sensation  No weak pulses  Risk: 0

## 2022-08-01 NOTE — ASSESSMENT & PLAN NOTE
Clinically looks like trochanteric bursitis on the left  Will do x-ray of the left hip to exclude any gross changes  She will follow-up with us in few weeks for possible local steroid injection

## 2022-08-01 NOTE — PROGRESS NOTES
Assessment/Plan:    Type 2 diabetes mellitus with stage 3 chronic kidney disease (HCC)  Recent hemoglobin A1c few months ago for of 7 1  She is currently on metformin 1000 mg b i d  will recheck in few months  Continue the same therapy as for now  Stick with low-carbohydrate diet  Lab Results   Component Value Date    HGBA1C 7 1 (H) 05/20/2022       Benign essential hypertension  Blood pressure is well controlled, she is currently on losartan 100 mg daily  Will repeat BMP  Left hip pain  Clinically looks like trochanteric bursitis on the left  Will do x-ray of the left hip to exclude any gross changes  She will follow-up with us in few weeks for possible local steroid injection  Diagnoses and all orders for this visit:    Benign essential hypertension    Type 2 diabetes mellitus with stage 3a chronic kidney disease, unspecified whether long term insulin use ([facility]  -     Basic metabolic panel; Future  -     HEMOGLOBIN A1C W/ EAG ESTIMATION; Future    Left hip pain  -     XR hip/pelv 2-3 vws left if performed; Future          Subjective:      Patient ID: Maris Lou is a 74 y o  female  Patient came today to establish care and to follow-up on her multiple chronic problems including hypertension, type 2 diabetes, hyperlipidemia  She also came today with new complaint of left hip pain  The following portions of the patient's history were reviewed and updated as appropriate: allergies, current medications, past family history, past medical history, past social history, past surgical history, and problem list     Review of Systems   Constitutional: Negative for chills and fever  Respiratory: Negative for chest tightness, shortness of breath and wheezing  Cardiovascular: Negative for chest pain and leg swelling  Gastrointestinal: Negative for abdominal pain  Musculoskeletal: Positive for arthralgias  Negative for gait problem and joint swelling           Objective:      /60 (BP Location: Left arm, Patient Position: Sitting, Cuff Size: Standard)   Pulse 72   Resp 12   Ht 5' (1 524 m)   Wt 78 9 kg (174 lb)   SpO2 96%   BMI 33 98 kg/m²     Allergies   Allergen Reactions    Penicillins Hives          Current Outpatient Medications:     acetaminophen (TYLENOL) 325 mg tablet, Take 2 tablets (650 mg total) by mouth every 6 (six) hours as needed for mild pain, Disp: 30 tablet, Rfl: 0    albuterol (PROVENTIL HFA,VENTOLIN HFA) 90 mcg/act inhaler, Inhale 2 puffs every 6 (six) hours as needed for wheezing or shortness of breath, Disp: 6 7 g, Rfl: 0    alendronate (FOSAMAX) 70 mg tablet, Take 1 tablet (70 mg total) by mouth every 7 days Give with at least 8 ounces of plain water first thing in the morning  Patient should be instructed to stay upright (not to lie down) for at least 30 minutes and until after first food of the day (to reduce esophageal irritation)  , Disp: 12 tablet, Rfl: 1    aspirin (ECOTRIN LOW STRENGTH) 81 mg EC tablet, Take 81 mg by mouth daily, Disp: , Rfl:     Calcium Carbonate-Vit D-Min (CALCIUM 1200 PO), Take by mouth daily, Disp: , Rfl:     Coenzyme Q10 (COQ-10) 10 MG CAPS, Take by mouth daily, Disp: , Rfl:     Loratadine 10 MG CAPS, Take by mouth daily, Disp: , Rfl:     losartan (COZAAR) 100 MG tablet, Take 1 tablet (100 mg total) by mouth daily, Disp: 90 tablet, Rfl: 3    metFORMIN (GLUCOPHAGE) 1000 MG tablet, Take 1 tablet (1,000 mg total) by mouth 2 (two) times a day, Disp: 180 tablet, Rfl: 3    Multiple Vitamin (MULTI-VITAMIN DAILY PO), Take by mouth daily, Disp: , Rfl:     nystatin (MYCOSTATIN) powder, Apply topically 2 (two) times a day, Disp: 30 g, Rfl: 0    rosuvastatin (CRESTOR) 20 MG tablet, Take 1 tablet (20 mg total) by mouth daily At bedtime, Disp: 90 tablet, Rfl: 3    VITAMIN D PO, Take 2,000 mg by mouth daily, Disp: , Rfl:      Patient Instructions   Please do blood work in 1 month for your diabetes and kidney function              Physical Exam  Constitutional:       General: She is not in acute distress  Appearance: She is not toxic-appearing  Cardiovascular:      Rate and Rhythm: Normal rate  Heart sounds: No murmur heard  No gallop  Pulmonary:      Effort: No respiratory distress  Breath sounds: No wheezing or rales  Musculoskeletal:         General: Tenderness (Over left trochanteric area ) present  Neurological:      Mental Status: She is alert

## 2022-08-06 DIAGNOSIS — M81.0 AGE-RELATED OSTEOPOROSIS WITHOUT CURRENT PATHOLOGICAL FRACTURE: Primary | ICD-10-CM

## 2022-08-10 ENCOUNTER — OFFICE VISIT (OUTPATIENT)
Dept: OBGYN CLINIC | Facility: HOSPITAL | Age: 72
End: 2022-08-10
Payer: MEDICARE

## 2022-08-10 VITALS
SYSTOLIC BLOOD PRESSURE: 181 MMHG | HEART RATE: 84 BPM | WEIGHT: 176 LBS | HEIGHT: 60 IN | BODY MASS INDEX: 34.55 KG/M2 | DIASTOLIC BLOOD PRESSURE: 54 MMHG

## 2022-08-10 DIAGNOSIS — M65.331 TRIGGER FINGER, RIGHT MIDDLE FINGER: Primary | ICD-10-CM

## 2022-08-10 DIAGNOSIS — M72.0 DUPUYTREN'S CONTRACTURE: ICD-10-CM

## 2022-08-10 PROCEDURE — 99204 OFFICE O/P NEW MOD 45 MIN: CPT | Performed by: ORTHOPAEDIC SURGERY

## 2022-08-10 PROCEDURE — 20550 NJX 1 TENDON SHEATH/LIGAMENT: CPT | Performed by: ORTHOPAEDIC SURGERY

## 2022-08-10 PROCEDURE — 99214 OFFICE O/P EST MOD 30 MIN: CPT | Performed by: ORTHOPAEDIC SURGERY

## 2022-08-10 RX ORDER — LIDOCAINE HYDROCHLORIDE 5 MG/ML
1 INJECTION, SOLUTION INFILTRATION; PERINEURAL
Status: COMPLETED | OUTPATIENT
Start: 2022-08-10 | End: 2022-08-10

## 2022-08-10 RX ORDER — BETAMETHASONE SODIUM PHOSPHATE AND BETAMETHASONE ACETATE 3; 3 MG/ML; MG/ML
3 INJECTION, SUSPENSION INTRA-ARTICULAR; INTRALESIONAL; INTRAMUSCULAR; SOFT TISSUE
Status: COMPLETED | OUTPATIENT
Start: 2022-08-10 | End: 2022-08-10

## 2022-08-10 RX ADMIN — LIDOCAINE HYDROCHLORIDE 1 ML: 5 INJECTION, SOLUTION INFILTRATION; PERINEURAL at 13:38

## 2022-08-10 RX ADMIN — BETAMETHASONE SODIUM PHOSPHATE AND BETAMETHASONE ACETATE 3 MG: 3; 3 INJECTION, SUSPENSION INTRA-ARTICULAR; INTRALESIONAL; INTRAMUSCULAR; SOFT TISSUE at 13:38

## 2022-08-10 NOTE — PROGRESS NOTES
ASSESSMENT/PLAN:    Assessment:   Trigger Finger  right  long finger and Dupuytrens Disease of the  right  ring finger    Plan:   steroid injections, Tylenol and scar tissue massage    Follow Up:  6  week(s)    To Do Next Visit:  Reassess cortisone injection for trigger finger and possibly schedule surgery if unsuccessful    General Discussions:     Dupuytren's Disease: The anatomy and physiology of Dupuytren's disease were discussed with the patient today in the office  Increased scar formation within the interval between the skin volarly and the flexor tendons dorsally can result in pit formation, nodular formation, or eventual cord formation  These pathologic cords can cause contracture at either the metacarpophalangeal joint, proximal interphalangeal joint, or both  As the cords progress towards the proximal interphalangeal joint, the neurovascular structures of the finger may become involved within the disease process  While this is a genetic condition, variable penetrance occurs within family trees  Conservative treatment options including therapy to maintain joint mobility and a tabletop test were discussed  Other treatments include Xiaflex and possible surgical intervention  Trigger FInger: The anatomy and physiology of trigger finger was discussed with the patient today in the office  Edema and increased contact pressure within the flexor tendons at the A1 pulley can cause pain, crepitation, and limitation of function  Treatment options include resting MP blocking splints to decrease edema, oral anti-inflammatory medications, home or formal therapy exercises, up to 2 steroid injections within the tendon sheath, or surgical release    While majority of patients do respond to conservative treatment, up to 20% may require surgical release        _____________________________________________________  CHIEF COMPLAINT:  Chief Complaint   Patient presents with    Right Hand - dupuytren's SUBJECTIVE:  Nazario Silver is a 74 y o  female who presents presents today for initial evaluation right hand pain and numbness  She is right-hand dominant  She states she has had a contracture in her palm pump for well over a year now  She treated this with formal physical therapy which did provide relief from her symptoms until around 2 months ago when discontinuing her exercises she describes the pain radiating into her long finger  She states the pain has progressively gotten worse over this time and describes a catching sensation as well as numbness and tingling that radiates throughout her hand  She states that it has become progressively difficult to hold things in her hand such as opening a door knob, peeling potatoes, or writing for prolonged periods of time  She denies any injury or trauma to her right upper extremity  She has not attempted any cortisone injections in the past   She does have a history of diabetes that she states is well under control      PAST MEDICAL HISTORY:  Past Medical History:   Diagnosis Date    Asthma     [facility]     [facility] ([facility]     Hyperlipidemia     Osteoarthritis     Osteoporosis     Seasonal allergies        PAST SURGICAL HISTORY:  Past Surgical History:   Procedure Laterality Date    BILATERAL SALPINGOOPHORECTOMY Bilateral 1/3/2020    Procedure: BILATERAL SALPINGO-OOPHORECTOMY;  Surgeon: Best Layton MD;  [facility];  Service: Gynecology Oncology    CATARACT EXTRACTION Bilateral     CHOLECYSTECTOMY      [id number]  LAST ASSESSED    EYE SURGERY      cataracts removed    HYSTERECTOMY      [id number]  LAST ASSESSED    CO EXPLORATORY OF ABDOMEN N/A 1/3/2020    Procedure: LAPAROTOMY EXPLORATORY;  Surgeon: Best Layton MD;  [facility];  Service: Gynecology Oncology    TUBAL LIGATION         FAMILY HISTORY:  Family History   Problem Relation Age of Onset    Heart disease Mother  Rheumatic fever Father     Diabetes Family         MELLITUS    Diabetes Family         MELLITUS       SOCIAL HISTORY:  Social History     Tobacco Use    Smoking status: Former Smoker     Quit date:      Years since quittin 6    Smokeless tobacco: Never Used   Vaping Use    Vaping Use: Never used   Substance Use Topics    Alcohol use: Not Currently    Drug use: No       MEDICATIONS:    Current Outpatient Medications:     acetaminophen (TYLENOL) 325 mg tablet, Take 2 tablets (650 mg total) by mouth every 6 (six) hours as needed for mild pain, Disp: 30 tablet, Rfl: 0    albuterol (PROVENTIL HFA,VENTOLIN HFA) 90 mcg/act inhaler, Inhale 2 puffs every 6 (six) hours as needed for wheezing or shortness of breath, Disp: 6 7 g, Rfl: 0    alendronate (FOSAMAX) 70 mg tablet, Take 1 tablet (70 mg total) by mouth every 7 days Give with at least 8 ounces of plain water first thing in the morning  Patient should be instructed to stay upright (not to lie down) for at least 30 minutes and until after first food of the day (to reduce esophageal irritation)  , Disp: 12 tablet, Rfl: 1    aspirin (ECOTRIN LOW STRENGTH) 81 mg EC tablet, Take 81 mg by mouth daily, Disp: , Rfl:     Calcium Carbonate-Vit D-Min (CALCIUM 1200 PO), Take by mouth daily, Disp: , Rfl:     Coenzyme Q10 (COQ-10) 10 MG CAPS, Take by mouth daily, Disp: , Rfl:     Loratadine 10 MG CAPS, Take by mouth daily, Disp: , Rfl:     losartan (COZAAR) 100 MG tablet, Take 1 tablet (100 mg total) by mouth daily, Disp: 90 tablet, Rfl: 3    metFORMIN (GLUCOPHAGE) 1000 MG tablet, Take 1 tablet (1,000 mg total) by mouth 2 (two) times a day, Disp: 180 tablet, Rfl: 3    Multiple Vitamin (MULTI-VITAMIN DAILY PO), Take by mouth daily, Disp: , Rfl:     nystatin (MYCOSTATIN) powder, Apply topically 2 (two) times a day, Disp: 30 g, Rfl: 0    rosuvastatin (CRESTOR) 20 MG tablet, Take 1 tablet (20 mg total) by mouth daily At bedtime, Disp: 90 tablet, Rfl: 3   VITAMIN D PO, Take 2,000 mg by mouth daily, Disp: , Rfl:     ALLERGIES:  Allergies   Allergen Reactions    Penicillins Hives       REVIEW OF SYSTEMS:  Pertinent items are noted in HPI  A comprehensive review of systems was negative  LABS:  HgA1c:   Lab Results   Component Value Date    HGBA1C 7 1 (H) 05/20/2022     BMP:   Lab Results   Component Value Date    CALCIUM 9 8 06/02/2022    K 4 3 06/02/2022    CO2 33 (H) 06/02/2022     06/02/2022    BUN 15 06/02/2022    CREATININE 1 38 (H) 06/02/2022         _____________________________________________________  PHYSICAL EXAMINATION:  Vital signs: BP (!) 181/54   Pulse 84   Ht 5' (1 524 m)   Wt 79 8 kg (176 lb)   BMI 34 37 kg/m²   General: well developed and well nourished, alert, oriented times 3 and appears comfortable  Psychiatric: Normal  HEENT: Trachea Midline, No torticollis  Cardiovascular: No discernable arrhythmia  Pulmonary: No wheezing or stridor  Abdomen: No rebound or guarding  Extremities: No peripheral edema  Skin: No masses, erythema, lacerations, fluctation, ulcerations  Neurovascular: Sensation Intact to the Median, Ulnar, Radial Nerve, Motor Intact to the Median, Ulnar, Radial Nerve and Pulses Intact    MUSCULOSKELETAL EXAMINATION:  RIGHT SIDE:  Finger:  No instability, Triggering  long finger, Crepitation long finger and intrinsic tightness, Nodules  long finger, Dupuytren's nodule in pin in line with ring finger and       _____________________________________________________  STUDIES REVIEWED:  No Studies to review      PROCEDURES PERFORMED:  Hand/upper extremity injection  Universal Protocol:  Consent: Verbal consent obtained    Risks and benefits: risks, benefits and alternatives were discussed  Consent given by: patient  Timeout called at: 8/10/2022 1:37 PM   Patient understanding: patient states understanding of the procedure being performed  Patient consent: the patient's understanding of the procedure matches consent given  Site marked: the operative site was marked  Patient identity confirmed: verbally with patient    Supporting Documentation  Indications: pain   Procedure Details  Condition:trigger finger Location: long finger -   Preparation: Patient was prepped and draped in the usual sterile fashion  Needle size: 25 G  Ultrasound guidance: no  Approach: volar  Medications administered: 1 mL lidocaine 0 5 %; 3 mg betamethasone acetate-betamethasone sodium phosphate 6 (3-3) mg/mL    Patient tolerance: patient tolerated the procedure well with no immediate complications  Dressing:  Sterile dressing applied             Scribe Attestation    I,:  Neha Sánchez am acting as a scribe while in the presence of the attending physician :       I,:  Kahlil Cordova MD personally performed the services described in this documentation    as scribed in my presence :

## 2022-08-31 ENCOUNTER — HOSPITAL ENCOUNTER (OUTPATIENT)
Dept: RADIOLOGY | Facility: HOSPITAL | Age: 72
Discharge: HOME | End: 2022-08-31
Payer: MEDICARE

## 2022-08-31 DIAGNOSIS — J44.9 CHRONIC OBSTRUCTIVE PULMONARY DISEASE, UNSPECIFIED COPD TYPE (HCC): ICD-10-CM

## 2022-08-31 PROCEDURE — 71046 X-RAY EXAM CHEST 2 VIEWS: CPT

## 2022-09-02 ENCOUNTER — DOCUMENTATION (OUTPATIENT)
Dept: PULMONOLOGY | Facility: CLINIC | Age: 72
End: 2022-09-02

## 2022-09-02 ENCOUNTER — CONSULT (OUTPATIENT)
Dept: PULMONOLOGY | Facility: CLINIC | Age: 72
End: 2022-09-02
Payer: MEDICARE

## 2022-09-02 VITALS
WEIGHT: 176 LBS | OXYGEN SATURATION: 96 % | HEART RATE: 80 BPM | SYSTOLIC BLOOD PRESSURE: 146 MMHG | DIASTOLIC BLOOD PRESSURE: 72 MMHG | HEIGHT: 61 IN | TEMPERATURE: 98.6 F | BODY MASS INDEX: 33.23 KG/M2

## 2022-09-02 DIAGNOSIS — I10 BENIGN ESSENTIAL HYPERTENSION: ICD-10-CM

## 2022-09-02 DIAGNOSIS — J44.9 CHRONIC OBSTRUCTIVE PULMONARY DISEASE, UNSPECIFIED COPD TYPE (HCC): Primary | ICD-10-CM

## 2022-09-02 DIAGNOSIS — E66.9 OBESITY, CLASS I, BMI 30-34.9: ICD-10-CM

## 2022-09-02 PROCEDURE — 99204 OFFICE O/P NEW MOD 45 MIN: CPT | Performed by: INTERNAL MEDICINE

## 2022-09-02 PROCEDURE — 94664 DEMO&/EVAL PT USE INHALER: CPT | Performed by: INTERNAL MEDICINE

## 2022-09-02 RX ORDER — TIOTROPIUM BROMIDE INHALATION SPRAY 3.12 UG/1
2 SPRAY, METERED RESPIRATORY (INHALATION) DAILY
Qty: 4 G | Refills: 5 | Status: SHIPPED | OUTPATIENT
Start: 2022-09-02 | End: 2022-10-02

## 2022-09-02 NOTE — ASSESSMENT & PLAN NOTE
Patient with exertional dyspnea and cough  Former smoker  Quit 1996  Symptoms seem consistent with COPD but need PFTs to confirm airflow obstruction    Trial of Spiriva respimat  Observed her taking first doses today  2 puffs once daily  PRN albuterol  Check PFTs    Addendum- after visit noticed eosinophil count 440 on recent CBC w/ diff    Will consider swtiching from Spiriva to ICS/LABA or trelegy or breztri depending on response to spiriva and PFT results    RTC in 3 months

## 2022-09-02 NOTE — PROGRESS NOTES
Pulmonary Outpatient Note   Oriana Hein 74 y o  female MRN: [id number]  9/2/2022      Referring Physician: Preeti Izquierdo MD    Reason for Consultation:    Chief Complaint   Patient presents with    COPD                Assessment/Plan:    1  Chronic obstructive pulmonary disease, unspecified COPD type ([facility] )  Assessment & Plan:  Patient with exertional dyspnea and cough  Former smoker  Quit 1996  Symptoms seem consistent with COPD but need PFTs to confirm airflow obstruction    Trial of Spiriva respimat  Observed her taking first doses today  2 puffs once daily  PRN albuterol  Check PFTs    Addendum- after visit noticed eosinophil count 440 on recent CBC w/ diff  Will consider swtiching from Spiriva to ICS/LABA or trelegy or breztri depending on response to spiriva and PFT results    RTC in 3 months    Orders:  -     Ambulatory Referral to Pulmonology  -     Complete PFT with post bronchodilator; Future  -     tiotropium (Spiriva Respimat) 2 5 MCG/ACT AERS inhaler; Inhale 2 puffs daily    2  Benign essential hypertension  Assessment & Plan:  /72  Medications reviewed      3  Obesity, Class I, BMI 30-34 9        Health Maintenance  Immunization History   Administered Date(s) Administered    COVID-19 MODERNA VACC 0 25 ML IM BOOSTER 01/04/2022    COVID-19 MODERNA VACC 0 5 ML IM 04/06/2021, 05/04/2021    Influenza Split High Dose Preservative Free IM 12/01/2016, 10/10/2017    Influenza, high dose seasonal 0 7 mL 11/21/2018, 11/21/2019, 11/17/2020, 11/23/2021    Pneumococcal Conjugate 13-Valent 12/01/2016    Pneumococcal Polysaccharide PPV23 01/16/2018        Return in about 3 months (around 12/2/2022)  History of Present Illness   HPI:  Oriana Hein is a 74 y o  female who has a history of hypertension, diabetes, asthma vs COPD, frequent bronchitis, obesity who presents for pulmonary evaluation for shortness of breath      First started noticing shortness of breath with exertion around 6 years ago  Has worsened  She is not short of breath at rest   She gets breathless when she walks at a quick pace  Difficulty breathing when climbing steps  Anything on an incline is tough  She coughs every day multiple times, especially in the morning  Often coughs for as long as 20 minutes  Brings up minimal sputum- usually yellow  No hemoptysis  Occasional wheezing  Has had multiple episodes of bronchitis per year her entire adult life  She was diagnosed with bronchial asthma  She reports she rarely gets antibiotics or steroids for these episodes of bronchitis  She currently has an albuterol inhaler  She used to be on advair which reportedly helped her  Advair was too expensive- over $200 a month  Uses her albuterol inhaler around twice a week  Takes loratadine for seasonal allergies  Used flonase OTC in the past but stopped due to cost     Never hospitalized for her breathing  Has never been in the Emergency department  Pulmonary history:    Childhood lung disease/premature birth: No    Family hx of lung disease: No    # of ED/hospitalizations this year: No    Autoimmune history:    Prior rheumatologic diagnosis: No    Joint swelling or pain: No    Dysphagia/Reflux: No    Leg swelling: No    Exposure history:    Exposure to pets/birds/farm animals: 2 dogs     Carpets/Mold/Roaches:No mold  Carpeting in bedroom- new    Medications, herbs, supplements: No    Social history:    Smoking: former smoker- quit 1996, smoked 32 years on average 1 PPD    Alcohol, ilicit drugs (inhalational/ IV): No    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        Review of Systems   Constitutional: Negative for chills and fever  HENT: Positive for postnasal drip and rhinorrhea  Negative for ear pain and sore throat  Eyes: Negative for pain and visual disturbance  Respiratory: Positive for cough, shortness of breath and wheezing      Cardiovascular: Negative for chest pain and palpitations  Gastrointestinal: Negative for abdominal pain and vomiting  Genitourinary: Negative for dysuria and hematuria  Musculoskeletal: Negative for arthralgias and back pain  Skin: Negative for color change and rash  Neurological: Negative for seizures and syncope  All other systems reviewed and are negative  Historical Information   Past Medical History:   Diagnosis Date    Asthma     [facility]     [facility]     [facility] mellitus [facility]     Hyperlipidemia     Hypertension     Osteoarthritis     Osteoporosis     Seasonal allergies      Past Surgical History:   Procedure Laterality Date    BILATERAL SALPINGOOPHORECTOMY Bilateral 1/3/2020    Procedure: BILATERAL SALPINGO-OOPHORECTOMY;  Surgeon: Angel Le MD;  [facility];  Service: Gynecology Oncology    CATARACT EXTRACTION Bilateral     CHOLECYSTECTOMY      [id number]  LAST ASSESSED    EYE SURGERY      cataracts removed    HYSTERECTOMY      [id number]  LAST ASSESSED    NH EXPLORATORY OF ABDOMEN N/A 1/3/2020    Procedure: LAPAROTOMY EXPLORATORY;  Surgeon: Angel Le MD;  [facility];  Service: Gynecology Oncology    TUBAL LIGATION       Family History   Problem Relation Age of Onset    Heart disease Mother     Rheumatic fever Father     Diabetes Family         MELLITUS    Diabetes Family         MELLITUS       Meds/Allergies     Current Outpatient Medications:     acetaminophen (TYLENOL) 325 mg tablet, Take 2 tablets (650 mg total) by mouth every 6 (six) hours as needed for mild pain, Disp: 30 tablet, Rfl: 0    albuterol (PROVENTIL HFA,VENTOLIN HFA) 90 mcg/act inhaler, Inhale 2 puffs every 6 (six) hours as needed for wheezing or shortness of breath, Disp: 6 7 g, Rfl: 0    alendronate (FOSAMAX) 70 mg tablet, Take 1 tablet (70 mg total) by mouth every 7 days Give with at least 8 ounces of plain water first thing in the morning    Patient should be instructed to stay upright (not to lie down) for at least 30 minutes and until after first food of the day (to reduce esophageal irritation)  , Disp: 12 tablet, Rfl: 1    aspirin (ECOTRIN LOW STRENGTH) 81 mg EC tablet, Take 81 mg by mouth daily, Disp: , Rfl:     Calcium Carbonate-Vit D-Min (CALCIUM 1200 PO), Take by mouth daily, Disp: , Rfl:     Loratadine 10 MG CAPS, Take by mouth daily, Disp: , Rfl:     losartan (COZAAR) 100 MG tablet, Take 1 tablet (100 mg total) by mouth daily, Disp: 90 tablet, Rfl: 3    metFORMIN (GLUCOPHAGE) 1000 MG tablet, Take 1 tablet (1,000 mg total) by mouth 2 (two) times a day, Disp: 180 tablet, Rfl: 3    Multiple Vitamin (MULTI-VITAMIN DAILY PO), Take by mouth daily, Disp: , Rfl:     rosuvastatin (CRESTOR) 20 MG tablet, Take 1 tablet (20 mg total) by mouth daily At bedtime, Disp: 90 tablet, Rfl: 3    tiotropium (Spiriva Respimat) 2 5 MCG/ACT AERS inhaler, Inhale 2 puffs daily, Disp: 4 g, Rfl: 5    VITAMIN D PO, Take 2,000 mg by mouth daily, Disp: , Rfl:     Coenzyme Q10 (COQ-10) 10 MG CAPS, Take by mouth daily, Disp: , Rfl:     nystatin (MYCOSTATIN) powder, Apply topically 2 (two) times a day (Patient not taking: Reported on 9/2/2022), Disp: 30 g, Rfl: 0  Allergies   Allergen Reactions    Penicillins Hives       Vitals: Blood pressure 146/72, pulse 80, temperature 98 6 °F (37 °C), temperature source Tympanic, height 5' 1" (1 549 m), weight 79 8 kg (176 lb), SpO2 96 %  Body mass index is 33 25 kg/m²  Oxygen Therapy  SpO2: 96 %      Physical Exam  Physical Exam  Vitals and nursing note reviewed  Constitutional:       General: She is not in acute distress  Appearance: She is well-developed  HENT:      Head: Normocephalic and atraumatic  Mouth/Throat:      Mouth: Mucous membranes are moist       Pharynx: No oropharyngeal exudate  Eyes:      Conjunctiva/sclera: Conjunctivae normal    Cardiovascular:      Rate and Rhythm: Normal rate and regular rhythm        Heart sounds: No murmur heard  Pulmonary:      Effort: Pulmonary effort is normal  No respiratory distress  Breath sounds: Normal breath sounds  Abdominal:      Palpations: Abdomen is soft  Tenderness: There is no abdominal tenderness  Musculoskeletal:      Cervical back: Neck supple  Skin:     General: Skin is warm and dry  Neurological:      General: No focal deficit present  Mental Status: She is alert and oriented to person, place, and time  Psychiatric:         Mood and Affect: Mood normal          Behavior: Behavior normal          Labs: I have personally reviewed pertinent lab results  ABG: No results found for: PHART, HLV7VUP, PO2ART, QYY9OES, O7IWMEGB, BEART, SOURCE,   BNP: No results found for: BNP,   CBC:  Lab Results   Component Value Date    WBC 7 99 06/02/2022    HGB 12 0 06/02/2022    HCT 36 3 06/02/2022    MCV 88 06/02/2022     06/02/2022    EOSPCT 6 06/02/2022    EOSABS 0 44 06/02/2022    NEUTOPHILPCT 55 06/02/2022    LYMPHOPCT 31 06/02/2022   ,   CMP:   Lab Results   Component Value Date    SODIUM 141 06/02/2022    K 4 3 06/02/2022     06/02/2022    CO2 33 (H) 06/02/2022    BUN 15 06/02/2022    CREATININE 1 38 (H) 06/02/2022    CALCIUM 9 8 06/02/2022    AST 19 06/02/2022    ALT 20 06/02/2022    ALKPHOS 62 06/02/2022    EGFR 37 06/02/2022   ,   PT/INR: No results found for: PT, INR,   Troponin: No results found for: TROPONINI      Imaging and other studies: I have personally reviewed pertinent reports  and I have personally reviewed pertinent films in PACS    CXR- no acute disease 8/31    Pulmonary function testing:   Pulmonary Functions Testing Results:    No results found for: FEV1, FVC, [id number], TLC, DLCO        EKG, Pathology, and Other Studies: I have personally reviewed pertinent reports  and I have personally reviewed pertinent films [facility]

## 2022-09-02 NOTE — PROGRESS NOTES
Given 3 samples of inhailer Spriva 2 5 mcg  (2) lot # [id number]  Exp Nov 22  And one lot# [id number] exp nov 22

## 2022-09-15 ENCOUNTER — OFFICE VISIT (OUTPATIENT)
Dept: URGENT CARE | Facility: CLINIC | Age: 72
End: 2022-09-15
Payer: MEDICARE

## 2022-09-15 ENCOUNTER — APPOINTMENT (OUTPATIENT)
Dept: RADIOLOGY | Facility: CLINIC | Age: 72
End: 2022-09-15
Payer: MEDICARE

## 2022-09-15 VITALS
TEMPERATURE: 98 F | OXYGEN SATURATION: 95 % | RESPIRATION RATE: 22 BRPM | WEIGHT: 177 LBS | HEIGHT: 61 IN | BODY MASS INDEX: 33.42 KG/M2 | HEART RATE: 88 BPM

## 2022-09-15 DIAGNOSIS — J44.1 COPD WITH ACUTE EXACERBATION (HCC): Primary | ICD-10-CM

## 2022-09-15 DIAGNOSIS — Z87.09 HISTORY OF COPD: ICD-10-CM

## 2022-09-15 DIAGNOSIS — R09.81 NASAL CONGESTION: ICD-10-CM

## 2022-09-15 DIAGNOSIS — R05.1 ACUTE COUGH: ICD-10-CM

## 2022-09-15 LAB
SARS-COV-2 AG UPPER RESP QL IA: NEGATIVE
VALID CONTROL: NORMAL

## 2022-09-15 PROCEDURE — 99213 OFFICE O/P EST LOW 20 MIN: CPT | Performed by: NURSE PRACTITIONER

## 2022-09-15 PROCEDURE — 71046 X-RAY EXAM CHEST 2 VIEWS: CPT

## 2022-09-15 PROCEDURE — G0463 HOSPITAL OUTPT CLINIC VISIT: HCPCS | Performed by: NURSE PRACTITIONER

## 2022-09-15 PROCEDURE — 87811 SARS-COV-2 COVID19 W/OPTIC: CPT | Performed by: NURSE PRACTITIONER

## 2022-09-15 RX ORDER — PREDNISONE 10 MG/1
TABLET ORAL
Qty: 24 TABLET | Refills: 0 | Status: SHIPPED | OUTPATIENT
Start: 2022-09-15

## 2022-09-15 NOTE — PROGRESS NOTES
[address]        NAME: Suresh Denise is a 74 y o  female  : 1950    MRN: [id number]  DATE: September 15, 2022  TIME: 1:15 PM    Assessment and Plan   COPD [facility] exacerbation ([facility] ) [J44 1]  1  COPD with acute exacerbation (HCC)  predniSONE 10 mg tablet   2  Acute cough  Poct Covid 19 Rapid Antigen Test    XR chest pa & lateral    predniSONE 10 mg tablet   3  Nasal congestion  predniSONE 10 mg tablet   4  History of COPD  Poct Covid 19 Rapid Antigen Test    XR chest pa & lateral    predniSONE 10 mg tablet         Patient Instructions       Follow up with PCP in 3-5 days  Proceed to  ER if symptoms worsen  You are to continue the spiriva  Take the prednisone as prescribed  Continue claritin  Take plain robitussin for the cough  Drink water  You will need to monitor your blood sugars due to the use of the steroids  You may use saline nasal spray for nasal congestion  Your xray was preliminarily read by your provider  A radiologist will read the xray and you will be notified if it is abnormal     You are to follow up with your PCP and pulmonologist  Go to the ED if symptoms worsen  Your covid is negative however you have only had 2 days of symptoms  You may want to recheck your covid in 2 days         Chief Complaint     Chief Complaint   Patient presents with    Nasal Congestion     X 2 days     Cough     X 2 days          History of Present Illness       This is a 74year old female who states has had a non productive cough, sneezing, nasal congestion x 2 days  She has not checked herself for covid  She states she is covid vaccinated and denies any risk factors  She states she uses spiriva and has not taken anything for her symptoms  She denies contacting her physician  Denies fevers, chills, n/v/d  Review of Systems   Review of Systems   Constitutional: Negative  HENT: Positive for congestion and sneezing  Eyes: Negative  Respiratory: Positive for cough  Cardiovascular: Negative  Gastrointestinal: Negative  Endocrine: Negative  Genitourinary: Negative  Musculoskeletal: Negative  Skin: Negative  Allergic/Immunologic: Negative  Neurological: Negative  Hematological: Negative  Psychiatric/Behavioral: Negative  Current Medications       Current Outpatient Medications:     predniSONE 10 mg tablet, Take 5 tabs po x 2 days; 4 tabs po x 2 days; 3 tabs po x 1 day; 2 tabs po x 1 day  1 tab po x 1 day , Disp: 24 tablet, Rfl: 0    acetaminophen (TYLENOL) 325 mg tablet, Take 2 tablets (650 mg total) by mouth every 6 (six) hours as needed for mild pain, Disp: 30 tablet, Rfl: 0    albuterol (PROVENTIL HFA,VENTOLIN HFA) 90 mcg/act inhaler, Inhale 2 puffs every 6 (six) hours as needed for wheezing or shortness of breath, Disp: 6 7 g, Rfl: 0    alendronate (FOSAMAX) 70 mg tablet, Take 1 tablet (70 mg total) by mouth every 7 days Give with at least 8 ounces of plain water first thing in the morning  Patient should be instructed to stay upright (not to lie down) for at least 30 minutes and until after first food of the day (to reduce esophageal irritation)  , Disp: 12 tablet, Rfl: 1    aspirin (ECOTRIN LOW STRENGTH) 81 mg EC tablet, Take 81 mg by mouth daily, Disp: , Rfl:     Calcium Carbonate-Vit D-Min (CALCIUM 1200 PO), Take by mouth daily, Disp: , Rfl:     Coenzyme Q10 (COQ-10) 10 MG CAPS, Take by mouth daily, Disp: , Rfl:     Loratadine 10 MG CAPS, Take by mouth daily, Disp: , Rfl:     losartan (COZAAR) 100 MG tablet, Take 1 tablet (100 mg total) by mouth daily, Disp: 90 tablet, Rfl: 3    metFORMIN (GLUCOPHAGE) 1000 MG tablet, Take 1 tablet (1,000 mg total) by mouth 2 (two) times a day, Disp: 180 tablet, Rfl: 3    Multiple Vitamin (MULTI-VITAMIN DAILY PO), Take by mouth daily, Disp: , Rfl:     nystatin (MYCOSTATIN) powder, Apply topically 2 (two) times a day (Patient not taking: Reported on 9/2/2022), Disp: 30 g, Rfl: 0   rosuvastatin (CRESTOR) 20 MG tablet, Take 1 tablet (20 mg total) by mouth daily At bedtime, Disp: 90 tablet, Rfl: 3    tiotropium (Spiriva Respimat) 2 5 MCG/ACT AERS inhaler, Inhale 2 puffs daily, Disp: 4 g, Rfl: 5    VITAMIN D PO, Take 2,000 mg by mouth daily, Disp: , Rfl:     Current Allergies     Allergies as of 09/15/2022 - Reviewed 09/15/2022   Allergen Reaction Noted    Penicillins Hives 01/20/2016            The following portions of the patient's history were reviewed and updated as appropriate: allergies, current medications, past family history, past medical history, past social history, past surgical history and problem list      Past Medical History:   Diagnosis Date    Asthma     [facility]     Diabetes ([facility] )     Diabetes mellitus ([facility] )     Hyperlipidemia     Hypertension     Osteoarthritis     Osteoporosis     Seasonal allergies        Past Surgical History:   Procedure Laterality Date    BILATERAL SALPINGOOPHORECTOMY Bilateral 1/3/2020    Procedure: BILATERAL SALPINGO-OOPHORECTOMY;  Surgeon: Gonzalez Jackson MD;  [facility];  Service: Gynecology Oncology    CATARACT EXTRACTION Bilateral     CHOLECYSTECTOMY      [id number]  LAST ASSESSED    EYE SURGERY      cataracts removed    HYSTERECTOMY      [id number]  LAST ASSESSED    CT EXPLORATORY OF ABDOMEN N/A 1/3/2020    Procedure: LAPAROTOMY EXPLORATORY;  Surgeon: Gonzalez Jackson MD;  [facility];  Service: Gynecology Oncology    TUBAL LIGATION         Family History   Problem Relation Age of Onset    Heart disease Mother     Rheumatic fever Father     Diabetes Family         MELLITUS    Diabetes Family         MELLITUS         Medications have been verified  Objective   Pulse 88   Temp 98 °F (36 7 °C)   Resp 22   Ht 5' 1" (1 549 m)   Wt 80 3 kg (177 lb)   SpO2 95%   BMI 33 44 kg/m²   No LMP recorded  Patient has had a hysterectomy         Physical Exam Physical Exam  Vitals and nursing note reviewed  Constitutional:       General: She is not in acute distress  Appearance: Normal appearance  She is obese  She is not ill-appearing, toxic-appearing or diaphoretic  HENT:      Head: Normocephalic and atraumatic  Right Ear: Tympanic membrane and ear canal normal       Left Ear: Tympanic membrane and ear canal normal       Ears:      Comments: Has bilateral hearing aids      Nose: Congestion present  Eyes:      Extraocular Movements: Extraocular movements intact  Cardiovascular:      Rate and Rhythm: Normal rate and regular rhythm  Pulses: Normal pulses  Heart sounds: Normal heart sounds  Pulmonary:      Effort: Pulmonary effort is normal  No respiratory distress  Breath sounds: Normal breath sounds  No stridor  No wheezing, rhonchi or rales  Comments: Lungs CTA however has a tight wheezing cough  Chest:      Chest wall: No tenderness  Abdominal:      Palpations: Abdomen is soft  Musculoskeletal:         General: Normal range of motion  Cervical back: Normal range of motion and neck supple  Skin:     General: Skin is warm and dry  Capillary Refill: Capillary refill takes less than 2 seconds  Neurological:      General: No focal deficit present  Mental Status: She is alert and oriented to person, place, and time  Psychiatric:         Mood and Affect: Mood normal          Behavior: Behavior normal          Thought Content:  Thought content normal          Judgment: Judgment normal          Preliminary reading CXR  No acute process seen  Waiting on rad read

## 2022-09-15 NOTE — PATIENT INSTRUCTIONS
You are to continue the spiriva  Take the prednisone as prescribed  Continue claritin  Take plain robitussin for the cough  Drink water  You will need to monitor your blood sugars due to the use of the steroids  You may use saline nasal spray for nasal congestion  Your xray was preliminarily read by your provider  A radiologist will read the xray and you will be notified if it is abnormal     You are to follow up with your PCP and pulmonologist  Go to the ED if symptoms worsen  Your covid is negative however you have only had 2 days of symptoms    You may want to recheck your covid in 2 days

## 2022-10-06 LAB
LEFT EYE DIABETIC RETINOPATHY: NORMAL
RIGHT EYE DIABETIC RETINOPATHY: NORMAL

## 2022-11-02 DIAGNOSIS — M81.0 POSTMENOPAUSAL OSTEOPOROSIS: ICD-10-CM

## 2022-11-02 RX ORDER — ALENDRONATE SODIUM 70 MG/1
70 TABLET ORAL
Qty: 12 TABLET | Refills: 1 | Status: SHIPPED | OUTPATIENT
Start: 2022-11-02

## 2022-11-11 ENCOUNTER — TELEPHONE (OUTPATIENT)
Dept: GASTROENTEROLOGY | Facility: CLINIC | Age: 72
End: 2022-11-11

## 2022-11-14 ENCOUNTER — HOSPITAL ENCOUNTER (OUTPATIENT)
Dept: PULMONOLOGY | Facility: HOSPITAL | Age: 72
Discharge: HOME | End: 2022-11-14
Attending: INTERNAL MEDICINE

## 2022-11-14 DIAGNOSIS — J44.9 CHRONIC OBSTRUCTIVE PULMONARY DISEASE, UNSPECIFIED COPD TYPE (HCC): ICD-10-CM

## 2022-11-14 RX ORDER — ALBUTEROL SULFATE 2.5 MG/3ML
2.5 SOLUTION RESPIRATORY (INHALATION) ONCE AS NEEDED
Status: COMPLETED | OUTPATIENT
Start: 2022-11-14 | End: 2022-11-14

## 2022-11-14 RX ADMIN — ALBUTEROL SULFATE 2.5 MG: 2.5 SOLUTION RESPIRATORY (INHALATION) at 08:18

## 2022-11-17 ENCOUNTER — APPOINTMENT (OUTPATIENT)
Dept: LAB | Facility: CLINIC | Age: 72
End: 2022-11-17

## 2022-11-17 ENCOUNTER — RA CDI HCC (OUTPATIENT)
Dept: OTHER | Facility: HOSPITAL | Age: 72
End: 2022-11-17

## 2022-11-17 ENCOUNTER — TELEPHONE (OUTPATIENT)
Dept: PULMONOLOGY | Facility: CLINIC | Age: 72
End: 2022-11-17

## 2022-11-17 DIAGNOSIS — E11.22 TYPE 2 DIABETES MELLITUS WITH STAGE 3A CHRONIC KIDNEY DISEASE, UNSPECIFIED WHETHER LONG TERM INSULIN USE (HCC): ICD-10-CM

## 2022-11-17 DIAGNOSIS — J44.9 CHRONIC OBSTRUCTIVE PULMONARY DISEASE, UNSPECIFIED COPD TYPE (HCC): Primary | ICD-10-CM

## 2022-11-17 DIAGNOSIS — M81.0 AGE-RELATED OSTEOPOROSIS WITHOUT CURRENT PATHOLOGICAL FRACTURE: ICD-10-CM

## 2022-11-17 DIAGNOSIS — N18.31 TYPE 2 DIABETES MELLITUS WITH STAGE 3A CHRONIC KIDNEY DISEASE, UNSPECIFIED WHETHER LONG TERM INSULIN USE (HCC): ICD-10-CM

## 2022-11-17 LAB
25(OH)D3 SERPL-MCNC: 71.3 NG/ML (ref 30–100)
ANION GAP SERPL CALCULATED.3IONS-SCNC: 10 MMOL/L (ref 4–13)
BUN SERPL-MCNC: 26 MG/DL (ref 5–25)
CALCIUM SERPL-MCNC: 10.1 MG/DL (ref 8.3–10.1)
CHLORIDE SERPL-SCNC: 106 MMOL/L (ref 96–108)
CO2 SERPL-SCNC: 22 MMOL/L (ref 21–32)
CREAT SERPL-MCNC: 1.58 MG/DL (ref 0.6–1.3)
EST. AVERAGE GLUCOSE BLD GHB EST-MCNC: 151 MG/DL
GFR SERPL CREATININE-BSD FRML MDRD: 32 ML/MIN/1.73SQ M
GLUCOSE P FAST SERPL-MCNC: 123 MG/DL (ref 65–99)
HBA1C MFR BLD: 6.9 %
POTASSIUM SERPL-SCNC: 4.5 MMOL/L (ref 3.5–5.3)
SODIUM SERPL-SCNC: 138 MMOL/L (ref 135–147)

## 2022-11-17 RX ORDER — FLUTICASONE FUROATE, UMECLIDINIUM BROMIDE AND VILANTEROL TRIFENATATE 100; 62.5; 25 UG/1; UG/1; UG/1
1 POWDER RESPIRATORY (INHALATION) DAILY
Qty: 180 BLISTER | Refills: 5 | Status: SHIPPED | OUTPATIENT
Start: 2022-11-17 | End: 2023-02-15

## 2022-11-17 NOTE — TELEPHONE ENCOUNTER
Patient called, she said she absolutely cannot afford the medication that was sent in for her today  Her pharmacy told her the Trelegy inhaler would be $1,900  She isn't sure what you would like to do, patient has Medicare A&B   Please advise

## 2022-11-17 NOTE — PROGRESS NOTES
[id number]  [facility]  coding opportunities          Chart Reviewed number of suggestions sent to Provider: 1     Patients Insurance     Medicare Insurance: [employer]

## 2022-11-23 ENCOUNTER — OFFICE VISIT (OUTPATIENT)
Dept: OBGYN CLINIC | Facility: HOSPITAL | Age: 72
End: 2022-11-23

## 2022-11-23 VITALS
SYSTOLIC BLOOD PRESSURE: 171 MMHG | HEART RATE: 78 BPM | HEIGHT: 61 IN | WEIGHT: 178.8 LBS | DIASTOLIC BLOOD PRESSURE: 76 MMHG | BODY MASS INDEX: 33.76 KG/M2

## 2022-11-23 DIAGNOSIS — M72.0 DUPUYTREN'S CONTRACTURE: Primary | ICD-10-CM

## 2022-11-23 DIAGNOSIS — M65.331 TRIGGER FINGER, RIGHT MIDDLE FINGER: ICD-10-CM

## 2022-11-23 RX ORDER — BUPIVACAINE HYDROCHLORIDE 2.5 MG/ML
1 INJECTION, SOLUTION INFILTRATION; PERINEURAL
Status: COMPLETED | OUTPATIENT
Start: 2022-11-23 | End: 2022-11-23

## 2022-11-23 RX ORDER — BETAMETHASONE SODIUM PHOSPHATE AND BETAMETHASONE ACETATE 3; 3 MG/ML; MG/ML
6 INJECTION, SUSPENSION INTRA-ARTICULAR; INTRALESIONAL; INTRAMUSCULAR; SOFT TISSUE
Status: COMPLETED | OUTPATIENT
Start: 2022-11-23 | End: 2022-11-23

## 2022-11-23 RX ADMIN — BUPIVACAINE HYDROCHLORIDE 1 ML: 2.5 INJECTION, SOLUTION INFILTRATION; PERINEURAL at 13:20

## 2022-11-23 RX ADMIN — BETAMETHASONE SODIUM PHOSPHATE AND BETAMETHASONE ACETATE 6 MG: 3; 3 INJECTION, SUSPENSION INTRA-ARTICULAR; INTRALESIONAL; INTRAMUSCULAR; SOFT TISSUE at 13:20

## 2022-11-23 NOTE — PROGRESS NOTES
ASSESSMENT/PLAN:    Assessment:   Trigger Finger  right  long finger and Dupuytrens Disease of the  right  ring finger    Plan:   Patient will proceed with her second right long finger steroid injection today  She has no formal restrictions  Surgical intervention was discussed with the patient if this injection does not provide her relief  Follow Up:  6  week(s)    To Do Next Visit:       General Discussions:     Trigger FInger: The anatomy and physiology of trigger finger was discussed with the patient today in the office  Edema and increased contact pressure within the flexor tendons at the A1 pulley can cause pain, crepitation, and limitation of function  Treatment options include resting MP blocking splints to decrease edema, oral anti-inflammatory medications, home or formal therapy exercises, up to 2 steroid injections within the tendon sheath, or surgical release  While majority of patients do respond to conservative treatment, up to 20% may require surgical release  Operative Discussions:       _____________________________________________________  CHIEF COMPLAINT:  Chief Complaint   Patient presents with   • Right Hand - Follow-up     Trigger Finger  right  long finger and Dupuytrens Disease of the  right  ring finger         SUBJECTIVE:  Uma Conley is a 74 y o  female who presents for follow up regarding Trigger Finger  right  long finger and Dupuytrens Disease of the  right  ring finger  Since last visit, Uma Conley has tried steroid injections with only partial relief  Today there is Pain  Moderate  Intermittant  Sharp, Catching and Locking to the right long finger  Patient states that this is worse in the morning     Radiation: Yes to the  long finger  Associated symptoms: No Complaints    PAST MEDICAL HISTORY:  Past Medical History:   Diagnosis Date   • Asthma    • COPD [facility]ch[facility]    • Diabetes ([facility] )    • Diabetes mellitus ([facility] )    • Hyperlipidemia • Hypertension    • Osteoarthritis    • Osteoporosis    • Seasonal allergies        PAST SURGICAL HISTORY:  Past Surgical History:   Procedure Laterality Date   • BILATERAL SALPINGOOPHORECTOMY Bilateral 1/3/2020    Procedure: BILATERAL SALPINGO-OOPHORECTOMY;  Surgeon: Roxana Grullon MD;  [facility];  Service: Gynecology Oncology   • CATARACT EXTRACTION Bilateral    • CHOLECYSTECTOMY      [id number]  LAST ASSESSED   • EYE SURGERY      cataracts removed   • HYSTERECTOMY      [id number]  LAST ASSESSED   • OK EXPLORATORY OF ABDOMEN N/A 1/3/2020    Procedure: LAPAROTOMY EXPLORATORY;  Surgeon: Roxana Grullon MD;  [facility];  Service: Gynecology Oncology   • TUBAL LIGATION         FAMILY HISTORY:  Family History   Problem Relation Age of Onset   • Heart disease Mother    • Rheumatic fever Father    • Diabetes Family         MELLITUS   • Diabetes Family         MELLITUS       SOCIAL HISTORY:  Social History     Tobacco Use   • Smoking status: Former     Packs/day:  00     Types: Cigarettes     Start date: 1964     Quit date:      Years since quittin 9   • Smokeless tobacco: Never   Vaping Use   • Vaping Use: Never used   Substance Use Topics   • Alcohol use: Not Currently   • Drug use: No       MEDICATIONS:    Current Outpatient Medications:   •  acetaminophen (TYLENOL) 325 mg tablet, Take 2 tablets (650 mg total) by mouth every 6 (six) hours as needed for mild pain, Disp: 30 tablet, Rfl: 0  •  albuterol (PROVENTIL HFA,VENTOLIN HFA) 90 mcg/act inhaler, Inhale 2 puffs every 6 (six) hours as needed for wheezing or shortness of breath, Disp: 6 7 g, Rfl: 0  •  alendronate (FOSAMAX) 70 mg tablet, Take 1 tablet (70 mg total) by mouth every 7 days Give with at least 8 ounces of plain water first thing in the morning  Patient should be instructed to stay upright (not to lie down) for at least 30 minutes and until after first food of the day (to reduce esophageal irritation)  , Disp: 12 tablet, Rfl: 1  •  aspirin (ECOTRIN LOW STRENGTH) 81 mg EC tablet, Take 81 mg by mouth daily, Disp: , Rfl:   •  Calcium Carbonate-Vit D-Min (CALCIUM 1200 PO), Take by mouth daily, Disp: , Rfl:   •  Coenzyme Q10 (COQ-10) 10 MG CAPS, Take by mouth daily, Disp: , Rfl:   •  fluticasone-umeclidinium-vilanterol (Trelegy Ellipta) 100-62 5-25 mcg/actuation inhaler, Inhale 1 puff daily Rinse mouth after use , Disp: 180 blister, Rfl: 5  •  Loratadine 10 MG CAPS, Take by mouth daily, Disp: , Rfl:   •  losartan (COZAAR) 100 MG tablet, Take 1 tablet (100 mg total) by mouth daily, Disp: 90 tablet, Rfl: 3  •  metFORMIN (GLUCOPHAGE) 1000 MG tablet, Take 1 tablet (1,000 mg total) by mouth 2 (two) times a day, Disp: 180 tablet, Rfl: 3  •  Multiple Vitamin (MULTI-VITAMIN DAILY PO), Take by mouth daily, Disp: , Rfl:   •  predniSONE 10 mg tablet, Take 5 tabs po x 2 days; 4 tabs po x 2 days; 3 tabs po x 1 day; 2 tabs po x 1 day  1 tab po x 1 day , Disp: 24 tablet, Rfl: 0  •  rosuvastatin (CRESTOR) 20 MG tablet, Take 1 tablet (20 mg total) by mouth daily At bedtime, Disp: 90 tablet, Rfl: 3  •  VITAMIN D PO, Take 2,000 mg by mouth daily, Disp: , Rfl:   •  nystatin (MYCOSTATIN) powder, Apply topically 2 (two) times a day, Disp: 30 g, Rfl: 0    ALLERGIES:  Allergies   Allergen Reactions   • Penicillins Hives       REVIEW OF SYSTEMS:  Pertinent items are noted in HPI      LABS:  HgA1c:   Lab Results   Component Value Date    HGBA1C 6 9 (H) 11/17/2022     BMP:   Lab Results   Component Value Date    CALCIUM 10 1 11/17/2022    K 4 5 11/17/2022    CO2 22 11/17/2022     11/17/2022    BUN 26 (H) 11/17/2022    CREATININE 1 58 (H) 11/17/2022           _____________________________________________________  PHYSICAL EXAMINATION:  Vital signs: BP (!) 171/76   Pulse 78   Ht 5' 1" (1 549 m)   Wt 81 1 kg (178 lb 12 8 oz)   BMI 33 78 kg/m²   General: well developed and well nourished, alert, oriented times 3 and appears comfortable  Psychiatric: Normal  HEENT: Trachea Midline, No torticollis  Cardiovascular: No discernable arrhythmia  Pulmonary: No wheezing or stridor  Abdomen: No rebound or guarding  Extremities: No peripheral edema  Skin: No masses, erythema, lacerations, fluctation, ulcerations  Neurovascular: Sensation Intact to the Median, Ulnar, Radial Nerve, Motor Intact to the Median, Ulnar, Radial Nerve and Pulses Intact    MUSCULOSKELETAL EXAMINATION:  RIGHT SIDE:  Finger:  Tenderness at A1 pulley of long finger, Crepitation long finger and Nodules  long finger    _____________________________________________________  STUDIES REVIEWED:  No Studies to review      PROCEDURES PERFORMED:  Hand/upper extremity injection: R long A1  Universal Protocol:  Consent: Verbal consent obtained    Risks and benefits: risks, benefits and alternatives were discussed  Consent given by: patient  Site marked: the operative site was marked  Supporting Documentation  Indications: tendon swelling   Procedure Details  Condition:trigger finger Location: long finger - R long A1   Medications administered: 6 mg betamethasone acetate-betamethasone sodium phosphate 6 (3-3) mg/mL; 1 mL bupivacaine 0 25 %    Patient tolerance: patient tolerated the procedure well with no immediate complications  Dressing:  Sterile dressing applied             Scribe Attestation    I,:  Genoveva Díaz am acting as a scribe while in the presence of the attending physician :       I,:  Db Espinoza MD personally performed the services described in this documentation    as scribed in my presence :

## 2022-11-25 ENCOUNTER — OFFICE VISIT (OUTPATIENT)
Dept: FAMILY MEDICINE CLINIC | Facility: CLINIC | Age: 72
End: 2022-11-25

## 2022-11-25 VITALS
SYSTOLIC BLOOD PRESSURE: 170 MMHG | HEART RATE: 67 BPM | OXYGEN SATURATION: 96 % | DIASTOLIC BLOOD PRESSURE: 80 MMHG | TEMPERATURE: 98.3 F | BODY MASS INDEX: 33.61 KG/M2 | RESPIRATION RATE: 18 BRPM | HEIGHT: 61 IN | WEIGHT: 178 LBS

## 2022-11-25 DIAGNOSIS — I10 BENIGN ESSENTIAL HYPERTENSION: ICD-10-CM

## 2022-11-25 DIAGNOSIS — N18.31 TYPE 2 DIABETES MELLITUS WITH STAGE 3A CHRONIC KIDNEY DISEASE, UNSPECIFIED WHETHER LONG TERM INSULIN USE (HCC): ICD-10-CM

## 2022-11-25 DIAGNOSIS — Z23 NEED FOR INFLUENZA VACCINATION: Primary | ICD-10-CM

## 2022-11-25 DIAGNOSIS — M46.1 SACROILIITIS, NOT ELSEWHERE CLASSIFIED (HCC): ICD-10-CM

## 2022-11-25 DIAGNOSIS — N18.30 STAGE 3 CHRONIC KIDNEY DISEASE, UNSPECIFIED WHETHER STAGE 3A OR 3B CKD (HCC): ICD-10-CM

## 2022-11-25 DIAGNOSIS — E11.22 TYPE 2 DIABETES MELLITUS WITH STAGE 3A CHRONIC KIDNEY DISEASE, UNSPECIFIED WHETHER LONG TERM INSULIN USE (HCC): ICD-10-CM

## 2022-11-25 DIAGNOSIS — E78.2 MIXED HYPERLIPIDEMIA: ICD-10-CM

## 2022-11-25 RX ORDER — LOSARTAN POTASSIUM 100 MG/1
100 TABLET ORAL DAILY
Qty: 90 TABLET | Refills: 3 | Status: SHIPPED | OUTPATIENT
Start: 2022-11-25

## 2022-11-25 RX ORDER — ROSUVASTATIN CALCIUM 20 MG/1
20 TABLET, COATED ORAL DAILY
Qty: 90 TABLET | Refills: 3 | Status: SHIPPED | OUTPATIENT
Start: 2022-11-25

## 2022-11-25 NOTE — PATIENT INSTRUCTIONS
Please check your blood pressures at home twice a day and send me the numbers in 2 weeks  You can bring it to the office just drop it and tell that it is for me  Please do your blood work for kidney function in 2 weeks

## 2022-11-25 NOTE — PROGRESS NOTES
Diabetic Foot Exam    Patient's shoes and socks removed  Right Foot/Ankle   Right Foot Inspection  Skin Exam: skin normal and skin intact  No dry skin, no warmth, no callus, no erythema, no maceration, no abnormal color, no pre-ulcer, no ulcer and no callus  Sensory   Vibration: intact  Monofilament testing: intact    Vascular  The right DP pulse is 1+  Left Foot/Ankle  Left Foot Inspection  Skin Exam: skin normal and skin intact  No dry skin, no warmth, no erythema, no maceration, normal color, no pre-ulcer, no ulcer and no callus  Sensory   Vibration: intact  Monofilament testing: intact    Vascular  The left DP pulse is 1+       Assign Risk Category  Risk: 0

## 2022-12-05 DIAGNOSIS — J45.20 MILD INTERMITTENT ASTHMA WITHOUT COMPLICATION: Primary | ICD-10-CM

## 2022-12-05 DIAGNOSIS — J44.9 CHRONIC OBSTRUCTIVE PULMONARY DISEASE, UNSPECIFIED COPD TYPE (HCC): ICD-10-CM

## 2022-12-05 RX ORDER — UMECLIDINIUM 62.5 UG/1
1 AEROSOL, POWDER ORAL DAILY
Qty: 30 BLISTER | Refills: 5 | Status: SHIPPED | OUTPATIENT
Start: 2022-12-05 | End: 2022-12-05

## 2022-12-05 RX ORDER — FLUTICASONE FUROATE, UMECLIDINIUM BROMIDE AND VILANTEROL TRIFENATATE 100; 62.5; 25 UG/1; UG/1; UG/1
1 POWDER RESPIRATORY (INHALATION) DAILY
Qty: 180 BLISTER | Refills: 5 | Status: SHIPPED | OUTPATIENT
Start: 2022-12-05 | End: 2023-05-08

## 2022-12-05 NOTE — ASSESSMENT & PLAN NOTE
Overall hemoglobin A1c is in acceptable range  Continue the same therapy with metformin    Lab Results   Component Value Date    HGBA1C 6 9 (H) 11/17/2022

## 2022-12-05 NOTE — PROGRESS NOTES
Assessment/Plan:    Type 2 diabetes mellitus with stage 3 chronic kidney disease (HCC)  Overall hemoglobin A1c is in acceptable range  Continue the same therapy with metformin  Lab Results   Component Value Date    HGBA1C 6 9 (H) 11/17/2022       Benign essential hypertension  Blood pressure is currently uncontrolled  She will continue to monitor at home  Will continue with the same management as for now  She will bring your blood pressure readings in few weeks  Hyperlipidemia  Continue with rosuvastatin 20 mg daily  Diagnoses and all orders for this visit:    Need for influenza vaccination  -     influenza vaccine, high-dose, PF 0 7 mL (FLUZONE HIGH-DOSE)    Benign essential hypertension  -     losartan (COZAAR) 100 MG tablet; Take 1 tablet (100 mg total) by mouth daily    Mixed hyperlipidemia  -     rosuvastatin (CRESTOR) 20 MG tablet; Take 1 tablet (20 mg total) by mouth daily At bedtime    Type 2 diabetes mellitus with stage 3a chronic kidney disease, unspecified whether long term insulin use (HCC)    Stage 3 chronic kidney disease, unspecified whether stage 3a or 3b CKD ([facility])  -     Basic metabolic panel; Future    Sacroiliitis, not elsewhere classified ([facility] )          Subjective:      Patient ID: Elder Burris is a 74 y o  female  Patient came today for follow-up on her chronic problems including hypertension, hyperlipidemia, type 2 diabetes  The following portions of the patient's history were reviewed and updated as appropriate: allergies, current medications, past family history, past medical history, past social history, past surgical history, and problem list     Review of Systems   Constitutional: Negative for chills and fever  Respiratory: Negative for chest tightness, shortness of breath and wheezing  Cardiovascular: Negative for chest pain and leg swelling  Gastrointestinal: Negative for abdominal pain  Musculoskeletal: Positive for arthralgias   Negative for gait problem and joint swelling  Objective:      /80 (BP Location: Left arm, Cuff Size: Standard)   Pulse 67   Temp 98 3 °F (36 8 °C) (Tympanic)   Resp 18   Ht 5' 1" (1 549 m)   Wt 80 7 kg (178 lb)   SpO2 96%   BMI 33 63 kg/m²     Allergies   Allergen Reactions   • Penicillins Hives          Current Outpatient Medications:   •  acetaminophen (TYLENOL) 325 mg tablet, Take 2 tablets (650 mg total) by mouth every 6 (six) hours as needed for mild pain, Disp: 30 tablet, Rfl: 0  •  albuterol (PROVENTIL HFA,VENTOLIN HFA) 90 mcg/act inhaler, Inhale 2 puffs every 6 (six) hours as needed for wheezing or shortness of breath, Disp: 6 7 g, Rfl: 0  •  alendronate (FOSAMAX) 70 mg tablet, Take 1 tablet (70 mg total) by mouth every 7 days Give with at least 8 ounces of plain water first thing in the morning  Patient should be instructed to stay upright (not to lie down) for at least 30 minutes and until after first food of the day (to reduce esophageal irritation)  , Disp: 12 tablet, Rfl: 1  •  aspirin (ECOTRIN LOW STRENGTH) 81 mg EC tablet, Take 81 mg by mouth daily, Disp: , Rfl:   •  Calcium Carbonate-Vit D-Min (CALCIUM 1200 PO), Take by mouth daily, Disp: , Rfl:   •  Coenzyme Q10 (COQ-10) 10 MG CAPS, Take by mouth daily, Disp: , Rfl:   •  Loratadine 10 MG CAPS, Take by mouth daily, Disp: , Rfl:   •  losartan (COZAAR) 100 MG tablet, Take 1 tablet (100 mg total) by mouth daily, Disp: 90 tablet, Rfl: 3  •  metFORMIN (GLUCOPHAGE) 1000 MG tablet, Take 1 tablet (1,000 mg total) by mouth 2 (two) times a day, Disp: 180 tablet, Rfl: 3  •  Multiple Vitamin (MULTI-VITAMIN DAILY PO), Take by mouth daily, Disp: , Rfl:   •  nystatin (MYCOSTATIN) powder, Apply topically 2 (two) times a day, Disp: 30 g, Rfl: 0  •  rosuvastatin (CRESTOR) 20 MG tablet, Take 1 tablet (20 mg total) by mouth daily At bedtime, Disp: 90 tablet, Rfl: 3  •  VITAMIN D PO, Take 2,000 mg by mouth daily, Disp: , Rfl:   •  fluticasone-umeclidinium-vilanterol (Fuentes Araujo) 100-62 5-25 mcg/actuation inhaler, Inhale 1 puff daily Rinse mouth after use , Disp: 180 blister, Rfl: 5     Patient Instructions   Please check your blood pressures at home twice a day and send me the numbers in 2 weeks  You can bring it to the office just drop it and tell that it is for me  Please do your blood work for kidney function in 2 weeks  Physical Exam  Constitutional:       General: She is not in acute distress  Appearance: She is not toxic-appearing  Cardiovascular:      Rate and Rhythm: Normal rate  Heart sounds: No murmur heard  No gallop  Pulmonary:      Effort: No respiratory distress  Breath sounds: No wheezing or rales  Musculoskeletal:         General: Tenderness: Over left trochanteric area  Neurological:      Mental Status: She is alert

## 2022-12-05 NOTE — ASSESSMENT & PLAN NOTE
Blood pressure is currently uncontrolled  She will continue to monitor at home  Will continue with the same management as for now  She will bring your blood pressure readings in few weeks

## 2022-12-09 ENCOUNTER — APPOINTMENT (OUTPATIENT)
Dept: LAB | Facility: CLINIC | Age: 72
End: 2022-12-09

## 2022-12-09 DIAGNOSIS — N18.31 STAGE 3A CHRONIC KIDNEY DISEASE (HCC): ICD-10-CM

## 2022-12-09 DIAGNOSIS — N18.30 STAGE 3 CHRONIC KIDNEY DISEASE, UNSPECIFIED WHETHER STAGE 3A OR 3B CKD (HCC): ICD-10-CM

## 2022-12-09 LAB
ALBUMIN SERPL BCP-MCNC: 3.3 G/DL (ref 3.5–5)
ALP SERPL-CCNC: 63 U/L (ref 46–116)
ALT SERPL W P-5'-P-CCNC: 21 U/L (ref 12–78)
ANION GAP SERPL CALCULATED.3IONS-SCNC: 6 MMOL/L (ref 4–13)
AST SERPL W P-5'-P-CCNC: 18 U/L (ref 5–45)
BACTERIA UR QL AUTO: NORMAL /HPF
BILIRUB SERPL-MCNC: 0.37 MG/DL (ref 0.2–1)
BILIRUB UR QL STRIP: NEGATIVE
BUN SERPL-MCNC: 19 MG/DL (ref 5–25)
CALCIUM ALBUM COR SERPL-MCNC: 10.6 MG/DL (ref 8.3–10.1)
CALCIUM SERPL-MCNC: 10 MG/DL (ref 8.3–10.1)
CHLORIDE SERPL-SCNC: 106 MMOL/L (ref 96–108)
CLARITY UR: CLEAR
CO2 SERPL-SCNC: 28 MMOL/L (ref 21–32)
COLOR UR: NORMAL
CREAT SERPL-MCNC: 1.33 MG/DL (ref 0.6–1.3)
CREAT UR-MCNC: 50.8 MG/DL
CREAT UR-MCNC: 50.8 MG/DL
GFR SERPL CREATININE-BSD FRML MDRD: 39 ML/MIN/1.73SQ M
GLUCOSE P FAST SERPL-MCNC: 115 MG/DL (ref 65–99)
GLUCOSE UR STRIP-MCNC: NEGATIVE MG/DL
HGB UR QL STRIP.AUTO: NEGATIVE
KETONES UR STRIP-MCNC: NEGATIVE MG/DL
LEUKOCYTE ESTERASE UR QL STRIP: NEGATIVE
MICROALBUMIN UR-MCNC: 33.6 MG/L (ref 0–20)
MICROALBUMIN/CREAT 24H UR: 66 MG/G CREATININE (ref 0–30)
NITRITE UR QL STRIP: NEGATIVE
NON-SQ EPI CELLS URNS QL MICRO: NORMAL /HPF
PH UR STRIP.AUTO: 5.5 [PH]
POTASSIUM SERPL-SCNC: 4.6 MMOL/L (ref 3.5–5.3)
PROT SERPL-MCNC: 7.6 G/DL (ref 6.4–8.4)
PROT UR STRIP-MCNC: NEGATIVE MG/DL
PROT UR-MCNC: 9 MG/DL
PROT/CREAT UR: 0.18 MG/G{CREAT} (ref 0–0.1)
RBC #/AREA URNS AUTO: NORMAL /HPF
SODIUM SERPL-SCNC: 140 MMOL/L (ref 135–147)
SP GR UR STRIP.AUTO: 1.01 (ref 1–1.03)
UROBILINOGEN UR STRIP-ACNC: <2 MG/DL
WBC #/AREA URNS AUTO: NORMAL /HPF

## 2022-12-13 DIAGNOSIS — G47.30 SLEEP APNEA, UNSPECIFIED TYPE: Primary | ICD-10-CM

## 2022-12-20 DIAGNOSIS — E11.8 CONTROLLED DIABETES MELLITUS TYPE 2 WITH COMPLICATIONS, UNSPECIFIED WHETHER LONG TERM INSULIN USE (HCC): ICD-10-CM

## 2023-01-04 ENCOUNTER — OFFICE VISIT (OUTPATIENT)
Dept: PULMONOLOGY | Facility: CLINIC | Age: 73
End: 2023-01-04

## 2023-01-04 VITALS
TEMPERATURE: 98.3 F | WEIGHT: 180.8 LBS | HEIGHT: 61 IN | SYSTOLIC BLOOD PRESSURE: 146 MMHG | BODY MASS INDEX: 34.14 KG/M2 | OXYGEN SATURATION: 96 % | DIASTOLIC BLOOD PRESSURE: 73 MMHG | HEART RATE: 76 BPM

## 2023-01-04 DIAGNOSIS — I10 BENIGN ESSENTIAL HYPERTENSION: ICD-10-CM

## 2023-01-04 DIAGNOSIS — J44.9 ASTHMA-COPD OVERLAP SYNDROME (HCC): Primary | ICD-10-CM

## 2023-01-04 DIAGNOSIS — E66.9 OBESITY, CLASS I, BMI 30-34.9: ICD-10-CM

## 2023-01-04 PROBLEM — J44.89 ASTHMA-COPD OVERLAP SYNDROME: Status: ACTIVE | Noted: 2021-05-18

## 2023-01-04 NOTE — PROGRESS NOTES
Pulmonary Outpatient Note   Yuridia Porter 74 y o  female MRN: [id number]  1/4/2023      Referring Physician: Govind Huston    Reason for Consultation:    Chief Complaint   Patient presents with   • COPD         Assessment/Plan:    1  Asthma-COPD overlap syndrome [facility])  Assessment & Plan:  Patient with exertional dyspnea and cough  Former smoker  Quit 1996  PFTs with severe obstruction, + bronchodilator response, + air trapping/hyperinflation    Doing well on Trelegy 100 1 puff daily  Rinse mouth after use  Has PRN albuterol    RTC in 6 months    Orders:  -     Alpha 1 Antitrypsin Phenotype; Future  -     Alpha 1 Antitrypsin Phenotype    2  Benign essential hypertension  Assessment & Plan:  /73  Medications reviewed  Advise PCP follow-up      3  Obesity, Class I, BMI 30-34 9  Assessment & Plan:  Encourage weight loss, dietary changes, and increased physical activity  Health Maintenance  Immunization History   Administered Date(s) Administered   • COVID-19 MODERNA VACC 0 25 ML IM BOOSTER 01/04/2022   • COVID-19 MODERNA VACC 0 5 ML IM 04/06/2021, 05/04/2021   • Influenza Split High Dose Preservative Free IM 12/01/2016, 10/10/2017   • Influenza, high dose seasonal 0 7 mL 11/21/2018, 11/21/2019, 11/17/2020, 11/23/2021, 11/25/2022   • Influenza, seasonal, injectable 10/06/2018, 12/04/2019, 10/07/2020, 11/04/2020, 11/17/2020, 11/20/2020   • Pneumococcal Conjugate 13-Valent 12/01/2016   • Pneumococcal Polysaccharide PPV23 01/16/2018        Return in about 6 months (around 7/4/2023)  History of Present Illness   HPI:  Yuridia Porter is a 74 y o  female who has a history of hypertension, diabetes, asthma/COPD overlap, frequent bronchitis, obesity who presents for follow-up  Last visit- ordered PFTs- prescribed spiriva sample  Noted to have eosinophilia  PFTs with severe obstruction with significant BD response and air trapping/hyperinflation  I prescribed Trelegy      She presents for follow-up  Shortness of breath is much improved  She is on Trelegy 1 puff daily  Recently ran out and had to use albuterol  Her prescription coverage is now in effect and she can fill it for $141 for 90 days  She had almost no cough  Felt much better on her Trelegy  Social history:    Smoking: former smoker- quit 1996, smoked 32 years on average 1 PPD    Alcohol, ilicit drugs (inhalational/ IV): No    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        Review of Systems   Constitutional: Negative for chills and fever  HENT: Negative for ear pain and sore throat  Eyes: Negative for pain and visual disturbance  Respiratory: Negative for cough and shortness of breath  Cardiovascular: Negative for chest pain and palpitations  Gastrointestinal: Negative for abdominal pain and vomiting  Genitourinary: Negative for dysuria and hematuria  Musculoskeletal: Negative for arthralgias and back pain  Skin: Negative for color change and rash  Neurological: Negative for seizures and syncope  All other systems reviewed and are negative            Historical Information   Past Medical History:   Diagnosis Date   • Asthma    • COPD [facility]chronic obstructive pulmonary disease) ([facility] )    • Diabetes ([facility] )    • Diabetes mellitus ([facility] )    • Hyperlipidemia    • Hypertension    • Osteoarthritis    • Osteoporosis    • Seasonal allergies      Past Surgical History:   Procedure Laterality Date   • BILATERAL SALPINGOOPHORECTOMY Bilateral 1/3/2020    Procedure: BILATERAL SALPINGO-OOPHORECTOMY;  Surgeon: Cathy Mireles MD;  Location:  MAIN OR;  Service: Gynecology Oncology   • CATARACT EXTRACTION Bilateral    • CHOLECYSTECTOMY      [id number]  LAST ASSESSED   • EYE SURGERY      cataracts removed   • HYSTERECTOMY      [id number]  LAST ASSESSED   • NE EXPLORATORY LAPAROTOMY CELIOTOMY W/WO BIOPSY SPX N/A 1/3/2020    Procedure: LAPAROTOMY EXPLORATORY;  Surgeon: Cathy Mireles MD;  Location: [facility];  Service: Gynecology Oncology   • TUBAL LIGATION       Family History   Problem Relation Age of Onset   • Heart disease Mother    • Rheumatic fever Father    • Diabetes Family         MELLITUS   • Diabetes Family         MELLITUS       Meds/Allergies     Current Outpatient Medications:   •  acetaminophen (TYLENOL) 325 mg tablet, Take 2 tablets (650 mg total) by mouth every 6 (six) hours as needed for mild pain, Disp: 30 tablet, Rfl: 0  •  albuterol (PROVENTIL HFA,VENTOLIN HFA) 90 mcg/act inhaler, Inhale 2 puffs every 6 (six) hours as needed for wheezing or shortness of breath, Disp: 6 7 g, Rfl: 0  •  alendronate (FOSAMAX) 70 mg tablet, Take 1 tablet (70 mg total) by mouth every 7 days Give with at least 8 ounces of plain water first thing in the morning  Patient should be instructed to stay upright (not to lie down) for at least 30 minutes and until after first food of the day (to reduce esophageal irritation)  , Disp: 12 tablet, Rfl: 1  •  aspirin (ECOTRIN LOW STRENGTH) 81 mg EC tablet, Take 81 mg by mouth daily, Disp: , Rfl:   •  Calcium Carbonate-Vit D-Min (CALCIUM 1200 PO), Take by mouth daily, Disp: , Rfl:   •  Coenzyme Q10 (COQ-10) 10 MG CAPS, Take by mouth daily, Disp: , Rfl:   •  fluticasone-umeclidinium-vilanterol (Trelegy Ellipta) 100-62 5-25 mcg/actuation inhaler, Inhale 1 puff daily Rinse mouth after use , Disp: 180 blister, Rfl: 5  •  Loratadine 10 MG CAPS, Take by mouth daily, Disp: , Rfl:   •  losartan (COZAAR) 100 MG tablet, Take 1 tablet (100 mg total) by mouth daily, Disp: 90 tablet, Rfl: 3  •  metFORMIN (GLUCOPHAGE) 1000 MG tablet, Take 1 tablet (1,000 mg total) by mouth 2 (two) times a day, Disp: 180 tablet, Rfl: 3  •  Multiple Vitamin (MULTI-VITAMIN DAILY PO), Take by mouth daily, Disp: , Rfl:   •  rosuvastatin (CRESTOR) 20 MG tablet, Take 1 tablet (20 mg total) by mouth daily At bedtime, Disp: 90 tablet, Rfl: 3  •  VITAMIN D PO, Take 2,000 mg by mouth daily, Disp: , Rfl:   • nystatin (MYCOSTATIN) powder, Apply topically 2 (two) times a day (Patient not taking: Reported on 1/4/2023), Disp: 30 g, Rfl: 0  Allergies   Allergen Reactions   • Penicillins Hives       Vitals: Blood pressure 146/73, pulse 76, temperature 98 3 °F (36 8 °C), temperature source Temporal, height 5' 1" (1 549 m), weight 82 kg (180 lb 12 8 oz), SpO2 96 %  Body mass index is 34 16 kg/m²  Oxygen Therapy  SpO2: 96 %  Oxygen Therapy: None (Room air)      Physical Exam  Physical Exam  Vitals and nursing note reviewed  Constitutional:       General: She is not in acute distress  Appearance: She is well-developed  She is obese  HENT:      Head: Normocephalic and atraumatic  Eyes:      Conjunctiva/sclera: Conjunctivae normal    Cardiovascular:      Rate and Rhythm: Normal rate and regular rhythm  Heart sounds: No murmur heard  Pulmonary:      Effort: Pulmonary effort is normal  No respiratory distress  Breath sounds: Normal breath sounds  Abdominal:      Palpations: Abdomen is soft  Tenderness: There is no abdominal tenderness  Musculoskeletal:         General: No swelling  Cervical back: Neck supple  Skin:     General: Skin is warm and dry  Capillary Refill: Capillary refill takes less than 2 seconds  Neurological:      Mental Status: She is alert  Psychiatric:         Mood and Affect: Mood normal          Labs: I have personally reviewed pertinent lab results      ABG: No results found for: PHART, NVW0PXV, PO2ART, LHG3YXE, B0KUBOSS, BEART, SOURCE,   BNP: No results found for: BNP,   CBC:  Lab Results   Component Value Date    WBC 7 99 06/02/2022    HGB 12 0 06/02/2022    HCT 36 3 06/02/2022    MCV 88 06/02/2022     06/02/2022    EOSPCT 6 06/02/2022    EOSABS 0 44 06/02/2022    NEUTOPHILPCT 55 06/02/2022    LYMPHOPCT 31 06/02/2022   ,   CMP:   Lab Results   Component Value Date    SODIUM 140 12/09/2022    K 4 6 12/09/2022     12/09/2022    CO2 28 12/09/2022    BUN 19 12/09/2022    CREATININE 1 33 (H) 12/09/2022    CALCIUM 10 0 12/09/2022    AST 18 12/09/2022    ALT 21 12/09/2022    ALKPHOS 63 12/09/2022    EGFR 39 12/09/2022   ,   PT/INR: No results found for: PT, INR,   Troponin: No results found for: TROPONINI      Imaging and other studies: I have personally reviewed pertinent reports  and I have personally reviewed pertinent films in PACS    CXR- no acute disease 8/31  CXR 9/15/22- no acute cardiopulmonary disease  Lungs clear  Pulmonary Functions Testing Results: 11/14/22  Results:  FEV1/FVC Ratio: 57 %  Forced Vital Capacity: 1 42 L    58 % predicted  FEV1: 0 80 L     42 % predicted     After administration of bronchodilator   FEV1/FVC Ratio: 56%  FVC: 1 89 L, 77% predicted, 33% change  FEV1: 1 05 L, 55% predicted, 31% change     Lung volumes by body plethysmography:   Total Lung Capacity 126 % predicted   Residual volume 196 % predicted     DLCO corrected for patients hemoglobin level: 81 %           Interpretation:     • Moderately severe obstructive airflow defect      • There is significant airway response with the administration of bronchodilator per ATS standards     • Increased lung volumes indicative of air trapping and hyperinflation     • Normal diffusion capacity    EKG, Pathology, and Other Studies: I have personally reviewed pertinent reports  and I have personally reviewed pertinent films in [facility]

## 2023-01-04 NOTE — ASSESSMENT & PLAN NOTE
Patient with exertional dyspnea and cough  Former smoker  Quit 1996  PFTs with severe obstruction, + bronchodilator response, + air trapping/hyperinflation    Doing well on Trelegy 100 1 puff daily  Rinse mouth after use    Has PRN albuterol    RTC in 6 months

## 2023-01-09 ENCOUNTER — OFFICE VISIT (OUTPATIENT)
Dept: NEPHROLOGY | Facility: CLINIC | Age: 73
End: 2023-01-09

## 2023-01-09 VITALS
BODY MASS INDEX: 33.79 KG/M2 | WEIGHT: 179 LBS | HEIGHT: 61 IN | DIASTOLIC BLOOD PRESSURE: 80 MMHG | SYSTOLIC BLOOD PRESSURE: 148 MMHG | OXYGEN SATURATION: 96 % | HEART RATE: 80 BPM

## 2023-01-09 DIAGNOSIS — N18.30 BENIGN HYPERTENSION WITH CKD (CHRONIC KIDNEY DISEASE) STAGE III (HCC): ICD-10-CM

## 2023-01-09 DIAGNOSIS — I12.9 BENIGN HYPERTENSION WITH CKD (CHRONIC KIDNEY DISEASE) STAGE III (HCC): ICD-10-CM

## 2023-01-09 DIAGNOSIS — N18.31 TYPE 2 DIABETES MELLITUS WITH STAGE 3A CHRONIC KIDNEY DISEASE, UNSPECIFIED WHETHER LONG TERM INSULIN USE (HCC): ICD-10-CM

## 2023-01-09 DIAGNOSIS — E11.22 TYPE 2 DIABETES MELLITUS WITH STAGE 3A CHRONIC KIDNEY DISEASE, UNSPECIFIED WHETHER LONG TERM INSULIN USE (HCC): ICD-10-CM

## 2023-01-09 DIAGNOSIS — E66.9 OBESITY, CLASS I, BMI 30-34.9: ICD-10-CM

## 2023-01-09 DIAGNOSIS — N18.32 STAGE 3B CHRONIC KIDNEY DISEASE (HCC): Primary | ICD-10-CM

## 2023-01-09 NOTE — ASSESSMENT & PLAN NOTE
Body mass index is 33 82 kg/m²  BMI greater than 40 is associated with greater risk of progression of renal disease secondary to glomerular hyperfiltration  Recommend weight loss

## 2023-01-09 NOTE — ASSESSMENT & PLAN NOTE
Lab Results   Component Value Date    EGFR 39 12/09/2022    EGFR 32 11/17/2022    EGFR 37 06/02/2022    CREATININE 1 33 (H) 12/09/2022    CREATININE 1 58 (H) 11/17/2022    CREATININE 1 38 (H) 06/02/2022   Blood pressure borderline  Continue to monitor at home  Continue losartan 100 mg daily

## 2023-01-09 NOTE — ASSESSMENT & PLAN NOTE
Lab Results   Component Value Date    EGFR 39 12/09/2022    EGFR 32 11/17/2022    EGFR 37 06/02/2022    CREATININE 1 33 (H) 12/09/2022    CREATININE 1 58 (H) 11/17/2022    CREATININE 1 38 (H) 06/02/2022   Renal function improved  PC ratio 0 18  Continue losartan 100 mg daily  Continue to avoid nephrotoxins  Return to clinic in 4 months with Dr Maribell Bond with labs prior

## 2023-01-09 NOTE — PROGRESS NOTES
Assessment & Plan:    1  Stage 3b chronic kidney disease [facility])  Assessment & Plan:  Lab Results   Component Value Date    EGFR 39 12/09/2022    EGFR 32 11/17/2022    EGFR 37 06/02/2022    CREATININE 1 33 (H) 12/09/2022    CREATININE 1 58 (H) 11/17/2022    CREATININE 1 38 (H) 06/02/2022   Renal function improved  PC ratio 0 18  Continue losartan 100 mg daily  Continue to avoid nephrotoxins  Return to clinic in 4 months with Dr Margaux Lynne with labs prior  Orders:  -     CBC and differential; Future; Expected date: 05/02/2023  -     Comprehensive metabolic panel; Future; Expected date: 05/02/2023  -     Magnesium; Future; Expected date: 05/02/2023  -     Microalbumin / creatinine urine ratio; Future; Expected date: 05/02/2023  -     Phosphorus; Future; Expected date: 05/02/2023  -     Protein / creatinine ratio, urine; Future; Expected date: 05/02/2023  -     PTH, intact; Future; Expected date: 05/02/2023  -     Urinalysis with microscopic; Future; Expected date: 05/02/2023    2  Benign hypertension with CKD (chronic kidney disease) stage III [facility])  Assessment & Plan:  Lab Results   Component Value Date    EGFR 39 12/09/2022    EGFR 32 11/17/2022    EGFR 37 06/02/2022    CREATININE 1 33 (H) 12/09/2022    CREATININE 1 58 (H) 11/17/2022    CREATININE 1 38 (H) 06/02/2022   Blood pressure borderline  Continue to monitor at home  Continue losartan 100 mg daily  3  Obesity, Class I, BMI 30-34 9  Assessment & Plan: Body mass index is 33 82 kg/m²  BMI greater than 40 is associated with greater risk of progression of renal disease secondary to glomerular hyperfiltration  Recommend weight loss  4  Type 2 diabetes mellitus with stage 3a chronic kidney disease, unspecified whether long term insulin use (HCC)  Assessment & Plan:    Lab Results   Component Value Date    HGBA1C 6 9 (H) 11/17/2022   Reduce simple carbohydrate intake    On metformin         The benefits, risks and alternatives to the treatment plan were discussed at this visit  Patient was advised of common adverse effects of any medical therapies prescribed  All questions were answered and discussed with the patient and any accompanying family members or caretakers  Subjective:      Patient ID: Génesis Velasquez is a 74 y o  female seen in the [place] office  Patient was last seen by nephrology nurse practitioner on 6/6/2022, at which time unction was stable around baseline creatinine 1 1 to 1 3 mg/dL  Losartan 100 mg daily was continued  HPI     Today, patient presents for routine follow-up without acute complaints relating to blood pressure, edema or nephrological concerns  Patient denies any changes in health, medication changes, hospitalizations, surgeries or trip to the emergency room, falls or bone fractures since last visit  She and her  had COVID with only mild Sx  Doing well recently  Blood pressure is 148/80 with a heart rate of 80  Patient does monitor blood pressures at home and reports looks good at home  Denies headaches, lightheadedness, dizziness  Patient reports adherence with antihypertensive regimen and denies adverse effects: Losartan 100 mg daily  Patient denies lower extremity swelling  Reviewed and discussed the results of labs performed 12/9/2022 which revealed that renal function has improved a creatinine of 1 33 mg/dL estimated GFR 39 mL/min  PC ratio is 0 18  On ARB  History is obtained from patient and   The following portions of the patient's history were reviewed and updated as appropriate: allergies, current medications, past family history, past medical history, past social history, past surgical history, and problem list     Review of Systems   Constitutional: Negative for activity change, appetite change, chills, fatigue, fever and unexpected weight change  Respiratory: Negative for apnea, cough and shortness of breath      Cardiovascular: Negative for chest pain, palpitations and leg swelling  Gastrointestinal: Negative for abdominal pain, blood in stool, constipation, diarrhea, nausea and vomiting  Genitourinary: Negative for decreased urine volume, difficulty urinating, dysuria, flank pain, frequency, hematuria and urgency  Musculoskeletal: Positive for back pain  Negative for arthralgias, joint swelling and myalgias  Skin: Negative for color change and rash  Neurological: Negative for dizziness, seizures, syncope, weakness, light-headedness, numbness and headaches  Hematological: Negative for adenopathy  Does not bruise/bleed easily  Psychiatric/Behavioral: Negative for agitation, behavioral problems, confusion, decreased concentration, dysphoric mood and hallucinations  The patient is not nervous/anxious and is not hyperactive  All other systems reviewed and are negative  Objective:      /80   Pulse 80   Ht 5' 1" (1 549 m)   Wt 81 2 kg (179 lb)   SpO2 96%   BMI 33 82 kg/m²          Physical Exam  Vitals and nursing note reviewed  Exam conducted with a chaperone present  Constitutional:       General: She is not in acute distress  Appearance: Normal appearance  She is obese  She is not ill-appearing or toxic-appearing  HENT:      Head: Normocephalic and atraumatic  Nose: Nose normal  No congestion or rhinorrhea  Mouth/Throat:      Mouth: Mucous membranes are moist       Pharynx: Oropharynx is clear  Eyes:      General: No scleral icterus  Right eye: No discharge  Left eye: No discharge  Extraocular Movements: Extraocular movements intact  Conjunctiva/sclera: Conjunctivae normal       Pupils: Pupils are equal, round, and reactive to light  Cardiovascular:      Rate and Rhythm: Normal rate and regular rhythm  Pulses: Normal pulses  Heart sounds: Normal heart sounds  No murmur heard  Pulmonary:      Effort: Pulmonary effort is normal  No respiratory distress  Breath sounds: Normal breath sounds   No wheezing  Abdominal:      General: Abdomen is flat  Bowel sounds are normal  There is no distension  Palpations: Abdomen is soft  There is no mass  Tenderness: There is no abdominal tenderness  Musculoskeletal:         General: No swelling or deformity  Normal range of motion  Cervical back: Normal range of motion and neck supple  No rigidity or tenderness  Skin:     General: Skin is warm and dry  Coloration: Skin is not jaundiced or pale  Findings: No bruising  Neurological:      General: No focal deficit present  Mental Status: She is alert and oriented to person, place, and time  Mental status is at baseline  Psychiatric:         Mood and Affect: Mood normal          Behavior: Behavior normal          Thought Content:  Thought content normal          Judgment: Judgment normal              Lab Results   Component Value Date    SODIUM 140 12/09/2022    K 4 6 12/09/2022     12/09/2022    CO2 28 12/09/2022    AGAP 6 12/09/2022    BUN 19 12/09/2022    CREATININE 1 33 (H) 12/09/2022    GLUC 101 01/06/2020    GLUF 115 (H) 12/09/2022    CALCIUM 10 0 12/09/2022    AST 18 12/09/2022    ALT 21 12/09/2022    ALKPHOS 63 12/09/2022    TP 7 6 12/09/2022    TBILI 0 37 12/09/2022    EGFR 39 12/09/2022      Lab Results   Component Value Date    CREATININE 1 33 (H) 12/09/2022    CREATININE 1 58 (H) 11/17/2022    CREATININE 1 38 (H) 06/02/2022    CREATININE 1 28 05/20/2022    CREATININE 1 31 (H) 01/10/2022    CREATININE 1 22 05/19/2021    CREATININE 1 18 05/26/2020    CREATININE 0 80 01/06/2020    CREATININE 0 98 01/05/2020    CREATININE 1 03 01/04/2020    CREATININE 1 12 01/03/2020    CREATININE 1 22 12/10/2019    CREATININE 1 46 (H) 11/22/2019    CREATININE 1 01 05/17/2019    CREATININE 0 92 01/12/2018      Lab Results   Component Value Date    COLORU Light Yellow 12/09/2022    CLARITYU Clear 12/09/2022    SPECGRAV 1 010 12/09/2022    PHUR 5 5 12/09/2022    LEUKOCYTESUR Negative 12/09/2022    NITRITE Negative 12/09/2022    PROTEIN UA Negative 12/09/2022    GLUCOSEU Negative 12/09/2022    KETONESU Negative 12/09/2022    UROBILINOGEN <2 0 12/09/2022    BILIRUBINUR Negative 12/09/2022    BLOODU Negative 12/09/2022    RBCUA None Seen 12/09/2022    WBCUA None Seen 12/09/2022    EPIS Occasional 12/09/2022    BACTERIA None Seen 12/09/2022      No results found for: LABPROT  No results found for: Orvil Sos Results   Component Value Date    WBC 7 99 06/02/2022    HGB 12 0 06/02/2022    HCT 36 3 06/02/2022    MCV 88 06/02/2022     06/02/2022      Lab Results   Component Value Date    HGB 12 0 06/02/2022    HGB 12 1 05/20/2022    HGB 12 2 01/10/2022    HGB 12 8 05/19/2021    HGB 12 0 01/06/2020      No results found for: IRON, TIBC, FERRITIN   No results found for: PTHCALCIUM, [id number], PHOSPHORUS   Lab Results   Component Value Date    CHOLESTEROL 116 05/20/2022    HDL 69 05/20/2022    LDLCALC 29 05/20/2022    TRIG 88 05/20/2022      Lab Results   Component Value Date    URICACID 5 2 01/10/2022      Lab Results   Component Value Date    HGBA1C 6 9 (H) 11/17/2022      No results found for: TSHANTIBODY, D2TLAPY, FREET4   No results found for: YULIET, DSDNAAB, RFIGM   No results found for: PROT, UPEP, IMMUNOFIX, KAPPALAMBDA, KAPPALIGHT     Portions of the record may have been created with voice recognition software  Occasional wrong word or "sound a like" substitutions may have occurred due to the inherent limitations of voice recognition software  Read the chart carefully and recognize, using context, where substitutions have occurred  If you have any questions, please contact the dictating provider

## 2023-01-09 NOTE — ASSESSMENT & PLAN NOTE
Lab Results   Component Value Date    HGBA1C 6 9 (H) 11/17/2022   Reduce simple carbohydrate intake    On metformin

## 2023-01-23 LAB
LEFT EYE DIABETIC RETINOPATHY: POSITIVE
RIGHT EYE DIABETIC RETINOPATHY: POSITIVE

## 2023-01-25 ENCOUNTER — OFFICE VISIT (OUTPATIENT)
Dept: OBGYN CLINIC | Facility: HOSPITAL | Age: 73
End: 2023-01-25

## 2023-01-25 VITALS
HEART RATE: 81 BPM | HEIGHT: 61 IN | SYSTOLIC BLOOD PRESSURE: 152 MMHG | OXYGEN SATURATION: 96 % | WEIGHT: 180.4 LBS | DIASTOLIC BLOOD PRESSURE: 70 MMHG | BODY MASS INDEX: 34.06 KG/M2

## 2023-01-25 DIAGNOSIS — M65.331 TRIGGER FINGER, RIGHT MIDDLE FINGER: Primary | ICD-10-CM

## 2023-01-25 NOTE — PROGRESS NOTES
ASSESSMENT/PLAN:    Assessment:   Right long trigger finger    Plan:   Patient is doing well after her second right long trigger finger injection  She advised use heat massage as demonstrated in the office  She was advised that it could return and if it did we would discuss surgical intervention  She will follow-up with us as needed    Follow Up:  As needed    To Do Next Visit:  Reevaluation    General Discussions:  Trigger FInger: The anatomy and physiology of trigger finger was discussed with the patient today in the office  Edema and increased contact pressure within the flexor tendons at the A1 pulley can cause pain, crepitation, and limitation of function  Treatment options include resting MP blocking splints to decrease edema, oral anti-inflammatory medications, home or formal therapy exercises, up to 2 steroid injections within the tendon sheath, or surgical release  While majority of patients do respond to conservative treatment, up to 20% may require surgical release        _____________________________________________________  CHIEF COMPLAINT:  Chief Complaint   Patient presents with   • Right Middle Finger - Follow-up, Triggering     TF- 2nd injection 11/23/22   • Right Ring Finger - Follow-up     Dupuytren's          SUBJECTIVE:  Randy Burton is a 74 y o  female who presents for follow up regarding right long trigger finger  Patient had a second cortisone injection for this issue  She states she has no clicking or locking  She also has no pain      PAST MEDICAL HISTORY:  Past Medical History:   Diagnosis Date   • Asthma    • COPD (chronic obstructive pulmonary disease) [address] )    • Diabetes ([facility] )    • Diabetes mellitus ([facility] )    • Hyperlipidemia    • Hypertension    • Osteoarthritis    • Osteoporosis    • Seasonal allergies        PAST SURGICAL HISTORY:  Past Surgical History:   Procedure Laterality Date   • BILATERAL SALPINGOOPHORECTOMY Bilateral 1/3/2020    Procedure: BILATERAL SALPINGO-OOPHORECTOMY;  Surgeon: John Garcia MD;  [facility];  Service: Gynecology Oncology   • CATARACT EXTRACTION Bilateral    • CHOLECYSTECTOMY      [id number]  LAST ASSESSED   • EYE SURGERY      cataracts removed   • HYSTERECTOMY      [id number]  LAST ASSESSED   • RI EXPLORATORY LAPAROTOMY CELIOTOMY W/WO BIOPSY SPX N/A 1/3/2020    Procedure: LAPAROTOMY EXPLORATORY;  Surgeon: John Garcia MD;  [facility];  Service: Gynecology Oncology   • TUBAL LIGATION         FAMILY HISTORY:  Family History   Problem Relation Age of Onset   • Heart disease Mother    • Rheumatic fever Father    • Diabetes Family         MELLITUS   • Diabetes Family         MELLITUS       SOCIAL HISTORY:  Social History     Tobacco Use   • Smoking status: Former     Packs/day: 1 00     Types: Cigarettes     Start date: 1964     Quit date:      Years since quittin 0   • Smokeless tobacco: Never   Vaping Use   • Vaping Use: Never used   Substance Use Topics   • Alcohol use: Not Currently   • Drug use: No       MEDICATIONS:    Current Outpatient Medications:   •  acetaminophen (TYLENOL) 325 mg tablet, Take 2 tablets (650 mg total) by mouth every 6 (six) hours as needed for mild pain, Disp: 30 tablet, Rfl: 0  •  albuterol (PROVENTIL HFA,VENTOLIN HFA) 90 mcg/act inhaler, Inhale 2 puffs every 6 (six) hours as needed for wheezing or shortness of breath, Disp: 6 7 g, Rfl: 0  •  alendronate (FOSAMAX) 70 mg tablet, Take 1 tablet (70 mg total) by mouth every 7 days Give with at least 8 ounces of plain water first thing in the morning  Patient should be instructed to stay upright (not to lie down) for at least 30 minutes and until after first food of the day (to reduce esophageal irritation)  , Disp: 12 tablet, Rfl: 1  •  aspirin (ECOTRIN LOW STRENGTH) 81 mg EC tablet, Take 81 mg by mouth daily, Disp: , Rfl:   •  Calcium Carbonate-Vit D-Min (CALCIUM 1200 PO), Take by mouth daily, Disp: , Rfl:   •  Coenzyme Q10 (COQ-10) 10 MG CAPS, Take by mouth daily, Disp: , Rfl:   •  fluticasone-umeclidinium-vilanterol (Trelegy Ellipta) 100-62 5-25 mcg/actuation inhaler, Inhale 1 puff daily Rinse mouth after use , Disp: 180 blister, Rfl: 5  •  Loratadine 10 MG CAPS, Take by mouth daily, Disp: , Rfl:   •  losartan (COZAAR) 100 MG tablet, Take 1 tablet (100 mg total) by mouth daily, Disp: 90 tablet, Rfl: 3  •  metFORMIN (GLUCOPHAGE) 1000 MG tablet, Take 1 tablet (1,000 mg total) by mouth 2 (two) times a day, Disp: 180 tablet, Rfl: 3  •  Multiple Vitamin (MULTI-VITAMIN DAILY PO), Take by mouth daily, Disp: , Rfl:   •  rosuvastatin (CRESTOR) 20 MG tablet, Take 1 tablet (20 mg total) by mouth daily At bedtime, Disp: 90 tablet, Rfl: 3  •  VITAMIN D PO, Take 2,000 mg by mouth daily, Disp: , Rfl:   •  nystatin (MYCOSTATIN) powder, Apply topically 2 (two) times a day (Patient not taking: Reported on 1/4/2023), Disp: 30 g, Rfl: 0    ALLERGIES:  Allergies   Allergen Reactions   • Penicillins Hives       REVIEW OF SYSTEMS:  Pertinent items are noted in HPI  A comprehensive review of systems was negative      LABS:  HgA1c:   Lab Results   Component Value Date    HGBA1C 6 9 (H) 11/17/2022     BMP:   Lab Results   Component Value Date    CALCIUM 10 0 12/09/2022    K 4 6 12/09/2022    CO2 28 12/09/2022     12/09/2022    BUN 19 12/09/2022    CREATININE 1 33 (H) 12/09/2022           _____________________________________________________  PHYSICAL EXAMINATION:  Vital signs: /70   Pulse 81   Ht 5' 1" (1 549 m)   Wt 81 8 kg (180 lb 6 4 oz)   SpO2 96%   BMI 34 09 kg/m²   General: well developed and well nourished, alert, oriented times 3 and appears comfortable  Psychiatric: Normal  HEENT: Trachea Midline, No torticollis  Cardiovascular: No discernable arrhythmia  Pulmonary: No wheezing or stridor  Abdomen: No rebound or guarding  Extremities: No peripheral edema  Skin: No masses, erythema, lacerations, fluctation, ulcerations  Neurovascular: Sensation Intact to the Median, Ulnar, Radial Nerve, Motor Intact to the Median, Ulnar, Radial Nerve and Pulses Intact    MUSCULOSKELETAL EXAMINATION:  right long finger:  Positive palpable nodule over the A1 pulley  Negative tenderness to palpation over A1 pulley  Negative clicking     Negative catching        _____________________________________________________  STUDIES REVIEWED:  No Studies to review      PROCEDURES PERFORMED:  Procedures   Additional procedures were performed at today's visit

## 2023-02-08 ENCOUNTER — OFFICE VISIT (OUTPATIENT)
Dept: URGENT CARE | Facility: CLINIC | Age: 73
End: 2023-02-08

## 2023-02-08 ENCOUNTER — APPOINTMENT (OUTPATIENT)
Dept: RADIOLOGY | Facility: CLINIC | Age: 73
End: 2023-02-08

## 2023-02-08 VITALS
SYSTOLIC BLOOD PRESSURE: 158 MMHG | RESPIRATION RATE: 20 BRPM | OXYGEN SATURATION: 94 % | DIASTOLIC BLOOD PRESSURE: 67 MMHG | WEIGHT: 180 LBS | HEART RATE: 85 BPM | TEMPERATURE: 97.7 F | BODY MASS INDEX: 34.01 KG/M2

## 2023-02-08 DIAGNOSIS — Z87.09 HISTORY OF COPD: ICD-10-CM

## 2023-02-08 DIAGNOSIS — R05.1 ACUTE COUGH: ICD-10-CM

## 2023-02-08 DIAGNOSIS — R05.1 ACUTE COUGH: Primary | ICD-10-CM

## 2023-02-08 RX ORDER — AZITHROMYCIN 250 MG/1
TABLET, FILM COATED ORAL
Qty: 6 TABLET | Refills: 0 | Status: SHIPPED | OUTPATIENT
Start: 2023-02-08 | End: 2023-02-12

## 2023-02-08 RX ORDER — PREDNISONE 20 MG/1
60 TABLET ORAL DAILY
Qty: 15 TABLET | Refills: 0 | Status: SHIPPED | OUTPATIENT
Start: 2023-02-08 | End: 2023-02-13

## 2023-02-08 NOTE — PATIENT INSTRUCTIONS
Your xray was preliminarily read by your provider  A radiologist will read the xray and you will be notified if it is abnormal     You have been prescribed azithromycin and prednisone  You are to get plain robitussin or tussin  You are to follow up with your PCP in 3-5 days  Go to the Ed if symptoms worsen    You have a covid/flu pending  You are to download [facility] for the results in 24-48 hours    You will be notified if +

## 2023-02-08 NOTE — PROGRESS NOTES
[facility]        NAME: Valerie Tineo is a 74 y o  female  : 1950    MRN: [id number]  DATE: 2023  TIME: 3:11 PM    Assessment and Plan   Acute cough [R05 1]  1  Acute cough  Cov/Flu-Collected at [facility] or [facility]    XR chest pa & lateral    azithromycin (ZITHROMAX) 250 mg tablet    predniSONE 20 mg tablet      2  History of COPD  azithromycin (ZITHROMAX) 250 mg tablet    predniSONE 20 mg tablet            Patient Instructions       Follow up with PCP in 3-5 days  Proceed to  ER if symptoms worsen  Your xray was preliminarily read by your provider  A radiologist will read the xray and you will be notified if it is abnormal     You have been prescribed azithromycin and prednisone  You are to get plain robitussin or tussin  You are to follow up with your PCP in 3-5 days  Go to the Ed if symptoms worsen    You have a covid/flu pending  You are to download SL mychart for the results in 24-48 hours  You will be notified if +     Chief Complaint     Chief Complaint   Patient presents with   • Cough         History of Present Illness       This is a 74year old female who has hx of COPD and uses trilogy and albuterol MDI  She states she developed a cough last Thursday and this past Monday started with yellow mucous  She denies fevers, chills, n/v/d  Covid + at [place]  She denies taking anything OTC  She states that when she gets like this and yellow phlegm she always then gets bronchitis and green phlegm  She has not called her PCP  Pt states took a covid test yesterday and was negative  Review of Systems   Review of Systems   Constitutional: Negative  HENT: Negative  Eyes: Negative  Respiratory: Positive for cough  Negative for chest tightness and shortness of breath  Cardiovascular: Negative  Gastrointestinal: Negative  Endocrine: Negative  Genitourinary: Negative  Musculoskeletal: Negative  Skin: Negative      Allergic/Immunologic: Negative  Neurological: Negative  Hematological: Negative  Psychiatric/Behavioral: Negative  Current Medications       Current Outpatient Medications:   •  albuterol (PROVENTIL HFA,VENTOLIN HFA) 90 mcg/act inhaler, Inhale 2 puffs every 6 (six) hours as needed for wheezing or shortness of breath, Disp: 6 7 g, Rfl: 0  •  azithromycin (ZITHROMAX) 250 mg tablet, Take 2 tablets today then 1 tablet daily x 4 days, Disp: 6 tablet, Rfl: 0  •  fluticasone-umeclidinium-vilanterol (Trelegy Ellipta) 100-62 5-25 mcg/actuation inhaler, Inhale 1 puff daily Rinse mouth after use , Disp: 180 blister, Rfl: 5  •  predniSONE 20 mg tablet, Take 3 tablets (60 mg total) by mouth daily for 5 days, Disp: 15 tablet, Rfl: 0  •  acetaminophen (TYLENOL) 325 mg tablet, Take 2 tablets (650 mg total) by mouth every 6 (six) hours as needed for mild pain, Disp: 30 tablet, Rfl: 0  •  alendronate (FOSAMAX) 70 mg tablet, Take 1 tablet (70 mg total) by mouth every 7 days Give with at least 8 ounces of plain water first thing in the morning  Patient should be instructed to stay upright (not to lie down) for at least 30 minutes and until after first food of the day (to reduce esophageal irritation)  , Disp: 12 tablet, Rfl: 1  •  aspirin (ECOTRIN LOW STRENGTH) 81 mg EC tablet, Take 81 mg by mouth daily, Disp: , Rfl:   •  Calcium Carbonate-Vit D-Min (CALCIUM 1200 PO), Take by mouth daily, Disp: , Rfl:   •  Coenzyme Q10 (COQ-10) 10 MG CAPS, Take by mouth daily, Disp: , Rfl:   •  Loratadine 10 MG CAPS, Take by mouth daily, Disp: , Rfl:   •  losartan (COZAAR) 100 MG tablet, Take 1 tablet (100 mg total) by mouth daily, Disp: 90 tablet, Rfl: 3  •  metFORMIN (GLUCOPHAGE) 1000 MG tablet, Take 1 tablet (1,000 mg total) by mouth 2 (two) times a day, Disp: 180 tablet, Rfl: 3  •  Multiple Vitamin (MULTI-VITAMIN DAILY PO), Take by mouth daily, Disp: , Rfl:   •  nystatin (MYCOSTATIN) powder, Apply topically 2 (two) times a day (Patient not taking: Reported on 1/4/2023), Disp: 30 g, Rfl: 0  •  rosuvastatin (CRESTOR) 20 MG tablet, Take 1 tablet (20 mg total) by mouth daily At bedtime, Disp: 90 tablet, Rfl: 3  •  VITAMIN D PO, Take 2,000 mg by mouth daily, Disp: , Rfl:     Current Allergies     Allergies as of 02/08/2023 - Reviewed 02/08/2023   Allergen Reaction Noted   • Penicillins Hives 01/20/2016            The following portions of the patient's history were reviewed and updated as appropriate: allergies, current medications, past family history, past medical history, past social history, past surgical history and problem list      Past Medical History:   Diagnosis Date   • Asthma    • COPD [facility]chronic obstructive [facility] disease) ([email or web address]    • Diabetes ([facility] )    • Diabetes mellitus ([facility] )    • Hyperlipidemia    • Hypertension    • Osteoarthritis    • Osteoporosis    • Seasonal allergies        Past Surgical History:   Procedure Laterality Date   • BILATERAL SALPINGOOPHORECTOMY Bilateral 1/3/2020    Procedure: BILATERAL SALPINGO-OOPHORECTOMY;  Surgeon: Harsha Alexander MD;  [facility];  Service: Gynecology Oncology   • CATARACT EXTRACTION Bilateral    • CHOLECYSTECTOMY      [id number]  LAST ASSESSED   • EYE SURGERY      cataracts removed   • HYSTERECTOMY      [id number]  LAST ASSESSED   • WI EXPLORATORY LAPAROTOMY CELIOTOMY W/WO BIOPSY SPX N/A 1/3/2020    Procedure: LAPAROTOMY EXPLORATORY;  Surgeon: Harsha Alexander MD;  [facility];  Service: Gynecology Oncology   • TUBAL LIGATION         Family History   Problem Relation Age of Onset   • Heart disease Mother    • Rheumatic fever Father    • Diabetes Family         MELLITUS   • Diabetes Family         MELLITUS         Medications have been verified  Objective   /67   Pulse 85   Temp 97 7 °F (36 5 °C)   Resp 20   Wt 81 6 kg (180 lb)   SpO2 94%   BMI 34 01 kg/m²   No LMP recorded  Patient has had a hysterectomy         Physical Exam     Physical Exam  Vitals and nursing note reviewed  Constitutional:       General: She is not in acute distress  Appearance: Normal appearance  She is obese  She is not ill-appearing, toxic-appearing or diaphoretic  HENT:      Head: Normocephalic and atraumatic  Right Ear: Tympanic membrane and ear canal normal       Left Ear: Tympanic membrane and ear canal normal       Ears:      Comments: Wears b/l hearing aids      Nose: Nose normal  No congestion or rhinorrhea  Mouth/Throat:      Mouth: Mucous membranes are moist       Pharynx: Oropharynx is clear  No oropharyngeal exudate or posterior oropharyngeal erythema  Eyes:      Extraocular Movements: Extraocular movements intact  Cardiovascular:      Rate and Rhythm: Normal rate and regular rhythm  Pulses: Normal pulses  Heart sounds: Normal heart sounds  Pulmonary:      Effort: Pulmonary effort is normal  No respiratory distress  Breath sounds: Normal breath sounds  No stridor  No wheezing, rhonchi or rales  Comments: Tight upper airway cough   Chest:      Chest wall: No tenderness  Musculoskeletal:         General: Normal range of motion  Cervical back: Normal range of motion and neck supple  Skin:     General: Skin is warm and dry  Capillary Refill: Capillary refill takes less than 2 seconds  Neurological:      General: No focal deficit present  Mental Status: She is alert and oriented to person, place, and time  Psychiatric:         Mood and Affect: Mood normal          Behavior: Behavior normal          Thought Content:  Thought content normal          Judgment: Judgment normal            Preliminary reading CXR  No acute process seen   Waiting on rad read

## 2023-02-09 LAB
FLUAV RNA RESP QL NAA+PROBE: NEGATIVE
FLUBV RNA RESP QL NAA+PROBE: NEGATIVE
SARS-COV-2 RNA RESP QL NAA+PROBE: NEGATIVE

## 2023-03-18 NOTE — ASSESSMENT & PLAN NOTE
The diabetes has been very stable  Hemoglobin A1c was 6 5 today  We will maintain   Metformin 1000 mg b I d  English

## 2023-05-08 ENCOUNTER — OFFICE VISIT (OUTPATIENT)
Dept: SLEEP CENTER | Facility: CLINIC | Age: 73
End: 2023-05-08

## 2023-05-08 VITALS
RESPIRATION RATE: 17 BRPM | OXYGEN SATURATION: 95 % | WEIGHT: 176 LBS | HEIGHT: 61 IN | HEART RATE: 73 BPM | TEMPERATURE: 98.2 F | DIASTOLIC BLOOD PRESSURE: 70 MMHG | SYSTOLIC BLOOD PRESSURE: 130 MMHG | BODY MASS INDEX: 33.23 KG/M2

## 2023-05-08 DIAGNOSIS — G47.33 OBSTRUCTIVE SLEEP APNEA: Primary | ICD-10-CM

## 2023-05-08 DIAGNOSIS — F40.240 CLAUSTROPHOBIA: ICD-10-CM

## 2023-05-08 DIAGNOSIS — G47.00 INSOMNIA, UNSPECIFIED TYPE: ICD-10-CM

## 2023-05-08 DIAGNOSIS — G47.19 EXCESSIVE DAYTIME SLEEPINESS: ICD-10-CM

## 2023-05-08 NOTE — PROGRESS NOTES
Consultation - Kaykay Ruano [age], 1950, MRN: [id number]    5/8/2023        Reason for Consult / Principal Problem:  Evaluation of possible Obstructive Sleep Apnea  Insomnia  Excessive daytime sleepiness       Thank you for the opportunity of participating in the evaluation and care of this patient in the Sleep Clinic at [facility]  Subjective:     HPI: Sandra Sims is a 74y o  year old female  She presents for a consultation regarding concern of possible obstructive sleep apnea  Her  hears her snoring loudly with periods of choking, absence of breath and gasping for breath during sleep  He states that symptoms have been ongoing for awhile, but seem to be worsening and occurring more regularly  She wakes up daily between 7-8:00am and feels refreshed for the most part  She feels tired midday and will doze if sedentary, such as while watching TV  Comorbid conditions:  Obesity  HTN  Asthma-COPD overlap syndrome  Type 2 diabetes  Review of Systems      Genitourinary none   Cardiology none   Gastrointestinal none   Neurology muscle weakness and numbness/tingling of an extremity   Constitutional claustrophobia, fatigue and weight change   Integumentary none   Psychiatry none   Musculoskeletal joint pain, back pain and sciatica   Pulmonary shortness of breath with activity and frequent cough   ENT none   Endocrine none   Hematological none       Sleep Study Results:  No prior sleep study    Employment:  She is currently retired  She worked many different jobs over the years, but most recently, worked in a [employer]  She worked day shift hours all her life  Sleep Schedule:       Bedtime:  Between 10:00-11:00pm      Latency:  Within 5 minutes      Wakeup time:  She gets up between 7:00-8:00am without the use of an alarm    Awakenings:       Frequency:  2-3 times per night      Causes:   For urination      Duration:  She returns to "sleep within a few minutes    Daytime Sleepiness / Inappropriate Sleep:       Most severe:  midday       Naps :  She avoids taking a nap      Inappropriate drowsiness / sleep:  She dozes when watching TV  A few times per week  Snoring:  She snores with gasping and choking noted by her     Apnea:  She has had witnessed apnea    Change in Weight:  Weight fluctuates by 5 lbs  Restless Leg Syndrome:  no clinical symptoms consistent with this diagnosis     Other Complaints:   No reports of sleep walking, sleep talking, sleep paralysis or hallucinations surrounding sleep  She wakes up with headaches occasionally  She attributes this to not having enough caffeine during the day  Social History:      Caffeine:  2 cups of coffee daily, some iced tea 1-2 glasses per day      Tobacco:   reports that she quit smoking about 27 years ago  Her smoking use included cigarettes  She started smoking about 59 years ago  She smoked an average of 1 pack per day  She has never used smokeless tobacco      E-cig/Vaping:    E-Cigarette/Vaping   • E-Cigarette Use Never User       E-Cigarette/Vaping Substances   • Nicotine No    • THC No    • CBD No    • Flavoring No    • Other No    • Unknown No          Alcohol:   reports that she does not currently use alcohol  Drugs:   reports no history of drug use  The review of systems and following portions of the patient's history were reviewed and updated as appropriate: allergies, current medications, past family history, past medical history, past social history, past surgical history and problem list         Objective:       Vitals:    05/08/23 0734   BP: 130/70   Pulse: 73   Resp: 17   Temp: 98 2 °F (36 8 °C)   SpO2: 95%   Weight: 79 8 kg (176 lb)   Height: 5' 1\" (1 549 m)     Body mass index is 33 25 kg/m²  [place] Sleepiness Scale:  Total score: 8      Current Outpatient Medications:   •  acetaminophen (TYLENOL) 325 mg tablet, Take 2 tablets (650 mg total) " by mouth every 6 (six) hours as needed for mild pain, Disp: 30 tablet, Rfl: 0  •  albuterol (PROVENTIL HFA,VENTOLIN HFA) 90 mcg/act inhaler, Inhale 2 puffs every 6 (six) hours as needed for wheezing or shortness of breath, Disp: 6 7 g, Rfl: 0  •  alendronate (FOSAMAX) 70 mg tablet, Take 1 tablet (70 mg total) by mouth every 7 days Give with at least 8 ounces of plain water first thing in the morning  Patient should be instructed to stay upright (not to lie down) for at least 30 minutes and until after first food of the day (to reduce esophageal irritation)  , Disp: 12 tablet, Rfl: 1  •  aspirin (ECOTRIN LOW STRENGTH) 81 mg EC tablet, Take 81 mg by mouth daily, Disp: , Rfl:   •  Calcium Carbonate-Vit D-Min (CALCIUM 1200 PO), Take by mouth daily, Disp: , Rfl:   •  Coenzyme Q10 (COQ-10) 10 MG CAPS, Take by mouth daily, Disp: , Rfl:   •  fluticasone-umeclidinium-vilanterol (Trelegy Ellipta) 100-62 5-25 mcg/actuation inhaler, Inhale 1 puff daily Rinse mouth after use , Disp: 180 blister, Rfl: 5  •  Loratadine 10 MG CAPS, Take by mouth daily, Disp: , Rfl:   •  losartan (COZAAR) 100 MG tablet, Take 1 tablet (100 mg total) by mouth daily, Disp: 90 tablet, Rfl: 3  •  metFORMIN (GLUCOPHAGE) 1000 MG tablet, Take 1 tablet (1,000 mg total) by mouth 2 (two) times a day, Disp: 180 tablet, Rfl: 3  •  Multiple Vitamin (MULTI-VITAMIN DAILY PO), Take by mouth daily, Disp: , Rfl:   •  rosuvastatin (CRESTOR) 20 MG tablet, Take 1 tablet (20 mg total) by mouth daily At bedtime, Disp: 90 tablet, Rfl: 3  •  VITAMIN D PO, Take 2,000 mg by mouth daily, Disp: , Rfl:   •  nystatin (MYCOSTATIN) powder, Apply topically 2 (two) times a day (Patient not taking: Reported on 1/4/2023), Disp: 30 g, Rfl: 0    Physical Exam  General Appearance:   Alert, cooperative, no distress, appears stated age, obese     Head:   Normocephalic, without obvious abnormality, atraumatic     Eyes:   Conjunctiva/corneas clear          Nose:  Nares normal, septum midline, mucosa normal, no drainage or sinus tenderness           Throat:  Lips and gums normal; edentulous, tongue normal in size and midline in position; mucosa moist and redundant bilaterally, uvula normal, tonsils normal, Mallampati class 3-4       Neck:  Supple, short, symmetrical, trachea midline, no adenopathy; no thyromegaly noted, no carotid bruit or JVD     Lungs:      Clear to auscultation bilaterally, respirations unlabored     Heart:   Regular rate and rhythm, S1 and S2 normal, no murmur, rub or gallop       Extremities:  Extremities normal, atraumatic, no cyanosis or edema       Skin:  Skin color, texture, turgor normal, no rashes or lesions       Neurologic:  No focal deficits noted  ASSESSMENT / PLAN     1  Obstructive sleep apnea  Ambulatory Referral to Sleep Medicine    Diagnostic Sleep Study      2  Insomnia, unspecified type  Diagnostic Sleep Study      3  Excessive daytime sleepiness        4  Claustrophobia              Counseling / Coordination of Care  I have spent a total time of 55 minutes on 05/08/23 in caring for this patient including Risks and benefits of tx options, Instructions for management, Patient and family education, Importance of tx compliance, Risk factor reductions, Impressions, Counseling / Coordination of care, Documenting in the medical record, Reviewing / ordering tests, medicine, procedures   and Obtaining or reviewing history    Today we discussed the anatomy and physiology of the upper airway  I pointed out how changes in this region can result in both snoring and abnormal breathing events including apneas and hypopneas  I explained the most common co-morbidities of untreated sleep apnea  After this we talked about some forms of treatment including application of positive airway pressure, mandibular advancement devices and surgery  She would consider use of PAP therapy, but has some reservations due to claustrophobia      In order to evaluate the possibility of Obstructive Sleep Apnea as a cause of the patient's symptoms, a diagnostic sleep study will be completed to identify the presence or absence of abnormal nocturnal breathing  If significant abnormal nocturnal breathing is detected, nasal CPAP will be titrated to find the optimum pressure needed to maintain upper airway patency during sleep  Following testing, the patient will return to the [address] for set up of CPAP equipment, followed by a compliance follow up visit 31-91 days after the set up  The study details will be reviewed in detail at that time  The following instructions have been given to the patient today:    Patient Instructions   1  Schedule diagnostic sleep study   2  Schedule DME appointment for CPAP equipment, if needed  3  Schedule follow up visit for CPAP compliance and recheck         Nursing Support:  When: Monday through Friday 7A-5PM except holidays  Where: Our direct line is [phone]  If you are having a true emergency please call 911  In the event that the line is busy or it is after hours please leave a voice message and we will return your call  Please speak clearly, leaving your full name, birth date, best number to reach you and the reason for your call  Medication refills: We will need the name of the medication, the dosage, the ordering provider, whether you get a 30 or 90 day refill, and the pharmacy name and address  Medications will be ordered by the provider only  Nurses cannot call in prescriptions  Please allow 7 days for medication refills  Physician requested updates: If your provider requested that you call with an update after starting medication, please be ready to provide us the medication and dosage, what time you take your medication, the time you attempt to fall asleep, time you fall asleep, when you wake up, and what time you get out of bed  Sleep Study Results:  We will contact you with sleep study results and/or next steps after the "physician has reviewed your testing  Yasmani Novak, [address] [facility]      Portions of the record may have been created with voice recognition software  Occasional wrong word or \"sound a like\" substitutions may have occurred due to the inherent limitations of voice recognition software  Read the chart carefully and recognize, using context, where substitutions have occurred                   "

## 2023-05-08 NOTE — PATIENT INSTRUCTIONS
1  Schedule diagnostic sleep study   2  Schedule DME appointment for CPAP equipment, if needed  3  Schedule follow up visit for CPAP compliance and recheck         Nursing Support:  When: Monday through Friday 7A-5PM except holidays  Where: Our direct line is [phone]  If you are having a true emergency please call 911  In the event that the line is busy or it is after hours please leave a voice message and we will return your call  Please speak clearly, leaving your full name, birth date, best number to reach you and the reason for your call  Medication refills: We will need the name of the medication, the dosage, the ordering provider, whether you get a 30 or 90 day refill, and the pharmacy name and address  Medications will be ordered by the provider only  Nurses cannot call in prescriptions  Please allow 7 days for medication refills  Physician requested updates: If your provider requested that you call with an update after starting medication, please be ready to provide us the medication and dosage, what time you take your medication, the time you attempt to fall asleep, time you fall asleep, when you wake up, and what time you get out of bed  Sleep Study Results: We will contact you with sleep study results and/or next steps after the physician has reviewed your testing

## 2023-05-10 ENCOUNTER — APPOINTMENT (OUTPATIENT)
Dept: LAB | Age: 73
End: 2023-05-10

## 2023-05-10 DIAGNOSIS — N18.32 STAGE 3B CHRONIC KIDNEY DISEASE (HCC): ICD-10-CM

## 2023-05-10 LAB
ALBUMIN SERPL BCP-MCNC: 3.5 G/DL (ref 3.5–5)
ALP SERPL-CCNC: 62 U/L (ref 46–116)
ALT SERPL W P-5'-P-CCNC: 24 U/L (ref 12–78)
ANION GAP SERPL CALCULATED.3IONS-SCNC: 2 MMOL/L (ref 4–13)
AST SERPL W P-5'-P-CCNC: 30 U/L (ref 5–45)
BACTERIA UR QL AUTO: ABNORMAL /HPF
BASOPHILS # BLD AUTO: 0.07 THOUSANDS/ÂΜL (ref 0–0.1)
BASOPHILS NFR BLD AUTO: 1 % (ref 0–1)
BILIRUB SERPL-MCNC: 0.3 MG/DL (ref 0.2–1)
BILIRUB UR QL STRIP: NEGATIVE
BUN SERPL-MCNC: 23 MG/DL (ref 5–25)
CALCIUM SERPL-MCNC: 9.5 MG/DL (ref 8.3–10.1)
CHLORIDE SERPL-SCNC: 108 MMOL/L (ref 96–108)
CLARITY UR: CLEAR
CO2 SERPL-SCNC: 28 MMOL/L (ref 21–32)
COLOR UR: ABNORMAL
CREAT SERPL-MCNC: 1.5 MG/DL (ref 0.6–1.3)
CREAT UR-MCNC: 71.4 MG/DL
CREAT UR-MCNC: 71.4 MG/DL
EOSINOPHIL # BLD AUTO: 0.37 THOUSAND/ÂΜL (ref 0–0.61)
EOSINOPHIL NFR BLD AUTO: 4 % (ref 0–6)
ERYTHROCYTE [DISTWIDTH] IN BLOOD BY AUTOMATED COUNT: 11.9 % (ref 11.6–15.1)
GFR SERPL CREATININE-BSD FRML MDRD: 33 ML/MIN/1.73SQ M
GLUCOSE P FAST SERPL-MCNC: 147 MG/DL (ref 65–99)
GLUCOSE UR STRIP-MCNC: NEGATIVE MG/DL
HCT VFR BLD AUTO: 38.1 % (ref 34.8–46.1)
HGB BLD-MCNC: 12.2 G/DL (ref 11.5–15.4)
HGB UR QL STRIP.AUTO: NEGATIVE
IMM GRANULOCYTES # BLD AUTO: 0.03 THOUSAND/UL (ref 0–0.2)
IMM GRANULOCYTES NFR BLD AUTO: 0 % (ref 0–2)
KETONES UR STRIP-MCNC: NEGATIVE MG/DL
LEUKOCYTE ESTERASE UR QL STRIP: ABNORMAL
LYMPHOCYTES # BLD AUTO: 2.47 THOUSANDS/ÂΜL (ref 0.6–4.47)
LYMPHOCYTES NFR BLD AUTO: 27 % (ref 14–44)
MAGNESIUM SERPL-MCNC: 2 MG/DL (ref 1.6–2.6)
MCH RBC QN AUTO: 29.5 PG (ref 26.8–34.3)
MCHC RBC AUTO-ENTMCNC: 32 G/DL (ref 31.4–37.4)
MCV RBC AUTO: 92 FL (ref 82–98)
MICROALBUMIN UR-MCNC: 36.2 MG/L (ref 0–20)
MICROALBUMIN/CREAT 24H UR: 51 MG/G CREATININE (ref 0–30)
MONOCYTES # BLD AUTO: 0.77 THOUSAND/ÂΜL (ref 0.17–1.22)
MONOCYTES NFR BLD AUTO: 9 % (ref 4–12)
NEUTROPHILS # BLD AUTO: 5.33 THOUSANDS/ÂΜL (ref 1.85–7.62)
NEUTS SEG NFR BLD AUTO: 59 % (ref 43–75)
NITRITE UR QL STRIP: NEGATIVE
NON-SQ EPI CELLS URNS QL MICRO: ABNORMAL /HPF
NRBC BLD AUTO-RTO: 0 /100 WBCS
PH UR STRIP.AUTO: 5.5 [PH]
PHOSPHATE SERPL-MCNC: 3.8 MG/DL (ref 2.3–4.1)
PLATELET # BLD AUTO: 280 THOUSANDS/UL (ref 149–390)
PMV BLD AUTO: 10.9 FL (ref 8.9–12.7)
POTASSIUM SERPL-SCNC: 4 MMOL/L (ref 3.5–5.3)
PROT SERPL-MCNC: 7.1 G/DL (ref 6.4–8.4)
PROT UR STRIP-MCNC: NEGATIVE MG/DL
PROT UR-MCNC: 14 MG/DL
PROT/CREAT UR: 0.2 MG/G{CREAT} (ref 0–0.1)
PTH-INTACT SERPL-MCNC: 47.5 PG/ML (ref 18.4–80.1)
RBC # BLD AUTO: 4.14 MILLION/UL (ref 3.81–5.12)
RBC #/AREA URNS AUTO: ABNORMAL /HPF
SODIUM SERPL-SCNC: 138 MMOL/L (ref 135–147)
SP GR UR STRIP.AUTO: 1.01 (ref 1–1.03)
UROBILINOGEN UR STRIP-ACNC: <2 MG/DL
WBC # BLD AUTO: 9.04 THOUSAND/UL (ref 4.31–10.16)
WBC #/AREA URNS AUTO: ABNORMAL /HPF

## 2023-05-17 ENCOUNTER — OFFICE VISIT (OUTPATIENT)
Dept: NEPHROLOGY | Facility: CLINIC | Age: 73
End: 2023-05-17

## 2023-05-17 VITALS
OXYGEN SATURATION: 95 % | HEIGHT: 61 IN | SYSTOLIC BLOOD PRESSURE: 160 MMHG | BODY MASS INDEX: 33.23 KG/M2 | HEART RATE: 67 BPM | DIASTOLIC BLOOD PRESSURE: 70 MMHG | WEIGHT: 176 LBS

## 2023-05-17 DIAGNOSIS — N18.32 TYPE 2 DIABETES MELLITUS WITH STAGE 3B CHRONIC KIDNEY DISEASE, UNSPECIFIED WHETHER LONG TERM INSULIN USE (HCC): ICD-10-CM

## 2023-05-17 DIAGNOSIS — E11.22 TYPE 2 DIABETES MELLITUS WITH STAGE 3B CHRONIC KIDNEY DISEASE, UNSPECIFIED WHETHER LONG TERM INSULIN USE (HCC): ICD-10-CM

## 2023-05-17 DIAGNOSIS — I10 ESSENTIAL HYPERTENSION: ICD-10-CM

## 2023-05-17 DIAGNOSIS — N18.32 STAGE 3B CHRONIC KIDNEY DISEASE (HCC): Primary | ICD-10-CM

## 2023-05-17 DIAGNOSIS — E66.9 OBESITY, CLASS I, BMI 30-34.9: ICD-10-CM

## 2023-05-17 NOTE — PROGRESS NOTES
Assessment & Plan:    1  Stage 3b chronic kidney disease ([facility] [address] )  -     Comprehensive metabolic panel; Future; Expected date: 07/11/2023  -     PTH, intact; Future; Expected date: 07/11/2023  -     Uric acid; Future; Expected date: 07/11/2023  -     CBC and Platelet; Future; Expected date: 07/11/2023  -     Urinalysis with microscopic; Future; Expected date: 07/11/2023  -     Albumin / creatinine urine ratio; Future; Expected date: 07/11/2023    2  Essential hypertension    3  Type 2 diabetes mellitus with stage 3b chronic kidney disease, unspecified whether long term insulin use (HCC)    4  Obesity, Class I, BMI 30-34 9       1  CKD G3b A2  Creatinine baseline in the 1 3-1 5 range  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, hypertensive nephrosclerosis, and diabetic nephropathy  Not biopsy-proven  5/10/2023:  Sodium 138, potassium 4 0, chloride 108, T CO2 28 within normal limits  Hemoglobin 12 2 no evidence of anemia  Microalbumin creatinine ratio minimally elevated at 51 mg/g creatinine  PTH appropriate for CKD stage of 47 5  Appears euvolemic at present  Blood pressure above goal, see discussion below  Ideally blood pressure less than 130/80  She needs to reduce her sodium, caffeine, stress  Reviewed CKD stages, creatinine and EGFR trends  Discussed addition of SGLT2 inhibitor to help with proteinuria and CKD progression  This medication may be expensive for patient and I discussed the risk, benefits, indications, complications  She can evaluate the cost with insurance  We are opting to hold this medication at this time  Can continue to review benefits and indications of medication  Follow-up on 8/18 with CBC, PTH, uric acid, UA, CMP, albumin to creatinine ratio prior to visit  2   Essential hypertension, blood pressure uncontrolled during visit  Blood pressure 160/70 on left arm and 144/78 in right arm  Discussed minimizing caffeine use    Her blood pressure may have been recently elevated due to the stress of moving  She is not following her blood pressure consistently at home and we discussed the importance of this  She may have a component of whitecoat hypertension but this would need to be evaluated blood pressure trends at home  She should start monitoring her blood pressure once daily at home  If her blood pressure greater than 068 systolic she should call the office  Her diastolics have been reasonable  She has been eating a lot of fast food out and she knows that she needs to reduce her sodium intake which can contribute to blood pressure  For now, we have decided to hold on any addition of antihypertensives  She is only maintained on losartan 100 mg/day  She would need an additional antihypertensive to control her blood pressure  3   Type 2 diabetes with CKD 3B  Appears under ideal control with an A1c 6 9  She is only maintained on metformin 1000 mg twice a day  Current eGFR last metformin use  We did discuss adding SGLT2 inhibitor to her regiment  Reviewed risk/benefits  She would like to consider options and hold on any additional medications at this point  She can evaluate with her insurance  4   Obesity, class I, encourage weight loss  5   Health maintenance  Continue age-appropriate vaccines and cancer screening per primary  The benefits, risks and alternatives to the treatment plan were discussed at this visit  Patient was advised of common adverse effects of any medical therapies prescribed  All questions were answered and discussed with the patient and any accompanying family members or caretakers  Subjective:      Patient ID: Linda Edwards is a 74 y o  female presenting for follow-up of CKD 3B in the [facility] office  Last seen by Jane Alvarez on 1/9/2023  Radha Freedman Appears to have a creatinine baseline 1 3-1 5  She has a history of hypertension maintained on losartan 100 mg/day  No changes made at her last visit      HPI   In the "interim since her last visit, denies any new medical conditions/surgery/allergy/to medical conditions  In terms of her diabetic control, reports under ideal control  Last A1c in November was 6 9  Does not check BG at home  Takes metformin 1000mg BID  Has COPD and chronic SOB no changes  No recent flares/infection/fevers  NO UTI/pyelo/yeast infection since pregnancy  Denies hx DKA  /70 when taken by me on left arm  She took her losartan at 8 AM   Does not eat black licorice  Drinks 2 cups of coffee per day, had 1 cup this AM Reports robitussin use for cough at times  Denies NSAID use  Drinks water, ice tea, cranberry juice  Drinking close to 2 quart per day  The following portions of the patient's history were reviewed and updated as appropriate: allergies, current medications, past family history, past medical history, past social history, past surgical history, and problem list     Review of Systems   Constitutional: Negative  Respiratory: Negative  Cardiovascular: Negative  Gastrointestinal: Negative  Genitourinary: Negative  Neurological: Negative  All other systems reviewed and are negative  Objective:      /70 (BP Location: Left arm, Patient Position: Sitting, Cuff Size: Standard) Comment: 144/78 in right arm  Pulse 67   Ht 5' 1\" (1 549 m)   Wt 79 8 kg (176 lb)   SpO2 95%   BMI 33 25 kg/m²          Physical Exam  Vitals reviewed  Constitutional:       General: She is not in acute distress  HENT:      Head: Normocephalic  Nose: Nose normal       Mouth/Throat:      Mouth: Mucous membranes are moist       Pharynx: Oropharynx is clear  Cardiovascular:      Rate and Rhythm: Normal rate and regular rhythm  Heart sounds: No friction rub  Pulmonary:      Breath sounds: Normal breath sounds  No wheezing or rales  Abdominal:      General: Bowel sounds are normal  There is no distension  Palpations: Abdomen is soft        " Tenderness: There is no abdominal tenderness  There is no guarding  Musculoskeletal:      Right lower leg: No edema  Left lower leg: No edema  Skin:     General: Skin is warm  Coloration: Skin is not jaundiced  Findings: No bruising  Neurological:      General: No focal deficit present  Mental Status: She is alert and oriented to person, place, and time  Mental status is at baseline  Cranial Nerves: No cranial nerve deficit     Psychiatric:         Mood and Affect: Mood normal          Behavior: Behavior normal              Lab Results   Component Value Date    SODIUM 138 05/10/2023    K 4 0 05/10/2023     05/10/2023    CO2 28 05/10/2023    AGAP 2 (L) 05/10/2023    BUN 23 05/10/2023    CREATININE 1 50 (H) 05/10/2023    GLUC 101 01/06/2020    GLUF 147 (H) 05/10/2023    CALCIUM 9 5 05/10/2023    AST 30 05/10/2023    ALT 24 05/10/2023    ALKPHOS 62 05/10/2023    TP 7 1 05/10/2023    TBILI 0 30 05/10/2023    EGFR 33 05/10/2023      Lab Results   Component Value Date    CREATININE 1 50 (H) 05/10/2023    CREATININE 1 33 (H) 12/09/2022    CREATININE 1 58 (H) 11/17/2022    CREATININE 1 38 (H) 06/02/2022    CREATININE 1 28 05/20/2022    CREATININE 1 31 (H) 01/10/2022    CREATININE 1 22 05/19/2021    CREATININE 1 18 05/26/2020    CREATININE 0 80 01/06/2020    CREATININE 0 98 01/05/2020    CREATININE 1 03 01/04/2020    CREATININE 1 12 01/03/2020    CREATININE 1 22 12/10/2019    CREATININE 1 46 (H) 11/22/2019    CREATININE 1 01 05/17/2019      Lab Results   Component Value Date    COLORU Light Yellow 05/10/2023    CLARITYU Clear 05/10/2023    SPECGRAV 1 012 05/10/2023    PHUR 5 5 05/10/2023    LEUKOCYTESUR Small (A) 05/10/2023    NITRITE Negative 05/10/2023    PROTEIN UA Negative 05/10/2023    GLUCOSEU Negative 05/10/2023    KETONESU Negative 05/10/2023    UROBILINOGEN <2 0 05/10/2023    BILIRUBINUR Negative 05/10/2023    BLOODU Negative 05/10/2023    RBCUA None Seen 05/10/2023    WBCUA 4-10 "(A) 05/10/2023    EPIS Occasional 05/10/2023    BACTERIA None Seen 05/10/2023      No results found for: LABPROT  No results found for: Leigh Do  Lab Results   Component Value Date    WBC 9 04 05/10/2023    HGB 12 2 05/10/2023    HCT 38 1 05/10/2023    MCV 92 05/10/2023     05/10/2023      Lab Results   Component Value Date    HGB 12 2 05/10/2023    HGB 12 0 06/02/2022    HGB 12 1 05/20/2022    HGB 12 2 01/10/2022    HGB 12 8 05/19/2021      No results found for: IRON, TIBC, FERRITIN   No results found for: PTHCALCIUM, [id number], PHOSPHORUS   Lab Results   Component Value Date    CHOLESTEROL 116 05/20/2022    HDL 69 05/20/2022    LDLCALC 29 05/20/2022    TRIG 88 05/20/2022      Lab Results   Component Value Date    URICACID 5 2 01/10/2022      Lab Results   Component Value Date    HGBA1C 6 9 (H) 11/17/2022      No results found for: TSHANTIBODY, L7BYFYH, FREET4   No results found for: YULIET, DSDNAAB, RFIGM   No results found for: PROT, UPEP, IMMUNOFIX, KAPPALAMBDA, KAPPALIGHT     Portions of the record may have been created with voice recognition software  Occasional wrong word or \"sound a like\" substitutions may have occurred due to the inherent limitations of voice recognition software  Read the chart carefully and recognize, using context, where substitutions have occurred  If you have any questions, please contact the dictating provider        "

## 2023-05-17 NOTE — PATIENT INSTRUCTIONS
Discussed addition of a medicine called Anette Kerr, these are diabetic medicines but are being used without diabetes to help with kidney protection and protein in the urine  His medicine May be costly  We discussed the side effect profile including infections and diabetic ketoacidosis  Your blood work Would have to be monitored closely if you would start on these medications  You can look through your insurance to see if these medicines would be cost effective to you  For now we are holding on adding these medicines  Your kidney function is stable at 33%  You have mild protein in your urine  Start monitoring blood pressure once a day at home  Call if top blood pressure number greater than 150  If this is greater than 150 consistently you will need an additional blood pressure pill  Your blood pressure was elevated in the office but this may be due to recent stress, recent activity, caffeine use  Try to minimize your sodium intake to 2 g/day  Minimize caffeine intake  Hydrate to 2 quart per day

## 2023-05-18 ENCOUNTER — HOSPITAL ENCOUNTER (OUTPATIENT)
Dept: SLEEP CENTER | Facility: CLINIC | Age: 73
Discharge: HOME | End: 2023-05-18

## 2023-05-18 DIAGNOSIS — G47.00 INSOMNIA, UNSPECIFIED TYPE: ICD-10-CM

## 2023-05-18 DIAGNOSIS — G47.33 OBSTRUCTIVE SLEEP APNEA: ICD-10-CM

## 2023-05-19 NOTE — PROGRESS NOTES
Sleep Study Documentation    Pre-Sleep Study       Sleep testing procedure explained to patient:YES    Patient napped prior to study:NO    Caffeine:Dayshift worker after 12PM   Caffeine use:YES- hot cocoa  6 to 18 ounces    Alcohol:Dayshift workers after 5PM: Alcohol use:NO    Typical day for patient:YES       Study Documentation    Sleep Study Indications: EDS, Witnessed gasping    Sleep Study: Diagnostic   Snore:Mild  Supplemental O2: no    Minimum SaO2 89 9%  Baseline SaO2 92%      EKG abnormalities: no     EEG abnormalities: no    Sleep Study Recorded < 2 hours: N/A    Sleep Study Recorded > 2 hours but incomplete study: N/A    Sleep Study Recorded 6 hours but no sleep obtained: NO    Patient classification: retired       Post-Sleep Study     Medication used at bedtime or during sleep study:NO    Patient reports time it took to fall asleep:20 to 30 minutes    Patient reports waking up during study:1 to 2 times  Patient reports returning to sleep without difficulty  Patient reports sleeping 6 to 8 hours without dreaming  Patient reports sleep during study:typical    Patient rated sleepiness: Somewhat sleepy or tired    PAP treatment:no

## 2023-05-24 ENCOUNTER — TELEPHONE (OUTPATIENT)
Dept: SLEEP CENTER | Facility: CLINIC | Age: 73
End: 2023-05-24

## 2023-05-24 NOTE — TELEPHONE ENCOUNTER
Spoke with the patient advised sleep study results and that iron level was recommended  Offered to schedule follow up  Patient declined for now she wants to discuss with her PCP  Did you want to order the lab work?

## 2023-05-24 NOTE — TELEPHONE ENCOUNTER
----- Message from Lizabeth Spurling, [address]  sent at 5/22/2023 12:52 PM EDT -----  Sleep apnea was not confirmed during testing  There were a large number of limb movements noted, which may be contributing to symptoms  Magnesium level was in mid-normal range  Iron panel ordered  Recommend completing lab test as fasting   Follow up to review results and plan of treatment

## 2023-05-26 ENCOUNTER — OFFICE VISIT (OUTPATIENT)
Dept: FAMILY MEDICINE CLINIC | Facility: CLINIC | Age: 73
End: 2023-05-26

## 2023-05-26 VITALS
HEART RATE: 64 BPM | BODY MASS INDEX: 33.44 KG/M2 | SYSTOLIC BLOOD PRESSURE: 148 MMHG | OXYGEN SATURATION: 95 % | WEIGHT: 177 LBS | DIASTOLIC BLOOD PRESSURE: 80 MMHG

## 2023-05-26 DIAGNOSIS — I12.9 BENIGN HYPERTENSION WITH CKD (CHRONIC KIDNEY DISEASE) STAGE III (HCC): ICD-10-CM

## 2023-05-26 DIAGNOSIS — Z12.31 ENCOUNTER FOR SCREENING MAMMOGRAM FOR BREAST CANCER: ICD-10-CM

## 2023-05-26 DIAGNOSIS — I10 BENIGN ESSENTIAL HYPERTENSION: ICD-10-CM

## 2023-05-26 DIAGNOSIS — Z23 NEED FOR PROPHYLACTIC VACCINATION WITH COMBINED DIPHTHERIA-TETANUS-PERTUSSIS (DTP) VACCINE: ICD-10-CM

## 2023-05-26 DIAGNOSIS — Z23 NEED FOR PROPHYLACTIC VACCINATION AND INOCULATION AGAINST VARICELLA: ICD-10-CM

## 2023-05-26 DIAGNOSIS — E11.51 CONTROLLED TYPE 2 DIABETES MELLITUS WITH DIABETIC PERIPHERAL ANGIOPATHY WITHOUT GANGRENE, WITHOUT LONG-TERM CURRENT USE OF INSULIN (HCC): ICD-10-CM

## 2023-05-26 DIAGNOSIS — E11.22 TYPE 2 DIABETES MELLITUS WITH STAGE 3B CHRONIC KIDNEY DISEASE, UNSPECIFIED WHETHER LONG TERM INSULIN USE (HCC): ICD-10-CM

## 2023-05-26 DIAGNOSIS — N18.30 BENIGN HYPERTENSION WITH CKD (CHRONIC KIDNEY DISEASE) STAGE III (HCC): ICD-10-CM

## 2023-05-26 DIAGNOSIS — M46.1 SACROILIITIS, NOT ELSEWHERE CLASSIFIED (HCC): ICD-10-CM

## 2023-05-26 DIAGNOSIS — E66.9 OBESITY, CLASS I, BMI 30-34.9: ICD-10-CM

## 2023-05-26 DIAGNOSIS — Z00.00 MEDICARE ANNUAL WELLNESS VISIT, SUBSEQUENT: Primary | ICD-10-CM

## 2023-05-26 DIAGNOSIS — N18.32 TYPE 2 DIABETES MELLITUS WITH STAGE 3B CHRONIC KIDNEY DISEASE, UNSPECIFIED WHETHER LONG TERM INSULIN USE (HCC): ICD-10-CM

## 2023-05-26 LAB — SL AMB POCT HEMOGLOBIN AIC: 6.6 (ref ?–6.5)

## 2023-05-26 RX ORDER — ZOSTER VACCINE RECOMBINANT, ADJUVANTED 50 MCG/0.5
0.5 KIT INTRAMUSCULAR ONCE
Qty: 1 EACH | Refills: 1 | Status: SHIPPED | OUTPATIENT
Start: 2023-05-26 | End: 2023-05-26

## 2023-05-26 NOTE — ASSESSMENT & PLAN NOTE
Blood pressure is slightly on the higher side  Continue current dose of losartan, kidney function seems to be close to baseline  She will continue to monitor at home, she will update me during her next appointment

## 2023-05-26 NOTE — ASSESSMENT & PLAN NOTE
Lab Results   Component Value Date    CREATININE 1 50 (H) 05/10/2023    CREATININE 1 33 (H) 12/09/2022    CREATININE 1 58 (H) 11/17/2022    EGFR 33 05/10/2023    EGFR 39 12/09/2022    EGFR 32 11/17/2022   Close to baseline  Continue follow-up with nephrology

## 2023-05-26 NOTE — ASSESSMENT & PLAN NOTE
Hemoglobin A1c went down  Continue current management with metformin    Lab Results   Component Value Date    HGBA1C 6 6 (A) 05/26/2023

## 2023-05-26 NOTE — PROGRESS NOTES
Assessment and Plan:     Problem List Items Addressed This Visit        Endocrine    Type 2 diabetes mellitus with stage 3 chronic kidney disease ([facility] [place] [address] )    Relevant Orders    POCT hemoglobin A1c (Completed)    Controlled type 2 diabetes mellitus with diabetic peripheral angiopathy without gangrene, without long-term current use of insulin (HCC)     Hemoglobin A1c went down  Continue current management with metformin  Lab Results   Component Value Date    HGBA1C 6 6 (A) 05/26/2023               Cardiovascular and Mediastinum    Benign essential hypertension     Blood pressure is slightly on the higher side  Continue current dose of losartan, kidney function seems to be close to baseline  She will continue to monitor at home, she will update me during her next appointment  Benign hypertension with CKD (chronic kidney disease) stage III (HCC)     Lab Results   Component Value Date    CREATININE 1 50 (H) 05/10/2023    CREATININE 1 33 (H) 12/09/2022    CREATININE 1 58 (H) 11/17/2022    EGFR 33 05/10/2023    EGFR 39 12/09/2022    EGFR 32 11/17/2022   Close to baseline  Continue follow-up with nephrology  Other    Obesity, Class I, BMI 30-34 9     Slowly losing weight, continue with low-carb diet          Other Visit Diagnoses     Medicare annual wellness visit, subsequent    -  Primary    Encounter for screening mammogram for breast cancer        Need for prophylactic vaccination and inoculation against varicella        Relevant Medications    Zoster Vac Recomb Adjuvanted (Shingrix) 50 MCG/0 5ML SUSR    Need for prophylactic vaccination with combined diphtheria-tetanus-pertussis (DTP) vaccine        Relevant Medications    tetanus-diphtheria-acellular pertussis (ADACEL) 5-2-15 5 LF-mcg/0 5 injection    Sacroiliitis, not elsewhere classified [facility])               Preventive health issues were discussed with patient, and age appropriate screening tests were ordered as noted in patient's After Visit Summary  Personalized health advice and appropriate referrals for health education or preventive services given if needed, as noted in patient's After Visit Summary  History of Present Illness:     Patient presents for a Medicare Wellness Visit    Patient came today for Medicare wellness visit and to follow-up on her chronic problems  She denies colonoscopy and mammogram   Agreed for tetanus shot and shingles scripts are given  Vital signs are acceptable except of elevated BMI which is stable and slightly elevated blood pressures  Patient Care Team:  Lewis Rodney MD as PCP - General (Internal Medicine)  Opal Puri (Pediatrics)  [address] (Optometry)  ROSCOE Vo (Neurology)  Raji Damon MD (Pulmonary Disease)     Review of Systems:     Review of Systems   Constitutional: Negative for chills and fever  Respiratory: Negative for chest tightness, shortness of breath and wheezing  Cardiovascular: Negative for chest pain and leg swelling  Gastrointestinal: Negative for abdominal pain  Musculoskeletal: Positive for arthralgias  Negative for gait problem and joint swelling          Problem List:     Patient Active Problem List   Diagnosis   • Allergic rhinitis   • Benign essential hypertension   • Chronic low back pain   • Controlled type 2 diabetes mellitus ([facility] [address] )   • Dupuytren's contracture   • Hearing loss of right ear   • Hyperlipidemia   • Age-related osteoporosis without current pathological fracture   • Restless legs syndrome   • Sacroiliitis (HCC)   • Obstructive sleep apnea   • Pain of left upper extremity   • Type 2 diabetes mellitus with stage 3 chronic kidney disease (HCC)   • Pain in right ankle and joints of right foot   • Left lower quadrant abdominal mass   • Abnormal EKG   • Obesity   • Obesity, Class I, BMI 30-34 9   • Benign hypertension with CKD (chronic kidney disease) stage III ([facility])   • Asthma-COPD overlap syndrome ([facility])   • Left hip pain   • Stage 3b chronic kidney disease (HCC)   • Insomnia   • Excessive daytime sleepiness   • Claustrophobia   • Controlled type 2 diabetes mellitus with diabetic peripheral angiopathy without gangrene, without long-term current use of insulin ([facility])      Past Medical and Surgical History:     Past Medical History:   Diagnosis Date   • Asthma    • COPD (chronic obstructive pulmonary disease) ([facility])    • Diabetes ([facility] [address] )    • [facility]    • Hyperlipidemia    • Hypertension    • Osteoarthritis    • Osteoporosis    • Seasonal allergies      Past Surgical History:   Procedure Laterality Date   • BILATERAL SALPINGOOPHORECTOMY Bilateral 1/3/2020    Procedure: BILATERAL SALPINGO-OOPHORECTOMY;  Surgeon: Christo Ledezma MD;  Location: [facility];  Service: Gynecology Oncology   • CATARACT EXTRACTION Bilateral    • CHOLECYSTECTOMY      [id number]  LAST ASSESSED   • EYE SURGERY      cataracts removed   • HYSTERECTOMY      [id number]  LAST ASSESSED   • [place] EXPLORATORY LAPAROTOMY CELIOTOMY W/WO BIOPSY SPX N/A 1/3/2020    Procedure: LAPAROTOMY EXPLORATORY;  Surgeon: Christo Ledezma MD;  Location: [facility];  Service: Gynecology Oncology   • TUBAL LIGATION        Family History:     Family History   Problem Relation Age of Onset   • Heart disease Mother    • Rheumatic fever Father    • Diabetes Family         MELLITUS   • Diabetes Family         MELLITUS      Social History:     Social History     Socioeconomic History   • Marital status: /Civil Union     Spouse name: None   • Number of children: None   • Years of education: None   • Highest education level: None   Occupational History   • None   Tobacco Use   • Smoking status: Former     Packs/day: 1 00     Types: Cigarettes     Start date: 1964     Quit date:      Years since quittin 4   • Smokeless tobacco: Never   Vaping Use   • Vaping Use: Never used   Substance and Sexual Activity   • Alcohol use: Not Currently   • Drug use: No   • Sexual activity: Not Currently   Other Topics Concern   • None   Social History Narrative   • None     Social Determinants of Health     Financial Resource Strain: Low Risk  (5/26/2023)    Overall Financial Resource Strain (CARDIA)    • Difficulty of Paying Living Expenses: Not hard at all   Food Insecurity: Not on file   Transportation Needs: No Transportation Needs (5/26/2023)    PRAPARE - Transportation    • Lack of Transportation (Medical): No    • Lack of Transportation (Non-Medical): No   Physical Activity: Not on file   Stress: Not on file   Social Connections: Not on file   Intimate Partner Violence: Not on file   Housing Stability: Not on file      Medications and Allergies:     Current Outpatient Medications   Medication Sig Dispense Refill   • acetaminophen (TYLENOL) 325 mg tablet Take 2 tablets (650 mg total) by mouth every 6 (six) hours as needed for mild pain 30 tablet 0   • albuterol (PROVENTIL HFA,VENTOLIN HFA) 90 mcg/act inhaler Inhale 2 puffs every 6 (six) hours as needed for wheezing or shortness of breath 6 7 g 0   • alendronate (FOSAMAX) 70 mg tablet Take 1 tablet (70 mg total) by mouth every 7 days Give with at least 8 ounces of plain water first thing in the morning  Patient should be instructed to stay upright (not to lie down) for at least 30 minutes and until after first food of the day (to reduce esophageal irritation)   12 tablet 1   • aspirin (ECOTRIN LOW STRENGTH) 81 mg EC tablet Take 81 mg by mouth daily     • Calcium Carbonate-Vit D-Min (CALCIUM 1200 PO) Take by mouth daily     • Coenzyme Q10 (COQ-10) 10 MG CAPS Take by mouth daily     • Fluticasone-Umeclidin-Vilant (TRELEGY ELLIPTA IN) Inhale 100 tablets     • Loratadine 10 MG CAPS Take by mouth daily     • losartan (COZAAR) 100 MG tablet Take 1 tablet (100 mg total) by mouth daily 90 tablet 3   • metFORMIN (GLUCOPHAGE) 1000 MG tablet Take 1 tablet (1,000 mg total) by mouth 2 (two) times a day 180 tablet 3   • Multiple Vitamin (MULTI-VITAMIN DAILY PO) Take by mouth daily     • nystatin (MYCOSTATIN) powder Apply topically 2 (two) times a day 30 g 0   • rosuvastatin (CRESTOR) 20 MG tablet Take 1 tablet (20 mg total) by mouth daily At bedtime 90 tablet 3   • tetanus-diphtheria-acellular pertussis (ADACEL) 5-2-15 5 LF-mcg/0 5 injection Inject 0 5 mL into a muscle once for 1 dose 0 5 mL 0   • VITAMIN D PO Take 2,000 mg by mouth daily     • Zoster Vac Recomb Adjuvanted (Shingrix) 50 MCG/0 5ML SUSR Inject 0 5 mL into a muscle once for 1 dose Repeat dose in 2 to 6 months 1 each 1   • fluticasone-umeclidinium-vilanterol (Trelegy Ellipta) 100-62 5-25 mcg/actuation inhaler Inhale 1 puff daily Rinse mouth after use  180 blister 5     No current facility-administered medications for this visit  Allergies   Allergen Reactions   • Penicillins Hives      Immunizations:     Immunization History   Administered Date(s) Administered   • COVID-19 MODERNA VACC 0 25 ML IM BOOSTER 01/04/2022   • COVID-19 MODERNA VACC 0 5 ML IM 04/06/2021, 05/04/2021   • Influenza Split High Dose Preservative Free IM 12/01/2016, 10/10/2017   • Influenza, high dose seasonal 0 7 mL 11/21/2018, 11/21/2019, 11/17/2020, 11/23/2021, 11/25/2022   • Influenza, seasonal, injectable 10/06/2018, 12/04/2019, 10/07/2020, 11/04/2020, 11/17/2020, 11/20/2020   • Pneumococcal Conjugate 13-Valent 12/01/2016   • Pneumococcal Polysaccharide PPV23 01/16/2018      Health Maintenance:         Topic Date Due   • Breast Cancer Screening: Mammogram  Never done   • Colorectal Cancer Screening  05/26/2024 (Originally 5/17/2020)   • DXA SCAN  08/01/2024   • Hepatitis C Screening  Completed     There are no preventive care reminders to display for this patient  Medicare Screening Tests and Risk Assessments:     Catalino Colon is here for her Subsequent Wellness visit  Health Risk Assessment:   Patient rates overall health as good  Patient feels that their physical health rating is same   Patient is very satisfied with their life  Eyesight was rated as same  Hearing was rated as same  Patient feels that their emotional and mental health rating is same  Patients states they are sometimes angry  Patient states they are often unusually tired/fatigued  Pain experienced in the last 7 days has been none  Patient states that she has experienced weight loss or gain in last 6 months  Fall Risk Screening: In the past year, patient has experienced: history of falling in past year    Number of falls: 1  Injured during fall?: Yes    Feels unsteady when standing or walking?: Yes    Worried about falling?: Yes      Urinary Incontinence Screening:   Patient has leaked urine accidently in the last six months  Home Safety:  Patient does not have trouble with stairs inside or outside of their home  Patient has working smoke alarms and has working carbon monoxide detector  Nutrition:   Current diet is Regular  Medications:   Patient is currently taking over-the-counter supplements  OTC medications include: see medication list  Patient is able to manage medications  Activities of Daily Living (ADLs)/Instrumental Activities of Daily Living (IADLs):   Walk and transfer into and out of bed and chair?: Yes  Dress and groom yourself?: Yes    Bathe or shower yourself?: Yes    Feed yourself?  Yes  Do your laundry/housekeeping?: Yes  Manage your money, pay your bills and track your expenses?: Yes  Make your own meals?: Yes    Do your own shopping?: Yes    Previous Hospitalizations:   Any hospitalizations or ED visits within the last 12 months?: No      Advance Care Planning:   Living will: Yes    Durable POA for healthcare: No    Advanced directive: Yes      PREVENTIVE SCREENINGS      Cardiovascular Screening:    General: Screening Not Indicated and History Lipid Disorder      Diabetes Screening:     General: Screening Not Indicated and History Diabetes      Cervical Cancer Screening:    General: Screening Not Indicated Osteoporosis Screening:    General: Screening Not Indicated and History Osteoporosis      Lung Cancer Screening:     General: Screening Not Indicated      Hepatitis C Screening:    General: Screening Current    Screening, Brief Intervention, and Referral to Treatment (SBIRT)    Screening  Typical number of drinks in a day: 0  Typical number of drinks in a week: 0  Interpretation: Low risk drinking behavior  Single Item Drug Screening:  How often have you used an illegal drug (including marijuana) or a prescription medication for non-medical reasons in the past year? never    Single Item Drug Screen Score: 0  Interpretation: Negative screen for possible drug use disorder    No results found  Physical Exam:     /80 (BP Location: Left arm, Patient Position: Sitting, Cuff Size: Standard)   Pulse 64   Wt 80 3 kg (177 lb)   SpO2 95%   BMI 33 44 kg/m²     Physical Exam  Constitutional:       General: She is not in acute distress  Appearance: Normal appearance  She is not toxic-appearing  Cardiovascular:      Rate and Rhythm: Normal rate and regular rhythm  Heart sounds: No murmur heard  No friction rub  No gallop  Pulmonary:      Effort: No respiratory distress  Breath sounds: No wheezing or rales  Abdominal:      General: There is no distension  Palpations: There is no mass  Tenderness: There is no abdominal tenderness  There is no rebound  Hernia: No hernia is present  Musculoskeletal:         General: Tenderness present  No swelling or deformity  Neurological:      General: No focal deficit present  Mental Status: She is alert and oriented to person, place, and time     Psychiatric:         Mood and Affect: Mood normal          Behavior: Behavior normal           Doreen Perez MD

## 2023-06-27 ENCOUNTER — OFFICE VISIT (OUTPATIENT)
Dept: PULMONOLOGY | Facility: CLINIC | Age: 73
End: 2023-06-27
Payer: MEDICARE

## 2023-06-27 VITALS
HEIGHT: 61 IN | WEIGHT: 174 LBS | OXYGEN SATURATION: 94 % | BODY MASS INDEX: 32.85 KG/M2 | HEART RATE: 73 BPM | SYSTOLIC BLOOD PRESSURE: 148 MMHG | TEMPERATURE: 99.3 F | DIASTOLIC BLOOD PRESSURE: 80 MMHG

## 2023-06-27 DIAGNOSIS — J30.9 ALLERGIC RHINITIS, UNSPECIFIED SEASONALITY, UNSPECIFIED TRIGGER: ICD-10-CM

## 2023-06-27 DIAGNOSIS — E66.9 OBESITY, CLASS I, BMI 30-34.9: ICD-10-CM

## 2023-06-27 DIAGNOSIS — J44.9 ASTHMA-COPD OVERLAP SYNDROME (HCC): Primary | ICD-10-CM

## 2023-06-27 PROCEDURE — 99213 OFFICE O/P EST LOW 20 MIN: CPT

## 2023-06-27 NOTE — ASSESSMENT & PLAN NOTE
Patient with postnasal drainage that is worse at night  Recommend adding Flonase nasal spray 2 sprays each nostril daily  Continue daily antihistamine such as Claritin or Zyrtec

## 2023-06-27 NOTE — PROGRESS NOTES
Pulmonary Follow Up Note  Neva Molina 74 y o  female MRN: [id number]  6/27/2023    Assessment:    Asthma-COPD overlap syndrome (HCC)  Well-controlled on current regimen  No exacerbations since her last visit  She will continue Trelegy 100 1 puff daily  Albuterol as needed every 6 hours for shortness of breath or wheezing  Encouraged patient to complete alpha-1 antitrypsin phenotype testing as ordered at previous visit  We will follow-up with those results  Allergic rhinitis  Patient with postnasal drainage that is worse at night  Recommend adding Flonase nasal spray 2 sprays each nostril daily  Continue daily antihistamine such as Claritin or Zyrtec  Obesity, Class I, BMI 30-34 9  Encouraged weight loss      Plan:    Diagnoses and all orders for this visit:    Asthma-COPD overlap syndrome ([facility] )    Obesity, Class I, BMI 30-34 9    Allergic rhinitis, unspecified seasonality, unspecified trigger          Return in about 6 months (around 12/27/2023)  History of Present Illness     Chief Complaint:   Chief Complaint   Patient presents with   • Follow-up       Patient ID: Andres Warren is a 74 y o  y o  female has a past medical history of Asthma, COPD (chronic obstructive pulmonary disease) ([facility] ), Diabetes ([facility] ), Diabetes mellitus ([facility] ), Hyperlipidemia, Hypertension, Osteoarthritis, Osteoporosis, and Seasonal allergies  6/27/2023  HPI: Neva Molina is a 74 y o  female former smoker with a history significant for asthma/COPD overlap, frequent bronchitis, hypertension, diabetes, and obesity who is here today for a follow-up visit  She was last seen in the office 6 months ago  Has been maintained on Trelegy 100 1 puff daily  Today patient states she feels a lot better on the Trelegy inhaler  She is not requiring any use of her albuterol rescue inhaler  Reports postnasal drip and a cough that is worse in the morning    Symptoms are bothered by humidity and pollen which she tries to avoid when able   She takes daily Claritin, however no nasal sprays  She denies any shortness of breath, chest pain, chest tightness, or wheezing  She has no concerns or complaints today  Review of Systems   Constitutional: Negative for activity change, chills, diaphoresis, fever and unexpected weight change  HENT: Positive for postnasal drip  Negative for congestion, rhinorrhea, sore throat, trouble swallowing and voice change  Respiratory: Positive for cough  Negative for chest tightness, shortness of breath and wheezing  Cardiovascular: Negative for chest pain, palpitations and leg swelling  Allergic/Immunologic: Positive for environmental allergies  Historical Information   Past Medical History:   Diagnosis Date   • Asthma    • COPD (chronic obstructive pulmonary disease) ([facility] )    • Diabetes ([facility] )    • Diabetes mellitus ([place] [address] )    • Hyperlipidemia    • Hypertension    • Osteoarthritis    • Osteoporosis    • Seasonal allergies      Past Surgical History:   Procedure Laterality Date   • BILATERAL SALPINGOOPHORECTOMY Bilateral 1/3/2020    Procedure: BILATERAL SALPINGO-OOPHORECTOMY;  Surgeon: Abigail Patel MD;  [facility];  Service: Gynecology Oncology   • CATARACT EXTRACTION Bilateral    • CHOLECYSTECTOMY      [id number]  LAST ASSESSED   • EYE SURGERY      cataracts removed   • HYSTERECTOMY      [id number]  LAST ASSESSED   • [place] EXPLORATORY LAPAROTOMY CELIOTOMY W/WO BIOPSY SPX N/A 1/3/2020    Procedure: LAPAROTOMY EXPLORATORY;  Surgeon: Abigail Patel MD;  Location: [facility];  Service: Gynecology Oncology   • TUBAL LIGATION       Family History   Problem Relation Age of Onset   • Heart disease Mother    • Rheumatic fever Father    • Diabetes Family         MELLITUS   • Diabetes Family         MELLITUS       Smoking history: She reports that she quit smoking about 27 years ago  Her smoking use included cigarettes  She started smoking about 59 years ago   She smoked an average of 1 pack per day  She has never used smokeless tobacco     Occupational History:     Immunization History   Administered Date(s) Administered   • COVID-19 MODERNA VACC 0 25 ML IM BOOSTER 01/04/2022   • COVID-19 MODERNA VACC 0 5 ML IM 04/06/2021, 05/04/2021   • Influenza Split High Dose Preservative Free IM 12/01/2016, 10/10/2017   • Influenza, high dose seasonal 0 7 mL 11/21/2018, 11/21/2019, 11/17/2020, 11/23/2021, 11/25/2022   • Influenza, seasonal, injectable 10/06/2018, 12/04/2019, 10/07/2020, 11/04/2020, 11/17/2020, 11/20/2020   • Pneumococcal Conjugate 13-Valent 12/01/2016   • Pneumococcal Polysaccharide PPV23 01/16/2018       Meds/Allergies     Current Outpatient Medications:   •  acetaminophen (TYLENOL) 325 mg tablet, Take 2 tablets (650 mg total) by mouth every 6 (six) hours as needed for mild pain, Disp: 30 tablet, Rfl: 0  •  albuterol (PROVENTIL HFA,VENTOLIN HFA) 90 mcg/act inhaler, Inhale 2 puffs every 6 (six) hours as needed for wheezing or shortness of breath, Disp: 6 7 g, Rfl: 0  •  alendronate (FOSAMAX) 70 mg tablet, Take 1 tablet (70 mg total) by mouth every 7 days Give with at least 8 ounces of plain water first thing in the morning  Patient should be instructed to stay upright (not to lie down) for at least 30 minutes and until after first food of the day (to reduce esophageal irritation)  , Disp: 12 tablet, Rfl: 1  •  aspirin (ECOTRIN LOW STRENGTH) 81 mg EC tablet, Take 81 mg by mouth daily, Disp: , Rfl:   •  Calcium Carbonate-Vit D-Min (CALCIUM 1200 PO), Take by mouth daily, Disp: , Rfl:   •  Coenzyme Q10 (COQ-10) 10 MG CAPS, Take by mouth daily, Disp: , Rfl:   •  Fluticasone-Umeclidin-Vilant (TRELEGY ELLIPTA IN), Inhale 100 tablets, Disp: , Rfl:   •  Loratadine 10 MG CAPS, Take by mouth daily, Disp: , Rfl:   •  losartan (COZAAR) 100 MG tablet, Take 1 tablet (100 mg total) by mouth daily, Disp: 90 tablet, Rfl: 3  •  metFORMIN (GLUCOPHAGE) 1000 MG tablet, Take 1 tablet (1,000 mg total) by mouth 2 (two) times "a day, Disp: 180 tablet, Rfl: 3  •  Multiple Vitamin (MULTI-VITAMIN DAILY PO), Take by mouth daily, Disp: , Rfl:   •  nystatin (MYCOSTATIN) powder, Apply topically 2 (two) times a day, Disp: 30 g, Rfl: 0  •  rosuvastatin (CRESTOR) 20 MG tablet, Take 1 tablet (20 mg total) by mouth daily At bedtime, Disp: 90 tablet, Rfl: 3  •  VITAMIN D PO, Take 2,000 mg by mouth daily, Disp: , Rfl:   •  fluticasone-umeclidinium-vilanterol (Trelegy Ellipta) 100-62 5-25 mcg/actuation inhaler, Inhale 1 puff daily Rinse mouth after use , Disp: 180 blister, Rfl: 5  Allergies: Allergies   Allergen Reactions   • Penicillins Hives         Vitals:  Vitals:    06/27/23 1111 06/27/23 1114   BP: 148/80    Pulse: 73    Temp: 99 3 °F (37 4 °C)    TempSrc: Tympanic    SpO2: 94% 94%   Weight: 78 9 kg (174 lb)    Height: 5' 1\" (1 549 m)    Oxygen Therapy  SpO2: 94 %  Oxygen Therapy: None (Room air)    Wt Readings from Last 3 Encounters:   06/27/23 78 9 kg (174 lb)   06/08/23 80 3 kg (177 lb)   05/26/23 80 3 kg (177 lb)     Body mass index is 32 88 kg/m²  Physical Exam  Vitals and nursing note reviewed  Constitutional:       General: She is not in acute distress  Appearance: Normal appearance  She is well-developed  Cardiovascular:      Rate and Rhythm: Normal rate and regular rhythm  Heart sounds: Normal heart sounds, S1 normal and S2 normal  No murmur heard  Pulmonary:      Effort: Pulmonary effort is normal       Breath sounds: Normal breath sounds  No decreased breath sounds, wheezing, rhonchi or rales  Musculoskeletal:         General: No swelling  Right lower leg: No edema  Left lower leg: No edema  Neurological:      Mental Status: She is alert  Psychiatric:         Mood and Affect: Mood and affect normal          Behavior: Behavior normal  Behavior is cooperative  Labs: I have personally reviewed pertinent lab results    Lab Results   Component Value Date    WBC 9 04 05/10/2023    HGB 12 2 " "05/10/2023    HCT 38 1 05/10/2023    MCV 92 05/10/2023     05/10/2023     Lab Results   Component Value Date    CALCIUM 9 5 05/10/2023    K 4 0 05/10/2023    CO2 28 05/10/2023     05/10/2023    BUN 23 05/10/2023    CREATININE 1 50 (H) 05/10/2023     No results found for: \"IGE\"  Lab Results   Component Value Date    ALT 24 05/10/2023    AST 30 05/10/2023    ALKPHOS 62 05/10/2023       Imaging and other studies: I have personally reviewed pertinent reports and I have personally reviewed pertinent films in PACS     Chest x-ray 2/8/2023  No acute cardiopulmonary disease    Pulmonary function testing:     Pulmonary Functions Testing Results: 11/14/2022    FEV1/FVC ratio 57%    FEV1 42% predicted  FVC 58% predicted  (+) response to bronchodilators   % predicted   % predicted  DLCO corrected for hemoglobin 81 % predicted    Impression:   Moderately severe obstructive airflow defect  Significant airway response with administration of bronchodilators  Increased lung volumes indicative of air trapping and hyperinflation  Normal diffusion capacity      "

## 2023-06-27 NOTE — ASSESSMENT & PLAN NOTE
Well-controlled on current regimen  No exacerbations since her last visit  She will continue Trelegy 100 1 puff daily  Albuterol as needed every 6 hours for shortness of breath or wheezing  Encouraged patient to complete alpha-1 antitrypsin phenotype testing as ordered at previous visit  We will follow-up with those results

## 2023-07-13 ENCOUNTER — APPOINTMENT (OUTPATIENT)
Dept: LAB | Age: 73
End: 2023-07-13
Payer: MEDICARE

## 2023-07-13 ENCOUNTER — TELEPHONE (OUTPATIENT)
Dept: OTHER | Facility: OTHER | Age: 73
End: 2023-07-13

## 2023-07-13 DIAGNOSIS — N18.32 STAGE 3B CHRONIC KIDNEY DISEASE (HCC): Primary | ICD-10-CM

## 2023-07-13 LAB
ALBUMIN SERPL BCP-MCNC: 3.7 G/DL (ref 3.5–5)
ALP SERPL-CCNC: 64 U/L (ref 46–116)
ALT SERPL W P-5'-P-CCNC: 19 U/L (ref 12–78)
ANION GAP SERPL CALCULATED.3IONS-SCNC: 2 MMOL/L
AST SERPL W P-5'-P-CCNC: 19 U/L (ref 5–45)
BACTERIA UR QL AUTO: ABNORMAL /HPF
BILIRUB SERPL-MCNC: 0.39 MG/DL (ref 0.2–1)
BILIRUB UR QL STRIP: NEGATIVE
BUN SERPL-MCNC: 23 MG/DL (ref 5–25)
CALCIUM SERPL-MCNC: 9.7 MG/DL (ref 8.3–10.1)
CHLORIDE SERPL-SCNC: 107 MMOL/L (ref 96–108)
CLARITY UR: CLEAR
CO2 SERPL-SCNC: 31 MMOL/L (ref 21–32)
COLOR UR: ABNORMAL
CREAT SERPL-MCNC: 1.6 MG/DL (ref 0.6–1.3)
CREAT UR-MCNC: 52.5 MG/DL
ERYTHROCYTE [DISTWIDTH] IN BLOOD BY AUTOMATED COUNT: 11.8 % (ref 11.6–15.1)
GFR SERPL CREATININE-BSD FRML MDRD: 31 ML/MIN/1.73SQ M
GLUCOSE P FAST SERPL-MCNC: 146 MG/DL (ref 65–99)
GLUCOSE UR STRIP-MCNC: NEGATIVE MG/DL
HCT VFR BLD AUTO: 40.7 % (ref 34.8–46.1)
HGB BLD-MCNC: 12.8 G/DL (ref 11.5–15.4)
HGB UR QL STRIP.AUTO: NEGATIVE
KETONES UR STRIP-MCNC: NEGATIVE MG/DL
LEUKOCYTE ESTERASE UR QL STRIP: ABNORMAL
MCH RBC QN AUTO: 29.1 PG (ref 26.8–34.3)
MCHC RBC AUTO-ENTMCNC: 31.4 G/DL (ref 31.4–37.4)
MCV RBC AUTO: 93 FL (ref 82–98)
MICROALBUMIN UR-MCNC: 48 MG/L (ref 0–20)
MICROALBUMIN/CREAT 24H UR: 91 MG/G CREATININE (ref 0–30)
NITRITE UR QL STRIP: NEGATIVE
NON-SQ EPI CELLS URNS QL MICRO: ABNORMAL /HPF
PH UR STRIP.AUTO: 7 [PH]
PLATELET # BLD AUTO: 278 THOUSANDS/UL (ref 149–390)
PMV BLD AUTO: 10.8 FL (ref 8.9–12.7)
POTASSIUM SERPL-SCNC: 5.1 MMOL/L (ref 3.5–5.3)
PROT SERPL-MCNC: 7.5 G/DL (ref 6.4–8.4)
PROT UR STRIP-MCNC: NEGATIVE MG/DL
PTH-INTACT SERPL-MCNC: 39.4 PG/ML (ref 12–88)
RBC # BLD AUTO: 4.4 MILLION/UL (ref 3.81–5.12)
RBC #/AREA URNS AUTO: ABNORMAL /HPF
SODIUM SERPL-SCNC: 140 MMOL/L (ref 135–147)
SP GR UR STRIP.AUTO: 1.01 (ref 1–1.03)
URATE SERPL-MCNC: 5.6 MG/DL (ref 2–7.5)
UROBILINOGEN UR STRIP-ACNC: <2 MG/DL
WBC # BLD AUTO: 9.05 THOUSAND/UL (ref 4.31–10.16)
WBC #/AREA URNS AUTO: ABNORMAL /HPF

## 2023-07-13 PROCEDURE — 82104 ALPHA-1-ANTITRYPSIN PHENO: CPT

## 2023-07-13 PROCEDURE — 81001 URINALYSIS AUTO W/SCOPE: CPT

## 2023-07-13 PROCEDURE — 80053 COMPREHEN METABOLIC PANEL: CPT

## 2023-07-13 PROCEDURE — 36415 COLL VENOUS BLD VENIPUNCTURE: CPT

## 2023-07-13 PROCEDURE — 83970 ASSAY OF PARATHORMONE: CPT

## 2023-07-13 PROCEDURE — 82103 ALPHA-1-ANTITRYPSIN TOTAL: CPT

## 2023-07-13 PROCEDURE — 85027 COMPLETE CBC AUTOMATED: CPT

## 2023-07-13 PROCEDURE — 84550 ASSAY OF BLOOD/URIC ACID: CPT

## 2023-07-13 PROCEDURE — 82570 ASSAY OF URINE CREATININE: CPT

## 2023-07-13 PROCEDURE — 82043 UR ALBUMIN QUANTITATIVE: CPT

## 2023-07-13 NOTE — TELEPHONE ENCOUNTER
Patient called saying that she is returning a phone call that she had got from the office regarding her test result. Patient is asking if the office can give her back a call regarding the matter.

## 2023-07-17 LAB
A1AT PHENOTYP SERPL IFE: NORMAL
A1AT SERPL-MCNC: 147 MG/DL (ref 101–187)

## 2023-07-24 LAB
LEFT EYE DIABETIC RETINOPATHY: POSITIVE
RIGHT EYE DIABETIC RETINOPATHY: POSITIVE

## 2023-08-18 ENCOUNTER — OFFICE VISIT (OUTPATIENT)
Dept: NEPHROLOGY | Facility: CLINIC | Age: 73
End: 2023-08-18

## 2023-08-18 VITALS
HEIGHT: 61 IN | DIASTOLIC BLOOD PRESSURE: 68 MMHG | BODY MASS INDEX: 32.85 KG/M2 | SYSTOLIC BLOOD PRESSURE: 138 MMHG | WEIGHT: 174 LBS | OXYGEN SATURATION: 95 % | HEART RATE: 67 BPM

## 2023-08-18 DIAGNOSIS — I12.9 BENIGN HYPERTENSION WITH CKD (CHRONIC KIDNEY DISEASE) STAGE III (HCC): ICD-10-CM

## 2023-08-18 DIAGNOSIS — E66.9 OBESITY, CLASS I, BMI 30-34.9: ICD-10-CM

## 2023-08-18 DIAGNOSIS — N18.32 STAGE 3B CHRONIC KIDNEY DISEASE (HCC): Primary | ICD-10-CM

## 2023-08-18 DIAGNOSIS — N18.30 BENIGN HYPERTENSION WITH CKD (CHRONIC KIDNEY DISEASE) STAGE III (HCC): ICD-10-CM

## 2023-08-18 DIAGNOSIS — M81.0 AGE-RELATED OSTEOPOROSIS WITHOUT CURRENT PATHOLOGICAL FRACTURE: ICD-10-CM

## 2023-08-18 DIAGNOSIS — E11.22 TYPE 2 DIABETES MELLITUS WITH STAGE 3B CHRONIC KIDNEY DISEASE, UNSPECIFIED WHETHER LONG TERM INSULIN USE (HCC): ICD-10-CM

## 2023-08-18 DIAGNOSIS — N18.32 TYPE 2 DIABETES MELLITUS WITH STAGE 3B CHRONIC KIDNEY DISEASE, UNSPECIFIED WHETHER LONG TERM INSULIN USE (HCC): ICD-10-CM

## 2023-08-18 NOTE — PROGRESS NOTES
Assessment & Plan:    1. Stage 3b chronic kidney disease ([address])  -     Comprehensive metabolic panel; Future; Expected date: 12/20/2023  -     Urinalysis with microscopic; Future; Expected date: 12/20/2023  -     Uric acid; Future; Expected date: 12/20/2023  -     PTH, intact; Future; Expected date: 12/20/2023  -     Magnesium; Future; Expected date: 12/20/2023  -     Albumin / creatinine urine ratio; Future; Expected date: 12/20/2023  -     Phosphorus; Future; Expected date: 12/20/2023  -     CBC and differential; Future; Expected date: 12/20/2023    2. Type 2 diabetes mellitus with stage 3b chronic kidney disease, unspecified whether long term insulin use ([facility]ly [facility])  -     Comprehensive metabolic panel; Future; Expected date: 12/20/2023  -     Urinalysis with microscopic; Future; Expected date: 12/20/2023  -     Uric acid; Future; Expected date: 12/20/2023  -     PTH, intact; Future; Expected date: 12/20/2023  -     Magnesium; Future; Expected date: 12/20/2023  -     Albumin / creatinine urine ratio; Future; Expected date: 12/20/2023  -     Phosphorus; Future; Expected date: 12/20/2023  -     CBC and differential; Future; Expected date: 12/20/2023    3. Age-related osteoporosis without current pathological fracture    4. Benign hypertension with CKD (chronic kidney disease) stage III (Formerly [facility])    5. Obesity, Class I, BMI 30-34.9         1. CKD 3b A2  Creatinine baseline 1.3 to 1.5 mg/dL, creatinine most recently 1.6 with EGFR 31 mL/min. Etiology likely due to age-related EGFR loss, vascular/hypertensive nephrosclerosis, diabetes. Not biopsy-proven. Metabolic trends stable. Albumin within normal limits. No signs and symptoms of UTI. Bone mineral parameters stable. Albumin/creatinine ratio 91 mg/g Cr  UA bland aside from small leuks. No evidence of anemia. Reviewed CKD stages, creatinine and EGFR trends. Creatinine trend slightly worsened at 1.6. She has a 2 microalbuminuria.     Reviewed starting SGLT2 inhibitor and risk, benefits, indications, potential complications. She is already having difficulty with her insurance for her Trelegy. We will hold on adjusting medications at this time. I did ask her to look into [place] or [place] with her insurance. She understands that metformin will have to be stopped once the GFR goes under 30 mm/min. Volume status appears euvolemic at this time. Her blood pressure appears well controlled. Her weight is stable. We will not make any adjustments with his medications at this time. Continue losartan for CKD progression. 2.  Type 2 diabetes with CKD  Continue metformin for now. Did discuss that when her EGFR declines to less than 30 mL/min, she will need to switch to alternative regiment. Briefly reviewed SGLT2 inhibitors. Discussed risks, benefits, indications, potential complications. We will continue to monitor as we are for now. Will not make any adjustments at this time. Reviewed that she should look into [place] or [place] with her insurance, is already having difficulty with obtaining Trelegy. Could consider decreasing metformin dosing with current eGFR to 500 mg twice daily. 3.Age-related osteoporosis without current pathological fracture  continue Fosamax, calcium replacement. ,  Vitamin D.    4.  Benign hypertension with CKD, blood pressure reasonably well controlled. Continue losartan 100 mg/day. 5.  Obesity, encourage weight loss. The benefits, risks and alternatives to the treatment plan were discussed at this visit. Patient was advised of common adverse effects of any medical therapies prescribed. All questions were answered and discussed with the patient and any accompanying family members or caretakers. Subjective:      Patient ID: Darron Conception is a 74 y.o. female presents for follow-up in the [facility] office. She was last seen on 5/17 for CKD 3B baseline creatinine 1.3-1.5.   No medications adjusted at last visit    HPI    In the interim since last visit, denies any new medical conditions,surgeries,alleriges or medications. She has been drinking diet cranberry juice and green tea. Most recent Cr 1.6 with eGFR 31ml/min. Weight stable at 177 lb. -140/70 at home when she intermittently checks. She takes losartan 100mg/day for BP. Tolerating medicine well without any adverse side effects. For DM, just takes metformin 1000mg bid. Does not check BG at home. Denies NSAID use. Hx UTI it has been >20 years. The following portions of the patient's history were reviewed and updated as appropriate: allergies, current medications, past family history, past medical history, past social history, past surgical history, and problem list.    Review of Systems   Respiratory: Negative. Genitourinary: Negative. Neurological: Negative. All other systems reviewed and are negative. Objective:      /68 (BP Location: Left arm, Patient Position: Sitting)   Pulse 67   Ht 5' 0.5" (1.537 m)   Wt 78.9 kg (174 lb)   SpO2 95%   BMI 33.42 kg/m²          Physical Exam  Vitals reviewed. Constitutional:       General: She is not in acute distress. HENT:      Nose: Nose normal.      Mouth/Throat:      Mouth: Mucous membranes are moist.      Pharynx: Oropharynx is clear. Eyes:      Conjunctiva/sclera: Conjunctivae normal.   Cardiovascular:      Rate and Rhythm: Normal rate and regular rhythm. Heart sounds: No friction rub. Abdominal:      Palpations: Abdomen is soft. Tenderness: There is no abdominal tenderness. There is no guarding. Musculoskeletal:      Right lower leg: No edema. Left lower leg: No edema. Skin:     General: Skin is warm and dry. Findings: No rash. Neurological:      General: No focal deficit present. Mental Status: She is alert. Mental status is at baseline. Cranial Nerves: No cranial nerve deficit.    Psychiatric:         Mood and Affect: Mood normal.         Behavior: Behavior normal.             Lab Results   Component Value Date    SODIUM 140 07/13/2023    K 5.1 07/13/2023     07/13/2023    CO2 31 07/13/2023    AGAP 2 07/13/2023    BUN 23 07/13/2023    CREATININE 1.60 (H) 07/13/2023    GLUC 101 01/06/2020    GLUF 146 (H) 07/13/2023    CALCIUM 9.7 07/13/2023    AST 19 07/13/2023    ALT 19 07/13/2023    ALKPHOS 64 07/13/2023    TP 7.5 07/13/2023    TBILI 0.39 07/13/2023    EGFR 31 07/13/2023      Lab Results   Component Value Date    CREATININE 1.60 (H) 07/13/2023    CREATININE 1.50 (H) 05/10/2023    CREATININE 1.33 (H) 12/09/2022    CREATININE 1.58 (H) 11/17/2022    CREATININE 1.38 (H) 06/02/2022    CREATININE 1.28 05/20/2022    CREATININE 1.31 (H) 01/10/2022    CREATININE 1.22 05/19/2021    CREATININE 1.18 05/26/2020    CREATININE 0.80 01/06/2020    CREATININE 0.98 01/05/2020    CREATININE 1.03 01/04/2020    CREATININE 1.12 01/03/2020    CREATININE 1.22 12/10/2019    CREATININE 1.46 (H) 11/22/2019      Lab Results   Component Value Date    COLORU Light Yellow 07/13/2023    CLARITYU Clear 07/13/2023    SPECGRAV 1.010 07/13/2023    PHUR 7.0 07/13/2023    LEUKOCYTESUR Small (A) 07/13/2023    NITRITE Negative 07/13/2023    PROTEIN UA Negative 07/13/2023    GLUCOSEU Negative 07/13/2023    KETONESU Negative 07/13/2023    UROBILINOGEN <2.0 07/13/2023    BILIRUBINUR Negative 07/13/2023    BLOODU Negative 07/13/2023    RBCUA 1-2 07/13/2023    WBCUA 1-2 07/13/2023    EPIS Occasional 07/13/2023    BACTERIA None Seen 07/13/2023      No results found for: "LABPROT"  No results found for: "Amie Brito", "[id number]"  Lab Results   Component Value Date    WBC 9.05 07/13/2023    HGB 12.8 07/13/2023    HCT 40.7 07/13/2023    MCV 93 07/13/2023     07/13/2023      Lab Results   Component Value Date    HGB 12.8 07/13/2023    HGB 12.2 05/10/2023    HGB 12.0 06/02/2022    HGB 12.1 05/20/2022    HGB 12.2 01/10/2022      No results found for: "IRON", "TIBC", "FERRITIN"   No results found for: "PTHCALCIUM", "[id number]", "PHOSPHORUS"   Lab Results   Component Value Date    CHOLESTEROL 116 05/20/2022    HDL 69 05/20/2022    LDLCALC 29 05/20/2022    TRIG 88 05/20/2022      Lab Results   Component Value Date    URICACID 5.6 07/13/2023      Lab Results   Component Value Date    HGBA1C 6.6 (A) 05/26/2023      No results found for: "TSHANTIBODY", "G4MSLLY", "Marzella Apa"   No results found for: "YULIET", "DSDNAAB", "RFIGM"   No results found for: "PROT", "UPEP", "IMMUNOFIX", "Dylan Cannon", "Raysa Conway"     Portions of the record may have been created with voice recognition software. Occasional wrong word or "sound a like" substitutions may have occurred due to the inherent limitations of voice recognition software. Read the chart carefully and recognize, using context, where substitutions have occurred. If you have any questions, please contact the dictating provider.

## 2023-08-18 NOTE — PATIENT INSTRUCTIONS
Most recent kidney function is stable at 31%. No changes to medicine now, you can ask your insurance about the cost of [place] or jardiance to you. This may be a beneficial medicine in the future, but not needed right now. Follow up in 4-5 months.

## 2023-10-02 DIAGNOSIS — M81.0 POSTMENOPAUSAL OSTEOPOROSIS: ICD-10-CM

## 2023-10-03 RX ORDER — ALENDRONATE SODIUM 70 MG/1
70 TABLET ORAL
Qty: 12 TABLET | Refills: 1 | Status: SHIPPED | OUTPATIENT
Start: 2023-10-03

## 2023-10-23 LAB
LEFT EYE DIABETIC RETINOPATHY: POSITIVE
RIGHT EYE DIABETIC RETINOPATHY: POSITIVE

## 2023-11-22 ENCOUNTER — RA CDI HCC (OUTPATIENT)
Dept: OTHER | Facility: HOSPITAL | Age: 73
End: 2023-11-22

## 2023-11-22 NOTE — PROGRESS NOTES
[id number]  [id number]   [address] [facility] coding opportunities          Chart Reviewed number of suggestions sent to Provider: 2     Patients Insurance     Medicare Insurance: [employer]

## 2023-11-27 ENCOUNTER — OFFICE VISIT (OUTPATIENT)
Dept: FAMILY MEDICINE CLINIC | Facility: CLINIC | Age: 73
End: 2023-11-27
Payer: MEDICARE

## 2023-11-27 VITALS
DIASTOLIC BLOOD PRESSURE: 80 MMHG | WEIGHT: 177 LBS | OXYGEN SATURATION: 96 % | HEART RATE: 70 BPM | RESPIRATION RATE: 16 BRPM | BODY MASS INDEX: 29.49 KG/M2 | HEIGHT: 65 IN | SYSTOLIC BLOOD PRESSURE: 140 MMHG

## 2023-11-27 DIAGNOSIS — Z23 NEEDS FLU SHOT: ICD-10-CM

## 2023-11-27 DIAGNOSIS — E11.22 TYPE 2 DIABETES MELLITUS WITH STAGE 3B CHRONIC KIDNEY DISEASE, UNSPECIFIED WHETHER LONG TERM INSULIN USE (HCC): Primary | ICD-10-CM

## 2023-11-27 DIAGNOSIS — E78.2 MIXED HYPERLIPIDEMIA: ICD-10-CM

## 2023-11-27 DIAGNOSIS — N18.32 TYPE 2 DIABETES MELLITUS WITH STAGE 3B CHRONIC KIDNEY DISEASE, UNSPECIFIED WHETHER LONG TERM INSULIN USE (HCC): Primary | ICD-10-CM

## 2023-11-27 DIAGNOSIS — I10 BENIGN ESSENTIAL HYPERTENSION: ICD-10-CM

## 2023-11-27 PROBLEM — E66.9 OBESITY, CLASS I, BMI 30-34.9: Status: RESOLVED | Noted: 2020-11-17 | Resolved: 2023-11-27

## 2023-11-27 PROBLEM — E11.9 CONTROLLED TYPE 2 DIABETES MELLITUS (HCC): Status: RESOLVED | Noted: 2017-10-10 | Resolved: 2023-11-27

## 2023-11-27 PROBLEM — E66.9 OBESITY: Status: RESOLVED | Noted: 2020-01-03 | Resolved: 2023-11-27

## 2023-11-27 PROBLEM — M79.602 PAIN OF LEFT UPPER EXTREMITY: Status: RESOLVED | Noted: 2019-05-22 | Resolved: 2023-11-27

## 2023-11-27 PROBLEM — E11.51 CONTROLLED TYPE 2 DIABETES MELLITUS WITH DIABETIC PERIPHERAL ANGIOPATHY WITHOUT GANGRENE, WITHOUT LONG-TERM CURRENT USE OF INSULIN (HCC): Status: RESOLVED | Noted: 2023-05-26 | Resolved: 2023-11-27

## 2023-11-27 PROBLEM — E66.811 OBESITY, CLASS I, BMI 30-34.9: Status: RESOLVED | Noted: 2020-11-17 | Resolved: 2023-11-27

## 2023-11-27 LAB — SL AMB POCT HEMOGLOBIN AIC: 7.3 (ref ?–6.5)

## 2023-11-27 PROCEDURE — G0008 ADMIN INFLUENZA VIRUS VAC: HCPCS

## 2023-11-27 PROCEDURE — 83036 HEMOGLOBIN GLYCOSYLATED A1C: CPT | Performed by: INTERNAL MEDICINE

## 2023-11-27 PROCEDURE — 99214 OFFICE O/P EST MOD 30 MIN: CPT | Performed by: INTERNAL MEDICINE

## 2023-11-27 PROCEDURE — 90662 IIV NO PRSV INCREASED AG IM: CPT

## 2023-11-27 RX ORDER — LOSARTAN POTASSIUM 100 MG/1
100 TABLET ORAL DAILY
Qty: 90 TABLET | Refills: 3 | Status: SHIPPED | OUTPATIENT
Start: 2023-11-27

## 2023-11-27 RX ORDER — ROSUVASTATIN CALCIUM 20 MG/1
20 TABLET, COATED ORAL DAILY
Qty: 90 TABLET | Refills: 3 | Status: SHIPPED | OUTPATIENT
Start: 2023-11-27

## 2023-11-27 NOTE — PROGRESS NOTES
Diabetic Foot Exam    Patient's shoes and socks removed. Right Foot/Ankle   Right Foot Inspection  Skin Exam: skin normal, skin intact, dry skin, callus and callus. No warmth, no erythema, no maceration, no abnormal color, no pre-ulcer and no ulcer. Toe Exam: ROM and strength within normal limits. No swelling, no tenderness, erythema and  no right toe deformity    Sensory   Vibration: intact  Proprioception: intact  Monofilament testing: intact    Vascular  Capillary refills: < 3 seconds  The right DP pulse is 2+. The right PT pulse is 2+. Left Foot/Ankle  Left Foot Inspection  Skin Exam: skin normal, skin intact, dry skin and callus. No warmth, no erythema, no maceration, normal color, no pre-ulcer and no ulcer. Toe Exam: ROM and strength within normal limits. No swelling, no tenderness, no erythema and no left toe deformity. Sensory   Vibration: intact  Proprioception: intact  Monofilament testing: intact    Vascular  Capillary refills: < 3 seconds  The left DP pulse is 2+. The left PT pulse is 2+.      Assign Risk Category  No deformity present  No loss of protective sensation  No weak pulses  Risk: 0

## 2023-11-27 NOTE — ASSESSMENT & PLAN NOTE
Hemoglobin A1c is above her baseline but she reported that she was not very compliant with low-carb diet recently. We will continue the same dose of metformin for now, she also follows up with nephrology, she will do her blood work for them in December to follow-up on her GFR which was very borderline.     Lab Results   Component Value Date    HGBA1C 7.3 (A) 11/27/2023

## 2023-11-27 NOTE — PROGRESS NOTES
Assessment/Plan:    Type 2 diabetes mellitus with stage 3 chronic kidney disease (HCC)  Hemoglobin A1c is above her baseline but she reported that she was not very compliant with low-carb diet recently. We will continue the same dose of metformin for now, she also follows up with nephrology, she will do her blood work for them in December to follow-up on her GFR which was very borderline. Lab Results   Component Value Date    HGBA1C 7.3 (A) 11/27/2023       Benign essential hypertension  Very well controlled on current meds. Continue the same. Continue to watch for the kidney function closely. Hyperlipidemia  DL was very good on the last blood work, she tolerates current dose of rosuvastatin. Diagnoses and all orders for this visit:    Type 2 diabetes mellitus with stage 3b chronic kidney disease, unspecified whether long term insulin use ([address])  -     POCT hemoglobin A1c    Needs flu shot  -     influenza vaccine, high-dose, PF 0.7 mL (FLUZONE HIGH-DOSE)    Mixed hyperlipidemia  -     rosuvastatin (CRESTOR) 20 MG tablet; Take 1 tablet (20 mg total) by mouth daily At bedtime    Benign essential hypertension  -     losartan (COZAAR) 100 MG tablet; Take 1 tablet (100 mg total) by mouth daily          Subjective:      Patient ID: Anitha Cuevas is a 74 y.o. female. Patient came today for 6-month follow-up on her chronic medical problems. Diabetes  Pertinent negatives for diabetes include no chest pain. The following portions of the patient's history were reviewed and updated as appropriate: allergies, current medications, past family history, past medical history, past social history, past surgical history, and problem list.    Review of Systems   Constitutional:  Negative for chills and fever. Respiratory:  Negative for chest tightness, shortness of breath and wheezing. Cardiovascular:  Negative for chest pain and leg swelling. Gastrointestinal:  Negative for abdominal pain. Musculoskeletal:  Negative for gait problem and joint swelling. Objective:      /80 (BP Location: Left arm, Patient Position: Sitting, Cuff Size: Large)   Pulse 70   Resp 16   Ht 5' 5" (1.651 m)   Wt 80.3 kg (177 lb)   SpO2 96%   BMI 29.45 kg/m²     Allergies   Allergen Reactions    Penicillins Hives          Current Outpatient Medications:     acetaminophen (TYLENOL) 325 mg tablet, Take 2 tablets (650 mg total) by mouth every 6 (six) hours as needed for mild pain, Disp: 30 tablet, Rfl: 0    albuterol (PROVENTIL HFA,VENTOLIN HFA) 90 mcg/act inhaler, Inhale 2 puffs every 6 (six) hours as needed for wheezing or shortness of breath, Disp: 6.7 g, Rfl: 0    alendronate (FOSAMAX) 70 mg tablet, Take 1 tablet (70 mg total) by mouth every 7 days Give with at least 8 ounces of plain water first thing in the morning. Patient should be instructed to stay upright (not to lie down) for at least 30 minutes and until after first food of the day (to reduce esophageal irritation). , Disp: 12 tablet, Rfl: 1    aspirin (ECOTRIN LOW STRENGTH) 81 mg EC tablet, Take 81 mg by mouth daily, Disp: , Rfl:     Calcium Carbonate-Vit D-Min (CALCIUM 1200 PO), Take by mouth daily, Disp: , Rfl:     Coenzyme Q10 (COQ-10) 10 MG CAPS, Take by mouth daily, Disp: , Rfl:     Fluticasone-Umeclidin-Vilant (TRELEGY ELLIPTA IN), Inhale 100 tablets, Disp: , Rfl:     Loratadine 10 MG CAPS, Take by mouth daily, Disp: , Rfl:     losartan (COZAAR) 100 MG tablet, Take 1 tablet (100 mg total) by mouth daily, Disp: 90 tablet, Rfl: 3    metFORMIN (GLUCOPHAGE) 1000 MG tablet, Take 1 tablet (1,000 mg total) by mouth 2 (two) times a day, Disp: 180 tablet, Rfl: 3    Multiple Vitamin (MULTI-VITAMIN DAILY PO), Take by mouth daily, Disp: , Rfl:     nystatin (MYCOSTATIN) powder, Apply topically 2 (two) times a day, Disp: 30 g, Rfl: 0    rosuvastatin (CRESTOR) 20 MG tablet, Take 1 tablet (20 mg total) by mouth daily At bedtime, Disp: 90 tablet, Rfl: 3 VITAMIN D PO, Take 2,000 mg by mouth daily, Disp: , Rfl:     fluticasone-umeclidinium-vilanterol (Trelegy Ellipta) 100-62.5-25 mcg/actuation inhaler, Inhale 1 puff daily Rinse mouth after use., Disp: 180 blister, Rfl: 5     There are no Patient Instructions on file for this visit. Physical Exam  Constitutional:       General: She is not in acute distress. Appearance: She is not toxic-appearing. Cardiovascular:      Rate and Rhythm: Normal rate. Heart sounds: No murmur heard. No gallop. Pulmonary:      Effort: No respiratory distress. Breath sounds: No wheezing or rales. Neurological:      Mental Status: She is alert.

## 2023-11-27 NOTE — ASSESSMENT & PLAN NOTE
Very well controlled on current meds. Continue the same. Continue to watch for the kidney function closely.

## 2023-12-07 DIAGNOSIS — E11.8 CONTROLLED DIABETES MELLITUS TYPE 2 WITH COMPLICATIONS, UNSPECIFIED WHETHER LONG TERM INSULIN USE (HCC): ICD-10-CM

## 2023-12-29 ENCOUNTER — APPOINTMENT (OUTPATIENT)
Dept: LAB | Age: 73
End: 2023-12-29
Payer: MEDICARE

## 2023-12-29 DIAGNOSIS — E11.22 TYPE 2 DIABETES MELLITUS WITH STAGE 3B CHRONIC KIDNEY DISEASE, UNSPECIFIED WHETHER LONG TERM INSULIN USE (HCC): ICD-10-CM

## 2023-12-29 DIAGNOSIS — N18.32 STAGE 3B CHRONIC KIDNEY DISEASE (HCC): ICD-10-CM

## 2023-12-29 DIAGNOSIS — N18.32 TYPE 2 DIABETES MELLITUS WITH STAGE 3B CHRONIC KIDNEY DISEASE, UNSPECIFIED WHETHER LONG TERM INSULIN USE (HCC): ICD-10-CM

## 2023-12-29 LAB
ALBUMIN SERPL BCP-MCNC: 4.2 G/DL (ref 3.5–5)
ALP SERPL-CCNC: 52 U/L (ref 34–104)
ALT SERPL W P-5'-P-CCNC: 13 U/L (ref 7–52)
ANION GAP SERPL CALCULATED.3IONS-SCNC: 7 MMOL/L
AST SERPL W P-5'-P-CCNC: 19 U/L (ref 13–39)
BACTERIA UR QL AUTO: ABNORMAL /HPF
BASOPHILS # BLD AUTO: 0.07 THOUSANDS/ÂΜL (ref 0–0.1)
BASOPHILS NFR BLD AUTO: 1 % (ref 0–1)
BILIRUB SERPL-MCNC: 0.4 MG/DL (ref 0.2–1)
BILIRUB UR QL STRIP: NEGATIVE
BUN SERPL-MCNC: 23 MG/DL (ref 5–25)
CALCIUM SERPL-MCNC: 10 MG/DL (ref 8.4–10.2)
CHLORIDE SERPL-SCNC: 100 MMOL/L (ref 96–108)
CLARITY UR: CLEAR
CO2 SERPL-SCNC: 32 MMOL/L (ref 21–32)
COLOR UR: COLORLESS
CREAT SERPL-MCNC: 1.49 MG/DL (ref 0.6–1.3)
CREAT UR-MCNC: 20.2 MG/DL
EOSINOPHIL # BLD AUTO: 0.39 THOUSAND/ÂΜL (ref 0–0.61)
EOSINOPHIL NFR BLD AUTO: 4 % (ref 0–6)
ERYTHROCYTE [DISTWIDTH] IN BLOOD BY AUTOMATED COUNT: 11.9 % (ref 11.6–15.1)
GFR SERPL CREATININE-BSD FRML MDRD: 34 ML/MIN/1.73SQ M
GLUCOSE P FAST SERPL-MCNC: 164 MG/DL (ref 65–99)
GLUCOSE UR STRIP-MCNC: NEGATIVE MG/DL
HCT VFR BLD AUTO: 39.9 % (ref 34.8–46.1)
HGB BLD-MCNC: 12.6 G/DL (ref 11.5–15.4)
HGB UR QL STRIP.AUTO: NEGATIVE
IMM GRANULOCYTES # BLD AUTO: 0.05 THOUSAND/UL (ref 0–0.2)
IMM GRANULOCYTES NFR BLD AUTO: 1 % (ref 0–2)
KETONES UR STRIP-MCNC: NEGATIVE MG/DL
LEUKOCYTE ESTERASE UR QL STRIP: ABNORMAL
LYMPHOCYTES # BLD AUTO: 2.66 THOUSANDS/ÂΜL (ref 0.6–4.47)
LYMPHOCYTES NFR BLD AUTO: 25 % (ref 14–44)
MAGNESIUM SERPL-MCNC: 1.6 MG/DL (ref 1.9–2.7)
MCH RBC QN AUTO: 29.3 PG (ref 26.8–34.3)
MCHC RBC AUTO-ENTMCNC: 31.6 G/DL (ref 31.4–37.4)
MCV RBC AUTO: 93 FL (ref 82–98)
MICROALBUMIN UR-MCNC: 37.2 MG/L
MICROALBUMIN/CREAT 24H UR: 184 MG/G CREATININE (ref 0–30)
MONOCYTES # BLD AUTO: 0.78 THOUSAND/ÂΜL (ref 0.17–1.22)
MONOCYTES NFR BLD AUTO: 7 % (ref 4–12)
NEUTROPHILS # BLD AUTO: 6.68 THOUSANDS/ÂΜL (ref 1.85–7.62)
NEUTS SEG NFR BLD AUTO: 62 % (ref 43–75)
NITRITE UR QL STRIP: NEGATIVE
NON-SQ EPI CELLS URNS QL MICRO: ABNORMAL /HPF
NRBC BLD AUTO-RTO: 0 /100 WBCS
PH UR STRIP.AUTO: 7 [PH]
PHOSPHATE SERPL-MCNC: 3.9 MG/DL (ref 2.3–4.1)
PLATELET # BLD AUTO: 290 THOUSANDS/UL (ref 149–390)
PMV BLD AUTO: 10.7 FL (ref 8.9–12.7)
POTASSIUM SERPL-SCNC: 4.9 MMOL/L (ref 3.5–5.3)
PROT SERPL-MCNC: 7.2 G/DL (ref 6.4–8.4)
PROT UR STRIP-MCNC: NEGATIVE MG/DL
PTH-INTACT SERPL-MCNC: 30.4 PG/ML (ref 12–88)
RBC # BLD AUTO: 4.3 MILLION/UL (ref 3.81–5.12)
RBC #/AREA URNS AUTO: ABNORMAL /HPF
SODIUM SERPL-SCNC: 139 MMOL/L (ref 135–147)
SP GR UR STRIP.AUTO: 1.01 (ref 1–1.03)
URATE SERPL-MCNC: 5.4 MG/DL (ref 2–7.5)
UROBILINOGEN UR STRIP-ACNC: <2 MG/DL
WBC # BLD AUTO: 10.63 THOUSAND/UL (ref 4.31–10.16)
WBC #/AREA URNS AUTO: ABNORMAL /HPF

## 2023-12-29 PROCEDURE — 81001 URINALYSIS AUTO W/SCOPE: CPT

## 2023-12-29 PROCEDURE — 83970 ASSAY OF PARATHORMONE: CPT

## 2023-12-29 PROCEDURE — 82043 UR ALBUMIN QUANTITATIVE: CPT

## 2023-12-29 PROCEDURE — 80053 COMPREHEN METABOLIC PANEL: CPT

## 2023-12-29 PROCEDURE — 82570 ASSAY OF URINE CREATININE: CPT

## 2023-12-29 PROCEDURE — 36415 COLL VENOUS BLD VENIPUNCTURE: CPT

## 2023-12-29 PROCEDURE — 84550 ASSAY OF BLOOD/URIC ACID: CPT

## 2023-12-29 PROCEDURE — 83735 ASSAY OF MAGNESIUM: CPT

## 2023-12-29 PROCEDURE — 84100 ASSAY OF PHOSPHORUS: CPT

## 2023-12-29 PROCEDURE — 85025 COMPLETE CBC W/AUTO DIFF WBC: CPT

## 2024-01-08 ENCOUNTER — OFFICE VISIT (OUTPATIENT)
Dept: NEPHROLOGY | Facility: CLINIC | Age: 74
End: 2024-01-08
Payer: MEDICARE

## 2024-01-08 VITALS
DIASTOLIC BLOOD PRESSURE: 64 MMHG | HEART RATE: 47 BPM | BODY MASS INDEX: 29.66 KG/M2 | HEIGHT: 65 IN | WEIGHT: 178 LBS | OXYGEN SATURATION: 96 % | SYSTOLIC BLOOD PRESSURE: 150 MMHG

## 2024-01-08 DIAGNOSIS — I10 ESSENTIAL HYPERTENSION: ICD-10-CM

## 2024-01-08 DIAGNOSIS — N18.32 TYPE 2 DIABETES MELLITUS WITH STAGE 3B CHRONIC KIDNEY DISEASE, UNSPECIFIED WHETHER LONG TERM INSULIN USE (HCC): ICD-10-CM

## 2024-01-08 DIAGNOSIS — E11.22 TYPE 2 DIABETES MELLITUS WITH STAGE 3B CHRONIC KIDNEY DISEASE, UNSPECIFIED WHETHER LONG TERM INSULIN USE (HCC): ICD-10-CM

## 2024-01-08 DIAGNOSIS — J44.89 ASTHMA-COPD OVERLAP SYNDROME: ICD-10-CM

## 2024-01-08 DIAGNOSIS — M81.0 AGE-RELATED OSTEOPOROSIS WITHOUT CURRENT PATHOLOGICAL FRACTURE: ICD-10-CM

## 2024-01-08 DIAGNOSIS — N18.32 STAGE 3B CHRONIC KIDNEY DISEASE (HCC): Primary | ICD-10-CM

## 2024-01-08 PROCEDURE — 99214 OFFICE O/P EST MOD 30 MIN: CPT | Performed by: INTERNAL MEDICINE

## 2024-01-08 NOTE — PROGRESS NOTES
Assessment & Plan:    1. Stage 3b chronic kidney disease (HCC)  -     Urinalysis with microscopic; Future; Expected date: 07/09/2024  -     PTH, intact; Future; Expected date: 07/09/2024  -     Uric acid; Future; Expected date: 07/09/2024  -     Albumin / creatinine urine ratio; Future; Expected date: 07/09/2024  -     Magnesium; Future; Expected date: 07/09/2024  -     Comprehensive metabolic panel; Future; Expected date: 07/09/2024  -     Phosphorus; Future; Expected date: 07/09/2024  -     CBC and differential; Future; Expected date: 07/09/2024    2. Type 2 diabetes mellitus with stage 3b chronic kidney disease, unspecified whether long term insulin use (HCC)    3. Age-related osteoporosis without current pathological fracture    4. Essential hypertension    5. Asthma-COPD overlap syndrome         CKD3b A2  Baseline 1.3-1.5.    Etiology likely due to age-related EGFR loss, vascular/hypertensive nephrosclerosis, diabetes.  Not biopsy-proven.     Cr 1.49 with eGFR 34 ml/min.  Alb/cr atio 184mg/g cr.    Reviewed CKD stages and eGFR trends in simple terms.  Recommend SGLT-2 addition as this may help improve BP control. Patient will look into feasibility with her insurance.  If Jardiance of Farxiga started, check chemistry in 1 month.  Follow up in 6 months with labs prior.   Goal BP<130/80. SGLT-2 may help with BP control as well.    2. Type 2 DM  Well controlled on metformin.  Recommend SGLT-2 addition as this may help improve BP control. Patient will look into feasibility with her insurance.    3. Age related osteoporosis  Continue calcium and alendronate per primary team.    4. Essential HTN  BP goal<130/80.  Maintained on losartan 100mg/day. BP mildly elevated. Recommend SGLT-2 addition as this may help improve BP control. Patient will look into feasibility with her insurance.    5 Asthma-COPD overlap syndrome  Continue Trelegy Ellipta inhaler and follow up with pulmonology.    The benefits, risks and  "alternatives to the treatment plan were discussed at this visit. Patient was advised of common adverse effects of any medical therapies prescribed. All questions were answered and discussed with the patient and any accompanying family members or caretakers.      Subjective:      Patient ID: Ellie Daniel is a 73 y.o. female present for follow up in the [place] office. Patient last seen on 8/18/23 for CKD3b.    HPI    In the interim since last visit, reported a recent COPD flare and has improved with Trelegy Ellipta. Follows with pulmonology for asthma-COPD overlap syndrome. Denies shortness of breath at present.     She has a hx DM that is well controlled and her most recent A1C was 7.0. She is maintained on metformin 1000mg BID.    She monitors her BP frequently and reports SBP in 135 range. She is on losartan 100mg/day.    She is on alendronate and calcium for osteoporosis management.     Labs 12/29 Cr 1.4 with eGFR 34 ml/min. UA is bland. No evidence of anemia.Alb/cr ratio 184mg/g cr.    The following portions of the patient's history were reviewed and updated as appropriate: allergies, current medications, past family history, past medical history, past social history, past surgical history, and problem list.    Review of Systems   Respiratory: Negative.     Cardiovascular: Negative.    Genitourinary: Negative.    Neurological: Negative.    All other systems reviewed and are negative.        Objective:      /64 (BP Location: Left arm, Patient Position: Sitting, Cuff Size: Large)   Pulse (!) 47   Ht 5' 5\" (1.651 m)   Wt 80.7 kg (178 lb)   SpO2 96%   BMI 29.62 kg/m²          Physical Exam  Vitals reviewed.   Constitutional:       General: She is not in acute distress.     Appearance: She is obese.   HENT:      Head: Normocephalic and atraumatic.      Nose: Nose normal. No congestion.      Mouth/Throat:      Mouth: Mucous membranes are moist.      Pharynx: Oropharynx is clear.   Cardiovascular:      " Rate and Rhythm: Normal rate and regular rhythm.      Heart sounds:      No friction rub.   Pulmonary:      Breath sounds: Normal breath sounds. No wheezing, rhonchi or rales.   Abdominal:      Palpations: Abdomen is soft.      Tenderness: There is no abdominal tenderness. There is no guarding.   Musculoskeletal:      Right lower leg: No edema.      Left lower leg: No edema.   Skin:     General: Skin is warm.      Coloration: Skin is not jaundiced.      Findings: No bruising.   Neurological:      General: No focal deficit present.      Mental Status: She is alert and oriented to person, place, and time.      Cranial Nerves: No cranial nerve deficit.      Motor: No weakness.             Lab Results   Component Value Date    SODIUM 139 12/29/2023    K 4.9 12/29/2023     12/29/2023    CO2 32 12/29/2023    AGAP 7 12/29/2023    BUN 23 12/29/2023    CREATININE 1.49 (H) 12/29/2023    GLUC 101 01/06/2020    GLUF 164 (H) 12/29/2023    CALCIUM 10.0 12/29/2023    AST 19 12/29/2023    ALT 13 12/29/2023    ALKPHOS 52 12/29/2023    TP 7.2 12/29/2023    TBILI 0.40 12/29/2023    EGFR 34 12/29/2023      Lab Results   Component Value Date    CREATININE 1.49 (H) 12/29/2023    CREATININE 1.60 (H) 07/13/2023    CREATININE 1.50 (H) 05/10/2023    CREATININE 1.33 (H) 12/09/2022    CREATININE 1.58 (H) 11/17/2022    CREATININE 1.38 (H) 06/02/2022    CREATININE 1.28 05/20/2022    CREATININE 1.31 (H) 01/10/2022    CREATININE 1.22 05/19/2021    CREATININE 1.18 05/26/2020    CREATININE 0.80 01/06/2020    CREATININE 0.98 01/05/2020    CREATININE 1.03 01/04/2020    CREATININE 1.12 01/03/2020    CREATININE 1.22 12/10/2019      Lab Results   Component Value Date    COLORU Colorless 12/29/2023    CLARITYU Clear 12/29/2023    SPECGRAV 1.006 12/29/2023    PHUR 7.0 12/29/2023    LEUKOCYTESUR Small (A) 12/29/2023    NITRITE Negative 12/29/2023    PROTEIN UA Negative 12/29/2023    GLUCOSEU Negative 12/29/2023    KETONESU Negative 12/29/2023     "UROBILINOGEN <2.0 12/29/2023    BILIRUBINUR Negative 12/29/2023    BLOODU Negative 12/29/2023    RBCUA None Seen 12/29/2023    WBCUA 1-2 12/29/2023    EPIS Occasional 12/29/2023    BACTERIA Occasional 12/29/2023      No results found for: \"LABPROT\"  No results found for: \"MICROALBUR\", \"[id number]\"  Lab Results   Component Value Date    WBC 10.63 (H) 12/29/2023    HGB 12.6 12/29/2023    HCT 39.9 12/29/2023    MCV 93 12/29/2023     12/29/2023      Lab Results   Component Value Date    HGB 12.6 12/29/2023    HGB 12.8 07/13/2023    HGB 12.2 05/10/2023    HGB 12.0 06/02/2022    HGB 12.1 05/20/2022      No results found for: \"IRON\", \"TIBC\", \"FERRITIN\"   No results found for: \"PTHCALCIUM\", \"[id number]\", \"PHOSPHORUS\"   Lab Results   Component Value Date    CHOLESTEROL 116 05/20/2022    HDL 69 05/20/2022    LDLCALC 29 05/20/2022    TRIG 88 05/20/2022      Lab Results   Component Value Date    URICACID 5.4 12/29/2023      Lab Results   Component Value Date    HGBA1C 7.3 (A) 11/27/2023      No results found for: \"TSHANTIBODY\", \"[id number]\", \"FREET4\"   No results found for: \"YULIET\", \"DSDNAAB\", \"RFIGM\"   No results found for: \"PROT\", \"UPEP\", \"IMMUNOFIX\", \"KAPPALAMBDA\", \"KAPPALIGHT\"     Portions of the record may have been created with voice recognition software. Occasional wrong word or \"sound a like\" substitutions may have occurred due to the inherent limitations of voice recognition software. Read the chart carefully and recognize, using context, where substitutions have occurred. If you have any questions, please contact the dictating provider.      "

## 2024-01-08 NOTE — PATIENT INSTRUCTIONS
Encourage high magnesium foods. Your magnesium is mildly low.   Look into Jardiance or Farxiga to help with kidney protection. If you want to start this medicine, call the office and we will order. We will check blood work if you start the medicine after 1 month.  Follow up in 6 months.   You have stable stage 3b kidney disease. You are are at 34%.   Avoid motrin,advil,aleve,ibuprofen.

## 2024-01-11 ENCOUNTER — VBI (OUTPATIENT)
Dept: ADMINISTRATIVE | Facility: OTHER | Age: 74
End: 2024-01-11

## 2024-03-05 DIAGNOSIS — E11.8 CONTROLLED DIABETES MELLITUS TYPE 2 WITH COMPLICATIONS, UNSPECIFIED WHETHER LONG TERM INSULIN USE (HCC): ICD-10-CM

## 2024-05-28 ENCOUNTER — OFFICE VISIT (OUTPATIENT)
Dept: FAMILY MEDICINE CLINIC | Facility: CLINIC | Age: 74
End: 2024-05-28
Payer: MEDICARE

## 2024-05-28 VITALS
DIASTOLIC BLOOD PRESSURE: 80 MMHG | OXYGEN SATURATION: 94 % | TEMPERATURE: 97.1 F | SYSTOLIC BLOOD PRESSURE: 138 MMHG | BODY MASS INDEX: 32.32 KG/M2 | WEIGHT: 171.2 LBS | RESPIRATION RATE: 16 BRPM | HEART RATE: 64 BPM | HEIGHT: 61 IN

## 2024-05-28 DIAGNOSIS — E78.2 MIXED HYPERLIPIDEMIA: ICD-10-CM

## 2024-05-28 DIAGNOSIS — J44.89 ASTHMA-COPD OVERLAP SYNDROME: ICD-10-CM

## 2024-05-28 DIAGNOSIS — N18.32 STAGE 3B CHRONIC KIDNEY DISEASE (HCC): ICD-10-CM

## 2024-05-28 DIAGNOSIS — M81.0 OSTEOPOROSIS WITHOUT CURRENT PATHOLOGICAL FRACTURE, UNSPECIFIED OSTEOPOROSIS TYPE: ICD-10-CM

## 2024-05-28 DIAGNOSIS — J44.9 CHRONIC OBSTRUCTIVE PULMONARY DISEASE, UNSPECIFIED COPD TYPE (HCC): ICD-10-CM

## 2024-05-28 DIAGNOSIS — M81.0 POSTMENOPAUSAL OSTEOPOROSIS: ICD-10-CM

## 2024-05-28 DIAGNOSIS — E11.22 TYPE 2 DIABETES MELLITUS WITH STAGE 3B CHRONIC KIDNEY DISEASE, UNSPECIFIED WHETHER LONG TERM INSULIN USE (HCC): ICD-10-CM

## 2024-05-28 DIAGNOSIS — Z00.00 MEDICARE ANNUAL WELLNESS VISIT, SUBSEQUENT: Primary | ICD-10-CM

## 2024-05-28 DIAGNOSIS — N18.32 TYPE 2 DIABETES MELLITUS WITH STAGE 3B CHRONIC KIDNEY DISEASE, UNSPECIFIED WHETHER LONG TERM INSULIN USE (HCC): ICD-10-CM

## 2024-05-28 DIAGNOSIS — I10 BENIGN ESSENTIAL HYPERTENSION: ICD-10-CM

## 2024-05-28 DIAGNOSIS — J45.20 MILD INTERMITTENT ASTHMA WITHOUT COMPLICATION: ICD-10-CM

## 2024-05-28 DIAGNOSIS — M46.1 SACROILIITIS, NOT ELSEWHERE CLASSIFIED (HCC): ICD-10-CM

## 2024-05-28 LAB — SL AMB POCT HEMOGLOBIN AIC: 7.5 (ref ?–6.5)

## 2024-05-28 PROCEDURE — 99214 OFFICE O/P EST MOD 30 MIN: CPT | Performed by: INTERNAL MEDICINE

## 2024-05-28 PROCEDURE — 83036 HEMOGLOBIN GLYCOSYLATED A1C: CPT | Performed by: INTERNAL MEDICINE

## 2024-05-28 PROCEDURE — G0439 PPPS, SUBSEQ VISIT: HCPCS | Performed by: INTERNAL MEDICINE

## 2024-05-28 RX ORDER — FLUTICASONE FUROATE, UMECLIDINIUM BROMIDE AND VILANTEROL TRIFENATATE 100; 62.5; 25 UG/1; UG/1; UG/1
1 POWDER RESPIRATORY (INHALATION) DAILY
Qty: 60 BLISTER | Refills: 2 | Status: SHIPPED | OUTPATIENT
Start: 2024-05-28 | End: 2024-08-26

## 2024-05-28 RX ORDER — ALENDRONATE SODIUM 70 MG/1
70 TABLET ORAL
Qty: 12 TABLET | Refills: 1 | Status: SHIPPED | OUTPATIENT
Start: 2024-05-28

## 2024-05-28 RX ORDER — ALBUTEROL SULFATE 90 UG/1
2 AEROSOL, METERED RESPIRATORY (INHALATION) EVERY 6 HOURS PRN
Qty: 18 G | Refills: 2 | Status: CANCELLED | OUTPATIENT
Start: 2024-05-28

## 2024-05-28 RX ORDER — ALBUTEROL SULFATE 90 UG/1
2 AEROSOL, METERED RESPIRATORY (INHALATION) EVERY 6 HOURS PRN
Qty: 18 G | Refills: 2 | Status: SHIPPED | OUTPATIENT
Start: 2024-05-28

## 2024-05-28 NOTE — ASSESSMENT & PLAN NOTE
Hemoglobin A1c is got slightly worse, she was not very compliant with low-carb diet, she will try to comply.  Continue current dose of metformin.  Lab Results   Component Value Date    HGBA1C 7.5 (A) 05/28/2024

## 2024-05-28 NOTE — ASSESSMENT & PLAN NOTE
Continue with daily Trelegy, prescription sent to the pharmacy.  Continue with as needed albuterol.

## 2024-05-28 NOTE — PROGRESS NOTES
Diabetic Foot Exam    Patient's shoes and socks removed.    Right Foot/Ankle   Right Foot Inspection  Skin Exam: skin normal, skin intact and dry skin. No warmth, no callus, no erythema, no maceration, no abnormal color, no pre-ulcer, no ulcer and no callus.     Sensory   Monofilament testing: intact    Vascular  Capillary refills: < 3 seconds  The right DP pulse is 2+. The right PT pulse is 2+.     Left Foot/Ankle  Left Foot Inspection  Skin Exam: skin normal, skin intact and dry skin. No warmth, no erythema, no maceration, normal color, no pre-ulcer, no ulcer and no callus.     Sensory   Monofilament testing: intact    Vascular  Capillary refills: < 3 seconds  The left DP pulse is 2+. The left PT pulse is 2+.     Assign Risk Category  No deformity present  No loss of protective sensation  No weak pulses  Risk: 0  Ambulatory Visit  Name: Ellie Daniel      : 1950      MRN: [id number]  Encounter Provider: Aris Mota MD  Encounter Date: 2024   Encounter department: [facility] PRIMARY CARE    Assessment & Plan   1. Medicare annual wellness visit, subsequent  2. Type 2 diabetes mellitus with stage 3b chronic kidney disease, unspecified whether long term insulin use (HCC)  Assessment & Plan:  Hemoglobin A1c is got slightly worse, she was not very compliant with low-carb diet, she will try to comply.  Continue current dose of metformin.  Lab Results   Component Value Date    HGBA1C 7.5 (A) 2024     Orders:  -     POCT hemoglobin A1c  -     Lipid panel; Future  3. Sacroiliitis, not elsewhere classified (HCC)  4. Osteoporosis without current pathological fracture, unspecified osteoporosis type  -     DXA bone density spine hip and pelvis; Future; Expected date: 2024  5. Mild intermittent asthma without complication  -     albuterol (PROVENTIL HFA,VENTOLIN HFA) 90 mcg/act inhaler; Inhale 2 puffs every 6 (six) hours as needed for wheezing or shortness of breath  6.  Postmenopausal osteoporosis  -     alendronate (FOSAMAX) 70 mg tablet; Take 1 tablet (70 mg total) by mouth every 7 days Give with at least 8 ounces of plain water first thing in the morning.  Patient should be instructed to stay upright (not to lie down) for at least 30 minutes and until after first food of the day (to reduce esophageal irritation).  7. Chronic obstructive pulmonary disease, unspecified COPD type (HCC)  -     fluticasone-umeclidinium-vilanterol (Trelegy Ellipta) 100-62.5-25 mcg/actuation inhaler; Inhale 1 puff daily  8. Benign essential hypertension  Assessment & Plan:  Blood pressures are very well-controlled on current meds.  Continue the same.  9. Asthma-COPD overlap syndrome  Assessment & Plan:  Continue with daily Trelegy, prescription sent to the pharmacy.  Continue with as needed albuterol.  10. Stage 3b chronic kidney disease (HCC)  Assessment & Plan:  Lab Results   Component Value Date    EGFR 34 12/29/2023    EGFR 31 07/13/2023    EGFR 33 05/10/2023    CREATININE 1.49 (H) 12/29/2023    CREATININE 1.60 (H) 07/13/2023    CREATININE 1.50 (H) 05/10/2023   Continue follow-up with nephrology.  11. Mixed hyperlipidemia  Assessment & Plan:  Check lipid panel, continue current dose of rosuvastatin.       Preventive health issues were discussed with patient, and age appropriate screening tests were ordered as noted in patient's After Visit Summary. Personalized health advice and appropriate referrals for health education or preventive services given if needed, as noted in patient's After Visit Summary.    History of Present Illness     Patient came today for Medicare wellness visit and to follow-up on her chronic problems.  She denies colonoscopy and mammogram.  Agreed for DEXA scan.  Agreed for tetanus shot she reported that she got her Shingrix in the pharmacy.  Vital signs are acceptable except of elevated BMI which is stable and slightly elevated blood pressures.         Patient Care  Team:  Aris Mota MD as PCP - General (Internal Medicine)  Roya Phillips (Pediatrics)  [facility] (Optometry)  ROSCOE Duffy (Neurology)  Andrei Kaur MD (Pulmonary Disease)  ROSCOE Erazo as Nurse Practitioner (Nurse Practitioner)  Brittaney Haddad DO (Nephrology)    Review of Systems   Constitutional:  Negative for chills and fever.   Respiratory:  Negative for chest tightness, shortness of breath and wheezing.    Cardiovascular:  Negative for chest pain and leg swelling.   Gastrointestinal:  Negative for abdominal pain.   Musculoskeletal:  Negative for gait problem and joint swelling.     Medical History Reviewed by provider this encounter:       Annual Wellness Visit Questionnaire       Health Risk Assessment:   Patient rates overall health as good. Patient feels that their physical health rating is same. Patient is satisfied with their life. Eyesight was rated as same. Hearing was rated as slightly worse. Patient feels that their emotional and mental health rating is same. Patients states they are sometimes angry. Patient states they are often unusually tired/fatigued. Pain experienced in the last 7 days has been some. Patient's pain rating has been 3/10. Patient states that she has experienced no weight loss or gain in last 6 months.     Depression Screening:   PHQ-2 Score: 0      Fall Risk Screening:   In the past year, patient has experienced: no history of falling in past year      Urinary Incontinence Screening:   Patient has leaked urine accidently in the last six months.     Home Safety:  Patient has trouble with stairs inside or outside of their home. Patient has working smoke alarms and has working carbon monoxide detector. Home safety hazards include: none.     Nutrition:   Current diet is Regular.     Medications:   Patient is currently taking over-the-counter supplements. OTC medications include: see medication list. Patient is able to manage  medications.     Activities of Daily Living (ADLs)/Instrumental Activities of Daily Living (IADLs):   Walk and transfer into and out of bed and chair?: Yes  Dress and groom yourself?: Yes    Bathe or shower yourself?: Yes    Feed yourself? Yes  Do your laundry/housekeeping?: Yes  Manage your money, pay your bills and track your expenses?: Yes  Make your own meals?: Yes    Do your own shopping?: Yes    Previous Hospitalizations:   Any hospitalizations or ED visits within the last 12 months?: No      Advance Care Planning:   Living will: No    Durable POA for healthcare: No    Advanced directive: No      PREVENTIVE SCREENINGS      Cardiovascular Screening:    General: Screening Not Indicated and History Lipid Disorder      Diabetes Screening:     General: Screening Not Indicated and History Diabetes      Cervical Cancer Screening:    General: Screening Not Indicated      Osteoporosis Screening:    General: Screening Not Indicated and History Osteoporosis      Lung Cancer Screening:     General: Screening Not Indicated      Hepatitis C Screening:    General: Screening Current    Screening, Brief Intervention, and Referral to Treatment (SBIRT)    Screening  Typical number of drinks in a day: 0  Typical number of drinks in a week: 0  Interpretation: Low risk drinking behavior.    Single Item Drug Screening:  How often have you used an illegal drug (including marijuana) or a prescription medication for non-medical reasons in the past year? never    Single Item Drug Screen Score: 0  Interpretation: Negative screen for possible drug use disorder    Social Determinants of Health     Financial Resource Strain: Low Risk  (5/26/2023)    Overall Financial Resource Strain (CARDIA)     Difficulty of Paying Living Expenses: Not hard at all   Food Insecurity: No Food Insecurity (5/28/2024)    Hunger Vital Sign     Worried About Running Out of Food in the Last Year: Never true     Ran Out of Food in the Last Year: Never true  "  Transportation Needs: No Transportation Needs (5/28/2024)    PRAPARE - Transportation     Lack of Transportation (Medical): No     Lack of Transportation (Non-Medical): No   Housing Stability: Unknown (5/28/2024)    Housing Stability Vital Sign     Unable to Pay for Housing in the Last Year: No     Homeless in the Last Year: No   Utilities: Not At Risk (5/28/2024)    [facility] Utilities     Threatened with loss of utilities: No     No results found.    Objective     /80 (BP Location: Left arm, Patient Position: Sitting, Cuff Size: Standard)   Pulse 64   Temp (!) 97.1 °F (36.2 °C) (Tympanic)   Resp 16   Ht 5' 0.5\" (1.537 m)   Wt 77.7 kg (171 lb 3.2 oz)   SpO2 94%   BMI 32.88 kg/m²     Physical Exam  Constitutional:       General: She is not in acute distress.     Appearance: Normal appearance. She is not toxic-appearing.   Cardiovascular:      Rate and Rhythm: Normal rate and regular rhythm.      Pulses: no weak pulses.           Dorsalis pedis pulses are 2+ on the right side and 2+ on the left side.        Posterior tibial pulses are 2+ on the right side and 2+ on the left side.      Heart sounds: No murmur heard.     No friction rub. No gallop.   Pulmonary:      Effort: No respiratory distress.      Breath sounds: No wheezing or rales.   Abdominal:      General: There is no distension.      Palpations: There is no mass.      Tenderness: There is no abdominal tenderness. There is no rebound.      Hernia: No hernia is present.   Musculoskeletal:         General: Tenderness present. No swelling or deformity.   Feet:      Right foot:      Skin integrity: Dry skin present. No ulcer, skin breakdown, erythema, warmth or callus.      Left foot:      Skin integrity: Dry skin present. No ulcer, skin breakdown, erythema, warmth or callus.   Neurological:      General: No focal deficit present.      Mental Status: She is alert and oriented to person, place, and time.   Psychiatric:         Mood and Affect: Mood " normal.         Behavior: Behavior normal.       Administrative Statements

## 2024-05-28 NOTE — ASSESSMENT & PLAN NOTE
Lab Results   Component Value Date    EGFR 34 12/29/2023    EGFR 31 07/13/2023    EGFR 33 05/10/2023    CREATININE 1.49 (H) 12/29/2023    CREATININE 1.60 (H) 07/13/2023    CREATININE 1.50 (H) 05/10/2023   Continue follow-up with nephrology.

## 2024-05-28 NOTE — PATIENT INSTRUCTIONS
Medicare Preventive Visit Patient Instructions  Thank you for completing your Welcome to Medicare Visit or Medicare Annual Wellness Visit today. Your next wellness visit will be due in one year (5/29/2025).  The screening/preventive services that you may require over the next 5-10 years are detailed below. Some tests may not apply to you based off risk factors and/or age. Screening tests ordered at today's visit but not completed yet may show as past due. Also, please note that scanned in results may not display below.  Preventive Screenings:  Service Recommendations Previous Testing/Comments   Colorectal Cancer Screening  * Colonoscopy    * Fecal Occult Blood Test (FOBT)/Fecal Immunochemical Test (FIT)  * Fecal DNA/Cologuard Test  * Flexible Sigmoidoscopy Age: 45-75 years old   Colonoscopy: every 10 years (may be performed more frequently if at higher risk)  OR  FOBT/FIT: every 1 year  OR  Cologuard: every 3 years  OR  Sigmoidoscopy: every 5 years  Screening may be recommended earlier than age 45 if at higher risk for colorectal cancer. Also, an individualized decision between you and your healthcare provider will decide whether screening between the ages of 76-85 would be appropriate. Colonoscopy: Not on file  FOBT/FIT: Not on file  Cologuard: Not on file  Sigmoidoscopy: Not on file          Breast Cancer Screening Age: 40+ years old  Frequency: every 1-2 years  Not required if history of left and right mastectomy Mammogram: Not on file        Cervical Cancer Screening Between the ages of 21-29, pap smear recommended once every 3 years.   Between the ages of 30-65, can perform pap smear with HPV co-testing every 5 years.   Recommendations may differ for women with a history of total hysterectomy, cervical cancer, or abnormal pap smears in past. Pap Smear: Not on file        Hepatitis C Screening Once for adults born between 1945 and 1965  More frequently in patients at high risk for Hepatitis C Hep C Antibody:  10/06/2017        Diabetes Screening 1-2 times per year if you're at risk for diabetes or have pre-diabetes Fasting glucose: 164 mg/dL (12/29/2023)  A1C: 7.3 (11/27/2023)      Cholesterol Screening Once every 5 years if you don't have a lipid disorder. May order more often based on risk factors. Lipid panel: 05/20/2022          Other Preventive Screenings Covered by Medicare:  Abdominal Aortic Aneurysm (AAA) Screening: covered once if your at risk. You're considered to be at risk if you have a family history of AAA.  Lung Cancer Screening: covers low dose CT scan once per year if you meet all of the following conditions: (1) Age 55-77; (2) No signs or symptoms of lung cancer; (3) Current smoker or have quit smoking within the last 15 years; (4) You have a tobacco smoking history of at least 20 pack years (packs per day multiplied by number of years you smoked); (5) You get a written order from a healthcare provider.  Glaucoma Screening: covered annually if you're considered high risk: (1) You have diabetes OR (2) Family history of glaucoma OR (3)  aged 50 and older OR (4)  American aged 65 and older  Osteoporosis Screening: covered every 2 years if you meet one of the following conditions: (1) You're estrogen deficient and at risk for osteoporosis based off medical history and other findings; (2) Have a vertebral abnormality; (3) On glucocorticoid therapy for more than 3 months; (4) Have primary hyperparathyroidism; (5) On osteoporosis medications and need to assess response to drug therapy.   Last bone density test (DXA Scan): 08/05/2022.  HIV Screening: covered annually if you're between the age of 15-65. Also covered annually if you are younger than 15 and older than 65 with risk factors for HIV infection. For pregnant patients, it is covered up to 3 times per pregnancy.    Immunizations:  Immunization Recommendations   Influenza Vaccine Annual influenza vaccination during flu season is  recommended for all persons aged >= 6 months who do not have contraindications   Pneumococcal Vaccine   * Pneumococcal conjugate vaccine = PCV13 (Prevnar 13), PCV15 (Vaxneuvance), PCV20 (Prevnar 20)  * Pneumococcal polysaccharide vaccine = PPSV23 (Pneumovax) Adults 19-63 yo with certain risk factors or if 65+ yo  If never received any pneumonia vaccine: recommend Prevnar 20 (PCV20)  Give PCV20 if previously received 1 dose of PCV13 or PPSV23   Hepatitis B Vaccine 3 dose series if at intermediate or high risk (ex: diabetes, end stage renal disease, liver disease)   Respiratory syncytial virus (RSV) Vaccine - COVERED BY MEDICARE PART D  * RSVPreF3 (Arexvy) CDC recommends that adults 60 years of age and older may receive a single dose of RSV vaccine using shared clinical decision-making (SCDM)   Tetanus (Td) Vaccine - COST NOT COVERED BY MEDICARE PART B Following completion of primary series, a booster dose should be given every 10 years to maintain immunity against tetanus. Td may also be given as tetanus wound prophylaxis.   Tdap Vaccine - COST NOT COVERED BY MEDICARE PART B Recommended at least once for all adults. For pregnant patients, recommended with each pregnancy.   Shingles Vaccine (Shingrix) - COST NOT COVERED BY MEDICARE PART B  2 shot series recommended in those 19 years and older who have or will have weakened immune systems or those 50 years and older     Health Maintenance Due:      Topic Date Due   • Breast Cancer Screening: Mammogram  11/27/2024 (Originally 3/18/1990)   • DXA SCAN  08/01/2024   • Hepatitis C Screening  Completed   • Colorectal Cancer Screening  Discontinued     Immunizations Due:      Topic Date Due   • DTaP,Tdap,and Td Vaccines (1 - Tdap) Never done   • COVID-19 Vaccine (5 - 2023-24 season) 09/01/2023     Advance Directives   What are advance directives?  Advance directives are legal documents that state your wishes and plans for medical care. These plans are made ahead of time in  case you lose your ability to make decisions for yourself. Advance directives can apply to any medical decision, such as the treatments you want, and if you want to donate organs.   What are the types of advance directives?  There are many types of advance directives, and each state has rules about how to use them. You may choose a combination of any of the following:  Living will:  This is a written record of the treatment you want. You can also choose which treatments you do not want, which to limit, and which to stop at a certain time. This includes surgery, medicine, IV fluid, and tube feedings.   Durable power of  for healthcare (DPAHC):  This is a written record that states who you want to make healthcare choices for you when you are unable to make them for yourself. This person, called a proxy, is usually a family member or a friend. You may choose more than 1 proxy.  Do not resuscitate (DNR) order:  A DNR order is used in case your heart stops beating or you stop breathing. It is a request not to have certain forms of treatment, such as CPR. A DNR order may be included in other types of advance directives.  Medical directive:  This covers the care that you want if you are in a coma, near death, or unable to make decisions for yourself. You can list the treatments you want for each condition. Treatment may include pain medicine, surgery, blood transfusions, dialysis, IV or tube feedings, and a ventilator (breathing machine).  Values history:  This document has questions about your views, beliefs, and how you feel and think about life. This information can help others choose the care that you would choose.  Why are advance directives important?  An advance directive helps you control your care. Although spoken wishes may be used, it is better to have your wishes written down. Spoken wishes can be misunderstood, or not followed. Treatments may be given even if you do not want them. An advance directive  may make it easier for your family to make difficult choices about your care.   Urinary Incontinence   Urinary incontinence (UI)  is when you lose control of your bladder. UI develops because your bladder cannot store or empty urine properly. The 3 most common types of UI are stress incontinence, urge incontinence, or both.  Medicines:   May be given to help strengthen your bladder control. Report any side effects of medication to your healthcare provider.  Do pelvic muscle exercises often:  Your pelvic muscles help you stop urinating. Squeeze these muscles tight for 5 seconds, then relax for 5 seconds. Gradually work up to squeezing for 10 seconds. Do 3 sets of 15 repetitions a day, or as directed. This will help strengthen your pelvic muscles and improve bladder control.  Train your bladder:  Go to the bathroom at set times, such as every 2 hours, even if you do not feel the urge to go. You can also try to hold your urine when you feel the urge to go. For example, hold your urine for 5 minutes when you feel the urge to go. As that becomes easier, hold your urine for 10 minutes.   Self-care:   Keep a UI record.  Write down how often you leak urine and how much you leak. Make a note of what you were doing when you leaked urine.  Drink liquids as directed. You may need to limit the amount of liquid you drink to help control your urine leakage. Do not drink any liquid right before you go to bed. Limit or do not have drinks that contain caffeine or alcohol.   Prevent constipation.  Eat a variety of high-fiber foods. Good examples are high-fiber cereals, beans, vegetables, and whole-grain breads. Walking is the best way to trigger your intestines to have a bowel movement.  Exercise regularly and maintain a healthy weight.  Weight loss and exercise will decrease pressure on your bladder and help you control your leakage.   Use a catheter as directed  to help empty your bladder. A catheter is a tiny, plastic tube that is  put into your bladder to drain your urine.   Go to behavior therapy as directed.  Behavior therapy may be used to help you learn to control your urge to urinate.    Weight Management   Why it is important to manage your weight:  Being overweight increases your risk of health conditions such as heart disease, high blood pressure, type 2 diabetes, and certain types of cancer. It can also increase your risk for osteoarthritis, sleep apnea, and other respiratory problems. Aim for a slow, steady weight loss. Even a small amount of weight loss can lower your risk of health problems.  How to lose weight safely:  A safe and healthy way to lose weight is to eat fewer calories and get regular exercise. You can lose up about 1 pound a week by decreasing the number of calories you eat by 500 calories each day.   Healthy meal plan for weight management:  A healthy meal plan includes a variety of foods, contains fewer calories, and helps you stay healthy. A healthy meal plan includes the following:  Eat whole-grain foods more often.  A healthy meal plan should contain fiber. Fiber is the part of grains, fruits, and vegetables that is not broken down by your body. Whole-grain foods are healthy and provide extra fiber in your diet. Some examples of whole-grain foods are whole-wheat breads and pastas, oatmeal, brown rice, and bulgur.  Eat a variety of vegetables every day.  Include dark, leafy greens such as spinach, kale, yessy greens, and mustard greens. Eat yellow and orange vegetables such as carrots, sweet potatoes, and winter squash.   Eat a variety of fruits every day.  Choose fresh or canned fruit (canned in its own juice or light syrup) instead of juice. Fruit juice has very little or no fiber.  Eat low-fat dairy foods.  Drink fat-free (skim) milk or 1% milk. Eat fat-free yogurt and low-fat cottage cheese. Try low-fat cheeses such as mozzarella and other reduced-fat cheeses.  Choose meat and other protein foods that are  low in fat.  Choose beans or other legumes such as split peas or lentils. Choose fish, skinless poultry (chicken or turkey), or lean cuts of red meat (beef or pork). Before you cook meat or poultry, cut off any visible fat.   Use less fat and oil.  Try baking foods instead of frying them. Add less fat, such as margarine, sour cream, regular salad dressing and mayonnaise to foods. Eat fewer high-fat foods. Some examples of high-fat foods include french fries, doughnuts, ice cream, and cakes.  Eat fewer sweets.  Limit foods and drinks that are high in sugar. This includes candy, cookies, regular soda, and sweetened drinks.  Exercise:  Exercise at least 30 minutes per day on most days of the week. Some examples of exercise include walking, biking, dancing, and swimming. You can also fit in more physical activity by taking the stairs instead of the elevator or parking farther away from stores. Ask your healthcare provider about the best exercise plan for you.      © Copyright [employer] 2018 Information is for End User's use only and may not be sold, redistributed or otherwise used for commercial purposes. All illustrations and images included in CareNotes® are the copyrighted property of [employer] or [employer]

## 2024-05-31 ENCOUNTER — TELEPHONE (OUTPATIENT)
Dept: NEPHROLOGY | Facility: CLINIC | Age: 74
End: 2024-05-31

## 2024-06-04 ENCOUNTER — TELEPHONE (OUTPATIENT)
Age: 74
End: 2024-06-04

## 2024-06-04 ENCOUNTER — APPOINTMENT (OUTPATIENT)
Dept: LAB | Facility: CLINIC | Age: 74
End: 2024-06-04
Payer: COMMERCIAL

## 2024-06-04 DIAGNOSIS — E11.22 TYPE 2 DIABETES MELLITUS WITH STAGE 3B CHRONIC KIDNEY DISEASE, UNSPECIFIED WHETHER LONG TERM INSULIN USE (HCC): ICD-10-CM

## 2024-06-04 DIAGNOSIS — N18.32 STAGE 3B CHRONIC KIDNEY DISEASE (HCC): ICD-10-CM

## 2024-06-04 DIAGNOSIS — N18.32 TYPE 2 DIABETES MELLITUS WITH STAGE 3B CHRONIC KIDNEY DISEASE, UNSPECIFIED WHETHER LONG TERM INSULIN USE (HCC): ICD-10-CM

## 2024-06-04 DIAGNOSIS — N18.32 STAGE 3B CHRONIC KIDNEY DISEASE (HCC): Primary | ICD-10-CM

## 2024-06-04 LAB
ALBUMIN SERPL BCP-MCNC: 4 G/DL (ref 3.5–5)
ALP SERPL-CCNC: 50 U/L (ref 34–104)
ALT SERPL W P-5'-P-CCNC: 10 U/L (ref 7–52)
ANION GAP SERPL CALCULATED.3IONS-SCNC: 9 MMOL/L (ref 4–13)
AST SERPL W P-5'-P-CCNC: 17 U/L (ref 13–39)
BACTERIA UR QL AUTO: ABNORMAL /HPF
BASOPHILS # BLD AUTO: 0.06 THOUSANDS/ÂΜL (ref 0–0.1)
BASOPHILS NFR BLD AUTO: 1 % (ref 0–1)
BILIRUB SERPL-MCNC: 0.42 MG/DL (ref 0.2–1)
BILIRUB UR QL STRIP: NEGATIVE
BUN SERPL-MCNC: 31 MG/DL (ref 5–25)
CALCIUM SERPL-MCNC: 10.5 MG/DL (ref 8.4–10.2)
CHLORIDE SERPL-SCNC: 101 MMOL/L (ref 96–108)
CHOLEST SERPL-MCNC: 114 MG/DL
CLARITY UR: CLEAR
CO2 SERPL-SCNC: 30 MMOL/L (ref 21–32)
COLOR UR: ABNORMAL
CREAT SERPL-MCNC: 1.57 MG/DL (ref 0.6–1.3)
CREAT UR-MCNC: 84.7 MG/DL
EOSINOPHIL # BLD AUTO: 0.28 THOUSAND/ÂΜL (ref 0–0.61)
EOSINOPHIL NFR BLD AUTO: 4 % (ref 0–6)
ERYTHROCYTE [DISTWIDTH] IN BLOOD BY AUTOMATED COUNT: 11.9 % (ref 11.6–15.1)
GFR SERPL CREATININE-BSD FRML MDRD: 31 ML/MIN/1.73SQ M
GLUCOSE P FAST SERPL-MCNC: 149 MG/DL (ref 65–99)
GLUCOSE UR STRIP-MCNC: NEGATIVE MG/DL
HCT VFR BLD AUTO: 41.6 % (ref 34.8–46.1)
HDLC SERPL-MCNC: 65 MG/DL
HGB BLD-MCNC: 13.4 G/DL (ref 11.5–15.4)
HGB UR QL STRIP.AUTO: NEGATIVE
HYALINE CASTS #/AREA URNS LPF: ABNORMAL /LPF
IMM GRANULOCYTES # BLD AUTO: 0.02 THOUSAND/UL (ref 0–0.2)
IMM GRANULOCYTES NFR BLD AUTO: 0 % (ref 0–2)
KETONES UR STRIP-MCNC: NEGATIVE MG/DL
LDLC SERPL CALC-MCNC: 23 MG/DL (ref 0–100)
LEUKOCYTE ESTERASE UR QL STRIP: NEGATIVE
LYMPHOCYTES # BLD AUTO: 2.31 THOUSANDS/ÂΜL (ref 0.6–4.47)
LYMPHOCYTES NFR BLD AUTO: 30 % (ref 14–44)
MAGNESIUM SERPL-MCNC: 1.7 MG/DL (ref 1.9–2.7)
MCH RBC QN AUTO: 30 PG (ref 26.8–34.3)
MCHC RBC AUTO-ENTMCNC: 32.2 G/DL (ref 31.4–37.4)
MCV RBC AUTO: 93 FL (ref 82–98)
MICROALBUMIN UR-MCNC: 45 MG/L
MICROALBUMIN/CREAT 24H UR: 53 MG/G CREATININE (ref 0–30)
MONOCYTES # BLD AUTO: 0.63 THOUSAND/ÂΜL (ref 0.17–1.22)
MONOCYTES NFR BLD AUTO: 8 % (ref 4–12)
NEUTROPHILS # BLD AUTO: 4.53 THOUSANDS/ÂΜL (ref 1.85–7.62)
NEUTS SEG NFR BLD AUTO: 57 % (ref 43–75)
NITRITE UR QL STRIP: NEGATIVE
NON-SQ EPI CELLS URNS QL MICRO: ABNORMAL /HPF
NONHDLC SERPL-MCNC: 49 MG/DL
NRBC BLD AUTO-RTO: 0 /100 WBCS
PH UR STRIP.AUTO: 5.5 [PH]
PHOSPHATE SERPL-MCNC: 4.4 MG/DL (ref 2.3–4.1)
PLATELET # BLD AUTO: 260 THOUSANDS/UL (ref 149–390)
PMV BLD AUTO: 11.4 FL (ref 8.9–12.7)
POTASSIUM SERPL-SCNC: 4.5 MMOL/L (ref 3.5–5.3)
PROT SERPL-MCNC: 7.1 G/DL (ref 6.4–8.4)
PROT UR STRIP-MCNC: ABNORMAL MG/DL
PTH-INTACT SERPL-MCNC: 22.1 PG/ML (ref 12–88)
RBC # BLD AUTO: 4.47 MILLION/UL (ref 3.81–5.12)
RBC #/AREA URNS AUTO: ABNORMAL /HPF
SODIUM SERPL-SCNC: 140 MMOL/L (ref 135–147)
SP GR UR STRIP.AUTO: 1.01 (ref 1–1.03)
TRIGL SERPL-MCNC: 128 MG/DL
URATE SERPL-MCNC: 6.2 MG/DL (ref 2–7.5)
UROBILINOGEN UR STRIP-ACNC: <2 MG/DL
WBC # BLD AUTO: 7.83 THOUSAND/UL (ref 4.31–10.16)
WBC #/AREA URNS AUTO: ABNORMAL /HPF

## 2024-06-04 PROCEDURE — 83970 ASSAY OF PARATHORMONE: CPT

## 2024-06-04 PROCEDURE — 82570 ASSAY OF URINE CREATININE: CPT

## 2024-06-04 PROCEDURE — 80053 COMPREHEN METABOLIC PANEL: CPT

## 2024-06-04 PROCEDURE — 36415 COLL VENOUS BLD VENIPUNCTURE: CPT

## 2024-06-04 PROCEDURE — 83735 ASSAY OF MAGNESIUM: CPT

## 2024-06-04 PROCEDURE — 80061 LIPID PANEL: CPT

## 2024-06-04 PROCEDURE — 82043 UR ALBUMIN QUANTITATIVE: CPT

## 2024-06-04 PROCEDURE — 85025 COMPLETE CBC W/AUTO DIFF WBC: CPT

## 2024-06-04 PROCEDURE — 84550 ASSAY OF BLOOD/URIC ACID: CPT

## 2024-06-04 PROCEDURE — 81001 URINALYSIS AUTO W/SCOPE: CPT

## 2024-06-04 PROCEDURE — 84100 ASSAY OF PHOSPHORUS: CPT

## 2024-06-04 NOTE — TELEPHONE ENCOUNTER
Call from [facility] lab. Patient is there now to get her labs done prior to her appt on 6/10. Labs are dated for July. Please change the date or remove it so the patient can get her labs done.  Attempted to call nicola longoria and no answer

## 2024-06-05 ENCOUNTER — TELEPHONE (OUTPATIENT)
Dept: NEPHROLOGY | Facility: CLINIC | Age: 74
End: 2024-06-05

## 2024-06-05 NOTE — TELEPHONE ENCOUNTER
----- Message from Bolivar Isaac PA-C sent at 6/4/2024  4:41 PM EDT -----  Phosphorus is elevated, magnesium is mildly low

## 2024-06-10 ENCOUNTER — OFFICE VISIT (OUTPATIENT)
Dept: NEPHROLOGY | Facility: CLINIC | Age: 74
End: 2024-06-10
Payer: COMMERCIAL

## 2024-06-10 VITALS
HEIGHT: 61 IN | OXYGEN SATURATION: 98 % | SYSTOLIC BLOOD PRESSURE: 150 MMHG | HEART RATE: 65 BPM | WEIGHT: 173 LBS | DIASTOLIC BLOOD PRESSURE: 78 MMHG | BODY MASS INDEX: 32.66 KG/M2

## 2024-06-10 DIAGNOSIS — M81.0 AGE-RELATED OSTEOPOROSIS WITHOUT CURRENT PATHOLOGICAL FRACTURE: ICD-10-CM

## 2024-06-10 DIAGNOSIS — N18.32 TYPE 2 DIABETES MELLITUS WITH STAGE 3B CHRONIC KIDNEY DISEASE, UNSPECIFIED WHETHER LONG TERM INSULIN USE (HCC): ICD-10-CM

## 2024-06-10 DIAGNOSIS — E11.22 TYPE 2 DIABETES MELLITUS WITH STAGE 3B CHRONIC KIDNEY DISEASE, UNSPECIFIED WHETHER LONG TERM INSULIN USE (HCC): ICD-10-CM

## 2024-06-10 DIAGNOSIS — I10 ESSENTIAL HYPERTENSION: ICD-10-CM

## 2024-06-10 DIAGNOSIS — E83.52 HYPERCALCEMIA: Primary | ICD-10-CM

## 2024-06-10 DIAGNOSIS — N18.32 STAGE 3B CHRONIC KIDNEY DISEASE (HCC): ICD-10-CM

## 2024-06-10 PROCEDURE — 99214 OFFICE O/P EST MOD 30 MIN: CPT | Performed by: INTERNAL MEDICINE

## 2024-06-10 RX ORDER — GUAIFENESIN/DEXTROMETHORPHAN 100-10MG/5
5 SYRUP ORAL 3 TIMES DAILY PRN
COMMUNITY

## 2024-06-10 NOTE — PATIENT INSTRUCTIONS
Look into Farxiga saving card and see if you would be able to get this for 0 dollar a month, this would help with your blood sugar and blood pressure.  Call the office if you would like a prescription for Farxiga, would start with 5mg/day.   You can trial an over the counter magnesium supplement, or you can trial magnesium oxide which is a prescription.   Decrease calcium to 1200mg per day.   When we start the new medication, Farxiga or Jardiance, we will check blood work 1 month after starting.   You kidney function is stable at 31%.

## 2024-06-10 NOTE — PROGRESS NOTES
Assessment & Plan:    1. Hypercalcemia  -     Basic metabolic panel; Future; Expected date: 07/10/2024  -     Urinalysis with microscopic; Future; Expected date: 11/12/2024  -     Comprehensive metabolic panel; Future; Expected date: 11/12/2024  -     Albumin / creatinine urine ratio; Future; Expected date: 11/12/2024  -     CBC and differential; Future; Expected date: 11/12/2024  -     Phosphorus; Future; Expected date: 11/12/2024  -     PTH, intact; Future; Expected date: 11/12/2024  2. Stage 3b chronic kidney disease (HCC)  -     Basic metabolic panel; Future; Expected date: 07/10/2024  -     Urinalysis with microscopic; Future; Expected date: 11/12/2024  -     Comprehensive metabolic panel; Future; Expected date: 11/12/2024  -     Albumin / creatinine urine ratio; Future; Expected date: 11/12/2024  -     CBC and differential; Future; Expected date: 11/12/2024  -     Phosphorus; Future; Expected date: 11/12/2024  -     PTH, intact; Future; Expected date: 11/12/2024  3. Age-related osteoporosis without current pathological fracture  4. Type 2 diabetes mellitus with stage 3b chronic kidney disease, unspecified whether long term insulin use ([facility])  5. Essential hypertension       1.  CKD 3B with baseline creatinine 1.4-1.6 Mg per deciliter.  Etiology of CKD attributed to age-related EGFR loss, vascular/hypertensive nephrosclerosis and diabetes.  She is not biopsy-proven.  volume status appears compensated.      6/4/2024    Albumin/creatinine ratio 53 Mg per G creatinine.  Most recent trends show creatinine 1.57 with EGFR 31 mL/min.  Metabolic parameters stable aside from mild hypercalcemia 10.5.  She has been taking 2400 mg of calcium and supplements per day.  She does have a history of osteoporosis and is on alendronate.    Recommend increasing calcium to 1200 mg/day and repeating BMP.  PTH trends were appropriately coming down in the setting of hypercalcemia.    Reviewed CKD stages, creatinine and EGFR trends and  EGFR meaning in simple terms.  Patient is looking into SGLT2 inhibitor because.    Follow-up in 6 months with labs prior.  No medication changes made today otherwise.  If she is able to afford SGLT2 inhibitor she will call the office.      2.  Hypercalcemia  She is taking 2400 mg of calcium per day.  Advised to cut in half given the rising calcium to 10.5.  Follow-up repeat BMP within 2 weeks after reduction in calcium intake.  Avoid taking Tums.  Maintain a normal calcium intake of 800 to 1200 mg/day.  Her PTH trends are on the low side at 22.1, then this may be an appropriate response to hypercalcemia.  Generally PTH should be suppressed around 20 or less.    3.  Age-related osteoporosis  Continue monitoring with DEXA scan and alendronate per primary team.      4.  Type 2 diabetes  Currently maintained on metformin 1000 mg twice daily, control reasonable.  She is looking to the cost of SGLT2 inhibitors.  And look into Farxiga savings card and see if she can get a price reduced.    5.  Essential hypertension  Blood pressure trends moderately elevated during the visit.  Patient is looking to SGLT2 inhibitor.  Typically her blood pressure is under 150 systolic.  She does not follow this faithfully at home.    Patient will need ongoing complex care for CKD management.    The benefits, risks and alternatives to the treatment plan were discussed at this visit. Patient was advised of common adverse effects of any medical therapies prescribed. All questions were answered and discussed with the patient and any accompanying family members or caretakers.      Subjective:      Patient ID: Ellie Daniel is a 74 y.o. female presented to the [facility] office for CKD follow-up.  Patient was last seen on 1/8/24 for CKD 3B    HPI  In the interim since last visit, denies any new medical conditions, surgeries, allergies, medications.  Checks BP intermittently at home, typically under 150 systolic.  Usual breathing situation at home  "with OPOD.   She will look into cost of SGLT2 inhibitors:    Jardiance 47 a month, Farxiga 100 a month.     Denies yeast infection/wound/UTI/pyelo.     She takes calcium supplements ---2400mg/day.     The following portions of the patient's history were reviewed and updated as appropriate: allergies, current medications, past family history, past medical history, past social history, past surgical history, and problem list.    Review of Systems   Respiratory:  Positive for shortness of breath.    Cardiovascular: Negative.    Gastrointestinal: Negative.    Genitourinary: Negative.    Neurological: Negative.          Objective:      /78 (BP Location: Left arm, Patient Position: Sitting, Cuff Size: Standard)   Pulse 65   Ht 5' 0.5\" (1.537 m)   Wt 78.5 kg (173 lb)   SpO2 98%   BMI 33.23 kg/m²          Physical Exam  Vitals reviewed.   Constitutional:       General: She is not in acute distress.     Appearance: She is not ill-appearing.   HENT:      Head: Normocephalic and atraumatic.      Nose: Nose normal.      Mouth/Throat:      Mouth: Mucous membranes are moist.   Eyes:      Extraocular Movements: Extraocular movements intact.      Conjunctiva/sclera: Conjunctivae normal.   Cardiovascular:      Rate and Rhythm: Normal rate and regular rhythm.      Heart sounds:      No friction rub.   Pulmonary:      Breath sounds: No wheezing, rhonchi or rales.   Abdominal:      Tenderness: There is no abdominal tenderness. There is no guarding.   Musculoskeletal:      Right lower leg: No edema.      Left lower leg: No edema.   Skin:     Coloration: Skin is not jaundiced.      Findings: No bruising.   Neurological:      General: No focal deficit present.      Mental Status: She is alert and oriented to person, place, and time.      Cranial Nerves: No cranial nerve deficit.   Psychiatric:         Mood and Affect: Mood normal.         Behavior: Behavior normal.             Lab Results   Component Value Date    SODIUM 140 " "06/04/2024    K 4.5 06/04/2024     06/04/2024    CO2 30 06/04/2024    AGAP 9 06/04/2024    BUN 31 (H) 06/04/2024    CREATININE 1.57 (H) 06/04/2024    GLUC 101 01/06/2020    GLUF 149 (H) 06/04/2024    CALCIUM 10.5 (H) 06/04/2024    AST 17 06/04/2024    ALT 10 06/04/2024    ALKPHOS 50 06/04/2024    TP 7.1 06/04/2024    TBILI 0.42 06/04/2024    EGFR 31 06/04/2024      Lab Results   Component Value Date    CREATININE 1.57 (H) 06/04/2024    CREATININE 1.49 (H) 12/29/2023    CREATININE 1.60 (H) 07/13/2023    CREATININE 1.50 (H) 05/10/2023    CREATININE 1.33 (H) 12/09/2022    CREATININE 1.58 (H) 11/17/2022    CREATININE 1.38 (H) 06/02/2022    CREATININE 1.28 05/20/2022    CREATININE 1.31 (H) 01/10/2022    CREATININE 1.22 05/19/2021    CREATININE 1.18 05/26/2020    CREATININE 0.80 01/06/2020    CREATININE 0.98 01/05/2020    CREATININE 1.03 01/04/2020    CREATININE 1.12 01/03/2020      Lab Results   Component Value Date    COLORU Light Yellow 06/04/2024    CLARITYU Clear 06/04/2024    SPECGRAV 1.014 06/04/2024    PHUR 5.5 06/04/2024    LEUKOCYTESUR Negative 06/04/2024    NITRITE Negative 06/04/2024    PROTEIN UA Trace (A) 06/04/2024    GLUCOSEU Negative 06/04/2024    KETONESU Negative 06/04/2024    UROBILINOGEN <2.0 06/04/2024    BILIRUBINUR Negative 06/04/2024    BLOODU Negative 06/04/2024    RBCUA None Seen 06/04/2024    WBCUA None Seen 06/04/2024    EPIS Occasional 06/04/2024    BACTERIA None Seen 06/04/2024      No results found for: \"LABPROT\"  No results found for: \"MICROALBUR\", \"WFKA72BVY\"  Lab Results   Component Value Date    WBC 7.83 06/04/2024    HGB 13.4 06/04/2024    HCT 41.6 06/04/2024    MCV 93 06/04/2024     06/04/2024      Lab Results   Component Value Date    HGB 13.4 06/04/2024    HGB 12.6 12/29/2023    HGB 12.8 07/13/2023    HGB 12.2 05/10/2023    HGB 12.0 06/02/2022      No results found for: \"IRON\", \"TIBC\", \"FERRITIN\"   No results found for: \"PTHCALCIUM\", \"LPCG28DCXHSC\", \"PHOSPHORUS\"   Lab " "Results   Component Value Date    CHOLESTEROL 114 06/04/2024    HDL 65 06/04/2024    LDLCALC 23 06/04/2024    TRIG 128 06/04/2024      Lab Results   Component Value Date    URICACID 6.2 06/04/2024      Lab Results   Component Value Date    HGBA1C 7.5 (A) 05/28/2024      No results found for: \"TSHANTIBODY\", \"C6WQMZS\", \"FREET4\"   No results found for: \"YULIET\", \"DSDNAAB\", \"RFIGM\"   No results found for: \"PROT\", \"UPEP\", \"IMMUNOFIX\", \"KAPPALAMBDA\", \"KAPPALIGHT\"     Portions of the record may have been created with voice recognition software. Occasional wrong word or \"sound a like\" substitutions may have occurred due to the inherent limitations of voice recognition software. Read the chart carefully and recognize, using context, where substitutions have occurred. If you have any questions, please contact the dictating provider.      "

## 2024-08-22 ENCOUNTER — HOSPITAL ENCOUNTER (OUTPATIENT)
Dept: BONE DENSITY | Facility: MEDICAL CENTER | Age: 74
Discharge: HOME | End: 2024-08-22
Payer: COMMERCIAL

## 2024-08-22 DIAGNOSIS — M81.0 OSTEOPOROSIS WITHOUT CURRENT PATHOLOGICAL FRACTURE, UNSPECIFIED OSTEOPOROSIS TYPE: ICD-10-CM

## 2024-08-22 PROCEDURE — 77080 DXA BONE DENSITY AXIAL: CPT

## 2024-08-29 ENCOUNTER — TELEPHONE (OUTPATIENT)
Dept: FAMILY MEDICINE CLINIC | Facility: CLINIC | Age: 74
End: 2024-08-29

## 2024-08-29 NOTE — TELEPHONE ENCOUNTER
Left VM . Please pt this message below .    Please call patient and ask if she takes her alendronate weekly, if she does update that her osteoporosis got slightly worse on the medication, we will switch her to different medication in December.  As for now continue with current dose of alendronate

## 2024-08-29 NOTE — TELEPHONE ENCOUNTER
Yes patient takes it weekly. She is aware of the slight change and will continue to take the normal dose until dec. Pt agreeable.

## 2024-08-30 ENCOUNTER — TELEPHONE (OUTPATIENT)
Dept: FAMILY MEDICINE CLINIC | Facility: CLINIC | Age: 74
End: 2024-08-30

## 2024-08-30 DIAGNOSIS — E11.8 CONTROLLED DIABETES MELLITUS TYPE 2 WITH COMPLICATIONS, UNSPECIFIED WHETHER LONG TERM INSULIN USE (HCC): ICD-10-CM

## 2024-10-25 LAB
LEFT EYE DIABETIC RETINOPATHY: NORMAL
RIGHT EYE DIABETIC RETINOPATHY: NORMAL
SEVERITY (EYE EXAM): NORMAL

## 2024-11-08 DIAGNOSIS — J44.9 CHRONIC OBSTRUCTIVE PULMONARY DISEASE, UNSPECIFIED COPD TYPE (HCC): ICD-10-CM

## 2024-11-08 RX ORDER — FLUTICASONE FUROATE, UMECLIDINIUM BROMIDE AND VILANTEROL TRIFENATATE 100; 62.5; 25 UG/1; UG/1; UG/1
1 POWDER RESPIRATORY (INHALATION) DAILY
Qty: 60 EACH | Refills: 0 | Status: SHIPPED | OUTPATIENT
Start: 2024-11-08

## 2024-11-11 DIAGNOSIS — M81.0 POSTMENOPAUSAL OSTEOPOROSIS: ICD-10-CM

## 2024-11-12 RX ORDER — ALENDRONATE SODIUM 70 MG/1
70 TABLET ORAL
Qty: 12 TABLET | Refills: 0 | Status: SHIPPED | OUTPATIENT
Start: 2024-11-12

## 2024-11-20 ENCOUNTER — APPOINTMENT (OUTPATIENT)
Dept: RADIOLOGY | Facility: CLINIC | Age: 74
End: 2024-11-20
Payer: COMMERCIAL

## 2024-11-20 ENCOUNTER — APPOINTMENT (EMERGENCY)
Dept: NON INVASIVE DIAGNOSTICS | Facility: HOSPITAL | Age: 74
End: 2024-11-20
Payer: COMMERCIAL

## 2024-11-20 ENCOUNTER — HOSPITAL ENCOUNTER (EMERGENCY)
Facility: HOSPITAL | Age: 74
Discharge: HOME | End: 2024-11-20
Attending: EMERGENCY MEDICINE
Payer: COMMERCIAL

## 2024-11-20 ENCOUNTER — OFFICE VISIT (OUTPATIENT)
Dept: URGENT CARE | Facility: CLINIC | Age: 74
End: 2024-11-20
Payer: COMMERCIAL

## 2024-11-20 VITALS
TEMPERATURE: 98.1 F | OXYGEN SATURATION: 95 % | DIASTOLIC BLOOD PRESSURE: 70 MMHG | RESPIRATION RATE: 18 BRPM | SYSTOLIC BLOOD PRESSURE: 160 MMHG | HEART RATE: 76 BPM

## 2024-11-20 VITALS
RESPIRATION RATE: 18 BRPM | TEMPERATURE: 97.9 F | BODY MASS INDEX: 32.66 KG/M2 | DIASTOLIC BLOOD PRESSURE: 73 MMHG | WEIGHT: 173 LBS | HEIGHT: 61 IN | OXYGEN SATURATION: 99 % | SYSTOLIC BLOOD PRESSURE: 168 MMHG | HEART RATE: 77 BPM

## 2024-11-20 DIAGNOSIS — M79.604 RIGHT LEG PAIN: Primary | ICD-10-CM

## 2024-11-20 DIAGNOSIS — M79.661 PAIN OF RIGHT CALF: Primary | ICD-10-CM

## 2024-11-20 DIAGNOSIS — M79.671 RIGHT FOOT PAIN: ICD-10-CM

## 2024-11-20 DIAGNOSIS — M76.61 ACHILLES TENDINITIS OF RIGHT LOWER EXTREMITY: ICD-10-CM

## 2024-11-20 LAB
ANION GAP SERPL CALCULATED.3IONS-SCNC: 10 MMOL/L (ref 4–13)
BUN SERPL-MCNC: 25 MG/DL (ref 5–25)
CALCIUM SERPL-MCNC: 9.2 MG/DL (ref 8.4–10.2)
CHLORIDE SERPL-SCNC: 101 MMOL/L (ref 96–108)
CK SERPL-CCNC: 28 U/L (ref 26–192)
CO2 SERPL-SCNC: 24 MMOL/L (ref 21–32)
CREAT SERPL-MCNC: 1.68 MG/DL (ref 0.6–1.3)
GFR SERPL CREATININE-BSD FRML MDRD: 29 ML/MIN/1.73SQ M
GLUCOSE SERPL-MCNC: 183 MG/DL (ref 65–140)
MAGNESIUM SERPL-MCNC: 1.9 MG/DL (ref 1.9–2.7)
POTASSIUM SERPL-SCNC: 4.7 MMOL/L (ref 3.5–5.3)
SODIUM SERPL-SCNC: 135 MMOL/L (ref 135–147)

## 2024-11-20 PROCEDURE — 73630 X-RAY EXAM OF FOOT: CPT

## 2024-11-20 PROCEDURE — 82550 ASSAY OF CK (CPK): CPT | Performed by: EMERGENCY MEDICINE

## 2024-11-20 PROCEDURE — G0463 HOSPITAL OUTPT CLINIC VISIT: HCPCS | Performed by: STUDENT IN AN ORGANIZED HEALTH CARE EDUCATION/TRAINING PROGRAM

## 2024-11-20 PROCEDURE — 73610 X-RAY EXAM OF ANKLE: CPT

## 2024-11-20 PROCEDURE — 93971 EXTREMITY STUDY: CPT

## 2024-11-20 PROCEDURE — 83735 ASSAY OF MAGNESIUM: CPT | Performed by: EMERGENCY MEDICINE

## 2024-11-20 PROCEDURE — 36415 COLL VENOUS BLD VENIPUNCTURE: CPT | Performed by: EMERGENCY MEDICINE

## 2024-11-20 PROCEDURE — 99284 EMERGENCY DEPT VISIT MOD MDM: CPT | Performed by: EMERGENCY MEDICINE

## 2024-11-20 PROCEDURE — 99213 OFFICE O/P EST LOW 20 MIN: CPT | Performed by: STUDENT IN AN ORGANIZED HEALTH CARE EDUCATION/TRAINING PROGRAM

## 2024-11-20 PROCEDURE — 93971 EXTREMITY STUDY: CPT | Performed by: SURGERY

## 2024-11-20 PROCEDURE — 99284 EMERGENCY DEPT VISIT MOD MDM: CPT

## 2024-11-20 PROCEDURE — 80048 BASIC METABOLIC PNL TOTAL CA: CPT | Performed by: EMERGENCY MEDICINE

## 2024-11-20 NOTE — ED PROVIDER NOTES
"Time reflects when diagnosis was documented in both MDM as applicable and the Disposition within this note       Time User Action Codes Description Comment    11/20/2024  1:58 PM Rafiq Aponte [M79.604] Right leg pain     11/20/2024  2:15 PM Rafiq Aponte [M76.61] Achilles tendinitis of right lower extremity           ED Disposition       ED Disposition   Discharge    Condition   Stable    Date/Time   Wed Nov 20, 2024  1:53 PM    Comment   Ellie Daniel discharge to home/self care.                   Assessment & Plan       Medical Decision Making    Achilles tendon tendinitis as a possible cause for the patient's right pain, does not appear to have a rupture or any induration erythema or bruising along the tendon sheath, no crepitus noted,  reports that she has restless leg and \"runs in her sleep\", not sure if that is connected in terms to the continuous dorsiflexion of the ankle because the the above chief complaint, otherwise workup unremarkable follow-up with PCP, follow-up with podiatry.    Portions of the record may have been created with voice recognition software. Occasional wrong word or \"sound a like\" substitutions may have occurred due to the inherent limitations of voice recognition software. Read the chart carefully and recognize, using context, where substitutions have occurred.     Counseling: I had a detailed discussion with the patient and/or guardian regarding: the historical points, exam findings, and any diagnostic results supporting the discharge diagnosis, lab results, radiology results, discharge instructions reviewed with patient and/or family/caregiver and understanding was verbalized. Instructions given to return to the emergency department if symptoms worsen or persist, or if there are any questions or concerns that arise at home.      ED Course as of 11/20/24 1417   Wed Nov 20, 2024   1300 Patient seen, evaluated, examined, abrupt onset of right calf pain worse with " dorsiflexion, mild swelling noted, seen by PCP advised to go to the emergency department for further evaluation, no shortness of breath, no chest pain, no palpitations, no presyncopal symptoms, no recent travel outside the geographical area with direct trauma.  Patient is on cholesterol medicines.    Focused differential diagnosis in this patient is as follows: DVT versus muscular strain versus rhabdo myelitis is on cholesterol medicine versus left specifically potassium abnormalities or magnesium abnormalities..   1340 Patient is creatinine 1.68 is at baseline.  Patient's magnesium level normal, potassium levels are normal and CK levels are normal, waiting ultrasound results.   1352 As per vascular technician, DVT studies negative.   1415 The time of discharge, all workup is negative, patient has point tenderness around the insertion of the right Achilles tendon to the gastrocnemius muscle, patient has a podiatrist Dr. Serrano who she follows up with advised follow-up with podiatry regarding this particular element.  Patient stable for discharge.       Medications - No data to display    ED Risk Strat Scores                           SBIRT 20yo+      Flowsheet Row Most Recent Value   Initial Alcohol Screen: US AUDIT-C     1. How often do you have a drink containing alcohol? 0 Filed at: 11/20/2024 1254   2. How many drinks containing alcohol do you have on a typical day you are drinking?  0 Filed at: 11/20/2024 1254   3a. Male UNDER 65: How often do you have five or more drinks on one occasion? 0 Filed at: 11/20/2024 1254   3b. FEMALE Any Age, or MALE 65+: How often do you have 4 or more drinks on one occassion? 0 Filed at: 11/20/2024 1254   Audit-C Score 0 Filed at: 11/20/2024 1254   MURIEL: How many times in the past year have you...    Used an illegal drug or used a prescription medication for non-medical reasons? Never Filed at: 11/20/2024 1254                            History of Present Illness       Chief  Complaint   Patient presents with    Leg Pain     Patient presents with right leg pain. Patient reports a cramp in it last night and since then there has been swelling to the leg and pain behind her calf.   Sent from urgent care for rule out DVT       Past Medical History:   Diagnosis Date    Asthma     COPD (chronic obstructive pulmonary disease) (HCC)     Diabetes (HCC)     Diabetes mellitus (HCC)     Hyperlipidemia     Hypertension     Osteoarthritis     Osteoporosis     Seasonal allergies       Past Surgical History:   Procedure Laterality Date    BILATERAL SALPINGOOPHORECTOMY Bilateral 1/3/2020    Procedure: BILATERAL SALPINGO-OOPHORECTOMY;  Surgeon: Martínez Oliveira MD;  Location: [facility];  Service: Gynecology Oncology    CATARACT EXTRACTION Bilateral     CHOLECYSTECTOMY      2016  LAST ASSESSED    EYE SURGERY      cataracts removed    HYSTERECTOMY      2016  LAST ASSESSED    AL EXPLORATORY LAPAROTOMY CELIOTOMY W/WO BIOPSY SPX N/A 1/3/2020    Procedure: LAPAROTOMY EXPLORATORY;  Surgeon: Martínez Oliveira MD;  Location: [facility];  Service: Gynecology Oncology    TUBAL LIGATION        Family History   Problem Relation Age of Onset    Heart disease Mother     Rheumatic fever Father     Diabetes Family         MELLITUS    Diabetes Family         MELLITUS      Social History     Tobacco Use    Smoking status: Former     Current packs/day: 0.00     Average packs/day: 1 pack/day for 32.0 years (32.0 ttl pk-yrs)     Types: Cigarettes     Start date: 1964     Quit date:      Years since quittin.9    Smokeless tobacco: Never   Vaping Use    Vaping status: Never Used   Substance Use Topics    Alcohol use: Not Currently    Drug use: No      E-Cigarette/Vaping    E-Cigarette Use Never User       E-Cigarette/Vaping Substances    Nicotine No     THC No     CBD No     Flavoring No     Other No     Unknown No       I have reviewed and agree with the history as documented.     HPI    Review of  Systems   Constitutional: Negative.    HENT: Negative.     Eyes: Negative.    Respiratory: Negative.     Cardiovascular:  Positive for leg swelling. Negative for chest pain and palpitations.   Gastrointestinal: Negative.  Negative for abdominal pain, diarrhea, nausea and vomiting.   Endocrine: Negative.    Genitourinary: Negative.    Musculoskeletal: Negative.    Skin: Negative.    Allergic/Immunologic: Negative.    Neurological: Negative.    Hematological: Negative.    Psychiatric/Behavioral: Negative.             Objective       ED Triage Vitals [11/20/24 1255]   Temperature Pulse Blood Pressure Respirations SpO2 Patient Position - Orthostatic VS   97.9 °F (36.6 °C) 82 170/76 16 98 % Sitting      Temp Source Heart Rate Source BP Location FiO2 (%) Pain Score    Temporal Monitor Left arm -- 6      Vitals      Date and Time Temp Pulse SpO2 Resp BP Pain Score FACES Pain Rating User   11/20/24 1255 97.9 °F (36.6 °C) 82 98 % 16 170/76 didn't take BP meds this morning 6 -- RC            Physical Exam  Vitals and nursing note reviewed.   Constitutional:       General: She is not in acute distress.     Appearance: Normal appearance. She is normal weight. She is not ill-appearing, toxic-appearing or diaphoretic.   HENT:      Head: Normocephalic and atraumatic.      Right Ear: External ear normal.      Left Ear: External ear normal.      Nose: Nose normal.      Mouth/Throat:      Mouth: Mucous membranes are moist.      Pharynx: Oropharynx is clear.   Eyes:      Extraocular Movements: Extraocular movements intact.      Conjunctiva/sclera: Conjunctivae normal.      Pupils: Pupils are equal, round, and reactive to light.   Cardiovascular:      Rate and Rhythm: Normal rate and regular rhythm.      Pulses: Normal pulses.      Heart sounds: Normal heart sounds.   Pulmonary:      Effort: Pulmonary effort is normal.      Breath sounds: Normal breath sounds.   Abdominal:      General: Abdomen is flat. Bowel sounds are normal.    Musculoskeletal:         General: Swelling and tenderness present. No deformity or signs of injury.      Cervical back: Normal range of motion.      Right lower leg: No edema.      Left lower leg: No edema.   Skin:     General: Skin is warm.      Capillary Refill: Capillary refill takes less than 2 seconds.   Neurological:      General: No focal deficit present.      Mental Status: She is alert and oriented to person, place, and time. Mental status is at baseline.   Psychiatric:         Mood and Affect: Mood normal.         Behavior: Behavior normal.         Thought Content: Thought content normal.         Results Reviewed       Procedure Component Value Units Date/Time    Basic metabolic panel [[id number]]  (Abnormal) Collected: 11/20/24 1306    Lab Status: Final result Specimen: Blood from Arm, Right Updated: 11/20/24 1328     Sodium 135 mmol/L      Potassium 4.7 mmol/L      Chloride 101 mmol/L      CO2 24 mmol/L      ANION GAP 10 mmol/L      BUN 25 mg/dL      Creatinine 1.68 mg/dL      Glucose 183 mg/dL      Calcium 9.2 mg/dL      eGFR 29 ml/min/1.73sq m     Narrative:      National Kid[employer] Disease Foundation guidelines for Chronic Kidney Disease (CKD):     Stage 1 with normal or high GFR (GFR > 90 mL/min/1.73 square meters)    Stage 2 Mild CKD (GFR = 60-89 mL/min/1.73 square meters)    Stage 3A Moderate CKD (GFR = 45-59 mL/min/1.73 square meters)    Stage 3B Moderate CKD (GFR = 30-44 mL/min/1.73 square meters)    Stage 4 Severe CKD (GFR = 15-29 mL/min/1.73 square meters)    Stage 5 End Stage CKD (GFR <15 mL/min/1.73 square meters)  Note: GFR calculation is accurate only with a steady state creatinine    Magnesium [[id number]]  (Normal) Collected: 11/20/24 1306    Lab Status: Final result Specimen: Blood from Arm, Right Updated: 11/20/24 1328     Magnesium 1.9 mg/dL     CK [[id number]]  (Normal) Collected: 11/20/24 1306    Lab Status: Final result Specimen: Blood from Arm, Right Updated: 11/20/24 1328     Total CK  28 U/L             VAS VENOUS DUPLEX -LOWER LIMB UNILATERAL    (Results Pending)       Procedures    ED Medication and Procedure Management   Prior to Admission Medications   Prescriptions Last Dose Informant Patient Reported? Taking?   Calcium Carbonate-Vit D-Min (CALCIUM 1200 PO)  Self Yes No   Sig: Take by mouth daily   Coenzyme Q10 (COQ-10) 10 MG CAPS  Self Yes No   Sig: Take by mouth daily   Loratadine 10 MG CAPS  Self Yes No   Sig: Take by mouth daily   Multiple Vitamin (MULTI-VITAMIN DAILY PO)  Self Yes No   Sig: Take by mouth daily   Trelegy Ellipta 100-62.5-25 MCG/ACT inhaler   No No   Sig: INHALE 1 PUFF ONCE DAILY   VITAMIN D PO  Self Yes No   Sig: Take 2,000 mg by mouth daily   acetaminophen (TYLENOL) 325 mg tablet  Self No No   Sig: Take 2 tablets (650 mg total) by mouth every 6 (six) hours as needed for mild pain   albuterol (PROVENTIL HFA,VENTOLIN HFA) 90 mcg/act inhaler  Self No No   Sig: Inhale 2 puffs every 6 (six) hours as needed for wheezing or shortness of breath   alendronate (FOSAMAX) 70 mg tablet   No No   Sig: Take 1 tablet (70 mg total) by mouth every 7 days Give with at least 8 ounces of plain water first thing in the morning.  Patient should be instructed to stay upright (not to lie down) for at least 30 minutes and until after first food of the day (to reduce esophageal irritation).   aspirin (ECOTRIN LOW STRENGTH) 81 mg EC tablet  Self Yes No   Sig: Take 81 mg by mouth daily   dextromethorphan-guaiFENesin (ROBITUSSIN DM)  mg/5 mL syrup  Self Yes No   Sig: Take 5 mL by mouth 3 (three) times a day as needed for cough As needed   losartan (COZAAR) 100 MG tablet  Self No No   Sig: Take 1 tablet (100 mg total) by mouth daily   metFORMIN (GLUCOPHAGE) 1000 MG tablet   No No   Sig: Take 1 tablet by mouth twice daily   nystatin (MYCOSTATIN) powder  Self No No   Sig: Apply topically 2 (two) times a day   rosuvastatin (CRESTOR) 20 MG tablet  Self No No   Sig: Take 1 tablet (20 mg total) by  mouth daily At bedtime      Facility-Administered Medications: None     Patient's Medications   Discharge Prescriptions    No medications on file     No discharge procedures on file.  ED SEPSIS DOCUMENTATION   Time reflects when diagnosis was documented in both MDM as applicable and the Disposition within this note       Time User Action Codes Description Comment    11/20/2024  1:58 PM Rafiq Aponte [M79.604] Right leg pain     11/20/2024  2:15 PM Rafiq Aponte [M76.61] Achilles tendinitis of right lower extremity                  Rafiq Aponte III, DO  11/20/24 [id number]

## 2024-11-20 NOTE — PROGRESS NOTES
[facility]        NAME: Ellie Daniel is a 74 y.o. female  : 1950    MRN: [id number]  DATE: 2024  TIME: 12:35 PM    Assessment and Orders   Pain of right calf [M79.661]  1. Pain of right calf  XR ankle 3+ vw right    XR foot 3+ vw right    Transfer to other facility            Plan and Discussion      Wells' Criteria for DVT: 3 points (right calf >3cm larger than left, pain along deep venous system, pitting edema).     Referred to ED to R/O DVT.    Differential include achilles tendon strain or calf muscle strain.     Xrays negative for acute abnormality per my wet read.     Risks and benefits discussed. Patient understands and agrees with the plan.     PATIENT INSTRUCTIONS      If tests have been performed at [facility], our office will contact you with results if changes need to be made to the care plan discussed with you at the visit.  [facility]    Follow up with PCP.     If any of the following occur, please report to your nearest ED for evaluation or call 911.   Difficultly breathing or shortness of breath  Chest pain  Acutely worsening symptoms.         Chief Complaint     Chief Complaint   Patient presents with    Ankle Pain     Right ankle started hurting yesterday          History of Present Illness       Pain in right ankle and right calf starting last night. NO injury. Difficult to ambulate. Noticed swelling this morning.     Ankle Pain   The incident occurred 12 to 24 hours ago. The incident occurred at home. There was no injury mechanism. The pain is present in the right ankle and right leg. The quality of the pain is described as cramping. Associated symptoms include an inability to bear weight. Pertinent negatives include no loss of motion, loss of sensation, muscle weakness, numbness or tingling.       Review of Systems   Review of Systems   Neurological:  Negative for tingling and numbness.         Current Medications        Current Outpatient Medications:     acetaminophen (TYLENOL) 325 mg tablet, Take 2 tablets (650 mg total) by mouth every 6 (six) hours as needed for mild pain, Disp: 30 tablet, Rfl: 0    albuterol (PROVENTIL HFA,VENTOLIN HFA) 90 mcg/act inhaler, Inhale 2 puffs every 6 (six) hours as needed for wheezing or shortness of breath, Disp: 18 g, Rfl: 2    alendronate (FOSAMAX) 70 mg tablet, Take 1 tablet (70 mg total) by mouth every 7 days Give with at least 8 ounces of plain water first thing in the morning.  Patient should be instructed to stay upright (not to lie down) for at least 30 minutes and until after first food of the day (to reduce esophageal irritation)., Disp: 12 tablet, Rfl: 0    aspirin (ECOTRIN LOW STRENGTH) 81 mg EC tablet, Take 81 mg by mouth daily, Disp: , Rfl:     Calcium Carbonate-Vit D-Min (CALCIUM 1200 PO), Take by mouth daily, Disp: , Rfl:     Coenzyme Q10 (COQ-10) 10 MG CAPS, Take by mouth daily, Disp: , Rfl:     dextromethorphan-guaiFENesin (ROBITUSSIN DM)  mg/5 mL syrup, Take 5 mL by mouth 3 (three) times a day as needed for cough As needed, Disp: , Rfl:     Loratadine 10 MG CAPS, Take by mouth daily, Disp: , Rfl:     losartan (COZAAR) 100 MG tablet, Take 1 tablet (100 mg total) by mouth daily, Disp: 90 tablet, Rfl: 3    metFORMIN (GLUCOPHAGE) 1000 MG tablet, Take 1 tablet by mouth twice daily, Disp: 180 tablet, Rfl: 1    Multiple Vitamin (MULTI-VITAMIN DAILY PO), Take by mouth daily, Disp: , Rfl:     nystatin (MYCOSTATIN) powder, Apply topically 2 (two) times a day, Disp: 30 g, Rfl: 0    rosuvastatin (CRESTOR) 20 MG tablet, Take 1 tablet (20 mg total) by mouth daily At bedtime, Disp: 90 tablet, Rfl: 3    Trelegy Ellipta 100-62.5-25 MCG/ACT inhaler, INHALE 1 PUFF ONCE DAILY, Disp: 60 each, Rfl: 0    VITAMIN D PO, Take 2,000 mg by mouth daily, Disp: , Rfl:     Current Allergies     Allergies as of 11/20/2024 - Reviewed 06/10/2024   Allergen Reaction Noted    Penicillins Hives 01/20/2016             The following portions of the patient's history were reviewed and updated as appropriate: allergies, current medications, past family history, past medical history, past social history, past surgical history and problem list.     Past Medical History:   Diagnosis Date    Asthma     COPD (chronic obstructive pulmonary disease) (HCC)     Diabetes (HCC)     Diabetes mellitus (HCC)     Hyperlipidemia     Hypertension     Osteoarthritis     Osteoporosis     Seasonal allergies        Past Surgical History:   Procedure Laterality Date    BILATERAL SALPINGOOPHORECTOMY Bilateral 1/3/2020    Procedure: BILATERAL SALPINGO-OOPHORECTOMY;  Surgeon: Martínez Oliveira MD;  Location: [facility];  Service: Gynecology Oncology    CATARACT EXTRACTION Bilateral     CHOLECYSTECTOMY      20JAN2016  LAST ASSESSED    EYE SURGERY      cataracts removed    HYSTERECTOMY      20JAN2016  LAST ASSESSED    IN EXPLORATORY LAPAROTOMY CELIOTOMY W/WO BIOPSY SPX N/A 1/3/2020    Procedure: LAPAROTOMY EXPLORATORY;  Surgeon: Martínez Oliveira MD;  Location: [facility];  Service: Gynecology Oncology    TUBAL LIGATION         Family History   Problem Relation Age of Onset    Heart disease Mother     Rheumatic fever Father     Diabetes Family         MELLITUS    Diabetes Family         MELLITUS         Medications have been verified.        Objective   /70 Comment: didnt take meds this am  Pulse 76   Temp 98.1 °F (36.7 °C)   Resp 18   SpO2 95%   No LMP recorded. Patient has had a hysterectomy.       Physical Exam     Physical Exam  Constitutional:       General: She is not in acute distress.  Pulmonary:      Effort: Pulmonary effort is normal. No respiratory distress.   Musculoskeletal:         General: Swelling and tenderness present.      Right lower leg: Swelling and tenderness (along deep venous system from ankle to knee. No skin changes) present. No deformity or lacerations. Pitting Edema present.      Right ankle: Normal range of  motion.      Right Achilles Tendon: Tenderness present. No defects. Pereira's test negative.      Right foot: No swelling.   Neurological:      General: No focal deficit present.      Mental Status: She is alert and oriented to person, place, and time.      Gait: Gait abnormal (in wheelchair becuase cannot ambulate due to pain).   Psychiatric:         Mood and Affect: Mood normal.               Giovana Herndon DO

## 2024-11-21 ENCOUNTER — VBI (OUTPATIENT)
Dept: FAMILY MEDICINE CLINIC | Facility: CLINIC | Age: 74
End: 2024-11-21

## 2024-11-21 NOTE — TELEPHONE ENCOUNTER
11/21/24 12:04 PM    Patient contacted post ED visit, [facility] department spoke with patient/caregiver and outreach was successful.    Thank you.  Romelia Smalls  [facility]

## 2024-11-26 ENCOUNTER — APPOINTMENT (OUTPATIENT)
Dept: LAB | Facility: CLINIC | Age: 74
End: 2024-11-26
Payer: COMMERCIAL

## 2024-11-26 DIAGNOSIS — N18.32 STAGE 3B CHRONIC KIDNEY DISEASE (HCC): ICD-10-CM

## 2024-11-26 DIAGNOSIS — E83.52 HYPERCALCEMIA: ICD-10-CM

## 2024-11-26 LAB
ALBUMIN SERPL BCG-MCNC: 3.9 G/DL (ref 3.5–5)
ALP SERPL-CCNC: 69 U/L (ref 34–104)
ALT SERPL W P-5'-P-CCNC: 11 U/L (ref 7–52)
ANION GAP SERPL CALCULATED.3IONS-SCNC: 9 MMOL/L (ref 4–13)
AST SERPL W P-5'-P-CCNC: 21 U/L (ref 13–39)
BACTERIA UR QL AUTO: ABNORMAL /HPF
BASOPHILS # BLD AUTO: 0.08 THOUSANDS/ΜL (ref 0–0.1)
BASOPHILS NFR BLD AUTO: 1 % (ref 0–1)
BILIRUB SERPL-MCNC: 0.32 MG/DL (ref 0.2–1)
BILIRUB UR QL STRIP: NEGATIVE
BUN SERPL-MCNC: 25 MG/DL (ref 5–25)
CALCIUM SERPL-MCNC: 9.5 MG/DL (ref 8.4–10.2)
CHLORIDE SERPL-SCNC: 99 MMOL/L (ref 96–108)
CLARITY UR: CLEAR
CO2 SERPL-SCNC: 31 MMOL/L (ref 21–32)
COLOR UR: ABNORMAL
CREAT SERPL-MCNC: 1.72 MG/DL (ref 0.6–1.3)
CREAT UR-MCNC: 98.2 MG/DL
EOSINOPHIL # BLD AUTO: 0.44 THOUSAND/ΜL (ref 0–0.61)
EOSINOPHIL NFR BLD AUTO: 5 % (ref 0–6)
ERYTHROCYTE [DISTWIDTH] IN BLOOD BY AUTOMATED COUNT: 11.6 % (ref 11.6–15.1)
GFR SERPL CREATININE-BSD FRML MDRD: 28 ML/MIN/1.73SQ M
GLUCOSE P FAST SERPL-MCNC: 165 MG/DL (ref 65–99)
GLUCOSE UR STRIP-MCNC: NEGATIVE MG/DL
HCT VFR BLD AUTO: 39.4 % (ref 34.8–46.1)
HGB BLD-MCNC: 12.2 G/DL (ref 11.5–15.4)
HGB UR QL STRIP.AUTO: NEGATIVE
IMM GRANULOCYTES # BLD AUTO: 0.06 THOUSAND/UL (ref 0–0.2)
IMM GRANULOCYTES NFR BLD AUTO: 1 % (ref 0–2)
KETONES UR STRIP-MCNC: NEGATIVE MG/DL
LEUKOCYTE ESTERASE UR QL STRIP: ABNORMAL
LYMPHOCYTES # BLD AUTO: 2.4 THOUSANDS/ΜL (ref 0.6–4.47)
LYMPHOCYTES NFR BLD AUTO: 25 % (ref 14–44)
MCH RBC QN AUTO: 29.3 PG (ref 26.8–34.3)
MCHC RBC AUTO-ENTMCNC: 31 G/DL (ref 31.4–37.4)
MCV RBC AUTO: 95 FL (ref 82–98)
MICROALBUMIN UR-MCNC: 53.7 MG/L
MICROALBUMIN/CREAT 24H UR: 55 MG/G CREATININE (ref 0–30)
MONOCYTES # BLD AUTO: 0.84 THOUSAND/ΜL (ref 0.17–1.22)
MONOCYTES NFR BLD AUTO: 9 % (ref 4–12)
NEUTROPHILS # BLD AUTO: 5.96 THOUSANDS/ΜL (ref 1.85–7.62)
NEUTS SEG NFR BLD AUTO: 59 % (ref 43–75)
NITRITE UR QL STRIP: NEGATIVE
NON-SQ EPI CELLS URNS QL MICRO: ABNORMAL /HPF
NRBC BLD AUTO-RTO: 0 /100 WBCS
PH UR STRIP.AUTO: 6 [PH]
PHOSPHATE SERPL-MCNC: 3.8 MG/DL (ref 2.3–4.1)
PLATELET # BLD AUTO: 328 THOUSANDS/UL (ref 149–390)
PMV BLD AUTO: 10.8 FL (ref 8.9–12.7)
POTASSIUM SERPL-SCNC: 4.3 MMOL/L (ref 3.5–5.3)
PROT SERPL-MCNC: 7 G/DL (ref 6.4–8.4)
PROT UR STRIP-MCNC: ABNORMAL MG/DL
PTH-INTACT SERPL-MCNC: 38.7 PG/ML (ref 12–88)
RBC # BLD AUTO: 4.17 MILLION/UL (ref 3.81–5.12)
RBC #/AREA URNS AUTO: ABNORMAL /HPF
SODIUM SERPL-SCNC: 139 MMOL/L (ref 135–147)
SP GR UR STRIP.AUTO: 1.02 (ref 1–1.03)
UROBILINOGEN UR STRIP-ACNC: <2 MG/DL
WBC # BLD AUTO: 9.78 THOUSAND/UL (ref 4.31–10.16)
WBC #/AREA URNS AUTO: ABNORMAL /HPF

## 2024-11-26 PROCEDURE — 84100 ASSAY OF PHOSPHORUS: CPT

## 2024-11-26 PROCEDURE — 82570 ASSAY OF URINE CREATININE: CPT

## 2024-11-26 PROCEDURE — 83970 ASSAY OF PARATHORMONE: CPT

## 2024-11-26 PROCEDURE — 80053 COMPREHEN METABOLIC PANEL: CPT

## 2024-11-26 PROCEDURE — 81001 URINALYSIS AUTO W/SCOPE: CPT

## 2024-11-26 PROCEDURE — 36415 COLL VENOUS BLD VENIPUNCTURE: CPT

## 2024-11-26 PROCEDURE — 85025 COMPLETE CBC W/AUTO DIFF WBC: CPT

## 2024-11-26 PROCEDURE — 82043 UR ALBUMIN QUANTITATIVE: CPT

## 2024-11-27 ENCOUNTER — RESULTS FOLLOW-UP (OUTPATIENT)
Dept: NEPHROLOGY | Facility: CLINIC | Age: 74
End: 2024-11-27

## 2024-11-27 NOTE — TELEPHONE ENCOUNTER
I called and left a detailed VM for Ellie regarding the following:    ----- Message from Brittaney Haddad DO sent at 11/27/2024  1:59 PM EST -----  Please call patient, kidney function stable at 28%, remainder of labs will be reviewed at upcoming appt     I requested a call back if she has any questions or concerns.

## 2024-12-02 ENCOUNTER — OFFICE VISIT (OUTPATIENT)
Dept: FAMILY MEDICINE CLINIC | Facility: CLINIC | Age: 74
End: 2024-12-02
Payer: COMMERCIAL

## 2024-12-02 DIAGNOSIS — Z12.31 ENCOUNTER FOR SCREENING MAMMOGRAM FOR BREAST CANCER: ICD-10-CM

## 2024-12-02 DIAGNOSIS — Z23 ENCOUNTER FOR IMMUNIZATION: ICD-10-CM

## 2024-12-02 DIAGNOSIS — J44.9 CHRONIC OBSTRUCTIVE PULMONARY DISEASE, UNSPECIFIED COPD TYPE (HCC): ICD-10-CM

## 2024-12-02 DIAGNOSIS — N18.32 TYPE 2 DIABETES MELLITUS WITH STAGE 3B CHRONIC KIDNEY DISEASE, UNSPECIFIED WHETHER LONG TERM INSULIN USE (HCC): Primary | ICD-10-CM

## 2024-12-02 DIAGNOSIS — R21 RASH: ICD-10-CM

## 2024-12-02 DIAGNOSIS — E11.22 TYPE 2 DIABETES MELLITUS WITH STAGE 3B CHRONIC KIDNEY DISEASE, UNSPECIFIED WHETHER LONG TERM INSULIN USE (HCC): Primary | ICD-10-CM

## 2024-12-02 DIAGNOSIS — N18.4 CHRONIC KIDNEY DISEASE, STAGE 4 (SEVERE) (HCC): ICD-10-CM

## 2024-12-02 DIAGNOSIS — I10 BENIGN ESSENTIAL HYPERTENSION: ICD-10-CM

## 2024-12-02 DIAGNOSIS — E78.2 MIXED HYPERLIPIDEMIA: ICD-10-CM

## 2024-12-02 LAB — SL AMB POCT HEMOGLOBIN AIC: 7.8 (ref ?–6.5)

## 2024-12-02 PROCEDURE — 99214 OFFICE O/P EST MOD 30 MIN: CPT

## 2024-12-02 PROCEDURE — 83036 HEMOGLOBIN GLYCOSYLATED A1C: CPT

## 2024-12-02 PROCEDURE — G0008 ADMIN INFLUENZA VIRUS VAC: HCPCS

## 2024-12-02 PROCEDURE — 90662 IIV NO PRSV INCREASED AG IM: CPT

## 2024-12-02 RX ORDER — FLUTICASONE FUROATE, UMECLIDINIUM BROMIDE AND VILANTEROL TRIFENATATE 100; 62.5; 25 UG/1; UG/1; UG/1
1 POWDER RESPIRATORY (INHALATION) DAILY
Qty: 60 EACH | Refills: 2 | Status: SHIPPED | OUTPATIENT
Start: 2024-12-02

## 2024-12-02 RX ORDER — NYSTATIN 100000 U/G
CREAM TOPICAL 2 TIMES DAILY
Qty: 30 G | Refills: 2 | Status: SHIPPED | OUTPATIENT
Start: 2024-12-02

## 2024-12-02 NOTE — PROGRESS NOTES
Diabetic Foot Exam    Patient's shoes and socks removed.    Right Foot/Ankle   Right Foot Inspection  Skin Exam: skin normal, skin intact, dry skin, callus and callus. No warmth, no erythema, no maceration, no abnormal color, no pre-ulcer and no ulcer.     Toe Exam: ROM and strength within normal limits.     Sensory   Monofilament testing: intact    Vascular  Capillary refills: < 3 seconds  The right DP pulse is 1+. The right PT pulse is 1+.     Left Foot/Ankle  Left Foot Inspection  Skin Exam: skin normal, skin intact, dry skin and callus. No warmth, no erythema, no maceration, normal color, no pre-ulcer and no ulcer.     Toe Exam: ROM and strength within normal limits.     Sensory   Monofilament testing: intact    Vascular  Capillary refills: < 3 seconds  The left DP pulse is 1+. The left PT pulse is 1+.     Assign Risk Category  No deformity present  No loss of protective sensation  No weak pulses  Risk: 0

## 2024-12-03 ENCOUNTER — TELEPHONE (OUTPATIENT)
Dept: ADMINISTRATIVE | Facility: OTHER | Age: 74
End: 2024-12-03

## 2024-12-03 VITALS
HEIGHT: 61 IN | BODY MASS INDEX: 33.64 KG/M2 | HEART RATE: 70 BPM | DIASTOLIC BLOOD PRESSURE: 70 MMHG | WEIGHT: 178.2 LBS | OXYGEN SATURATION: 96 % | TEMPERATURE: 97.6 F | SYSTOLIC BLOOD PRESSURE: 140 MMHG | RESPIRATION RATE: 16 BRPM

## 2024-12-03 NOTE — LETTER
Diabetic Eye Exam Form    Date Requested: 24  Patient: Ellie Daniel  Patient : 1950   Referring Provider: Aris Mota MD      DIABETIC Eye Exam Date _______________________________      Type of Exam MUST be documented for Diabetic Eye Exams. Please CHECK ONE.     Retinal Exam       Dilated Retinal Exam       OCT       Optomap-Iris Exam      Fundus Photography       Left Eye - Please check Retinopathy or No Retinopathy        Exam did show retinopathy    Exam did not show retinopathy       Right Eye - Please check Retinopathy or No Retinopathy       Exam did show retinopathy    Exam did not show retinopathy       Comments __________________________________________________________    Practice Providing Exam ______________________________________________    Exam Performed By (print name) _______________________________________      Provider Signature ___________________________________________________      These reports are needed for  compliance.  Please fax this completed form and a copy of the Diabetic Eye Exam report to our office located at [address] [place] [address] as soon as possible via Fax [phone] attention Tania: Phone [phone]  We thank you for your assistance in treating our mutual patient.

## 2024-12-03 NOTE — ASSESSMENT & PLAN NOTE
Hemoglobin A1c went up comparing to previous, she was not very compliant with her low-carb diet.  Will continue metformin 1000 mg twice a day as for now.  Reassess in 3 months.  Patient is not able to afford SGLT2 inhibitors.   Lab Results   Component Value Date    HGBA1C 7.8 (A) 12/02/2024

## 2024-12-03 NOTE — PROGRESS NOTES
Assessment/Plan:    Benign essential hypertension  Blood pressure is very well-controlled.  Continue current dose of losartan.    Type 2 diabetes mellitus with stage 3 chronic kidney disease (HCC)  Hemoglobin A1c went up comparing to previous, she was not very compliant with her low-carb diet.  Will continue metformin 1000 mg twice a day as for now.  Reassess in 3 months.  Patient is not able to afford SGLT2 inhibitors.   Lab Results   Component Value Date    HGBA1C 7.8 (A) 12/02/2024       Chronic kidney disease, stage 4 (severe) (HCC)  Lab Results   Component Value Date    EGFR 28 11/26/2024    EGFR 29 11/20/2024    EGFR 31 06/04/2024    CREATININE 1.72 (H) 11/26/2024    CREATININE 1.68 (H) 11/20/2024    CREATININE 1.57 (H) 06/04/2024   Kidney function is stable.  Continue follow-up with nephrology.    Hyperlipidemia  Continue current dose of rosuvastatin.  LDL is within a good range.       Diagnoses and all orders for this visit:    Type 2 diabetes mellitus with stage 3b chronic kidney disease, unspecified whether long term insulin use (HCC)  -     POCT hemoglobin A1c    Encounter for screening mammogram for breast cancer  -     Mammo screening bilateral w 3d and cad; Future    Encounter for immunization  -     influenza vaccine, high-dose, PF 0.5 mL (Fluzone High Dose)    Chronic kidney disease, stage 4 (severe) ([facility])    Chronic obstructive pulmonary disease, unspecified COPD type ([facility])  -     fluticasone-umeclidinium-vilanterol (Trelegy Ellipta) 100-62.5-25 mcg/actuation inhaler; Inhale 1 puff daily    Rash  -     nystatin (MYCOSTATIN) cream; Apply topically 2 (two) times a day    Benign essential hypertension    Mixed hyperlipidemia          Subjective:      Patient ID: Ellie Daniel is a 74 y.o. female.    Patient came today for follow-up on her chronic medical problems.        The following portions of the patient's history were reviewed and updated as appropriate: allergies, current medications, past  "family history, past medical history, past social history, past surgical history, and problem list.    Review of Systems   Constitutional:  Negative for chills and fever.   Respiratory:  Negative for chest tightness, shortness of breath and wheezing.    Cardiovascular:  Negative for chest pain and leg swelling.   Gastrointestinal:  Negative for abdominal pain.   Musculoskeletal:  Negative for gait problem and joint swelling.         Objective:      /70 (BP Location: Left arm, Patient Position: Sitting, Cuff Size: Large)   Pulse 70   Temp 97.6 °F (36.4 °C) (Temporal)   Resp 16   Ht 5' 0.5\" (1.537 m)   Wt 80.8 kg (178 lb 3.2 oz)   SpO2 96%   BMI 34.23 kg/m²     Allergies   Allergen Reactions    Penicillins Hives          Current Outpatient Medications:     acetaminophen (TYLENOL) 325 mg tablet, Take 2 tablets (650 mg total) by mouth every 6 (six) hours as needed for mild pain, Disp: 30 tablet, Rfl: 0    albuterol (PROVENTIL HFA,VENTOLIN HFA) 90 mcg/act inhaler, Inhale 2 puffs every 6 (six) hours as needed for wheezing or shortness of breath, Disp: 18 g, Rfl: 2    alendronate (FOSAMAX) 70 mg tablet, Take 1 tablet (70 mg total) by mouth every 7 days Give with at least 8 ounces of plain water first thing in the morning.  Patient should be instructed to stay upright (not to lie down) for at least 30 minutes and until after first food of the day (to reduce esophageal irritation)., Disp: 12 tablet, Rfl: 0    aspirin (ECOTRIN LOW STRENGTH) 81 mg EC tablet, Take 81 mg by mouth daily, Disp: , Rfl:     Calcium Carbonate-Vit D-Min (CALCIUM 1200 PO), Take by mouth daily, Disp: , Rfl:     Coenzyme Q10 (COQ-10) 10 MG CAPS, Take by mouth daily, Disp: , Rfl:     dextromethorphan-guaiFENesin (ROBITUSSIN DM)  mg/5 mL syrup, Take 5 mL by mouth 3 (three) times a day as needed for cough As needed, Disp: , Rfl:     fluticasone-umeclidinium-vilanterol (Trelegy Ellipta) 100-62.5-25 mcg/actuation inhaler, Inhale 1 puff " daily, Disp: 60 each, Rfl: 2    Loratadine 10 MG CAPS, Take by mouth daily, Disp: , Rfl:     losartan (COZAAR) 100 MG tablet, Take 1 tablet (100 mg total) by mouth daily, Disp: 90 tablet, Rfl: 3    metFORMIN (GLUCOPHAGE) 1000 MG tablet, Take 1 tablet by mouth twice daily, Disp: 180 tablet, Rfl: 1    Multiple Vitamin (MULTI-VITAMIN DAILY PO), Take by mouth daily, Disp: , Rfl:     nystatin (MYCOSTATIN) cream, Apply topically 2 (two) times a day, Disp: 30 g, Rfl: 2    nystatin (MYCOSTATIN) powder, Apply topically 2 (two) times a day, Disp: 30 g, Rfl: 0    rosuvastatin (CRESTOR) 20 MG tablet, Take 1 tablet (20 mg total) by mouth daily At bedtime, Disp: 90 tablet, Rfl: 3    VITAMIN D PO, Take 2,000 mg by mouth daily, Disp: , Rfl:      There are no Patient Instructions on file for this visit.        Physical Exam  Constitutional:       General: She is not in acute distress.     Appearance: She is not toxic-appearing.   Cardiovascular:      Rate and Rhythm: Normal rate.      Heart sounds: No murmur heard.     No gallop.   Pulmonary:      Effort: No respiratory distress.      Breath sounds: No wheezing or rales.   Neurological:      Mental Status: She is alert.

## 2024-12-03 NOTE — TELEPHONE ENCOUNTER
----- Message from Ev STEINER sent at 12/2/2024  4:00 PM EST -----  12/02/24 4:01 PM    Hello, our patient Ellie Daniel has had Diabetic Eye Exam completed/performed. Please assist in updating the patient chart by making an External outreach to [facility] facility located in [address] [place] [address].     Thank you,  Ev Lewis MA  [facility]

## 2024-12-03 NOTE — TELEPHONE ENCOUNTER
Upon review of the In Basket request and the patient's chart, initial outreach has been made via fax to facility. Please see Contacts section for details.     Thank you  Tania Chaudhari MA     chest pain

## 2024-12-03 NOTE — ASSESSMENT & PLAN NOTE
Lab Results   Component Value Date    EGFR 28 11/26/2024    EGFR 29 11/20/2024    EGFR 31 06/04/2024    CREATININE 1.72 (H) 11/26/2024    CREATININE 1.68 (H) 11/20/2024    CREATININE 1.57 (H) 06/04/2024   Kidney function is stable.  Continue follow-up with nephrology.

## 2024-12-18 DIAGNOSIS — E78.2 MIXED HYPERLIPIDEMIA: ICD-10-CM

## 2024-12-18 DIAGNOSIS — I10 BENIGN ESSENTIAL HYPERTENSION: ICD-10-CM

## 2024-12-18 NOTE — TELEPHONE ENCOUNTER
Reason for call:   [x] Refill   [] Prior Auth  [] Other:     Office:   [x] PCP/Provider - Aris Mota   [] Specialty/Provider -     Medication: losartan (COZAAR) 100 MG tablet     Dose/Frequency: Take 1 tablet (100 mg total) by mouth daily     Quantity: 90    Medication: rosuvastatin (CRESTOR) 20 MG tablet     Dose/Frequency: Take 1 tablet (20 mg total) by mouth daily At bedtime     Quantity: 90    Pharmacy: [facility] - [place], [place]    Does the patient have enough for 3 days?   [x] Yes   [] No - Send as HP to POD

## 2024-12-19 RX ORDER — LOSARTAN POTASSIUM 100 MG/1
100 TABLET ORAL DAILY
Qty: 90 TABLET | Refills: 1 | Status: SHIPPED | OUTPATIENT
Start: 2024-12-19

## 2024-12-19 RX ORDER — ROSUVASTATIN CALCIUM 20 MG/1
20 TABLET, COATED ORAL DAILY
Qty: 90 TABLET | Refills: 1 | Status: SHIPPED | OUTPATIENT
Start: 2024-12-19

## 2024-12-23 ENCOUNTER — OFFICE VISIT (OUTPATIENT)
Age: 74
End: 2024-12-23
Payer: COMMERCIAL

## 2024-12-23 VITALS
TEMPERATURE: 97.3 F | HEART RATE: 77 BPM | BODY MASS INDEX: 33.42 KG/M2 | SYSTOLIC BLOOD PRESSURE: 148 MMHG | WEIGHT: 177 LBS | HEIGHT: 61 IN | OXYGEN SATURATION: 97 % | DIASTOLIC BLOOD PRESSURE: 60 MMHG

## 2024-12-23 DIAGNOSIS — N18.32 STAGE 3B CHRONIC KIDNEY DISEASE (HCC): Primary | ICD-10-CM

## 2024-12-23 DIAGNOSIS — E83.52 HYPERCALCEMIA: ICD-10-CM

## 2024-12-23 DIAGNOSIS — I10 ESSENTIAL HYPERTENSION: ICD-10-CM

## 2024-12-23 DIAGNOSIS — E78.2 MIXED HYPERLIPIDEMIA: ICD-10-CM

## 2024-12-23 DIAGNOSIS — E11.22 TYPE 2 DIABETES MELLITUS WITH STAGE 3A CHRONIC KIDNEY DISEASE, UNSPECIFIED WHETHER LONG TERM INSULIN USE (HCC): ICD-10-CM

## 2024-12-23 DIAGNOSIS — N18.31 TYPE 2 DIABETES MELLITUS WITH STAGE 3A CHRONIC KIDNEY DISEASE, UNSPECIFIED WHETHER LONG TERM INSULIN USE (HCC): ICD-10-CM

## 2024-12-23 PROCEDURE — 99214 OFFICE O/P EST MOD 30 MIN: CPT | Performed by: NURSE PRACTITIONER

## 2024-12-23 NOTE — PROGRESS NOTES
Nephrology   Office Follow-Up  Ellie Daniel 74 y.o. female MRN: [id number]    Encounter: [id number]        Assessment & Plan    Ellie Daniel was seen in the [place] office today. All diagnoses and orders for visit:     1. Stage 3b chronic kidney disease (HCC)  Baseline creatinine 1.4-1.6 mg/dL.  Etiology not biopsy-proven diabetic kidney disease, HTN nephrosclerosis, vasculopathy, age-related.  Remains compensated on exam.  Renal ultrasound in 2021 was normal.  Blood pressure is mildly elevated.  A1c increased from 7.5 to 7.8%.  SGLT2 inhibitors remain too expensive however she will touch base with insurance company at the beginning of the year.  Will repeat lab work in 1 month to ensure stability.  Metformin dose may need to be reduced or discontinued given EGFR.  Crestor also may need to be renally dosed depending on trends.  I offered patient a repeat ultrasound which she declined today  Recommend home blood pressure monitoring and reporting SBP greater than 150 or lower than 110 mmHg  -     Basic metabolic panel; Future; Expected date: 01/20/2025  2. Essential hypertension  Recommend home blood pressure monitoring.  It appears blood pressure has been elevated more than 1 occasion at this office.  May need additional agent or more potent ARB.  She agrees to home blood pressure monitoring  3. Hypercalcemia  Resolved with reduction calcium dose  4. Type 2 diabetes mellitus with stage 3a chronic kidney disease, unspecified whether long term insulin use (HCC)  SGLT2 inhibitor still and affordable.  Metformin dose may need to be reduced or eliminated based upon repeat renal function.  Check BMP again in about 1 month  5. Mixed hyperlipidemia  Monitor need to renally dose Crestor      HPI: Ellie Daniel is a 74 y.o. female who is here for scheduled follow-up     Pertinent problems include CKD 3, type 2 diabetes, osteoporosis, hypertension, history of calcium    At last visit, physician recommended having calcium  supplement to 1200 mg a day for which she has obliged.  SGLT2 inhibitors remain too expensive however she will touch base with insurance company at the beginning of the year.  Will repeat lab work in 1 month to ensure stability.  Metformin dose may need to be reduced or discontinued given EGFR.  Crestor also may need to be renally dosed depending on trends.  I offered patient a repeat ultrasound which she declined today    ROS:   Review of Systems   Constitutional:  Negative for chills and fever.   HENT:  Negative for ear pain and sore throat.    Eyes:  Negative for pain and visual disturbance.   Respiratory:  Negative for cough and shortness of breath.    Cardiovascular:  Negative for chest pain and palpitations.   Gastrointestinal:  Negative for abdominal pain and vomiting.   Genitourinary:  Negative for dysuria and hematuria.   Musculoskeletal:  Negative for arthralgias and back pain.   Skin:  Negative for color change and rash.   Neurological:  Negative for seizures and syncope.   All other systems reviewed and are negative.      Allergies: Penicillins    Medications:   Current Outpatient Medications:     acetaminophen (TYLENOL) 325 mg tablet, Take 2 tablets (650 mg total) by mouth every 6 (six) hours as needed for mild pain, Disp: 30 tablet, Rfl: 0    albuterol (PROVENTIL HFA,VENTOLIN HFA) 90 mcg/act inhaler, Inhale 2 puffs every 6 (six) hours as needed for wheezing or shortness of breath, Disp: 18 g, Rfl: 2    alendronate (FOSAMAX) 70 mg tablet, Take 1 tablet (70 mg total) by mouth every 7 days Give with at least 8 ounces of plain water first thing in the morning.  Patient should be instructed to stay upright (not to lie down) for at least 30 minutes and until after first food of the day (to reduce esophageal irritation)., Disp: 12 tablet, Rfl: 0    aspirin (ECOTRIN LOW STRENGTH) 81 mg EC tablet, Take 81 mg by mouth daily, Disp: , Rfl:     Calcium Carbonate-Vit D-Min (CALCIUM 1200 PO), Take by mouth daily,  Disp: , Rfl:     Coenzyme Q10 (COQ-10) 10 MG CAPS, Take by mouth daily, Disp: , Rfl:     dextromethorphan-guaiFENesin (ROBITUSSIN DM)  mg/5 mL syrup, Take 5 mL by mouth 3 (three) times a day as needed for cough As needed, Disp: , Rfl:     fluticasone-umeclidinium-vilanterol (Trelegy Ellipta) 100-62.5-25 mcg/actuation inhaler, Inhale 1 puff daily, Disp: 60 each, Rfl: 2    Loratadine 10 MG CAPS, Take by mouth daily, Disp: , Rfl:     losartan (COZAAR) 100 MG tablet, Take 1 tablet (100 mg total) by mouth daily, Disp: 90 tablet, Rfl: 1    metFORMIN (GLUCOPHAGE) 1000 MG tablet, Take 1 tablet by mouth twice daily, Disp: 180 tablet, Rfl: 1    Multiple Vitamin (MULTI-VITAMIN DAILY PO), Take by mouth daily, Disp: , Rfl:     nystatin (MYCOSTATIN) cream, Apply topically 2 (two) times a day, Disp: 30 g, Rfl: 2    rosuvastatin (CRESTOR) 20 MG tablet, Take 1 tablet (20 mg total) by mouth daily At bedtime, Disp: 90 tablet, Rfl: 1    VITAMIN D PO, Take 2,000 mg by mouth daily, Disp: , Rfl:     nystatin (MYCOSTATIN) powder, Apply topically 2 (two) times a day (Patient not taking: Reported on 12/23/2024), Disp: 30 g, Rfl: 0    Past Medical History:   Diagnosis Date    Asthma     COPD (chronic obstructive pulmonary disease) (HCC)     Diabetes (HCC)     Diabetes mellitus (HCC)     Hyperlipidemia     Hypertension     Osteoarthritis     Osteoporosis     Seasonal allergies      Past Surgical History:   Procedure Laterality Date    BILATERAL SALPINGOOPHORECTOMY Bilateral 1/3/2020    Procedure: BILATERAL SALPINGO-OOPHORECTOMY;  Surgeon: Martínez Oliveira MD;  Location: [facility];  Service: Gynecology Oncology    CATARACT EXTRACTION Bilateral     CHOLECYSTECTOMY      20JAN2016  LAST ASSESSED    EYE SURGERY      cataracts removed    HYSTERECTOMY      20JAN2016  LAST ASSESSED    OR EXPLORATORY LAPAROTOMY CELIOTOMY W/WO BIOPSY SPX N/A 1/3/2020    Procedure: LAPAROTOMY EXPLORATORY;  Surgeon: Martínez Oliveira MD;  Location: [facility];   "Service: Gynecology Oncology    TUBAL LIGATION       Family History   Problem Relation Age of Onset    Heart disease Mother     Rheumatic fever Father     Diabetes Family         MELLITUS    Diabetes Family         MELLITUS      reports that she quit smoking about 29 years ago. Her smoking use included cigarettes. She started smoking about 61 years ago. She has a 32 pack-year smoking history. She has never used smokeless tobacco. She reports that she does not currently use alcohol. She reports that she does not use drugs.      Physical Exam:   Vitals:    12/23/24 1559   BP: 148/60   BP Location: Left arm   Patient Position: Sitting   Cuff Size: Large   Pulse: 77   Temp: (!) 97.3 °F (36.3 °C)   SpO2: 97%   Weight: 80.3 kg (177 lb)   Height: 5' 0.5\" (1.537 m)     Body mass index is 34 kg/m².    General: conscious, cooperative, in no acute distress, appears stated age  Eyes: conjunctivae pale, anicteric sclerae  ENT: lips and mucous membranes moist  Neck: supple, no JVD, no masses  Chest:  essentially clear breath sounds bilaterally, no crackles, ronchus or wheezings  CVS: S1 & S2, normal rate, regular rhythm  Abdomen: soft, non-tender, non-distended, normoactive bowel sounds, rounded  Extremities: no edema of both legs  Skin: no rash   Neuro: awake, alert, oriented       Diagnostic Data:  Lab: I have personally reviewed pertinent lab results.,   CBC:       CMP: No results found for: \"SODIUM\", \"K\", \"CL\", \"CO2\", \"ANIONGAP\", \"BUN\", \"CREATININE\", \"GLUCOSE\", \"CALCIUM\", \"AST\", \"ALT\", \"ALKPHOS\", \"PROT\", \"BILITOT\", \"EGFR\",   PT/INR: No results found for: \"PT\", \"INR\",   Magnesium: No components found for: \"MAG\",  Phosphorous: No results found for: \"PHOS\"    Patient Instructions   It was nice to see you today. Lets repeat kidney function lab in 1 month to see how its going. We may need to reduce Metformin    Check new insurance company regarding price of Jardiance (empagliflozin)       Portions of the record may have been " "created with voice recognition software. Occasional wrong word or \"sound a like\" substitutions may have occurred due to the inherent limitations of voice recognition software. Read the chart carefully and recognize, using context, where substitutions have occurred.    If you have any questions, please contact the dictating provider.  "

## 2024-12-23 NOTE — PATIENT INSTRUCTIONS
It was nice to see you today. Lets repeat kidney function lab in 1 month to see how its going. We may need to reduce Metformin    Check new insurance company regarding price of Jardiance (empagliflozin)

## 2025-02-18 DIAGNOSIS — J44.9 CHRONIC OBSTRUCTIVE PULMONARY DISEASE, UNSPECIFIED COPD TYPE (HCC): ICD-10-CM

## 2025-02-18 RX ORDER — FLUTICASONE FUROATE, UMECLIDINIUM BROMIDE AND VILANTEROL TRIFENATATE 100; 62.5; 25 UG/1; UG/1; UG/1
1 POWDER RESPIRATORY (INHALATION) DAILY
Qty: 60 EACH | Refills: 0 | Status: SHIPPED | OUTPATIENT
Start: 2025-02-18

## 2025-03-16 DIAGNOSIS — J44.9 CHRONIC OBSTRUCTIVE PULMONARY DISEASE, UNSPECIFIED COPD TYPE (HCC): ICD-10-CM

## 2025-03-17 RX ORDER — FLUTICASONE FUROATE, UMECLIDINIUM BROMIDE AND VILANTEROL TRIFENATATE 100; 62.5; 25 UG/1; UG/1; UG/1
1 POWDER RESPIRATORY (INHALATION) DAILY
Qty: 60 EACH | Refills: 0 | Status: SHIPPED | OUTPATIENT
Start: 2025-03-17

## 2025-03-30 DIAGNOSIS — E11.8 CONTROLLED DIABETES MELLITUS TYPE 2 WITH COMPLICATIONS, UNSPECIFIED WHETHER LONG TERM INSULIN USE (HCC): ICD-10-CM

## 2025-04-13 DIAGNOSIS — J44.9 CHRONIC OBSTRUCTIVE PULMONARY DISEASE, UNSPECIFIED COPD TYPE (HCC): ICD-10-CM

## 2025-04-14 RX ORDER — FLUTICASONE FUROATE, UMECLIDINIUM BROMIDE AND VILANTEROL TRIFENATATE 100; 62.5; 25 UG/1; UG/1; UG/1
1 POWDER RESPIRATORY (INHALATION) DAILY
Qty: 60 EACH | Refills: 0 | Status: SHIPPED | OUTPATIENT
Start: 2025-04-14

## 2025-04-17 ENCOUNTER — TELEPHONE (OUTPATIENT)
Age: 75
End: 2025-04-17

## 2025-04-17 NOTE — TELEPHONE ENCOUNTER
Tried reaching patient, no answer.  Left message on voicemail for patient, appt change from [facility] to [facility], same day 5/15 but at 3pm, same office.

## 2025-05-08 ENCOUNTER — TELEPHONE (OUTPATIENT)
Age: 75
End: 2025-05-08

## 2025-05-08 NOTE — TELEPHONE ENCOUNTER
I called and left a VM for Ellie regarding completing blood work prior to upcoming appt on 05/15/2025

## 2025-05-13 ENCOUNTER — APPOINTMENT (OUTPATIENT)
Dept: LAB | Age: 75
End: 2025-05-13
Payer: COMMERCIAL

## 2025-05-13 DIAGNOSIS — N18.32 STAGE 3B CHRONIC KIDNEY DISEASE (HCC): ICD-10-CM

## 2025-05-13 LAB
ANION GAP SERPL CALCULATED.3IONS-SCNC: 11 MMOL/L (ref 4–13)
BUN SERPL-MCNC: 29 MG/DL (ref 5–25)
CALCIUM SERPL-MCNC: 9.3 MG/DL (ref 8.4–10.2)
CHLORIDE SERPL-SCNC: 102 MMOL/L (ref 96–108)
CO2 SERPL-SCNC: 27 MMOL/L (ref 21–32)
CREAT SERPL-MCNC: 1.57 MG/DL (ref 0.6–1.3)
GFR SERPL CREATININE-BSD FRML MDRD: 31 ML/MIN/1.73SQ M
GLUCOSE P FAST SERPL-MCNC: 147 MG/DL (ref 65–99)
POTASSIUM SERPL-SCNC: 4.2 MMOL/L (ref 3.5–5.3)
SODIUM SERPL-SCNC: 140 MMOL/L (ref 135–147)

## 2025-05-13 PROCEDURE — 36415 COLL VENOUS BLD VENIPUNCTURE: CPT

## 2025-05-13 PROCEDURE — 80048 BASIC METABOLIC PNL TOTAL CA: CPT

## 2025-05-15 ENCOUNTER — OFFICE VISIT (OUTPATIENT)
Age: 75
End: 2025-05-15
Payer: COMMERCIAL

## 2025-05-15 VITALS
DIASTOLIC BLOOD PRESSURE: 62 MMHG | WEIGHT: 176.6 LBS | TEMPERATURE: 97.7 F | OXYGEN SATURATION: 95 % | SYSTOLIC BLOOD PRESSURE: 158 MMHG | BODY MASS INDEX: 33.92 KG/M2 | HEART RATE: 71 BPM

## 2025-05-15 DIAGNOSIS — I10 ESSENTIAL HYPERTENSION: ICD-10-CM

## 2025-05-15 DIAGNOSIS — N18.32 STAGE 3B CHRONIC KIDNEY DISEASE (HCC): Primary | ICD-10-CM

## 2025-05-15 PROCEDURE — 99214 OFFICE O/P EST MOD 30 MIN: CPT

## 2025-05-15 NOTE — ASSESSMENT & PLAN NOTE
Lab Results   Component Value Date    EGFR 31 05/13/2025    EGFR 28 11/26/2024    EGFR 29 11/20/2024    CREATININE 1.57 (H) 05/13/2025    CREATININE 1.72 (H) 11/26/2024    CREATININE 1.68 (H) 11/20/2024   Baseline creatinine 1.4 to 1.6 mg/dL  Etiology presumed diabetic kidney disease hypertension, vasculopathy, age-related  Current creatinine 1.57 mg/dL  Albumin creatinine ratio when previously checked was mildly elevated. Continue losartan 100 mg daily  Patient still on metformin, caution with GFR less than 30, she was instructed to let her PCP know. She is also thinking about starting jardiance, if it is affordable from an insurance standpoint  Orders:    CBC; Future    Comprehensive metabolic panel; Future    Magnesium; Future    Phosphorus; Future    PTH, intact; Future    Urinalysis with microscopic; Future    Albumin / creatinine urine ratio; Future

## 2025-05-15 NOTE — PATIENT INSTRUCTIONS
It was nice seeing you today. Your kidney function is stable. Please follow up with us in 6 months. I have ordered lab work for you to get done before then. In the meantime, please avoid NSAIDs and have a healthy diet and exercise.  Continue to consider about jardiance. Continue to monitor your blood pressure at home.

## 2025-05-15 NOTE — PROGRESS NOTES
Name: Ellie Daniel      : 1950      MRN: [id number]  Encounter Provider: [facility] Isaac PA-C  Encounter Date: 5/15/2025   Encounter department: [facility]  :  Assessment & Plan  Stage 3b chronic kidney disease (HCC)  Lab Results   Component Value Date    EGFR 31 2025    EGFR 28 2024    EGFR 29 2024    CREATININE 1.57 (H) 2025    CREATININE 1.72 (H) 2024    CREATININE 1.68 (H) 2024   Baseline creatinine 1.4 to 1.6 mg/dL  Etiology presumed diabetic kidney disease hypertension, vasculopathy, age-related  Current creatinine 1.57 mg/dL  Albumin creatinine ratio when previously checked was mildly elevated. Continue losartan 100 mg daily  Patient still on metformin, caution with GFR less than 30, she was instructed to let her PCP know. She is also thinking about starting jardiance, if it is affordable from an insurance standpoint  Orders:    CBC; Future    Comprehensive metabolic panel; Future    Magnesium; Future    Phosphorus; Future    PTH, intact; Future    Urinalysis with microscopic; Future    Albumin / creatinine urine ratio; Future    Essential hypertension  BP elevated in office today, which according to patient/ is typically the case   Continue losartan 100 mg           Patient Instructions   It was nice seeing you today. Your kidney function is stable. Please follow up with us in 6 months. I have ordered lab work for you to get done before then. In the meantime, please avoid NSAIDs and have a healthy diet and exercise.  Continue to consider about jardiance. Continue to monitor your blood pressure at home.     It was a pleasure evaluating your patient in the office today. Thank you for allowing our team to participate in the care of  Ellie Daniel. Please do not hesitate to contact our team if further issues/questions shall arise in the interim.     History of Present Illness   HPI  Ellie Daniel is a 75 y.o.  female who presents for nephrology follow up visit. No recent hospitalizations     Pertinent Medical History     Review of Systems   Constitutional:  Negative for chills and fever.   HENT:  Negative for ear pain and sore throat.    Eyes:  Negative for pain and visual disturbance.   Respiratory:  Negative for cough and shortness of breath.    Cardiovascular:  Negative for chest pain and palpitations.   Gastrointestinal:  Negative for abdominal pain and vomiting.   Genitourinary:  Negative for dysuria and hematuria.   Musculoskeletal:  Negative for arthralgias and back pain.   Skin:  Negative for color change and rash.   Neurological:  Negative for seizures and syncope.   All other systems reviewed and are negative.    Medical History Reviewed by provider this encounter:  Tobacco  Allergies  Meds  Problems  Med Hx  Surg Hx  Fam Hx     .       Medications Ordered Prior to Encounter[1]  Objective   /62 (BP Location: Left arm, Patient Position: Sitting)   Pulse 71   Temp 97.7 °F (36.5 °C)   Wt 80.1 kg (176 lb 9.6 oz)   SpO2 95%   BMI 33.92 kg/m²      Physical Exam  Vitals and nursing note reviewed.   Constitutional:       General: She is not in acute distress.     Appearance: She is well-developed.   HENT:      Head: Normocephalic and atraumatic.     Eyes:      Conjunctiva/sclera: Conjunctivae normal.       Cardiovascular:      Rate and Rhythm: Normal rate and regular rhythm.      Heart sounds: No murmur heard.  Pulmonary:      Effort: Pulmonary effort is normal. No respiratory distress.      Breath sounds: Normal breath sounds.   Abdominal:      Palpations: Abdomen is soft.      Tenderness: There is no abdominal tenderness.     Musculoskeletal:         General: No swelling.      Cervical back: Neck supple.     Skin:     General: Skin is warm and dry.      Capillary Refill: Capillary refill takes less than 2 seconds.     Neurological:      Mental Status: She is alert.     Psychiatric:         Mood and  "Affect: Mood normal.           Laboratory Results:        Invalid input(s): \"ALBUMIN\"      Results for orders placed or performed in visit on 05/13/25   Basic metabolic panel   Result Value Ref Range    Sodium 140 135 - 147 mmol/L    Potassium 4.2 3.5 - 5.3 mmol/L    Chloride 102 96 - 108 mmol/L    CO2 27 21 - 32 mmol/L    ANION GAP 11 4 - 13 mmol/L    BUN 29 (H) 5 - 25 mg/dL    Creatinine 1.57 (H) 0.60 - 1.30 mg/dL    Glucose, Fasting 147 (H) 65 - 99 mg/dL    Calcium 9.3 8.4 - 10.2 mg/dL    eGFR 31 ml/min/1.73sq m                [1]   Current Outpatient Medications on File Prior to Visit   Medication Sig Dispense Refill    acetaminophen (TYLENOL) 325 mg tablet Take 2 tablets (650 mg total) by mouth every 6 (six) hours as needed for mild pain 30 tablet 0    albuterol (PROVENTIL HFA,VENTOLIN HFA) 90 mcg/act inhaler Inhale 2 puffs every 6 (six) hours as needed for wheezing or shortness of breath 18 g 2    alendronate (FOSAMAX) 70 mg tablet Take 1 tablet (70 mg total) by mouth every 7 days Give with at least 8 ounces of plain water first thing in the morning.  Patient should be instructed to stay upright (not to lie down) for at least 30 minutes and until after first food of the day (to reduce esophageal irritation). 12 tablet 0    aspirin (ECOTRIN LOW STRENGTH) 81 mg EC tablet Take 81 mg by mouth in the morning.      Calcium Carbonate-Vit D-Min (CALCIUM 1200 PO) Take by mouth in the morning.      Coenzyme Q10 (COQ-10) 10 MG CAPS Take by mouth in the morning.      dextromethorphan-guaiFENesin (ROBITUSSIN DM)  mg/5 mL syrup Take 5 mL by mouth as needed in the morning and 5 mL as needed at noon and 5 mL as needed in the evening for cough. As needed.      Loratadine 10 MG CAPS Take by mouth in the morning.      losartan (COZAAR) 100 MG tablet Take 1 tablet (100 mg total) by mouth daily 90 tablet 1    metFORMIN (GLUCOPHAGE) 1000 MG tablet Take 1 tablet by mouth twice daily 180 tablet 1    Multiple Vitamin " (MULTI-VITAMIN DAILY PO) Take by mouth in the morning.      nystatin (MYCOSTATIN) cream Apply topically 2 (two) times a day 30 g 2    rosuvastatin (CRESTOR) 20 MG tablet Take 1 tablet (20 mg total) by mouth daily At bedtime 90 tablet 1    VITAMIN D PO Take 2,000 mg by mouth in the morning.      nystatin (MYCOSTATIN) powder Apply topically 2 (two) times a day (Patient not taking: Reported on 12/23/2024) 30 g 0     No current facility-administered medications on file prior to visit.

## 2025-05-16 DIAGNOSIS — J44.9 CHRONIC OBSTRUCTIVE PULMONARY DISEASE, UNSPECIFIED COPD TYPE (HCC): ICD-10-CM

## 2025-05-16 RX ORDER — FLUTICASONE FUROATE, UMECLIDINIUM BROMIDE AND VILANTEROL TRIFENATATE 100; 62.5; 25 UG/1; UG/1; UG/1
1 POWDER RESPIRATORY (INHALATION) DAILY
Qty: 60 EACH | Refills: 0 | Status: SHIPPED | OUTPATIENT
Start: 2025-05-16

## 2025-06-10 ENCOUNTER — OFFICE VISIT (OUTPATIENT)
Dept: FAMILY MEDICINE CLINIC | Facility: CLINIC | Age: 75
End: 2025-06-10
Payer: COMMERCIAL

## 2025-06-10 VITALS
HEART RATE: 68 BPM | SYSTOLIC BLOOD PRESSURE: 180 MMHG | BODY MASS INDEX: 33.12 KG/M2 | HEIGHT: 61 IN | OXYGEN SATURATION: 95 % | DIASTOLIC BLOOD PRESSURE: 100 MMHG | WEIGHT: 175.4 LBS

## 2025-06-10 DIAGNOSIS — I10 BENIGN ESSENTIAL HYPERTENSION: ICD-10-CM

## 2025-06-10 DIAGNOSIS — J44.9 CHRONIC OBSTRUCTIVE PULMONARY DISEASE, UNSPECIFIED COPD TYPE (HCC): ICD-10-CM

## 2025-06-10 DIAGNOSIS — Z00.00 MEDICARE ANNUAL WELLNESS VISIT, SUBSEQUENT: Primary | ICD-10-CM

## 2025-06-10 DIAGNOSIS — M81.0 POSTMENOPAUSAL OSTEOPOROSIS: ICD-10-CM

## 2025-06-10 DIAGNOSIS — N18.4 CHRONIC KIDNEY DISEASE, STAGE 4 (SEVERE) (HCC): ICD-10-CM

## 2025-06-10 DIAGNOSIS — Z13.29 SCREENING FOR HYPOTHYROIDISM: ICD-10-CM

## 2025-06-10 DIAGNOSIS — E11.22 TYPE 2 DIABETES MELLITUS WITH STAGE 3A CHRONIC KIDNEY DISEASE, UNSPECIFIED WHETHER LONG TERM INSULIN USE (HCC): ICD-10-CM

## 2025-06-10 DIAGNOSIS — E78.2 MIXED HYPERLIPIDEMIA: ICD-10-CM

## 2025-06-10 DIAGNOSIS — N18.31 TYPE 2 DIABETES MELLITUS WITH STAGE 3A CHRONIC KIDNEY DISEASE, UNSPECIFIED WHETHER LONG TERM INSULIN USE (HCC): ICD-10-CM

## 2025-06-10 LAB — SL AMB POCT HEMOGLOBIN AIC: 7.2 (ref ?–6.5)

## 2025-06-10 PROCEDURE — 99214 OFFICE O/P EST MOD 30 MIN: CPT | Performed by: INTERNAL MEDICINE

## 2025-06-10 PROCEDURE — G2211 COMPLEX E/M VISIT ADD ON: HCPCS | Performed by: INTERNAL MEDICINE

## 2025-06-10 PROCEDURE — 83036 HEMOGLOBIN GLYCOSYLATED A1C: CPT | Performed by: INTERNAL MEDICINE

## 2025-06-10 PROCEDURE — G0439 PPPS, SUBSEQ VISIT: HCPCS | Performed by: INTERNAL MEDICINE

## 2025-06-10 RX ORDER — LOSARTAN POTASSIUM 100 MG/1
100 TABLET ORAL DAILY
Qty: 90 TABLET | Refills: 1 | Status: SHIPPED | OUTPATIENT
Start: 2025-06-10

## 2025-06-10 RX ORDER — ALENDRONATE SODIUM 70 MG/1
70 TABLET ORAL
Qty: 12 TABLET | Refills: 0 | Status: SHIPPED | OUTPATIENT
Start: 2025-06-10

## 2025-06-10 RX ORDER — ROSUVASTATIN CALCIUM 20 MG/1
20 TABLET, COATED ORAL DAILY
Qty: 90 TABLET | Refills: 1 | Status: SHIPPED | OUTPATIENT
Start: 2025-06-10

## 2025-06-10 NOTE — ASSESSMENT & PLAN NOTE
Blood pressures are highly elevated today but patient is completely asymptomatic.  She will continue to monitor at home, update in 7 days.  Her blood pressure cuff was checked by me in the past and was reliable.  As for now continue current dose of losartan.  If any symptoms such as headaches, chest pain, shortness of breath, vision changes she will go to ER immediately.

## 2025-06-10 NOTE — PATIENT INSTRUCTIONS
Please start B12 vitamin 5000 mcg every day.    Please monitor your blood pressure at home for 7 days twice a day and please update me on your readings.

## 2025-06-10 NOTE — ASSESSMENT & PLAN NOTE
Lab Results   Component Value Date    EGFR 31 05/13/2025    EGFR 28 11/26/2024    EGFR 29 11/20/2024    CREATININE 1.57 (H) 05/13/2025    CREATININE 1.72 (H) 11/26/2024    CREATININE 1.68 (H) 11/20/2024   Continue follow-up with nephrology, remains stable.

## 2025-06-10 NOTE — PROGRESS NOTES
Diabetic Foot Exam    Patient's shoes and socks removed.    Right Foot/Ankle   Right Foot Inspection  Skin Exam: skin normal and skin intact. No dry skin, no warmth, no callus, no erythema, no maceration, no abnormal color, no pre-ulcer, no ulcer and no callus.     Toe Exam: ROM and strength within normal limits.     Sensory   Monofilament testing: intact    Vascular  Capillary refills: < 3 seconds  The right DP pulse is 2+. The right PT pulse is 2+.     Left Foot/Ankle  Left Foot Inspection  Skin Exam: skin normal and skin intact. No dry skin, no warmth, no erythema, no maceration, normal color, no pre-ulcer, no ulcer and no callus.     Toe Exam: ROM and strength within normal limits.     Sensory   Monofilament testing: intact    Vascular  Capillary refills: < 3 seconds  The left DP pulse is 2+. The left PT pulse is 2+.     Assign Risk Category  No deformity present  No loss of protective sensation  No weak pulses  Risk: 0   Assessment and Plan:     Problem List Items Addressed This Visit       Benign essential hypertension    Blood pressures are highly elevated today but patient is completely asymptomatic.  She will continue to monitor at home, update in 7 days.  Her blood pressure cuff was checked by me in the past and was reliable.  As for now continue current dose of losartan.  If any symptoms such as headaches, chest pain, shortness of breath, vision changes she will go to ER immediately.         Relevant Medications    losartan (COZAAR) 100 MG tablet    Hyperlipidemia    Continue current dose of rosuvastatin.  Recheck with the next blood work.         Relevant Medications    rosuvastatin (CRESTOR) 20 MG tablet    Other Relevant Orders    Lipid panel    Type 2 diabetes mellitus with stage 3 chronic kidney disease (HCC)    Hemoglobin A1c of 7.2, continue current dose of metformin.  Continue with low-carb diet.  Lab Results   Component Value Date    HGBA1C 7.2 (A) 06/10/2025            Relevant Orders    POCT  hemoglobin A1c (Completed)    Hemoglobin A1C    Albumin / creatinine urine ratio    Chronic kidney disease, stage 4 (severe) ([facility])    Lab Results   Component Value Date    EGFR 31 05/13/2025    EGFR 28 11/26/2024    EGFR 29 11/20/2024    CREATININE 1.57 (H) 05/13/2025    CREATININE 1.72 (H) 11/26/2024    CREATININE 1.68 (H) 11/20/2024   Continue follow-up with nephrology, remains stable.          Other Visit Diagnoses         Medicare annual wellness visit, subsequent    -  Primary      Chronic obstructive pulmonary disease, unspecified COPD type ([facility])          Postmenopausal osteoporosis        Relevant Medications    alendronate (FOSAMAX) 70 mg tablet      Screening for hypothyroidism        Relevant Orders    TSH w/Reflex             Preventive health issues were discussed with patient, and age appropriate screening tests were ordered as noted in patient's After Visit Summary.  Personalized health advice and appropriate referrals for health education or preventive services given if needed, as noted in patient's After Visit Summary.     History of Present Illness:     Patient presents for a Medicare Wellness Visit    Patient came today for Medicare wellness visit and to follow-up on her chronic problems.  She denies colonoscopy and mammogram.  She would like to hold off on tetanus shot.  Blood pressures are highly elevated today.         Patient Care Team:  Aris Mota MD as PCP - General (Internal Medicine)  Roya Phillips (Pediatrics)  [facility] (Optometry)  ROSCOE Duffy (Neurology)  Andrei Kaur MD (Pulmonary Disease)  ROSCOE Erazo as Nurse Practitioner (Nurse Practitioner)  Brittaney Haddad DO (Nephrology)  ROSCOE Trevizo as Nurse Practitioner (Nephrology)  Bolivar Isaac PA-C as Physician Assistant (Nephrology)     Review of Systems:     Review of Systems   Constitutional:  Negative for chills and fever.   Respiratory:  Negative for cough, shortness  of breath and wheezing.    Cardiovascular:  Negative for chest pain, palpitations and leg swelling.   Neurological:  Negative for dizziness, weakness and headaches.   Psychiatric/Behavioral:  Negative for confusion.       Problem List:     Problem List[1]   Past Medical and Surgical History:     Past Medical History[2]  Past Surgical History[3]   Family History:     Family History[4]   Social History:     Social History[5]   Medications and Allergies:     Current Medications[6]  Allergies[7]   Immunizations:     Immunization History   Administered Date(s) Administered    COVID-19 MODERNA VACC 0.25 ML IM BOOSTER 01/04/2022    COVID-19 MODERNA VACC 0.5 ML IM 04/06/2021, 05/04/2021, 01/04/2022    INFLUENZA 10/06/2018, 12/04/2019, 10/07/2020, 11/04/2020, 11/17/2020, 11/20/2020    Influenza Split High Dose Preservative Free IM 12/01/2016, 10/10/2017, 12/02/2024    Influenza, high dose seasonal 0.7 mL 11/21/2018, 11/21/2019, 11/17/2020, 11/23/2021, 11/25/2022, 11/27/2023    Influenza, seasonal, injectable 10/06/2018, 12/04/2019, 10/07/2020, 11/04/2020, 11/17/2020, 11/20/2020    Pneumococcal Conjugate 13-Valent 12/01/2016    Pneumococcal Polysaccharide PPV23 01/16/2018    Respiratory Syncytial Virus Vaccine (Recombinant, Adjuvanted) 02/15/2024    Zoster Vaccine Recombinant 12/13/2023, 02/15/2024      Health Maintenance:         Topic Date Due    Breast Cancer Screening: Mammogram  Never done    DXA SCAN  08/22/2026    Hepatitis C Screening  Completed    Colorectal Cancer Screening  Discontinued         Topic Date Due    COVID-19 Vaccine (5 - 2024-25 season) 09/01/2024      Medicare Screening Tests and Risk Assessments:     Ellie is here for her Subsequent Wellness visit.     Health Risk Assessment:   Patient rates overall health as good. Patient feels that their physical health rating is slightly worse. Patient is very satisfied with their life. Eyesight was rated as same. Hearing was rated as same. Patient feels that  their emotional and mental health rating is same. Patients states they are never, rarely angry. Patient states they are often unusually tired/fatigued. Pain experienced in the last 7 days has been some. Patient's pain rating has been 6/10. Patient states that she has experienced no weight loss or gain in last 6 months.     Depression Screening:   PHQ-2 Score: 1  PHQ-9 Score: 1      Fall Risk Screening:   In the past year, patient has experienced: no history of falling in past year      Urinary Incontinence Screening:   Patient has leaked urine accidently in the last six months.     Home Safety:  Patient has trouble with stairs inside or outside of their home. Patient has working smoke alarms and has working carbon monoxide detector. Home safety hazards include: none.     Nutrition:   Current diet is Regular.     Medications:   Patient is currently taking over-the-counter supplements. OTC medications include: see medication list. Patient is able to manage medications.     Activities of Daily Living (ADLs)/Instrumental Activities of Daily Living (IADLs):   Walk and transfer into and out of bed and chair?: Yes  Dress and groom yourself?: Yes    Bathe or shower yourself?: Yes    Feed yourself? Yes  Do your laundry/housekeeping?: Yes  Manage your money, pay your bills and track your expenses?: Yes  Make your own meals?: Yes    Do your own shopping?: Yes    Previous Hospitalizations:   Any hospitalizations or ED visits within the last 12 months?: No      Advance Care Planning:   Living will: No    Durable POA for healthcare: No    Advanced directive: No      Preventive Screenings      Cardiovascular Screening:    General: Screening Not Indicated and History Lipid Disorder      Diabetes Screening:     General: Screening Not Indicated and History Diabetes      Cervical Cancer Screening:    General: Screening Not Indicated      Osteoporosis Screening:    General: Screening Not Indicated and History Osteoporosis      Lung  "Cancer Screening:     General: Screening Not Indicated      Hepatitis C Screening:    General: Screening Current    Screening, Brief Intervention, and Referral to Treatment (SBIRT)     Screening  Typical number of drinks in a day: 0  Typical number of drinks in a week: 0  Interpretation: Low risk drinking behavior.    AUDIT-C Screenin) How often did you have a drink containing alcohol in the past year? never  2) How many drinks did you have on a typical day when you were drinking in the past year? 0  3) How often did you have 6 or more drinks on one occasion in the past year? never    AUDIT-C Score: 0  Interpretation: Score 0-2 (female): Negative screen for alcohol misuse    Single Item Drug Screening:  How often have you used an illegal drug (including marijuana) or a prescription medication for non-medical reasons in the past year? never    Single Item Drug Screen Score: 0  Interpretation: Negative screen for possible drug use disorder    No results found.     Physical Exam:     BP (!) 180/100   Pulse 68   Ht 5' 0.5\" (1.537 m)   Wt 79.6 kg (175 lb 6.4 oz)   SpO2 95%   BMI 33.69 kg/m²     Physical Exam  Constitutional:       General: She is not in acute distress.     Appearance: She is not ill-appearing or toxic-appearing.     Cardiovascular:      Pulses: no weak pulses.           Dorsalis pedis pulses are 2+ on the right side and 2+ on the left side.        Posterior tibial pulses are 2+ on the right side and 2+ on the left side.      Heart sounds: Murmur heard.   Pulmonary:      Effort: No respiratory distress.      Breath sounds: No wheezing or rales.   Feet:      Right foot:      Skin integrity: No ulcer, skin breakdown, erythema, warmth, callus or dry skin.      Left foot:      Skin integrity: No ulcer, skin breakdown, erythema, warmth, callus or dry skin.          Aris Mota MD         [1]   Patient Active Problem List  Diagnosis    Benign essential hypertension    Chronic low back " pain    Dupuytren's contracture    Hearing loss of right ear    Hyperlipidemia    Age-related osteoporosis without current pathological fracture    Restless legs syndrome    Sacroiliitis (HCC)    Obstructive sleep apnea    Type 2 diabetes mellitus with stage 3 chronic kidney disease (HCC)    Pain in right ankle and joints of right foot    Left lower quadrant abdominal mass    Abnormal EKG    Benign hypertension with CKD (chronic kidney disease) stage III (HCC)    Asthma-COPD overlap syndrome (HCC)    Left hip pain    Stage 3b chronic kidney disease (HCC)    Insomnia    Excessive daytime sleepiness    Claustrophobia    Chronic kidney disease, stage 4 (severe) (HCC)   [2]   Past Medical History:  Diagnosis Date    Asthma     COPD (chronic obstructive pulmonary disease) (HCC)     Diabetes (HCC)     Diabetes mellitus (HCC)     Hyperlipidemia     Hypertension     Osteoarthritis     Osteoporosis     Seasonal allergies    [3]   Past Surgical History:  Procedure Laterality Date    BILATERAL SALPINGOOPHORECTOMY Bilateral 1/3/2020    Procedure: BILATERAL SALPINGO-OOPHORECTOMY;  Surgeon: Martínez Oliveira MD;  Location: [facility];  Service: Gynecology Oncology    CATARACT EXTRACTION Bilateral     CHOLECYSTECTOMY      20JAN2016  LAST ASSESSED    EYE SURGERY      cataracts removed    HYSTERECTOMY      20JAN2016  LAST ASSESSED    OR EXPLORATORY LAPAROTOMY CELIOTOMY W/WO BIOPSY SPX N/A 1/3/2020    Procedure: LAPAROTOMY EXPLORATORY;  Surgeon: Martínez Oliveira MD;  Location: [facility];  Service: Gynecology Oncology    TUBAL LIGATION     [4]   Family History  Problem Relation Name Age of Onset    Heart disease Mother      Rheumatic fever Father      Diabetes Family MAT GGM         MELLITUS    Diabetes Family SIBLING         MELLITUS   [5]   Social History  Socioeconomic History    Marital status: /Civil Union   Tobacco Use    Smoking status: Former     Current packs/day: 0.00     Average packs/day: 1 pack/day for 32.0 years  (32.0 ttl pk-yrs)     Types: Cigarettes     Start date: 1964     Quit date:      Years since quittin.4    Smokeless tobacco: Never   Vaping Use    Vaping status: Never Used   Substance and Sexual Activity    Alcohol use: Not Currently    Drug use: No    Sexual activity: Not Currently     Social Drivers of Health     Financial Resource Strain: Low Risk  (2023)    Overall Financial Resource Strain (CARDIA)     Difficulty of Paying Living Expenses: Not hard at all   Food Insecurity: No Food Insecurity (6/10/2025)    Nursing - Inadequate Food Risk Classification     Worried About Running Out of Food in the Last Year: Never true     Ran Out of Food in the Last Year: Never true   Transportation Needs: No Transportation Needs (6/10/2025)    PRAPARE - Transportation     Lack of Transportation (Medical): No     Lack of Transportation (Non-Medical): No   Housing Stability: Low Risk  (6/10/2025)    Housing Stability Vital Sign     Unable to Pay for Housing in the Last Year: No     Number of Times Moved in the Last Year: 0     Homeless in the Last Year: No   [6]   Current Outpatient Medications   Medication Sig Dispense Refill    acetaminophen (TYLENOL) 325 mg tablet Take 2 tablets (650 mg total) by mouth every 6 (six) hours as needed for mild pain 30 tablet 0    albuterol (PROVENTIL HFA,VENTOLIN HFA) 90 mcg/act inhaler Inhale 2 puffs every 6 (six) hours as needed for wheezing or shortness of breath 18 g 2    alendronate (FOSAMAX) 70 mg tablet Take 1 tablet (70 mg total) by mouth every 7 days Give with at least 8 ounces of plain water first thing in the morning.  Patient should be instructed to stay upright (not to lie down) for at least 30 minutes and until after first food of the day (to reduce esophageal irritation). 12 tablet 0    aspirin (ECOTRIN LOW STRENGTH) 81 mg EC tablet Take 81 mg by mouth in the morning.      Calcium Carbonate-Vit D-Min (CALCIUM 1200 PO) Take by mouth in the morning.      Coenzyme  Q10 (COQ-10) 10 MG CAPS Take by mouth in the morning.      losartan (COZAAR) 100 MG tablet Take 1 tablet (100 mg total) by mouth daily 90 tablet 1    metFORMIN (GLUCOPHAGE) 1000 MG tablet Take 1 tablet by mouth twice daily 180 tablet 1    Multiple Vitamin (MULTI-VITAMIN DAILY PO) Take by mouth in the morning.      nystatin (MYCOSTATIN) cream Apply topically 2 (two) times a day 30 g 2    nystatin (MYCOSTATIN) powder Apply topically 2 (two) times a day 30 g 0    rosuvastatin (CRESTOR) 20 MG tablet Take 1 tablet (20 mg total) by mouth daily At bedtime 90 tablet 1    Trelegy Ellipta 100-62.5-25 MCG/ACT inhaler INHALE 1 PUFF ONCE DAILY 60 each 0    VITAMIN D PO Take 2,000 mg by mouth in the morning.      dextromethorphan-guaiFENesin (ROBITUSSIN DM)  mg/5 mL syrup Take 5 mL by mouth as needed in the morning and 5 mL as needed at noon and 5 mL as needed in the evening for cough. As needed. (Patient not taking: Reported on 6/10/2025)      Loratadine 10 MG CAPS Take by mouth in the morning. (Patient not taking: Reported on 6/10/2025)       No current facility-administered medications for this visit.   [7]   Allergies  Allergen Reactions    Ap Baumes

## 2025-06-10 NOTE — ASSESSMENT & PLAN NOTE
Hemoglobin A1c of 7.2, continue current dose of metformin.  Continue with low-carb diet.  Lab Results   Component Value Date    HGBA1C 7.2 (A) 06/10/2025

## 2025-06-16 DIAGNOSIS — J44.9 CHRONIC OBSTRUCTIVE PULMONARY DISEASE, UNSPECIFIED COPD TYPE (HCC): ICD-10-CM

## 2025-06-16 RX ORDER — FLUTICASONE FUROATE, UMECLIDINIUM BROMIDE AND VILANTEROL TRIFENATATE 100; 62.5; 25 UG/1; UG/1; UG/1
1 POWDER RESPIRATORY (INHALATION) DAILY
Qty: 60 EACH | Refills: 0 | Status: SHIPPED | OUTPATIENT
Start: 2025-06-16

## 2025-07-22 DIAGNOSIS — J44.9 CHRONIC OBSTRUCTIVE PULMONARY DISEASE, UNSPECIFIED COPD TYPE (HCC): ICD-10-CM

## 2025-07-31 RX ORDER — FLUTICASONE FUROATE, UMECLIDINIUM BROMIDE AND VILANTEROL TRIFENATATE 100; 62.5; 25 UG/1; UG/1; UG/1
1 POWDER RESPIRATORY (INHALATION) DAILY
Qty: 60 EACH | Refills: 0 | Status: SHIPPED | OUTPATIENT
Start: 2025-07-31

## (undated) DEVICE — 3000CC GUARDIAN II: Brand: GUARDIAN

## (undated) DEVICE — ELECTRODE BLADE MOD  E-Z CLEAN 6.5IN -0014M

## (undated) DEVICE — TELFA NON-ADHERENT ABSORBENT DRESSING: Brand: TELFA

## (undated) DEVICE — GAUZE SPONGES,16 PLY: Brand: CURITY

## (undated) DEVICE — GLOVE INDICATOR PI UNDERGLOVE SZ 7.5 BLUE

## (undated) DEVICE — SUT PDS II 1 XLH 96 IN LOOPED Z881G

## (undated) DEVICE — CHLORAPREP HI-LITE 26ML ORANGE

## (undated) DEVICE — ADHESIVE SKIN HIGH VISCOSITY EXOFIN 1ML

## (undated) DEVICE — INTENDED FOR TISSUE SEPARATION, AND OTHER PROCEDURES THAT REQUIRE A SHARP SURGICAL BLADE TO PUNCTURE OR CUT.: Brand: BARD-PARKER SAFETY BLADES SIZE 15, STERILE

## (undated) DEVICE — PACK PBDS STERILE LAP LITHOTOMY RF

## (undated) DEVICE — PENCIL ELECTROSURG E-Z CLEAN -0035H

## (undated) DEVICE — SUT STRATAFIX SPIRAL 3-0 PGA/PCL 30 X 30 CM SXMD2B408

## (undated) DEVICE — ANTIBACTERIAL UNDYED BRAIDED (POLYGLACTIN 910), SYNTHETIC ABSORBABLE SUTURE: Brand: COATED VICRYL

## (undated) DEVICE — SUT PLAIN 2-0 CTX 27 IN 872H

## (undated) DEVICE — TRAY FOLEY 16FR URIMETER SILICONE SURESTEP

## (undated) DEVICE — GLOVE PI ULTRA TOUCH SZ.7.5

## (undated) DEVICE — MEDI-VAC YANK SUCT HNDL W/TPRD BULBOUS TIP: Brand: CARDINAL HEALTH

## (undated) DEVICE — SUT VICRYL 0 REEL 54 IN J287G

## (undated) DEVICE — SUT VICRYL 0 CT-1 27 IN J260H

## (undated) DEVICE — 3M™ TEGADERM™ TRANSPARENT FILM DRESSING FRAME STYLE, 1628, 6 IN X 8 IN (15 CM X 20 CM), 10/CT 8CT/CASE: Brand: 3M™ TEGADERM™